# Patient Record
Sex: FEMALE | Race: WHITE | NOT HISPANIC OR LATINO | Employment: UNEMPLOYED | URBAN - METROPOLITAN AREA
[De-identification: names, ages, dates, MRNs, and addresses within clinical notes are randomized per-mention and may not be internally consistent; named-entity substitution may affect disease eponyms.]

---

## 2017-01-11 LAB — HUMAN PAPILLOMAVIRUS (HPV): NORMAL

## 2017-01-24 RX ORDER — ATORVASTATIN CALCIUM 10 MG/1
10 TABLET, FILM COATED ORAL DAILY
Qty: 90 TABLET | Refills: 3 | Status: SHIPPED | OUTPATIENT
Start: 2017-01-24 | End: 2017-01-30 | Stop reason: SDUPTHER

## 2017-01-24 RX ORDER — POTASSIUM CHLORIDE 750 MG/1
10 CAPSULE, EXTENDED RELEASE ORAL DAILY
Qty: 90 CAPSULE | Refills: 3 | Status: SHIPPED | OUTPATIENT
Start: 2017-01-24 | End: 2017-01-30 | Stop reason: SDUPTHER

## 2017-01-24 RX ORDER — FELODIPINE 5 MG/1
5 TABLET, EXTENDED RELEASE ORAL DAILY
Qty: 90 TABLET | Refills: 3 | Status: SHIPPED | OUTPATIENT
Start: 2017-01-24 | End: 2017-11-15 | Stop reason: SDUPTHER

## 2017-01-24 RX ORDER — LEVOTHYROXINE SODIUM 50 UG/1
50 TABLET ORAL DAILY
Qty: 90 TABLET | Refills: 3 | Status: SHIPPED | OUTPATIENT
Start: 2017-01-24 | End: 2017-11-15 | Stop reason: SDUPTHER

## 2017-01-24 RX ORDER — PANTOPRAZOLE SODIUM 40 MG/1
40 TABLET, DELAYED RELEASE ORAL DAILY
Qty: 90 TABLET | Refills: 3 | Status: SHIPPED | OUTPATIENT
Start: 2017-01-24 | End: 2017-09-18 | Stop reason: SDUPTHER

## 2017-01-25 ENCOUNTER — TELEPHONE (OUTPATIENT)
Dept: FAMILY MEDICINE | Facility: CLINIC | Age: 62
End: 2017-01-25

## 2017-01-26 RX ORDER — BUPROPION HYDROCHLORIDE 300 MG/1
300 TABLET ORAL DAILY
Qty: 90 TABLET | Refills: 1 | Status: SHIPPED | OUTPATIENT
Start: 2017-01-26 | End: 2017-11-15 | Stop reason: SDUPTHER

## 2017-01-30 RX ORDER — POTASSIUM CHLORIDE 750 MG/1
10 CAPSULE, EXTENDED RELEASE ORAL DAILY
Qty: 90 CAPSULE | Refills: 3 | Status: SHIPPED | OUTPATIENT
Start: 2017-01-30 | End: 2017-11-15 | Stop reason: SDUPTHER

## 2017-01-30 RX ORDER — ATORVASTATIN CALCIUM 10 MG/1
10 TABLET, FILM COATED ORAL DAILY
Qty: 90 TABLET | Refills: 3 | Status: SHIPPED | OUTPATIENT
Start: 2017-01-30 | End: 2017-03-09

## 2017-02-07 RX ORDER — ESCITALOPRAM OXALATE 20 MG/1
TABLET ORAL
Qty: 30 TABLET | Refills: 11 | Status: SHIPPED | OUTPATIENT
Start: 2017-02-07 | End: 2017-11-15 | Stop reason: SDUPTHER

## 2017-03-09 RX ORDER — HYDROCHLOROTHIAZIDE 25 MG/1
TABLET ORAL
Qty: 90 TABLET | Refills: 1 | Status: SHIPPED | OUTPATIENT
Start: 2017-03-09 | End: 2017-11-15 | Stop reason: SDUPTHER

## 2017-03-09 RX ORDER — ATORVASTATIN CALCIUM 10 MG/1
TABLET, FILM COATED ORAL
Qty: 90 TABLET | Refills: 1 | Status: SHIPPED | OUTPATIENT
Start: 2017-03-09 | End: 2017-11-15 | Stop reason: SDUPTHER

## 2017-03-14 RX ORDER — BUPROPION HYDROCHLORIDE 300 MG/1
TABLET ORAL
Qty: 90 TABLET | Refills: 2 | Status: SHIPPED | OUTPATIENT
Start: 2017-03-14 | End: 2017-11-15 | Stop reason: SDUPTHER

## 2017-03-21 RX ORDER — ESCITALOPRAM OXALATE 20 MG/1
20 TABLET ORAL DAILY
Qty: 90 TABLET | Refills: 2 | Status: SHIPPED | OUTPATIENT
Start: 2017-03-21 | End: 2017-09-04 | Stop reason: SDUPTHER

## 2017-06-07 RX ORDER — FELODIPINE 5 MG/1
5 TABLET, EXTENDED RELEASE ORAL DAILY
Qty: 90 TABLET | Refills: 3 | OUTPATIENT
Start: 2017-06-07

## 2017-06-08 RX ORDER — FELODIPINE 5 MG/1
TABLET, EXTENDED RELEASE ORAL
Qty: 90 TABLET | Refills: 1 | OUTPATIENT
Start: 2017-06-08

## 2017-09-04 RX ORDER — ESCITALOPRAM OXALATE 20 MG/1
TABLET ORAL
Qty: 90 TABLET | Refills: 0 | Status: SHIPPED | OUTPATIENT
Start: 2017-09-04 | End: 2017-11-15 | Stop reason: SDUPTHER

## 2017-09-18 RX ORDER — ESCITALOPRAM OXALATE 20 MG/1
TABLET ORAL
Qty: 90 TABLET | Refills: 2 | OUTPATIENT
Start: 2017-09-18

## 2017-09-18 RX ORDER — PANTOPRAZOLE SODIUM 40 MG/1
TABLET, DELAYED RELEASE ORAL
Qty: 90 TABLET | Refills: 3 | Status: SHIPPED | OUTPATIENT
Start: 2017-09-18 | End: 2018-08-26 | Stop reason: SDUPTHER

## 2017-11-15 ENCOUNTER — LAB VISIT (OUTPATIENT)
Dept: LAB | Facility: HOSPITAL | Age: 62
End: 2017-11-15
Attending: FAMILY MEDICINE
Payer: COMMERCIAL

## 2017-11-15 ENCOUNTER — OFFICE VISIT (OUTPATIENT)
Dept: FAMILY MEDICINE | Facility: CLINIC | Age: 62
End: 2017-11-15
Payer: COMMERCIAL

## 2017-11-15 VITALS
DIASTOLIC BLOOD PRESSURE: 68 MMHG | HEIGHT: 59 IN | WEIGHT: 141.81 LBS | TEMPERATURE: 99 F | HEART RATE: 66 BPM | SYSTOLIC BLOOD PRESSURE: 105 MMHG | BODY MASS INDEX: 28.59 KG/M2

## 2017-11-15 DIAGNOSIS — E03.9 HYPOTHYROIDISM, UNSPECIFIED TYPE: ICD-10-CM

## 2017-11-15 DIAGNOSIS — Z00.00 ROUTINE PHYSICAL EXAMINATION: ICD-10-CM

## 2017-11-15 DIAGNOSIS — E78.5 HYPERLIPIDEMIA, UNSPECIFIED HYPERLIPIDEMIA TYPE: ICD-10-CM

## 2017-11-15 DIAGNOSIS — F41.1 GENERALIZED ANXIETY DISORDER: ICD-10-CM

## 2017-11-15 DIAGNOSIS — Z00.00 ROUTINE PHYSICAL EXAMINATION: Primary | ICD-10-CM

## 2017-11-15 DIAGNOSIS — I10 ESSENTIAL HYPERTENSION: ICD-10-CM

## 2017-11-15 LAB
ALBUMIN SERPL BCP-MCNC: 3.9 G/DL
ALP SERPL-CCNC: 94 U/L
ALT SERPL W/O P-5'-P-CCNC: 23 U/L
ANION GAP SERPL CALC-SCNC: 6 MMOL/L
AST SERPL-CCNC: 21 U/L
BASOPHILS # BLD AUTO: 0 K/UL
BASOPHILS NFR BLD: 0 %
BILIRUB SERPL-MCNC: 0.4 MG/DL
BUN SERPL-MCNC: 20 MG/DL
CALCIUM SERPL-MCNC: 9.9 MG/DL
CHLORIDE SERPL-SCNC: 105 MMOL/L
CHOLEST SERPL-MCNC: 232 MG/DL
CHOLEST/HDLC SERPL: 3 {RATIO}
CO2 SERPL-SCNC: 31 MMOL/L
CREAT SERPL-MCNC: 0.9 MG/DL
DIFFERENTIAL METHOD: ABNORMAL
EOSINOPHIL # BLD AUTO: 0 K/UL
EOSINOPHIL NFR BLD: 0 %
ERYTHROCYTE [DISTWIDTH] IN BLOOD BY AUTOMATED COUNT: 13.1 %
EST. GFR  (AFRICAN AMERICAN): >60 ML/MIN/1.73 M^2
EST. GFR  (NON AFRICAN AMERICAN): >60 ML/MIN/1.73 M^2
GLUCOSE SERPL-MCNC: 95 MG/DL
HCT VFR BLD AUTO: 42.8 %
HDLC SERPL-MCNC: 77 MG/DL
HDLC SERPL: 33.2 %
HGB BLD-MCNC: 14.2 G/DL
IMM GRANULOCYTES # BLD AUTO: 0.03 K/UL
IMM GRANULOCYTES NFR BLD AUTO: 0.6 %
LDLC SERPL CALC-MCNC: 140 MG/DL
LYMPHOCYTES # BLD AUTO: 1.8 K/UL
LYMPHOCYTES NFR BLD: 36.3 %
MCH RBC QN AUTO: 29.5 PG
MCHC RBC AUTO-ENTMCNC: 33.2 G/DL
MCV RBC AUTO: 89 FL
MONOCYTES # BLD AUTO: 0.4 K/UL
MONOCYTES NFR BLD: 8.4 %
NEUTROPHILS # BLD AUTO: 2.8 K/UL
NEUTROPHILS NFR BLD: 54.7 %
NONHDLC SERPL-MCNC: 155 MG/DL
NRBC BLD-RTO: 0 /100 WBC
PLATELET # BLD AUTO: 223 K/UL
PMV BLD AUTO: 10.3 FL
POTASSIUM SERPL-SCNC: 3.9 MMOL/L
PROT SERPL-MCNC: 7.3 G/DL
RBC # BLD AUTO: 4.82 M/UL
SODIUM SERPL-SCNC: 142 MMOL/L
TRIGL SERPL-MCNC: 75 MG/DL
TSH SERPL DL<=0.005 MIU/L-ACNC: 1.31 UIU/ML
WBC # BLD AUTO: 5.02 K/UL

## 2017-11-15 PROCEDURE — 90472 IMMUNIZATION ADMIN EACH ADD: CPT | Mod: S$GLB,,, | Performed by: FAMILY MEDICINE

## 2017-11-15 PROCEDURE — 99396 PREV VISIT EST AGE 40-64: CPT | Mod: 25,S$GLB,, | Performed by: FAMILY MEDICINE

## 2017-11-15 PROCEDURE — 85025 COMPLETE CBC W/AUTO DIFF WBC: CPT

## 2017-11-15 PROCEDURE — 99999 PR PBB SHADOW E&M-EST. PATIENT-LVL IV: CPT | Mod: PBBFAC,,, | Performed by: FAMILY MEDICINE

## 2017-11-15 PROCEDURE — 90471 IMMUNIZATION ADMIN: CPT | Mod: S$GLB,,, | Performed by: FAMILY MEDICINE

## 2017-11-15 PROCEDURE — 80061 LIPID PANEL: CPT

## 2017-11-15 PROCEDURE — 80053 COMPREHEN METABOLIC PANEL: CPT

## 2017-11-15 PROCEDURE — 84443 ASSAY THYROID STIM HORMONE: CPT

## 2017-11-15 PROCEDURE — 90686 IIV4 VACC NO PRSV 0.5 ML IM: CPT | Mod: S$GLB,,, | Performed by: FAMILY MEDICINE

## 2017-11-15 PROCEDURE — 36415 COLL VENOUS BLD VENIPUNCTURE: CPT | Mod: PO

## 2017-11-15 PROCEDURE — 90736 HZV VACCINE LIVE SUBQ: CPT | Mod: S$GLB,,, | Performed by: FAMILY MEDICINE

## 2017-11-15 RX ORDER — ALPRAZOLAM 0.25 MG/1
0.25 TABLET ORAL 3 TIMES DAILY PRN
Qty: 30 TABLET | Refills: 0 | Status: SHIPPED | OUTPATIENT
Start: 2017-11-15 | End: 2019-01-23 | Stop reason: SDUPTHER

## 2017-11-15 RX ORDER — BUPROPION HYDROCHLORIDE 300 MG/1
300 TABLET ORAL DAILY
Qty: 90 TABLET | Refills: 3 | Status: SHIPPED | OUTPATIENT
Start: 2017-11-15 | End: 2018-08-26 | Stop reason: SDUPTHER

## 2017-11-15 RX ORDER — POTASSIUM CHLORIDE 750 MG/1
10 CAPSULE, EXTENDED RELEASE ORAL DAILY
Qty: 90 CAPSULE | Refills: 3 | Status: SHIPPED | OUTPATIENT
Start: 2017-11-15 | End: 2018-08-26 | Stop reason: SDUPTHER

## 2017-11-15 RX ORDER — HYDROCHLOROTHIAZIDE 25 MG/1
25 TABLET ORAL DAILY
Qty: 90 TABLET | Refills: 3 | Status: SHIPPED | OUTPATIENT
Start: 2017-11-15 | End: 2018-08-26 | Stop reason: SDUPTHER

## 2017-11-15 RX ORDER — ESCITALOPRAM OXALATE 20 MG/1
20 TABLET ORAL DAILY
Qty: 90 TABLET | Refills: 3 | Status: SHIPPED | OUTPATIENT
Start: 2017-11-15 | End: 2018-07-30 | Stop reason: SDUPTHER

## 2017-11-15 RX ORDER — ATORVASTATIN CALCIUM 10 MG/1
10 TABLET, FILM COATED ORAL DAILY
Qty: 90 TABLET | Refills: 3 | Status: SHIPPED | OUTPATIENT
Start: 2017-11-15 | End: 2018-08-26 | Stop reason: SDUPTHER

## 2017-11-15 RX ORDER — FELODIPINE 5 MG/1
5 TABLET, EXTENDED RELEASE ORAL DAILY
Qty: 90 TABLET | Refills: 3 | Status: SHIPPED | OUTPATIENT
Start: 2017-11-15 | End: 2018-01-26 | Stop reason: SDUPTHER

## 2017-11-15 RX ORDER — LEVOTHYROXINE SODIUM 50 UG/1
50 TABLET ORAL DAILY
Qty: 90 TABLET | Refills: 3 | Status: SHIPPED | OUTPATIENT
Start: 2017-11-15 | End: 2018-01-26 | Stop reason: SDUPTHER

## 2017-11-16 NOTE — PROGRESS NOTES
Patient presents routine physical exam.  Medical problems as below to include hypertension and hypothyroidism hyperlipidemia anxiety all controlled with current medication.    Past Medical History:  Past Medical History:   Diagnosis Date    Carpal tunnel syndrome of right wrist     Chronic gastritis     Dissociative fugue     Diverticulosis     Duodenitis     Dyspepsia     Generalized anxiety disorder     Hearing loss on left     Hearing loss on right     Helicobacter pylori (H. pylori)     History of blood transfusion     Hyperlipidemia     Hypertension     Hypothyroidism     IBS (irritable bowel syndrome)     Iron deficiency anemia     Mild obesity     Mild vitamin D deficiency     Paraesophageal hiatal hernia     Reflux gastritis     Sleep apnea     mild improved with wt. loss    Thyroid goiter     Urticaria, chronic      Past Surgical History:   Procedure Laterality Date    BLADDER SUSPENSION      pubovaginal sling     SECTION, CLASSIC      CHOLECYSTECTOMY      COLONOSCOPY  2011    Negative-repeat 10 years    ESOPHAGEAL DILATION      ESOPHAGOGASTRODUODENOSCOPY  2016    THYROIDECTOMY, PARTIAL       partial for benign nodule     Social History     Social History    Marital status:      Spouse name: N/A    Number of children: N/A    Years of education: N/A     Occupational History    Not on file.     Social History Main Topics    Smoking status: Never Smoker    Smokeless tobacco: Not on file    Alcohol use No    Drug use: No    Sexual activity: Not on file     Other Topics Concern    Not on file     Social History Narrative    No narrative on file     Family History   Problem Relation Age of Onset    Hypertension Mother     Heart disease Mother      Allergies   Allergen Reactions    Penicillins      Current Outpatient Prescriptions on File Prior to Visit   Medication Sig Dispense Refill    ferrous gluconate (FERGON) 324 MG tablet TAKE 1  TABLET BY MOUTH WITH BREAKFAST (Patient taking differently: TWICE WEEKLY) 30 tablet 5    pantoprazole (PROTONIX) 40 MG tablet TAKE 1 TABLET DAILY 90 tablet 3    [DISCONTINUED] atorvastatin (LIPITOR) 10 MG tablet TAKE 1 TABLET DAILY 90 tablet 1    [DISCONTINUED] buPROPion (WELLBUTRIN XL) 300 MG 24 hr tablet Take 1 tablet (300 mg total) by mouth once daily. 90 tablet 1    [DISCONTINUED] escitalopram oxalate (LEXAPRO) 20 MG tablet TAKE 1 TABLET ONCE DAILY 90 tablet 0    [DISCONTINUED] felodipine (PLENDIL) 5 MG 24 hr tablet Take 1 tablet (5 mg total) by mouth once daily. 90 tablet 3    [DISCONTINUED] hydrochlorothiazide (HYDRODIURIL) 25 MG tablet TAKE 1 TABLET DAILY 90 tablet 1    [DISCONTINUED] levothyroxine (SYNTHROID) 50 MCG tablet Take 1 tablet (50 mcg total) by mouth once daily. 90 tablet 3    [DISCONTINUED] potassium chloride (MICRO-K) 10 MEQ CpSR Take 1 capsule (10 mEq total) by mouth once daily. 90 capsule 3    senna (SENOKOT) 8.6 mg tablet Take 8.6 mg by mouth once daily.      [DISCONTINUED] buPROPion (WELLBUTRIN XL) 300 MG 24 hr tablet TAKE 1 TABLET DAILY 90 tablet 2    [DISCONTINUED] butalbital-acetaminophen-caffeine -40 mg (FIORICET, ESGIC) -40 mg per tablet Take 1 tablet by mouth daily as needed for Headaches. 30 tablet 0    [DISCONTINUED] escitalopram oxalate (LEXAPRO) 20 MG tablet TAKE 1 TABLET BY MOUTH ONCE DAILY. 30 tablet 11    [DISCONTINUED] multivitamin capsule Take 1 capsule by mouth once daily.       No current facility-administered medications on file prior to visit.            ROS:  GENERAL: No fever, chills.  She did lose some weight intentionally  HEENT: No headache .  Bilateral hearing aids.  No dysphagia  Eyes: No vision complaints  CHEST: Denies WATSON, cyanosis, wheezing, cough and sputum production.  CARDIOVASCULAR: Denies chest pain, PND, orthopnea or reduced exercise tolerance.  ABDOMEN: Appetite fine. Denies diarrhea, abdominal pain, hematemesis or blood in  "stool.  URINARY: No flank pain, dysuria or hematuria.  MUSCULOSKELETAL: No warmth swelling or tenderness of the joints  NEUROLOGIC: No focal weakness numbness or paresthesia  PSYCHIATRIC: Denies depression        OBJECTIVE:     Vitals:    11/15/17 0754   BP: 105/68   Pulse: 66   Temp: 98.6 °F (37 °C)   Weight: 64.3 kg (141 lb 12.8 oz)   Height: 4' 11" (1.499 m)     Wt Readings from Last 3 Encounters:   11/15/17 64.3 kg (141 lb 12.8 oz)   11/17/16 70 kg (154 lb 5.2 oz)   03/08/16 76.1 kg (167 lb 12.8 oz)     APPEARANCE: Well nourished, well developed, in no acute distress.    HEAD: Normocephalic.  Atraumatic.  No sinus tenderness.  EYES:   Right eye: Pupil reactive.  Conjunctiva clear.    Left eye: Pupil reactive.  Conjunctiva clear.    Both fundi:  Grossly normal to nondilated exam. EOMI.    EARS: TM's intact. Light reflex normal. No retraction or perforation.    NOSE:  clear.  MOUTH & THROAT:  No pharyngeal erythema or exudate. No lesions.  NECK: Supple. No bruits.  No JVD.  No cervical lymphadenopathy.  No thyromegaly.    CHEST: Breath sounds clear bilaterally.  Normal respiratory effort  CARDIOVASCULAR: Normal rate.  Regular rhythm.  No murmurs.  No rub.  No gallops.  ABDOMEN: Bowel sounds normal.  Soft.  No tenderness.  No organomegaly.  PERIPHERAL VASCULAR: No cyanosis.  No clubbing.  No edema.  NEUROLOGIC: No focal findings.  MENTAL STATUS: Alert.  Oriented x 3.          Cheyenne was seen today for annual exam.    Diagnoses and all orders for this visit:    Routine physical examination  -     CBC auto differential; Future  -     Comprehensive metabolic panel; Future  -     Lipid panel; Future  -     TSH; Future    Generalized anxiety disorder    Hyperlipidemia, unspecified hyperlipidemia type    Essential hypertension    Hypothyroidism, unspecified type    Other orders  -     Zoster Vaccine - Live  -     Influenza - Quadrivalent (3 years & older) (PF)  -     atorvastatin (LIPITOR) 10 MG tablet; Take 1 tablet (10 " mg total) by mouth once daily.  -     felodipine (PLENDIL) 5 MG 24 hr tablet; Take 1 tablet (5 mg total) by mouth once daily.  -     hydroCHLOROthiazide (HYDRODIURIL) 25 MG tablet; Take 1 tablet (25 mg total) by mouth once daily.  -     levothyroxine (SYNTHROID) 50 MCG tablet; Take 1 tablet (50 mcg total) by mouth once daily.  -     buPROPion (WELLBUTRIN XL) 300 MG 24 hr tablet; Take 1 tablet (300 mg total) by mouth once daily.  -     escitalopram oxalate (LEXAPRO) 20 MG tablet; Take 1 tablet (20 mg total) by mouth once daily.  -     potassium chloride (MICRO-K) 10 MEQ CpSR; Take 1 capsule (10 mEq total) by mouth once daily.  -     ALPRAZolam (XANAX) 0.25 MG tablet; Take 1 tablet (0.25 mg total) by mouth 3 (three) times daily as needed for Anxiety.      Anticipatory guidance: Don't smoke.  Healthy diet and regular exercise recommended.  Continue current medication

## 2018-01-23 RX ORDER — ESCITALOPRAM OXALATE 20 MG/1
TABLET ORAL
Qty: 90 TABLET | Refills: 3 | Status: SHIPPED | OUTPATIENT
Start: 2018-01-23 | End: 2018-06-27 | Stop reason: SDUPTHER

## 2018-01-26 RX ORDER — FELODIPINE 5 MG/1
5 TABLET, EXTENDED RELEASE ORAL DAILY
Qty: 90 TABLET | Refills: 3 | Status: SHIPPED | OUTPATIENT
Start: 2018-01-26 | End: 2018-12-10 | Stop reason: SDUPTHER

## 2018-01-26 RX ORDER — LEVOTHYROXINE SODIUM 50 UG/1
50 TABLET ORAL DAILY
Qty: 90 TABLET | Refills: 3 | Status: SHIPPED | OUTPATIENT
Start: 2018-01-26 | End: 2018-11-07 | Stop reason: SDUPTHER

## 2018-02-01 ENCOUNTER — TELEPHONE (OUTPATIENT)
Dept: FAMILY MEDICINE | Facility: CLINIC | Age: 63
End: 2018-02-01

## 2018-02-01 NOTE — TELEPHONE ENCOUNTER
----- Message from Gabriella Patel sent at 2/1/2018  3:40 PM CST -----  Contact: [facility]  Request a call concerning a medication refill, no additional info given, can be reached at [phone]///[email or web address]

## 2018-02-01 NOTE — TELEPHONE ENCOUNTER
Cancel rx at [facility] and verbally sent rx to [facility].

## 2018-04-15 RX ORDER — ESCITALOPRAM OXALATE 20 MG/1
TABLET ORAL
Qty: 90 TABLET | Refills: 1 | Status: SHIPPED | OUTPATIENT
Start: 2018-04-15 | End: 2018-12-03 | Stop reason: SDUPTHER

## 2018-06-27 ENCOUNTER — TELEPHONE (OUTPATIENT)
Dept: GASTROENTEROLOGY | Facility: CLINIC | Age: 63
End: 2018-06-27

## 2018-06-27 ENCOUNTER — OFFICE VISIT (OUTPATIENT)
Dept: FAMILY MEDICINE | Facility: CLINIC | Age: 63
End: 2018-06-27
Payer: COMMERCIAL

## 2018-06-27 ENCOUNTER — HOSPITAL ENCOUNTER (OUTPATIENT)
Dept: RADIOLOGY | Facility: HOSPITAL | Age: 63
Discharge: HOME | End: 2018-06-27
Attending: FAMILY MEDICINE
Payer: COMMERCIAL

## 2018-06-27 VITALS
OXYGEN SATURATION: 97 % | DIASTOLIC BLOOD PRESSURE: 84 MMHG | WEIGHT: 155.19 LBS | BODY MASS INDEX: 31.28 KG/M2 | HEIGHT: 59 IN | HEART RATE: 77 BPM | SYSTOLIC BLOOD PRESSURE: 126 MMHG | TEMPERATURE: 99 F

## 2018-06-27 DIAGNOSIS — E03.9 HYPOTHYROIDISM, UNSPECIFIED TYPE: ICD-10-CM

## 2018-06-27 DIAGNOSIS — K29.50 CHRONIC GASTRITIS, PRESENCE OF BLEEDING UNSPECIFIED, UNSPECIFIED GASTRITIS TYPE: ICD-10-CM

## 2018-06-27 DIAGNOSIS — K62.5 RECTAL BLEEDING: ICD-10-CM

## 2018-06-27 DIAGNOSIS — R06.09 DOE (DYSPNEA ON EXERTION): Primary | ICD-10-CM

## 2018-06-27 DIAGNOSIS — R06.09 DOE (DYSPNEA ON EXERTION): ICD-10-CM

## 2018-06-27 DIAGNOSIS — R00.2 PALPITATION: ICD-10-CM

## 2018-06-27 DIAGNOSIS — R13.10 DYSPHAGIA, UNSPECIFIED TYPE: ICD-10-CM

## 2018-06-27 DIAGNOSIS — Z86.2 HISTORY OF IRON DEFICIENCY ANEMIA: ICD-10-CM

## 2018-06-27 PROCEDURE — 99999 PR PBB SHADOW E&M-EST. PATIENT-LVL III: CPT | Mod: PBBFAC,,, | Performed by: FAMILY MEDICINE

## 2018-06-27 PROCEDURE — 71046 X-RAY EXAM CHEST 2 VIEWS: CPT | Mod: TC,PO

## 2018-06-27 PROCEDURE — 3008F BODY MASS INDEX DOCD: CPT | Mod: CPTII,S$GLB,, | Performed by: FAMILY MEDICINE

## 2018-06-27 PROCEDURE — 93005 ELECTROCARDIOGRAM TRACING: CPT | Mod: S$GLB,,, | Performed by: FAMILY MEDICINE

## 2018-06-27 PROCEDURE — 3079F DIAST BP 80-89 MM HG: CPT | Mod: CPTII,S$GLB,, | Performed by: FAMILY MEDICINE

## 2018-06-27 PROCEDURE — 93010 ELECTROCARDIOGRAM REPORT: CPT | Mod: S$GLB,,, | Performed by: INTERNAL MEDICINE

## 2018-06-27 PROCEDURE — 99214 OFFICE O/P EST MOD 30 MIN: CPT | Mod: S$GLB,,, | Performed by: FAMILY MEDICINE

## 2018-06-27 PROCEDURE — 3074F SYST BP LT 130 MM HG: CPT | Mod: CPTII,S$GLB,, | Performed by: FAMILY MEDICINE

## 2018-06-27 PROCEDURE — 71046 X-RAY EXAM CHEST 2 VIEWS: CPT | Mod: 26,,, | Performed by: RADIOLOGY

## 2018-06-27 NOTE — PROGRESS NOTES
Patient presents with a complaint of some restlessness in the legs during the past month or 2.  During the past 4 days she has noted some mild dyspnea on exertion.  Occasional headache.  She states she had similar symptoms when she had significant anemia 2 years ago which required transfusion.  At that time she had evidence of chronic gastritis on upper endoscopy, but apparently no active bleeding.  She does state that she saw small amount of blood when she had a hard bowel movement last month, but this has not recurred.  She was taking iron supplements, but only twice a week due to constipation and more recently had not been taking it at all.  She has noted some mild dysphagia which she has had in the past.  This mainly occurs with meat.  She denies any upper respiratory symptoms or cough.  No chest pain. Occasionally has a slight fluttering sensation at the chest.  No orthopnea or PND.  Not using NSAIDs    Past Medical History:  Past Medical History:   Diagnosis Date    Carpal tunnel syndrome of right wrist     Chronic gastritis     Dissociative fugue     Diverticulosis     Duodenitis     Dyspepsia     Generalized anxiety disorder     Hearing loss on left     Hearing loss on right     Helicobacter pylori (H. pylori)     History of blood transfusion     Hyperlipidemia     Hypertension     Hypothyroidism     IBS (irritable bowel syndrome)     Iron deficiency anemia     Mild obesity     Mild vitamin D deficiency     Paraesophageal hiatal hernia     Reflux gastritis     Sleep apnea     mild improved with wt. loss    Thyroid goiter     Urticaria, chronic      Past Surgical History:   Procedure Laterality Date    BLADDER SUSPENSION      pubovaginal sling     SECTION, CLASSIC      CHOLECYSTECTOMY      COLONOSCOPY  2011    Negative-repeat 10 years    ESOPHAGEAL DILATION      ESOPHAGOGASTRODUODENOSCOPY  2016    THYROIDECTOMY, PARTIAL       partial for benign nodule      Social History     Social History    Marital status:      Spouse name: N/A    Number of children: N/A    Years of education: N/A     Occupational History    Not on file.     Social History Main Topics    Smoking status: Never Smoker    Smokeless tobacco: Not on file    Alcohol use No    Drug use: No    Sexual activity: Not on file     Other Topics Concern    Not on file     Social History Narrative    No narrative on file     Family History   Problem Relation Age of Onset    Hypertension Mother     Heart disease Mother      Review of patient's allergies indicates:   Allergen Reactions    Penicillins      Current Outpatient Prescriptions on File Prior to Visit   Medication Sig Dispense Refill    ALPRAZolam (XANAX) 0.25 MG tablet Take 1 tablet (0.25 mg total) by mouth 3 (three) times daily as needed for Anxiety. 30 tablet 0    atorvastatin (LIPITOR) 10 MG tablet Take 1 tablet (10 mg total) by mouth once daily. 90 tablet 3    buPROPion (WELLBUTRIN XL) 300 MG 24 hr tablet Take 1 tablet (300 mg total) by mouth once daily. 90 tablet 3    CALCIUM CARBONATE/VITAMIN D3 (CALTRATE 600 + D ORAL) Take by mouth 2 (two) times daily.      escitalopram oxalate (LEXAPRO) 20 MG tablet Take 1 tablet (20 mg total) by mouth once daily. 90 tablet 3    escitalopram oxalate (LEXAPRO) 20 MG tablet TAKE 1 TABLET ONCE DAILY 90 tablet 1    felodipine (PLENDIL) 5 MG 24 hr tablet Take 1 tablet (5 mg total) by mouth once daily. 90 tablet 3    ferrous gluconate (FERGON) 324 MG tablet TAKE 1 TABLET BY MOUTH WITH BREAKFAST (Patient taking differently: TWICE WEEKLY) 30 tablet 5    hydroCHLOROthiazide (HYDRODIURIL) 25 MG tablet Take 1 tablet (25 mg total) by mouth once daily. 90 tablet 3    levothyroxine (SYNTHROID) 50 MCG tablet Take 1 tablet (50 mcg total) by mouth once daily. 90 tablet 3    pantoprazole (PROTONIX) 40 MG tablet TAKE 1 TABLET DAILY 90 tablet 3    potassium chloride (MICRO-K) 10 MEQ CpSR Take 1  "capsule (10 mEq total) by mouth once daily. 90 capsule 3    senna (SENOKOT) 8.6 mg tablet Take 8.6 mg by mouth once daily.      VIT A/VIT C/VIT E/ZINC/COPPER (PRESERVISION AREDS ORAL) Take by mouth once daily.      OMEGA-3 FATTY ACIDS-EPA ORAL Take by mouth once daily.      [DISCONTINUED] escitalopram oxalate (LEXAPRO) 20 MG tablet TAKE 1 TABLET ONCE DAILY 90 tablet 3     No current facility-administered medications on file prior to visit.        ROS:  GENERAL: No fever, chills,  or significant weight changes.   CARDIOVASCULAR: Denies chest pain, PND, orthopnea .  ABDOMEN: Appetite fine. Denies diarrhea, abdominal pain, hematemesis.  URINARY: No flank pain, dysuria or hematuria.      OBJECTIVE:     Vitals:    06/27/18 0822   BP: 126/84   Pulse: 77   Temp: 99 °F (37.2 °C)   SpO2: 97%   Weight: 70.4 kg (155 lb 3.2 oz)   Height: 4' 11" (1.499 m)     Wt Readings from Last 3 Encounters:   06/27/18 70.4 kg (155 lb 3.2 oz)   11/15/17 64.3 kg (141 lb 12.8 oz)   11/17/16 70 kg (154 lb 5.2 oz)     APPEARANCE: Well nourished, well developed, in no acute distress.    HEAD: Normocephalic.  Atraumatic.  No sinus tenderness.  EYES:   Right eye: Pupil reactive.  Conjunctiva clear.    Left eye: Pupil reactive.  Conjunctiva clear.    Both fundi:  Grossly normal to nondilated exam. EOMI.    EARS: TM's intact. Light reflex normal. No retraction or perforation.    NOSE:  clear.  MOUTH & THROAT:  No pharyngeal erythema or exudate. No lesions.  NECK: Supple. No bruits.  No JVD.  No cervical lymphadenopathy.  No thyromegaly.    CHEST: Breath sounds clear bilaterally.  Normal respiratory effort  CARDIOVASCULAR: Normal rate.  Regular rhythm.  No murmurs.  No rub.  No gallops.  ABDOMEN: Bowel sounds normal.  Soft.  No tenderness.  No organomegaly.  PERIPHERAL VASCULAR: No cyanosis.  No clubbing.  No edema.  NEUROLOGIC: No focal findings.  MENTAL STATUS: Alert.  Oriented x 3.    Cheyenne was seen today for shortness of breath.    Diagnoses " and all orders for this visit:    WATSON (dyspnea on exertion)  -     CBC auto differential; Future  -     Ferritin; Future  -     Iron and TIBC; Future  -     X-Ray Chest PA And Lateral; Future    Palpitation  -     EKG 12-lead  -     Basic metabolic panel; Future    History of iron deficiency anemia  -     CBC auto differential; Future  -     Ferritin; Future  -     Iron and TIBC; Future    Chronic gastritis, presence of bleeding unspecified, unspecified gastritis type  -     Ambulatory referral to Gastroenterology    Dysphagia, unspecified type  -     Ambulatory referral to Gastroenterology    Hypothyroidism, unspecified type  -     TSH; Future  -     Basic metabolic panel; Future    Rectal bleeding  -     Ambulatory referral to Gastroenterology      Further evaluation pending above.  Follow-up as needed if symptoms worsen or ER if needed.

## 2018-06-27 NOTE — TELEPHONE ENCOUNTER
----- Message from Otilia Red sent at 6/27/2018  9:17 AM CDT -----  Contact: self  cell [phone]  Not getting an available appointment with anyone in gastro.  Pt needs to be seen for:  Chronic gastritis, presence of bleeding unspecified, unspecified gastritis type [K29.50]  Dysphagia, unspecified type [R13.10]  Rectal bleeding [K62.5]    Can anyone see this patient for a consult.  Please call and advise

## 2018-06-28 ENCOUNTER — TELEPHONE (OUTPATIENT)
Dept: FAMILY MEDICINE | Facility: CLINIC | Age: 63
End: 2018-06-28

## 2018-06-28 DIAGNOSIS — D50.9 IRON DEFICIENCY ANEMIA, UNSPECIFIED IRON DEFICIENCY ANEMIA TYPE: Primary | ICD-10-CM

## 2018-06-28 DIAGNOSIS — R06.09 DOE (DYSPNEA ON EXERTION): ICD-10-CM

## 2018-06-28 NOTE — TELEPHONE ENCOUNTER
MD TAMIA Cardoso Staff             Blood test does show mild iron deficiency anemia however anemia is very mild and not enough to cause any problems breathing.  This could contribute however to restless legs.  Recommend taking iron once a day.  Follow up GI as discussed at appointment.  Recommend schedule a stress echocardiogram to rule out heart issue.  Repeat CBC, iron, TIBC, ferritin in 3 months.   My nurse will contact you to arrange.   Thanks,   Dr. Buck

## 2018-07-26 ENCOUNTER — TELEPHONE (OUTPATIENT)
Dept: CARDIOLOGY | Facility: CLINIC | Age: 63
End: 2018-07-26

## 2018-07-26 NOTE — TELEPHONE ENCOUNTER
I contacted patient and explained the [facility] was still not available to perform her stress echo today.I offered to schedule at [place] or [place].She wanted to reschedule here after next week due to vacation.Appointment rescheduled for patient and she agreed.

## 2018-07-30 ENCOUNTER — HOSPITAL ENCOUNTER (OUTPATIENT)
Dept: RADIOLOGY | Facility: HOSPITAL | Age: 63
Discharge: HOME | End: 2018-07-30
Attending: NURSE PRACTITIONER
Payer: COMMERCIAL

## 2018-07-30 ENCOUNTER — INITIAL CONSULT (OUTPATIENT)
Dept: GASTROENTEROLOGY | Facility: CLINIC | Age: 63
End: 2018-07-30
Payer: COMMERCIAL

## 2018-07-30 VITALS
SYSTOLIC BLOOD PRESSURE: 152 MMHG | HEART RATE: 69 BPM | WEIGHT: 158.94 LBS | HEIGHT: 59 IN | BODY MASS INDEX: 32.04 KG/M2 | DIASTOLIC BLOOD PRESSURE: 95 MMHG

## 2018-07-30 DIAGNOSIS — D50.9 IRON DEFICIENCY ANEMIA, UNSPECIFIED IRON DEFICIENCY ANEMIA TYPE: Primary | ICD-10-CM

## 2018-07-30 DIAGNOSIS — Z87.898 HISTORY OF SHORTNESS OF BREATH: ICD-10-CM

## 2018-07-30 DIAGNOSIS — Z86.19 HISTORY OF HELICOBACTER PYLORI INFECTION: ICD-10-CM

## 2018-07-30 DIAGNOSIS — R13.14 PHARYNGOESOPHAGEAL DYSPHAGIA: ICD-10-CM

## 2018-07-30 DIAGNOSIS — R10.13 EPIGASTRIC PAIN: ICD-10-CM

## 2018-07-30 DIAGNOSIS — K59.00 CONSTIPATION, UNSPECIFIED CONSTIPATION TYPE: ICD-10-CM

## 2018-07-30 DIAGNOSIS — R12 HEARTBURN: ICD-10-CM

## 2018-07-30 DIAGNOSIS — Z87.19 HISTORY OF RECTAL BLEEDING: ICD-10-CM

## 2018-07-30 DIAGNOSIS — K44.9 HIATAL HERNIA: ICD-10-CM

## 2018-07-30 DIAGNOSIS — Z87.19 HISTORY OF DIVERTICULOSIS: ICD-10-CM

## 2018-07-30 PROCEDURE — 74019 RADEX ABDOMEN 2 VIEWS: CPT | Mod: 26,,, | Performed by: RADIOLOGY

## 2018-07-30 PROCEDURE — 99243 OFF/OP CNSLTJ NEW/EST LOW 30: CPT | Mod: S$GLB,,, | Performed by: NURSE PRACTITIONER

## 2018-07-30 PROCEDURE — 74019 RADEX ABDOMEN 2 VIEWS: CPT | Mod: TC,FY,PO

## 2018-07-30 PROCEDURE — 99999 PR PBB SHADOW E&M-EST. PATIENT-LVL V: CPT | Mod: PBBFAC,,, | Performed by: NURSE PRACTITIONER

## 2018-07-30 RX ORDER — POLYETHYLENE GLYCOL 3350 17 G/17G
17 POWDER, FOR SOLUTION ORAL DAILY
Qty: 510 G | Refills: 2 | Status: SHIPPED | OUTPATIENT
Start: 2018-07-30 | End: 2018-08-29

## 2018-07-30 NOTE — PROGRESS NOTES
Subjective:       Patient ID: Cheyenne West is a 63 y.o. female Body mass index is 32.1 kg/m².    Chief Complaint: Anemia    This patient is new to me.  Referring Provider: Dr. Buck for rectal bleeding, chronic gastritis and dysphagia    GI Problem   The primary symptoms include abdominal pain, nausea (frequent) and melena (black stool since on iron supplement). Primary symptoms do not include fever, weight loss, fatigue, vomiting, diarrhea, hematemesis, hematochezia (history of bright red rectal bleeding on toilet paper of small amount a few months ago; denies any since then) or dysuria.   The abdominal pain is located in the epigastric region (occurs daily to once every few weeks, described as sharp). The severity of the abdominal pain is 0/10 (currently). Relieved by: ice cream.   The illness is also significant for dysphagia (recurred since 5/2018 with meat mostly; relieved in the past with EGD with dilation; denies problems with liquids or pills), odynophagia and constipation (since restarting iron supplement; bowel movements once-twice a week; dulcolax prn once a week, senna prn; PAST: colace no relief). The illness does not include chills. Associated symptoms comments: History of chronic anemia and was evaluated in the past by Dr. Whalen for it.. Significant associated medical issues include GERD (protonix 40 mg once daily (long term use); reflux only noted with episodes of dysphagia otherwise denies heartburn; controlled with PPI; PAST: zantac), gallstones, irritable bowel syndrome and hemorrhoids. Associated medical issues do not include inflammatory bowel disease, PUD or diverticulitis (positive history for diverticulosis).     Review of Systems   Constitutional: Negative for appetite change, chills, fatigue, fever, unexpected weight change and weight loss.   HENT: Positive for trouble swallowing. Negative for sore throat.    Respiratory: Positive for shortness of breath (occasional, seeing PCP  for it; scheduled for stress test, denies currently). Negative for cough and choking.    Cardiovascular: Negative for chest pain.   Gastrointestinal: Positive for abdominal pain, constipation (since restarting iron supplement; bowel movements once-twice a week; dulcolax prn once a week, senna prn; PAST: colace no relief), dysphagia (recurred since 2018 with meat mostly; relieved in the past with EGD with dilation; denies problems with liquids or pills), melena (black stool since on iron supplement) and nausea (frequent). Negative for anal bleeding, blood in stool, diarrhea, hematemesis, hematochezia (history of bright red rectal bleeding on toilet paper of small amount a few months ago; denies any since then), rectal pain and vomiting.   Genitourinary: Negative for difficulty urinating, dysuria and flank pain.   Neurological: Negative for weakness.       Past Medical History:   Diagnosis Date    Carpal tunnel syndrome of right wrist     Chronic gastritis     Dissociative fugue     Diverticulosis     Duodenitis     Dyspepsia     Generalized anxiety disorder     Hearing loss on left     Hearing loss on right     Helicobacter pylori (H. pylori)     History of blood transfusion     Hyperlipidemia     Hypertension     Hypothyroidism     IBS (irritable bowel syndrome)     Iron deficiency anemia     Mild obesity     Mild vitamin D deficiency     Paraesophageal hiatal hernia     Reflux gastritis     Sleep apnea     mild improved with wt. loss    Thyroid goiter     Urticaria, chronic      Past Surgical History:   Procedure Laterality Date    BLADDER SUSPENSION      pubovaginal sling     SECTION, CLASSIC      CHOLECYSTECTOMY      COLONOSCOPY  2011    Dr. Goode, in legacy:diverticulosis, hemorrhoids, melanosis coli-repeat 10 years    ESOPHAGEAL DILATION      ESOPHAGOGASTRODUODENOSCOPY  2016    THYROIDECTOMY, PARTIAL       partial for benign nodule    UPPER  GASTROINTESTINAL ENDOSCOPY  03/04/2016    Dr. Whalen, in care everywhere     Family History   Problem Relation Age of Onset    Hypertension Mother     Heart disease Mother     Ulcers Mother     GI problems Mother         colitis    Colon cancer Neg Hx     Crohn's disease Neg Hx     Colon polyps Neg Hx     Ulcerative colitis Neg Hx     Stomach cancer Neg Hx     Esophageal cancer Neg Hx      Wt Readings from Last 10 Encounters:   07/30/18 72.1 kg (158 lb 15.2 oz)   06/27/18 70.4 kg (155 lb 3.2 oz)   11/15/17 64.3 kg (141 lb 12.8 oz)   11/17/16 70 kg (154 lb 5.2 oz)   03/08/16 76.1 kg (167 lb 12.8 oz)   10/15/15 75.9 kg (167 lb 6.4 oz)   02/04/15 68 kg (150 lb)   09/24/14 68.5 kg (151 lb)   11/22/13 78.9 kg (174 lb)   07/23/13 79.4 kg (175 lb)     Lab Results   Component Value Date    WBC 5.44 06/27/2018    HGB 11.7 (L) 06/27/2018    HCT 38.6 06/27/2018    MCV 86 06/27/2018     06/27/2018     Lab Results   Component Value Date    IRON 44 06/27/2018    TIBC 515 (H) 06/27/2018    FERRITIN 16 (L) 06/27/2018     CMP  Sodium   Date Value Ref Range Status   06/27/2018 142 136 - 145 mmol/L Final     Potassium   Date Value Ref Range Status   06/27/2018 4.3 3.5 - 5.1 mmol/L Final     Chloride   Date Value Ref Range Status   06/27/2018 105 95 - 110 mmol/L Final     CO2   Date Value Ref Range Status   06/27/2018 29 23 - 29 mmol/L Final     Glucose   Date Value Ref Range Status   06/27/2018 96 70 - 110 mg/dL Final     BUN, Bld   Date Value Ref Range Status   06/27/2018 14 8 - 23 mg/dL Final     Creatinine   Date Value Ref Range Status   06/27/2018 0.8 0.5 - 1.4 mg/dL Final     Calcium   Date Value Ref Range Status   06/27/2018 9.9 8.7 - 10.5 mg/dL Final     Total Protein   Date Value Ref Range Status   11/15/2017 7.3 6.0 - 8.4 g/dL Final     Albumin   Date Value Ref Range Status   11/15/2017 3.9 3.5 - 5.2 g/dL Final     Total Bilirubin   Date Value Ref Range Status   11/15/2017 0.4 0.1 - 1.0 mg/dL Final     Comment:  "    For infants and newborns, interpretation of results should be based  on gestational age, weight and in agreement with clinical  observations.  Premature Infant recommended reference ranges:  Up to 24 hours.............<8.0 mg/dL  Up to 48 hours............<12.0 mg/dL  3-5 days..................<15.0 mg/dL  6-29 days.................<15.0 mg/dL       Alkaline Phosphatase   Date Value Ref Range Status   11/15/2017 94 55 - 135 U/L Final     AST   Date Value Ref Range Status   11/15/2017 21 10 - 40 U/L Final     ALT   Date Value Ref Range Status   11/15/2017 23 10 - 44 U/L Final     Anion Gap   Date Value Ref Range Status   06/27/2018 8 8 - 16 mmol/L Final     eGFR if    Date Value Ref Range Status   06/27/2018 >60.0 >60 mL/min/1.73 m^2 Final     eGFR if non    Date Value Ref Range Status   06/27/2018 >60.0 >60 mL/min/1.73 m^2 Final     Comment:     Calculation used to obtain the estimated glomerular filtration  rate (eGFR) is the CKD-EPI equation.        Lab Results   Component Value Date    TSH 1.653 06/27/2018     Reviewed prior medical records including radiology report of 5/18/11 ct abdomen pelvis & endoscopy history (see surgical history).    3/2/16 ct abdomen pelvis in care everywhere was reviewed and radiology report states:  " ABDOMEN FINDINGS: The visualized lung bases are clear.    There is a large para esophageal hiatal hernia present.  The liver, spleen, bilateral adrenal glands, and pancreas are unremarkable.  Bilateral kidneys are normal in size and configuration. There is no hydronephrosis bilaterally.  The patient is status post cholecystectomy.  The gastrointestinal tract is partially opacified with oral contrast. There is no inflammation or obstruction of the small or large bowel. There is abundant fluid within the colon. There is no free air or free fluid within the abdomen or pelvis. The   appendix is unremarkable.  The uterus and bilateral adnexa are " "unremarkable. The bladder is unremarkable as well.   IMPRESSION:   1.  Large paraesophageal hernia "    3/4/16 EGD in care everywhere was reviewed and procedure report states:   " DATE OF PROCEDURE: 3/4/2016  LOCATION: 4113.  PROCEDURE: EGD with biopsy.  ATTENDING PHYSICIAN: Dr. Mata.  PREOPERATIVE DIAGNOSIS: Anemia.  POSTOPERATIVE DIAGNOSIS: Mild antral gastritis.  MEDICATIONS: MAC.  PROCEDURE NOTE: After appropriate informed consent, the patient was placed in  the left lateral decubitus position, hooked up to the Datascope and pulse   oximetry monitor, and premedicated. The Olympus video gastroscope was   advanced through the esophagus into the second portion of the duodenum under   direct visualization and without difficulty. The patient tolerated the   procedure well.  FINDINGS:  1. Esophagus: Normal.  2. Cardia, fundus, and body: Normal.  3. Antrum: Diffuse Erythema, mild friability. Biopsies were taken.  4. Pylorus: Normal.  5. Duodenum: The bulb and the C-loop were normal.  ASSESSMENT: Gastritis.  RECOMMENDATIONS: Continue medications. Check biopsies. Monitor labs. Will   follow with you.  Álvaro Whalen MD".  Biopsy results:   "FINAL DIAGNOSIS:  Stomach, antrum, biopsies:   -Mild chronic gastritis.   -No intestinal metaplasia, dysplasia or malignancy identified.   -No H. pylori-like organisms identified on Giemsa stain; Giemsa control is appropriate."  Objective:      Physical Exam   Constitutional: She is oriented to person, place, and time. She appears well-developed and well-nourished. No distress.   HENT:   Mouth/Throat: Oropharynx is clear and moist and mucous membranes are normal. No oral lesions. No oropharyngeal exudate.   Eyes: Conjunctivae are normal. Pupils are equal, round, and reactive to light. No scleral icterus.   Cardiovascular: Normal rate.    Pulmonary/Chest: Effort normal and breath sounds normal. No respiratory distress. She has no wheezes.   Abdominal: Soft. Normal appearance " and bowel sounds are normal. She exhibits no distension, no abdominal bruit and no mass. There is no hepatosplenomegaly. There is tenderness (mild) in the epigastric area. There is no rigidity, no rebound, no guarding, no tenderness at McBurney's point and negative Contreras's sign. No hernia.   Well-healed surgical scars noted. deferred rectal exam.   Neurological: She is alert and oriented to person, place, and time.   Skin: Skin is warm and dry. No rash noted. She is not diaphoretic. No erythema. No pallor.   Non-jaundiced   Psychiatric: She has a normal mood and affect. Her behavior is normal. Judgment and thought content normal.   Nursing note and vitals reviewed.      Assessment:       1. Iron deficiency anemia, unspecified iron deficiency anemia type    2. History of rectal bleeding    3. Heartburn    4. Hiatal hernia    5. Pharyngoesophageal dysphagia    6. Epigastric pain    7. History of diverticulosis    8. History of Helicobacter pylori infection    9. Constipation, unspecified constipation type    10. History of shortness of breath        Plan:       Iron deficiency anemia, unspecified iron deficiency anemia type  - schedule EGD, discussed procedure with patient, patient verbalized understanding  - schedule Colonoscopy, discussed procedure with the patient, patient verbalized understanding  - discussed with patient the different ways that anemia occurs: blood loss (such as from the gi tract), the body is not making enough, or the body is breaking down the rbcs too quickly; recommend EGD and colonoscopy to further evaluate gi tract for possible blood loss and pending results of endoscopies, possible UGI with Small Bowel Follow Through/video capsule study  -follow-up with PCP and/or hematology for continued evaluation and management    History of rectal bleeding  - discussed about different etiologies that can cause rectal bleeding, such as diverticulosis, polyps, colon inflammation or infection, anal  fissure or hemorrhoids.   - schedule Colonoscopy, discussed procedure with the patient, verbalized understanding   - You may resume normal activity as long as you feel well.  - Avoid/minimize aspirin and anti-inflammatory drugs such as ibuprofen (Advil, Motrin) and naproxen (Aleve and Naprosyn).  - Avoid alcohol.    Heartburn  - CONTINUE PROTONIX 40 MG ONCE DAILY AS DIRECTED, take in the morning 30-60 minutes before breakfast, discussed about possible long term use of medication (prefer to use lowest effective dose or discontinuing if possible) and discussed the risks & benefits with taking a reflux medication long term, and to take OTC calcium and vitamin d supplements as directed (such as Citracal +D), pt verbalized understanding  -discussed about the different types of medications used to treat reflux and how to use them, antacids can be used PRN for breakthrough heartburn symptoms by reducing stomach acid that is already produced, H2 blockers (zantac) work by limiting the amount acid production, & PPI's work to block acid production and are taken daily, patient verbalized understanding.  -Educated patient on lifestyle modifications to help control/reduce reflux/abdominal pain including: avoid large meals, avoid eating within 2-3 hours of bedtime (avoid late night eating & lying down soon after eating), elevate head of bed if nocturnal symptoms are present, smoking cessation (if current smoker), & weight loss (if overweight).   -Educated to avoid known foods which trigger reflux symptoms & to minimize/avoid high-fat foods, chocolate, caffeine, citrus, alcohol, & tomato products.  -Advised to avoid/limit use of NSAID's, since they can cause GI upset, bleeding, and/or ulcers. If needed, take with food.   - schedule EGD, discussed procedure with patient, patient verbalized understanding    Hiatal hernia  - schedule EGD, discussed procedure with patient, patient verbalized understanding  -discussed diagnosis with  patient & that it is usually managed by controlling reflux symptoms, surgery is an option, but usually performed if reflux is uncontrolled by medication management and lifestyle/dietary modifications; if symptoms persist despite medication management and lifestyle/dietary modifications, we can refer to general surgery to consult about surgical options, patient verbalized understanding    Pharyngoesophageal dysphagia  - schedule EGD, discussed procedure with patient and possible esophageal dilation may be performed during procedure if indicated, patient verbalized understanding  - educated patient to eat smaller more frequent meals and to eat slowly and advised to eat a soft diet.  - possible UGI/esophagram/esophageal manometry if symptoms persist    Epigastric pain  -     X-Ray Abdomen Flat And Erect; Future; Expected date: 07/30/2018  -     US Abdomen Complete; Future; Expected date: 07/30/2018  -     Hepatic function panel; Future; Expected date: 07/30/2018  -     Lipase; Future; Expected date: 07/30/2018  -     Amylase; Future; Expected date: 07/30/2018    History of diverticulosis  - schedule Colonoscopy, discussed procedure with the patient, verbalized understanding  - discussed the diagnosis of diverticulosis and diverticulitis, & to prevent diverticulitis, high fiber diet is recommended.  -Recommended high fiber diet after episode has completely resolved. Recommended daily exercise, adequate water intake (six 8-oz glasses of water daily), and high fiber diet. OTC fiber supplements are recommended if diet does not reach daily fiber goal (25 grams daily), such as Metamucil, Citrucel, or FiberCon (take as directed, separate from other oral medications by >2 hours).  - Advised to avoid/minimize popcorn, corn, seeds, and nuts.    History of Helicobacter pylori infection  - schedule EGD, discussed procedure with patient, patient verbalized understanding    Constipation, unspecified constipation type  -     X-Ray  Abdomen Flat And Erect; Future; Expected date: 07/30/2018  -  START   polyethylene glycol (GLYCOLAX) 17 gram/dose powder; Take 17 g by mouth once daily.  Dispense: 510 g; Refill: 2  Recommended daily exercise as tolerated, adequate water intake (six 8-oz glasses of water daily), and high fiber diet. OTC fiber supplements are recommended if diet does not reach daily fiber goal (25 grams daily), such as Metamucil, Citrucel, or FiberCon (take as directed, separate from other oral medications by >2 hours).  -Recommend taking an OTC stool softener such as Colace as directed to avoid hard stools and straining with bowel movements PRN  - If no improvement with above recommendations, try intermittently dosed Dulcolax OTC/SENNA OTC as directed (every 3-4  days) PRN to facilitate bowel movements  -If still no improvement with these measures, call/follow-up  - schedule Colonoscopy, discussed procedure with the patient, patient verbalized understanding    History of shortness of breath  Recommend follow-up with Primary Care Provider for continued evaluation and management.     Follow-up in about 1 month (around 8/30/2018), or if symptoms worsen or fail to improve.      If no improvement in symptoms or symptoms worsen, call/follow-up at clinic or go to ER.

## 2018-07-30 NOTE — LETTER
July 30, 2018      Juan Buck MD  [address] [facility] [address]           [facility]  [address] [facility] [address] [place] [address]  Phone: [phone]          Patient: Cheyenne West   MR Number: [id number]   YOB: 1955   Date of Visit: 7/30/2018       Dear Dr. Juan Buck:    Thank you for referring Cheyenne West to me for evaluation. Attached you will find relevant portions of my assessment and plan of care.    If you have questions, please do not hesitate to call me. I look forward to following Cheyenne West along with you.    Sincerely,    Debbie Castañeda, [facility]    Enclosure  CC:  No Recipients    If you would like to receive this communication electronically, please contact [email or web address] or [phone] to request more information on [employer] access.    For providers and/or their staff who would like to refer a patient to [facility], please contact us through our one-stop-shop provider referral line, [facility], at [phone].    If you feel you have received this communication in error or would no longer like to receive these types of communications, please e-mail [email or web address]

## 2018-07-30 NOTE — PATIENT INSTRUCTIONS
Anemia  Anemia is a condition that occurs when your body does not have enough healthy red blood cells (RBCs). RBCs are the parts of your blood that carry oxygen throughout your body. A protein called hemoglobin allows your RBCs to absorb and release oxygen. Without enough RBCs or hemoglobin, your body doesn't get enough oxygen. Symptoms of anemia may then occur.    What are the symptoms of anemia?  Some people with anemia have no symptoms. But most people have symptoms that range from mild to severe. These can include:  · Tiredness (fatigue)  · Weakness  · Pale skin  · Shortness of breath  · Dizziness or fainting  · Rapid heartbeat  · Trouble doing normal amounts of activity  · Jaundice (yellowing of your eyes, skin, or mouth; dark urine)  What causes anemia?  Anemia can occur when your body:  · Loses too much blood  · Does not make enough RBCs  · Destroys your RBCs at a faster rate than it can replace them  · Does not make a normal amount of hemoglobin in your RBCs  These problems can occur for many reasons, including:  · A condition that you are born with (congenital or inherited), such as sickle cell disease or thalassemia  · Heavy bleeding for any reason, including injury, surgery, childbirth, or even heavy menstrual periods  · Being low in certain nutrients, such as iron, folate, or vitamin B12, possibly from a poor diet or a condition like celiac disease or Crohn's disease  · Certain chronic conditions like diabetes, arthritis, or kidney disease  · Certain chronic infections like tuberculosis or HIV  · Exposure to certain medicines, such as those used for chemotherapy  There are different types of anemia. Your healthcare provider can tell you more about the type of anemia you have and what may have caused it.  How is anemia diagnosed?  To diagnose anemia, your healthcare provider orders blood tests. These can include:  · Complete blood cell count (CBC). This test measures the amounts of the different types  of blood cells.  · Blood smear. This test checks the size and shape of your blood cells. To do the test, a drop of your blood is viewed under a microscope. A stain is used to make the blood cells easier to see.  · Iron studies. These tests measure the amount of iron in your blood. Your body needs iron to make hemoglobin in your RBCs.  · Vitamin B12 and folate studies. These tests check for some of the components that help give RBCs a normal size and shape.  · Reticulocyte count. This test measures the amount of new RBCs that your bone marrow makes.  · Hemoglobin electrophoresis. This test checks for problems with your hemoglobin in RBCs.  How is anemia treated?  Treatment for anemia is based on the type of anemia, its cause, and the severity of your symptoms. Treatments may include:  · Diet changes. This involves increasing the amount of certain nutrients in your diet, such as iron, vitamin B12, or folate. Your healthcare provider may also prescribe nutrient supplements.  · Medicines. Certain medicines treat the cause of your anemia. Others help build new RBCs or relieve symptoms. If a medicine is the cause of your anemia, you may need to stop or change it.  · Blood transfusions. Replacing some of your blood can increase the number of healthy RBCs in your body.  · Surgery. In some cases, your doctor may do surgery to treat the underlying cause of anemia. If you need surgery, your healthcare provider will explain the procedure and outline the risks and benefits for you.  What are the long-term concerns?  If you have a certain type of anemia, you can expect a full recovery after treatment. If you have other types of anemia (especially a type you're born with), you will need to manage it for life. Your doctor can tell you more.  Date Last Reviewed: 12/1/2016  © 8689-4382 [employer]. [address] [place] [address], [place] [address]. All rights reserved. This information is not intended as a substitute for  professional medical care. Always follow your healthcare professional's instructions.        GERD (Adult)    The esophagus is a tube that carries food from the mouth to the stomach. A valve at the lower end of the esophagus prevents stomach acid from flowing upward. When this valve doesn't work properly, stomach contents may repeatedly flow back up (reflux) into the esophagus. This is called gastroesophageal reflux disease (GERD). GERD can irritate the esophagus. It can cause problems with swallowing or breathing. In severe cases, GERD can cause recurrent pneumonia or other serious problems.  Symptoms of reflux include burning, pressure or sharp pain in the upper abdomen or mid to lower chest. The pain can spread to the neck, back, or shoulder. There may be belching, an acid taste in the back of the throat, chronic cough, or sore throat or hoarseness. GERD symptoms often occur during the day after a big meal. They can also occur at night when lying down.   Home care  Lifestyle changes can help reduce symptoms. If needed, medicines may be prescribed. Symptoms often improve with treatment, but if treatment is stopped, the symptoms often return after a few months. So most persons with GERD will need to continue treatment.  Lifestyle changes  · Limit or avoid fatty, fried, and spicy foods, as well as coffee, chocolate, mint, and foods with high acid content such as tomatoes and citrus fruit and juices (orange, grapefruit, lemon).  · Dont eat large meals, especially at night. Frequent, smaller meals are best. Do not lie down right after eating. And dont eat anything 3 hours before going to bed.  · Avoid drinking alcohol and smoking. As much as possible, stay away from second hand smoke.  · If you are overweight, losing weight will reduce symptoms.   · Avoid wearing tight clothing around your stomach area.  · If your symptoms occur during sleep, use a foam wedge to elevate your upper body (not just your head.) Or, place  "4" blocks under the head of your bed.  Medicines  If needed, medicines can help relieve the symptoms of GERD and prevent damage to the esophagus. Discuss a medicine plan with your healthcare provider. This may include one or more of the following medicines:  · Antacids to help neutralize the normal acids in your stomach.  · Acid blockers (H2 blockers) to decrease acid production.  · Acid inhibitors (PPIs) to decrease acid production in a different way than the blockers. They may work better, but can take a little longer to take effect.  Take an antacid 30-60 minutes after eating and at bedtime, but not at the same time as an acid blocker.  Try not to take medicines such as ibuprofen and aspirin. If you are taking aspirin for your heart or other medical reasons, talk to your healthcare provider about stopping it.  Follow-up care  Follow up with your healthcare provider or as advised by our staff.  When to seek medical advice  Call your healthcare provider if any of the following occur:  · Stomach pain gets worse or moves to the lower right abdomen (appendix area)  · Chest pain appears or gets worse, or spreads to the back, neck, shoulder, or arm  · Frequent vomiting (cant keep down liquids)  · Blood in the stool or vomit (red or black in color)  · Feeling weak or dizzy  · Fever of 100.4ºF (38ºC) or higher, or as directed by your healthcare provider  Date Last Reviewed: 6/23/2015  © 5397-9842 [employer]. [address] [place] [address]. All rights reserved. This information is not intended as a substitute for professional medical care. Always follow your healthcare professional's instructions.        Abdominal Pain    Abdominal pain is pain in the stomach or belly area. Everyone has this pain from time to time. In many cases it goes away on its own. But abdominal pain can sometimes be due to a serious problem, such as appendicitis. So its important to know when to seek help.  Causes of " abdominal pain  There are many possible causes of abdominal pain. Common causes in adults include:  · Constipation, diarrhea, or gas  · Stomach acid flowing back up into the esophagus (acid reflux or heartburn)  · Severe acid reflux, called GERD (gastroesophageal reflux disease)  · A sore in the lining of the stomach or small intestine (peptic ulcer)  · Inflammation of the gallbladder, liver, or pancreas  · Gallstones or kidney stones  · Appendicitis   · Intestinal blockage   · An internal organ pushing through a muscle or other tissue (hernia)  · Urinary tract infections  · In women, menstrual cramps, fibroids, or endometriosis  · Inflammation or infection of the intestines  Diagnosing the cause of abdominal pain  Your healthcare provider will do a physical exam help find the cause of your pain. If needed, tests will be ordered. Belly pain has many possible causes. So it can be hard to find the reason for your pain. Giving details about your pain can help. Tell your provider where and when you feel the pain, and what makes it better or worse. Also let your provider know if you have other symptoms such as:  · Fever  · Tiredness  · Upset stomach (nausea)  · Vomiting  · Changes in bathroom habits  Treating abdominal pain  Some causes of pain need emergency medical treatment right away. These include appendicitis or a bowel blockage. Other problems can be treated with rest, fluids, or medicines. Your healthcare provider can give you specific instructions for treatment or self-care based on what is causing your pain.  If you have vomiting or diarrhea, sip water or other clear fluids. When you are ready to eat solid foods again, start with small amounts of easy-to-digest, low-fat foods. These include apple sauce, toast, or crackers.   When to seek medical care  Call 911 or go to the hospital right away if you:  · Cant pass stool and are vomiting  · Are vomiting blood or have bloody diarrhea or black, tarry  diarrhea  · Have chest, neck, or shoulder pain  · Feel like you might pass out  · Have pain in your shoulder blades with nausea  · Have sudden, severe belly pain  · Have new, severe pain unlike any you have felt before  · Have a belly that is rigid, hard, and tender to touch  Call your healthcare provider if you have:  · Pain for more than 5 days  · Bloating for more than 2 days  · Diarrhea for more than 5 days  · A fever of 100.4°F (38.0°C) or higher, or as directed by your provider  · Pain that gets worse  · Weight loss for no reason  · Continued lack of appetite  · Blood in your stool  How to prevent abdominal pain  Here are some tips to help prevent abdominal pain:  · Eat smaller amounts of food at one time.  · Avoid greasy, fried, or other high-fat foods.  · Avoid foods that give you gas.  · Exercise regularly.  · Drink plenty of fluids.  To help prevent GERD symptoms:  · Quit smoking.  · Reduce alcohol and certain foods that increase stomach acid.  · Avoid aspirin and over-the-counter pain and fever medicines (NSAIDS or nonsteroidal anti-inflammatory drugs), if possible  · Lose extra weight.  · Finish eating at least 2 hours before you go to bed or lie down.  · Raise the head of your bed.  Date Last Reviewed: 7/1/2016  © 2251-8096 [employer]. [address] [place] [address], [place] [address]. All rights reserved. This information is not intended as a substitute for professional medical care. Always follow your healthcare professional's instructions.        Lower GI Bleeding (Stable)  You have signs of blood in your stool. This is called rectal bleeding. The bleeding may have begun in another part of your gastrointestinal (GI) tract. If the blood is bright red, it is likely coming from the lower part of the GI tract. If the blood is black or dark, it might be coming from higher up in the GI tract. Very small amounts of GI bleeding may not be visible and can only be discovered during a test on your stool.  Possible causes of lower GI bleeding include:  · Hemorrhoids  · Anal fissures  · Diverticulitis  · Inflammatory bowel disease (Crohn's disease or ulcerative colitis)  · Polyps (growths) in the intestine  Note: Iron supplements and medicines for diarrhea or upset stomach can cause black stools. Foods such as licorice and red beets can also discolor the stool and be mistaken for bleeding. These are not bleeding and are not a cause for alarm.  Home care  You have not lost a large amount of blood and your condition appears stable at this time. You may resume normal activity as long as you feel well.  Avoid NSAIDs, such as aspirin, ibuprofen, or naproxen. They can irritate the stomach and cause further bleeding. If you are taking these medicines for other medical reasons, talk to your healthcare provider before you stop them.   Follow-up care  Follow up with your healthcare provider as advised. Further tests may be needed to find the cause of your bleeding.  When to seek medical advice  Call your healthcare provider for any of the following:  · Large amount of rectal bleeding   · Increasing abdominal pain  · Weakness, dizziness  Call 911  Get emergency medical care if any of the following occur:  · Loss of consciousness  · Vomiting blood  Date Last Reviewed: 6/24/2015  © 2660-2453 [employer]. [address] [place] [address], [place] [address]. All rights reserved. This information is not intended as a substitute for professional medical care. Always follow your healthcare professional's instructions.

## 2018-07-31 ENCOUNTER — HOSPITAL ENCOUNTER (OUTPATIENT)
Dept: RADIOLOGY | Facility: HOSPITAL | Age: 63
Discharge: HOME | End: 2018-07-31
Attending: NURSE PRACTITIONER
Payer: COMMERCIAL

## 2018-07-31 DIAGNOSIS — R10.13 EPIGASTRIC PAIN: ICD-10-CM

## 2018-07-31 PROCEDURE — 76700 US EXAM ABDOM COMPLETE: CPT | Mod: TC,PO

## 2018-07-31 PROCEDURE — 76700 US EXAM ABDOM COMPLETE: CPT | Mod: 26,,, | Performed by: RADIOLOGY

## 2018-08-21 ENCOUNTER — CLINICAL SUPPORT (OUTPATIENT)
Dept: CARDIOLOGY | Facility: CLINIC | Age: 63
End: 2018-08-21
Attending: FAMILY MEDICINE
Payer: COMMERCIAL

## 2018-08-21 VITALS — HEIGHT: 59 IN | BODY MASS INDEX: 32.04 KG/M2 | HEART RATE: 66 BPM | WEIGHT: 158.94 LBS

## 2018-08-21 DIAGNOSIS — R06.09 DOE (DYSPNEA ON EXERTION): ICD-10-CM

## 2018-08-21 PROCEDURE — 99999 PR PBB SHADOW E&M-EST. PATIENT-LVL I: CPT | Mod: PBBFAC,,,

## 2018-08-21 PROCEDURE — 93351 STRESS TTE COMPLETE: CPT | Mod: S$GLB,,, | Performed by: INTERNAL MEDICINE

## 2018-08-22 LAB
ASCENDING AORTA: 3.06 CM
BSA FOR ECHO PROCEDURE: 1.73 M2
CV ECHO LV RWT: 0.48 CM
CV STRESS BASE HR: 72
CVPEAKDIABP: 96
CVPEAKSYSBP: 185
CVRESTDIABP: 87
CVRESTSYSBP: 140
DOP CALC LVOT AREA: 2.89 CM2
DOP CALC LVOT DIAMETER: 1.92 CM
E WAVE DECELERATION TIME: 160.08 MSEC
E/A RATIO: 0.77
E/E' RATIO: 11.5
ECHO LV POSTERIOR WALL: 0.87 CM (ref 0.6–1.1)
FRACTIONAL SHORTENING: 33 % (ref 28–44)
INTERVENTRICULAR SEPTUM: 0.91 CM (ref 0.6–1.1)
IVRT: 0.13 MSEC
LA MAJOR: 5 CM
LA MINOR: 4.13 CM
LA WIDTH: 3.91 CM
LEFT ATRIUM SIZE: 2.86 CM
LEFT ATRIUM VOLUME INDEX: 24.9 ML/M2
LEFT ATRIUM VOLUME: 43 CM3
LEFT INTERNAL DIMENSION IN SYSTOLE: 2.46 CM (ref 2.1–4)
LEFT VENTRICLE MASS INDEX: 54.9 G/M2
LEFT VENTRICULAR INTERNAL DIMENSION IN DIASTOLE: 3.69 CM (ref 3.5–6)
LEFT VENTRICULAR MASS: 94.97 G
LV LATERAL E/E' RATIO: 8.63
LV SEPTAL E/E' RATIO: 17.25
MV PEAK A VEL: 0.9 M/S
MV PEAK E VEL: 0.69 M/S
MV STENOSIS PRESSURE HALF TIME: 46.42 MS
MV VALVE AREA P 1/2 METHOD: 4.74 CM2
PISA TR MAX VEL: 2.22 M/S
PULM VEIN S/D RATIO: 0.84
PV PEAK D VEL: 0.49 M/S
PV PEAK S VEL: 0.41 M/S
RA MAJOR: 4.84 CM
RA WIDTH: 3.49 CM
SINUS: 2.78 CM
STJ: 2.83 CM
STRESS ECHO POST ESTIMATED WORKLOAD: 11 METS
STRESS ECHO POST EXERCISE DUR MIN: 6 MIN
STRESS ECHO POST EXERCISE DUR SEC: 19
TDI LATERAL: 0.08
TDI SEPTAL: 0.04
TDI: 0.06
TR MAX PG: 19.71 MMHG

## 2018-08-27 RX ORDER — BUPROPION HYDROCHLORIDE 300 MG/1
TABLET ORAL
Qty: 90 TABLET | Refills: 3 | Status: SHIPPED | OUTPATIENT
Start: 2018-08-27 | End: 2020-01-07 | Stop reason: SDUPTHER

## 2018-08-27 RX ORDER — POTASSIUM CHLORIDE 750 MG/1
CAPSULE, EXTENDED RELEASE ORAL
Qty: 90 CAPSULE | Refills: 3 | Status: SHIPPED | OUTPATIENT
Start: 2018-08-27 | End: 2019-10-14

## 2018-08-27 RX ORDER — HYDROCHLOROTHIAZIDE 25 MG/1
TABLET ORAL
Qty: 90 TABLET | Refills: 3 | Status: SHIPPED | OUTPATIENT
Start: 2018-08-27 | End: 2019-10-14

## 2018-08-27 RX ORDER — ATORVASTATIN CALCIUM 10 MG/1
TABLET, FILM COATED ORAL
Qty: 90 TABLET | Refills: 3 | Status: SHIPPED | OUTPATIENT
Start: 2018-08-27 | End: 2019-10-14

## 2018-08-27 RX ORDER — PANTOPRAZOLE SODIUM 40 MG/1
TABLET, DELAYED RELEASE ORAL
Qty: 90 TABLET | Refills: 3 | Status: SHIPPED | OUTPATIENT
Start: 2018-08-27 | End: 2019-09-13 | Stop reason: SDUPTHER

## 2018-09-05 ENCOUNTER — TELEPHONE (OUTPATIENT)
Dept: FAMILY MEDICINE | Facility: CLINIC | Age: 63
End: 2018-09-05

## 2018-09-05 ENCOUNTER — ANESTHESIA EVENT (OUTPATIENT)
Dept: ENDOSCOPY | Facility: HOSPITAL | Age: 63
End: 2018-09-05
Payer: COMMERCIAL

## 2018-09-05 DIAGNOSIS — R92.8 ABNORMAL SCREENING MAMMOGRAM: Primary | ICD-10-CM

## 2018-09-05 NOTE — H&P
History & Physical - Short Stay  Gastroenterology      SUBJECTIVE:     Procedure: Gastroscopy and Colonoscopy    Chief Complaint/Indication for Procedure: GERD.  Dysphagia.   Rectal bleeding.    History of Present Illness:  Initial consult     7/30/2018  [facility]      QUYEN Fierro   Gastroenterology   Iron deficiency anemia, unspecified iron deficiency anemia type +9 more   Dx   Anemia ; Referred by Juan Buck MD   Reason for Visit    Progress Notes        Subjective:       Patient ID: Cheyenne West is a 63 y.o. female Body mass index is 32.1 kg/m².     Chief Complaint: Anemia     This patient is new to me.  Referring Provider: Dr. Buck for rectal bleeding, chronic gastritis and dysphagia     GI Problem   The primary symptoms include abdominal pain, nausea (frequent) and melena (black stool since on iron supplement). Primary symptoms do not include fever, weight loss, fatigue, vomiting, diarrhea, hematemesis, hematochezia (history of bright red rectal bleeding on toilet paper of small amount a few months ago; denies any since then) or dysuria.   The abdominal pain is located in the epigastric region (occurs daily to once every few weeks, described as sharp). The severity of the abdominal pain is 0/10 (currently). Relieved by: ice cream.   The illness is also significant for dysphagia (recurred since 5/2018 with meat mostly; relieved in the past with EGD with dilation; denies problems with liquids or pills), odynophagia and constipation (since restarting iron supplement; bowel movements once-twice a week; dulcolax prn once a week, senna prn; PAST: colace no relief). The illness does not include chills. Associated symptoms comments: History of chronic anemia and was evaluated in the past by Dr. Whalen for it.. Significant associated medical issues include GERD (protonix 40 mg once daily (long term use); reflux only noted with episodes of dysphagia otherwise denies  "heartburn; controlled with PPI; PAST: zantac), gallstones, irritable bowel syndrome and hemorrhoids. Associated medical issues do not include inflammatory bowel disease, PUD or diverticulitis (positive history for diverticulosis).     Reviewed prior medical records including radiology report of 5/18/11 ct abdomen pelvis & endoscopy history (see surgical history).     3/2/16 ct abdomen pelvis in care everywhere was reviewed and radiology report states:  " ABDOMEN FINDINGS: The visualized lung bases are clear.    There is a large para esophageal hiatal hernia present.  The liver, spleen, bilateral adrenal glands, and pancreas are unremarkable.  Bilateral kidneys are normal in size and configuration. There is no hydronephrosis bilaterally.  The patient is status post cholecystectomy.  The gastrointestinal tract is partially opacified with oral contrast. There is no inflammation or obstruction of the small or large bowel. There is abundant fluid within the colon. There is no free air or free fluid within the abdomen or pelvis. The   appendix is unremarkable.  The uterus and bilateral adnexa are unremarkable. The bladder is unremarkable as well.   IMPRESSION:  1.  Large paraesophageal hernia "     3/4/16 EGD in care everywhere was reviewed and procedure report states:   " DATE OF PROCEDURE: 3/4/2016  LOCATION: 4113.  PROCEDURE: EGD with biopsy.  ATTENDING PHYSICIAN: Dr. Mata.  PREOPERATIVE DIAGNOSIS: Anemia.  POSTOPERATIVE DIAGNOSIS: Mild antral gastritis.  MEDICATIONS: MAC.  PROCEDURE NOTE: After appropriate informed consent, the patient was placed in  the left lateral decubitus position, hooked up to the Datascope and pulse   oximetry monitor, and premedicated. The Olympus video gastroscope was   advanced through the esophagus into the second portion of the duodenum under   direct visualization and without difficulty. The patient tolerated the   procedure well.  FINDINGS:  1. Esophagus: Normal.  2. Cardia, " "fundus, and body: Normal.  3. Antrum: Diffuse Erythema, mild friability. Biopsies were taken.  4. Pylorus: Normal.  5. Duodenum: The bulb and the C-loop were normal.  ASSESSMENT: Gastritis.  RECOMMENDATIONS: Continue medications. Check biopsies. Monitor labs. Will   follow with you.  Álvaro Whalen MD".  Biopsy results:   "FINAL DIAGNOSIS:  Stomach, antrum, biopsies:  -Mild chronic gastritis.  -No intestinal metaplasia, dysplasia or malignancy identified.  -No H. pylori-like organisms identified on Giemsa stain; Giemsa control is appropriate."    Assessment:       1. Iron deficiency anemia, unspecified iron deficiency anemia type    2. History of rectal bleeding    3. Heartburn    4. Hiatal hernia    5. Pharyngoesophageal dysphagia    6. Epigastric pain    7. History of diverticulosis    8. History of Helicobacter pylori infection    9. Constipation, unspecified constipation type    10. History of shortness of breath        Plan:       Iron deficiency anemia, unspecified iron deficiency anemia type  - schedule EGD, discussed procedure with patient, patient verbalized understanding  - schedule Colonoscopy, discussed procedure with the patient, patient verbalized understanding  - discussed with patient the different ways that anemia occurs: blood loss (such as from the gi tract), the body is not making enough, or the body is breaking down the rbcs too quickly; recommend EGD and colonoscopy to further evaluate gi tract for possible blood loss and pending results of endoscopies, possible UGI with Small Bowel Follow Through/video capsule study  -follow-up with PCP and/or hematology for continued evaluation and management     History of rectal bleeding  - discussed about different etiologies that can cause rectal bleeding, such as diverticulosis, polyps, colon inflammation or infection, anal fissure or hemorrhoids.   - schedule Colonoscopy, discussed procedure with the patient, verbalized understanding   - You may " resume normal activity as long as you feel well.  - Avoid/minimize aspirin and anti-inflammatory drugs such as ibuprofen (Advil, Motrin) and naproxen (Aleve and Naprosyn).  - Avoid alcohol.     Heartburn  - CONTINUE PROTONIX 40 MG ONCE DAILY AS DIRECTED, take in the morning 30-60 minutes before breakfast, discussed about possible long term use of medication (prefer to use lowest effective dose or discontinuing if possible) and discussed the risks & benefits with taking a reflux medication long term, and to take OTC calcium and vitamin d supplements as directed (such as Citracal +D), pt verbalized understanding  -discussed about the different types of medications used to treat reflux and how to use them, antacids can be used PRN for breakthrough heartburn symptoms by reducing stomach acid that is already produced, H2 blockers (zantac) work by limiting the amount acid production, & PPI's work to block acid production and are taken daily, patient verbalized understanding.  -Educated patient on lifestyle modifications to help control/reduce reflux/abdominal pain including: avoid large meals, avoid eating within 2-3 hours of bedtime (avoid late night eating & lying down soon after eating), elevate head of bed if nocturnal symptoms are present, smoking cessation (if current smoker), & weight loss (if overweight).   -Educated to avoid known foods which trigger reflux symptoms & to minimize/avoid high-fat foods, chocolate, caffeine, citrus, alcohol, & tomato products.  -Advised to avoid/limit use of NSAID's, since they can cause GI upset, bleeding, and/or ulcers. If needed, take with food.   - schedule EGD, discussed procedure with patient, patient verbalized understanding     Hiatal hernia  - schedule EGD, discussed procedure with patient, patient verbalized understanding  -discussed diagnosis with patient & that it is usually managed by controlling reflux symptoms, surgery is an option, but usually performed if reflux is  uncontrolled by medication management and lifestyle/dietary modifications; if symptoms persist despite medication management and lifestyle/dietary modifications, we can refer to general surgery to consult about surgical options, patient verbalized understanding     Pharyngoesophageal dysphagia  - schedule EGD, discussed procedure with patient and possible esophageal dilation may be performed during procedure if indicated, patient verbalized understanding  - educated patient to eat smaller more frequent meals and to eat slowly and advised to eat a soft diet.  - possible UGI/esophagram/esophageal manometry if symptoms persist     Epigastric pain  -     X-Ray Abdomen Flat And Erect; Future; Expected date: 07/30/2018  -     US Abdomen Complete; Future; Expected date: 07/30/2018  -     Hepatic function panel; Future; Expected date: 07/30/2018  -     Lipase; Future; Expected date: 07/30/2018  -     Amylase; Future; Expected date: 07/30/2018     History of diverticulosis  - schedule Colonoscopy, discussed procedure with the patient, verbalized understanding  - discussed the diagnosis of diverticulosis and diverticulitis, & to prevent diverticulitis, high fiber diet is recommended.  -Recommended high fiber diet after episode has completely resolved. Recommended daily exercise, adequate water intake (six 8-oz glasses of water daily), and high fiber diet. OTC fiber supplements are recommended if diet does not reach daily fiber goal (25 grams daily), such as Metamucil, Citrucel, or FiberCon (take as directed, separate from other oral medications by >2 hours).  - Advised to avoid/minimize popcorn, corn, seeds, and nuts.     History of Helicobacter pylori infection  - schedule EGD, discussed procedure with patient, patient verbalized understanding     Constipation, unspecified constipation type  -     X-Ray Abdomen Flat And Erect; Future; Expected date: 07/30/2018  -  START   polyethylene glycol (GLYCOLAX) 17 gram/dose powder;  Take 17 g by mouth once daily.  Dispense: 510 g; Refill: 2  Recommended daily exercise as tolerated, adequate water intake (six 8-oz glasses of water daily), and high fiber diet. OTC fiber supplements are recommended if diet does not reach daily fiber goal (25 grams daily), such as Metamucil, Citrucel, or FiberCon (take as directed, separate from other oral medications by >2 hours).  -Recommend taking an OTC stool softener such as Colace as directed to avoid hard stools and straining with bowel movements PRN  - If no improvement with above recommendations, try intermittently dosed Dulcolax OTC/SENNA OTC as directed (every 3-4  days) PRN to facilitate bowel movements  -If still no improvement with these measures, call/follow-up  - schedule Colonoscopy, discussed procedure with the patient, patient verbalized understanding     History of shortness of breath  Recommend follow-up with Primary Care Provider for continued evaluation and management.      Follow-up in about 1 month (around 8/30/2018), or if symptoms worsen or fail to improve.                Results for ZABRINA LESTER (MRN [id number]) as of 9/6/2018 11:13   Ref. Range 8/30/2013 07:40 3/1/2016 08:05 3/15/2016 08:45 5/18/2016 08:08 11/17/2016 08:40 11/15/2017 08:55 6/27/2018 09:29   Hemoglobin Latest Ref Range: 12.0 - 16.0 g/dL 13.2 7.4 (L) 11.3 (L) 11.6 (L) 13.3 14.2 11.7 (L)   Hematocrit Latest Ref Range: 37.0 - 48.5 % 41.0 25.3 (L) 36.9 (L) 36.9 (L) 41.1 42.8 38.6   MCV Latest Ref Range: 82 - 98 fL 89.3 76 (L) 81 (L) 86 89 89 86       U/S 7/23/2018  Impression       1. Status post cholecystectomy.  2. Normal abdomen ultrasound.    Electronically signed by: Jose Ambriz MD  Date: 07/31/2018         PTA Medications   Medication Sig    atorvastatin (LIPITOR) 10 MG tablet TAKE 1 TABLET ONCE DAILY    buPROPion (WELLBUTRIN XL) 300 MG 24 hr tablet TAKE 1 TABLET ONCE DAILY    CALCIUM CARBONATE/VITAMIN D3 (CALTRATE 600 + D ORAL) Take by mouth 2 (two)  times daily.    escitalopram oxalate (LEXAPRO) 20 MG tablet TAKE 1 TABLET ONCE DAILY    felodipine (PLENDIL) 5 MG 24 hr tablet Take 1 tablet (5 mg total) by mouth once daily.    ferrous gluconate (FERGON) 324 MG tablet TAKE 1 TABLET BY MOUTH WITH BREAKFAST (Patient taking differently: TWICE WEEKLY)    hydroCHLOROthiazide (HYDRODIURIL) 25 MG tablet TAKE 1 TABLET ONCE DAILY    levothyroxine (SYNTHROID) 50 MCG tablet Take 1 tablet (50 mcg total) by mouth once daily.    OMEGA-3 FATTY ACIDS-EPA ORAL Take by mouth once daily.    pantoprazole (PROTONIX) 40 MG tablet TAKE 1 TABLET DAILY    potassium chloride (MICRO-K) 10 MEQ CpSR TAKE 1 CAPSULE ONCE DAILY    VIT A/VIT C/VIT E/ZINC/COPPER (PRESERVISION AREDS ORAL) Take by mouth once daily.    ALPRAZolam (XANAX) 0.25 MG tablet Take 1 tablet (0.25 mg total) by mouth 3 (three) times daily as needed for Anxiety.    senna (SENOKOT) 8.6 mg tablet Take 8.6 mg by mouth daily as needed.        Review of patient's allergies indicates:   Allergen Reactions    Penicillins         Past Medical History:   Diagnosis Date    Carpal tunnel syndrome of right wrist     Chronic gastritis     Coronary artery disease     mitral regurgitation    Dissociative fugue     Diverticulosis     Duodenitis     Dyspepsia     Generalized anxiety disorder     Hearing loss on left     Hearing loss on right     Helicobacter pylori (H. pylori)     History of blood transfusion     Hyperlipidemia     Hypertension     Hypothyroidism     IBS (irritable bowel syndrome)     Iron deficiency anemia     Mild mitral regurgitation by prior echocardiogram     Mild obesity     Mild vitamin D deficiency     Paraesophageal hiatal hernia     PONV (postoperative nausea and vomiting)     Reflux gastritis     Sleep apnea     mild improved with wt. loss    Thyroid goiter     Urticaria, chronic      Past Surgical History:   Procedure Laterality Date    BLADDER SUSPENSION      pubovaginal sling  "    SECTION, CLASSIC      CHOLECYSTECTOMY      COLONOSCOPY  2011    Dr. Goode, in [facility]:diverticulosis, hemorrhoids, melanosis coli-repeat 10 years    ESOPHAGEAL DILATION      ESOPHAGOGASTRODUODENOSCOPY  2016    THYROIDECTOMY, PARTIAL       partial for benign nodule    UPPER GASTROINTESTINAL ENDOSCOPY  2016    Dr. Whalen, in care everywhere     Family History   Problem Relation Age of Onset    Hypertension Mother     Heart disease Mother     Ulcers Mother     GI problems Mother         colitis    Colon cancer Neg Hx     Crohn's disease Neg Hx     Colon polyps Neg Hx     Ulcerative colitis Neg Hx     Stomach cancer Neg Hx     Esophageal cancer Neg Hx      Social History     Tobacco Use    Smoking status: Never Smoker    Smokeless tobacco: Never Used   Substance Use Topics    Alcohol use: No    Drug use: No         OBJECTIVE:     Vital Signs (Most Recent)  Temp: 97.5 °F (36.4 °C) (18 1059)  Pulse: 62 (18 1059)  Resp: 18 (18 1059)  BP: 129/72 (18 1059)  SpO2: 98 % (18 1059)    Physical Exam:  :  Ht 4' 11" (1.499 m)   Wt 72.1 kg (158 lb 15.2 oz)   BMI 32.10 kg/m²                   GENERAL:  Comfortable, in no acute distress.                                 HEENT EXAM:  Nonicteric.  No adenopathy.  Oropharynx is clear.               NECK:  Supple.                                                               LUNGS:  Clear.                                                               CARDIAC:  Regular rate and rhythm.  S1, S2.  No murmur.                      ABDOMEN:  Soft, positive bowel sounds, nontender.  No hepatosplenomegaly or masses.  No rebound or guarding.                                             EXTREMITIES:  No edema.     MENTAL STATUS:  Alert and oriented.    ASSESSMENT/PLAN:     Assessment: GERD.  Dysphagia.   Rectal bleeding.    Plan: Gastroscopy and Colonoscopy    Anesthesia Plan:   MAC / General Anaesthesia    ASA Grade: " ASA 2 - Patient with mild systemic disease with no functional limitations    MALLAMPATI SCORE: II (hard and soft palate, upper portion of tonsils anduvula visible)

## 2018-09-05 NOTE — TELEPHONE ENCOUNTER
----- Message from Ellie Porter sent at 9/5/2018 10:36 AM CDT -----  Contact: Pt.   Pt is calling regarding requesting Orders to be put in to have Mammo screening.  [phone]

## 2018-09-06 ENCOUNTER — ANESTHESIA (OUTPATIENT)
Dept: ENDOSCOPY | Facility: HOSPITAL | Age: 63
End: 2018-09-06
Payer: COMMERCIAL

## 2018-09-06 ENCOUNTER — HOSPITAL ENCOUNTER (OUTPATIENT)
Facility: HOSPITAL | Age: 63
Discharge: HOME | End: 2018-09-06
Attending: INTERNAL MEDICINE | Admitting: INTERNAL MEDICINE
Payer: COMMERCIAL

## 2018-09-06 ENCOUNTER — TELEPHONE (OUTPATIENT)
Dept: GASTROENTEROLOGY | Facility: CLINIC | Age: 63
End: 2018-09-06

## 2018-09-06 VITALS
WEIGHT: 160 LBS | RESPIRATION RATE: 16 BRPM | OXYGEN SATURATION: 98 % | HEART RATE: 64 BPM | SYSTOLIC BLOOD PRESSURE: 127 MMHG | HEIGHT: 59 IN | BODY MASS INDEX: 32.25 KG/M2 | DIASTOLIC BLOOD PRESSURE: 77 MMHG | TEMPERATURE: 98 F

## 2018-09-06 DIAGNOSIS — K21.9 GERD (GASTROESOPHAGEAL REFLUX DISEASE): ICD-10-CM

## 2018-09-06 LAB — H PYLORI INDEX VALUE: NEGATIVE

## 2018-09-06 PROCEDURE — 87449 NOS EACH ORGANISM AG IA: CPT | Mod: PO | Performed by: INTERNAL MEDICINE

## 2018-09-06 PROCEDURE — D9220A PRA ANESTHESIA: Mod: ANES,,, | Performed by: ANESTHESIOLOGY

## 2018-09-06 PROCEDURE — 37000009 HC ANESTHESIA EA ADD 15 MINS: Mod: PO | Performed by: INTERNAL MEDICINE

## 2018-09-06 PROCEDURE — 88305 TISSUE EXAM BY PATHOLOGIST: CPT | Performed by: PATHOLOGY

## 2018-09-06 PROCEDURE — 37000008 HC ANESTHESIA 1ST 15 MINUTES: Mod: PO | Performed by: INTERNAL MEDICINE

## 2018-09-06 PROCEDURE — 27201012 HC FORCEPS, HOT/COLD, DISP: Mod: PO | Performed by: INTERNAL MEDICINE

## 2018-09-06 PROCEDURE — 43248 EGD GUIDE WIRE INSERTION: CPT | Mod: PO | Performed by: INTERNAL MEDICINE

## 2018-09-06 PROCEDURE — 43248 EGD GUIDE WIRE INSERTION: CPT | Mod: 51,,, | Performed by: INTERNAL MEDICINE

## 2018-09-06 PROCEDURE — 63600175 PHARM REV CODE 636 W HCPCS: Mod: PO | Performed by: NURSE ANESTHETIST, CERTIFIED REGISTERED

## 2018-09-06 PROCEDURE — 88305 TISSUE EXAM BY PATHOLOGIST: CPT | Mod: 26,,, | Performed by: PATHOLOGY

## 2018-09-06 PROCEDURE — D9220A PRA ANESTHESIA: Mod: CRNA,,, | Performed by: NURSE ANESTHETIST, CERTIFIED REGISTERED

## 2018-09-06 PROCEDURE — 45378 DIAGNOSTIC COLONOSCOPY: CPT | Mod: ,,, | Performed by: INTERNAL MEDICINE

## 2018-09-06 PROCEDURE — 25000003 PHARM REV CODE 250: Mod: PO | Performed by: INTERNAL MEDICINE

## 2018-09-06 PROCEDURE — 43239 EGD BIOPSY SINGLE/MULTIPLE: CPT | Mod: 59,,, | Performed by: INTERNAL MEDICINE

## 2018-09-06 PROCEDURE — 43239 EGD BIOPSY SINGLE/MULTIPLE: CPT | Mod: PO | Performed by: INTERNAL MEDICINE

## 2018-09-06 RX ORDER — SODIUM CHLORIDE 0.9 % (FLUSH) 0.9 %
3 SYRINGE (ML) INJECTION
Status: DISCONTINUED | OUTPATIENT
Start: 2018-09-06 | End: 2018-09-06 | Stop reason: HOSPADM

## 2018-09-06 RX ORDER — LIDOCAINE HCL/PF 100 MG/5ML
SYRINGE (ML) INTRAVENOUS
Status: DISCONTINUED | OUTPATIENT
Start: 2018-09-06 | End: 2018-09-06

## 2018-09-06 RX ORDER — SODIUM CHLORIDE, SODIUM LACTATE, POTASSIUM CHLORIDE, CALCIUM CHLORIDE 600; 310; 30; 20 MG/100ML; MG/100ML; MG/100ML; MG/100ML
INJECTION, SOLUTION INTRAVENOUS CONTINUOUS
Status: DISCONTINUED | OUTPATIENT
Start: 2018-09-06 | End: 2018-09-06 | Stop reason: HOSPADM

## 2018-09-06 RX ORDER — PROPOFOL 10 MG/ML
VIAL (ML) INTRAVENOUS
Status: DISCONTINUED | OUTPATIENT
Start: 2018-09-06 | End: 2018-09-06

## 2018-09-06 RX ADMIN — PROPOFOL 30 MG: 10 INJECTION, EMULSION INTRAVENOUS at 12:09

## 2018-09-06 RX ADMIN — PROPOFOL 30 MG: 10 INJECTION, EMULSION INTRAVENOUS at 11:09

## 2018-09-06 RX ADMIN — LIDOCAINE HYDROCHLORIDE 100 MG: 20 INJECTION, SOLUTION INTRAVENOUS at 11:09

## 2018-09-06 RX ADMIN — SODIUM CHLORIDE, SODIUM LACTATE, POTASSIUM CHLORIDE, AND CALCIUM CHLORIDE: .6; .31; .03; .02 INJECTION, SOLUTION INTRAVENOUS at 11:09

## 2018-09-06 NOTE — DISCHARGE INSTRUCTIONS
Recovery After Procedural Sedation (Adult)  You have been given medicine by vein to make you sleep during your surgery. This may have included both a pain medicine and sleeping medicine. Most of the effects have worn off. But you may still have some drowsiness for the next 6 to 8 hours.  Home care  Follow these guidelines when you get home:  · For the next 8 hours, you should be watched by a responsible adult. This person should make sure your condition is not getting worse.  · Don't drink any alcohol for the next 24 hours.  · Don't drive, operate dangerous machinery, or make important business or personal decisions during the next 24 hours.  Note: Your healthcare provider may tell you not to take any medicine by mouth for pain or sleep in the next 4 hours. These medicines may react with the medicines you were given in the hospital. This could cause a much stronger response than usual.  Follow-up care  Follow up with your healthcare provider if you are not alert and back to your usual level of activity within 12 hours.  When to seek medical advice  Call your healthcare provider right away if any of these occur:  · Drowsiness gets worse  · Weakness or dizziness gets worse  · Repeated vomiting  · You can't be awakened   Date Last Reviewed: 10/18/2016  © 3520-0610 [employer]. [address] [place] [address], [place] [address]. All rights reserved. This information is not intended as a substitute for professional medical care. Always follow your healthcare professional's instructions.      PROBIOTICS:  Now that your colon is so cleaned out, now is a good time for a round of PROBIOTICS.  Eat a container of Greek Yogurt, such as OIKOS or CHOBANI,  Or Activia or Dannon    Greek Yogurt.    Or Take a similar Probiotic product such as Align or Culturelle or Lizzie-Q, every day for a month.                  (The products listed are non-prescription, but you may need to ask the pharmacist for their location.)   Repeat  this t least 5-6 times a year.        Tips to Control Acid Reflux    To control acid reflux, youll need to make some basic diet and lifestyle changes. The simple steps outlined below may be all youll need to ease discomfort.  Watch what you eat  · Avoid fatty foods and spicy foods.  · Eat fewer acidic foods, such as citrus and tomato-based foods. These can increase symptoms.  · Limit drinking alcohol, caffeine, and fizzy beverages. All increase acid reflux.  · Try limiting chocolate, peppermint, and spearmint. These can worsen acid reflux in some people.  Watch when you eat  · Avoid lying down for 3 hours after eating.  · Do not snack before going to bed.  Raise your head  Raising your head and upper body by 4 to 6 inches helps limit reflux when youre lying down. Put blocks under the head of your bed frame to raise it.  Other changes  · Lose weight, if you need to  · Dont exercise near bedtime  · Avoid tight-fitting clothes  · Limit aspirin and ibuprofen  · Stop smoking   Date Last Reviewed: 7/1/2016  © 5906-6069 [employer]. [address] [place] [address]. All rights reserved. This information is not intended as a substitute for professional medical care. Always follow your healthcare professional's instructions.        High-Fiber Diet  Fiber is in fruits, vegetables, cereals, and grains. Fiber passes through your body undigested. A high-fiber diet helps food move through your intestinal tract. The added bulk is helpful in preventing constipation. In people with diverticulosis, fiber helps clean out the pouches along the colon wall. It also prevents new pouches from forming. A high-fiber diet reduces the risk of colon cancer. It also lowers blood cholesterol and prevents high blood sugar in people with diabetes.    The fiber-rich foods listed below should be part of your diet. If you are not used to high-fiber foods, start with 1 or 2 foods from this list. Every 3 to 4 days add a new  one to your diet. Do this until you are eating 4 high-fiber foods per day. This should give you 20 to 35 grams of fiber a day. It is also important to drink a lot of water when you are on this diet. You should have 6 to 8 glasses of water a day. Water makes the fiber swell and increases the benefit.  Foods high in dietary fiber  The following foods are high in dietary fiber:  · Breads. Breads made with 100% whole-wheat flour; tori, wheat, or rye crackers; whole-grain tortillas, bran muffins.  · Cereals. Whole-grain and bran cereals with bran (shredded wheat, wheat flakes, raisin bran, corn bran); oatmeal, rolled oats, granola, and brown rice.  · Fruits. Fresh fruits and their edible skins (pears, prunes, raisins, berries, apples, and apricots); bananas, citrus fruit, mangoes, pineapple; and prune juice.  · Nuts. Any nuts and seeds.  · Vegetables. Best served raw or lightly cooked. All types, especially: green peas, celery, eggplant, potatoes, spinach, broccoli, [place] sprouts, winter squash, carrots, cauliflower, soybeans, lentils, and fresh and dried beans of all kinds.  · Other. Popcorn, any spices.  Date Last Reviewed: 8/1/2016  © 4798-3226 [employer]. [address] [place] [address], [place] [address]. All rights reserved. This information is not intended as a substitute for professional medical care. Always follow your healthcare professional's instructions.

## 2018-09-06 NOTE — ANESTHESIA POSTPROCEDURE EVALUATION
"Anesthesia Post Evaluation    Patient: Cheyenne West    Procedure(s) Performed: Procedure(s) (LRB):  EGD (ESOPHAGOGASTRODUODENOSCOPY) (N/A)  COLONOSCOPY (N/A)    Final Anesthesia Type: general  Patient location during evaluation: PACU  Patient participation: Yes- Able to Participate  Level of consciousness: awake and alert  Post-procedure vital signs: reviewed and stable  Pain management: adequate  Airway patency: patent  PONV status at discharge: No PONV  Anesthetic complications: no      Cardiovascular status: hemodynamically stable and blood pressure returned to baseline  Respiratory status: unassisted, spontaneous ventilation and room air  Hydration status: euvolemic  Follow-up not needed.        Visit Vitals  /77 (BP Location: Left arm, Patient Position: Sitting)   Pulse 64   Temp 36.7 °C (98 °F) (Skin)   Resp 16   Ht 4' 11" (1.499 m)   Wt 72.6 kg (160 lb)   SpO2 98%   Breastfeeding? No   BMI 32.32 kg/m²       Pain/Veronica Score: Pain Assessment Performed: Yes (9/6/2018  1:03 PM)  Presence of Pain: denies (9/6/2018  1:03 PM)  Veronica Score: 10 (9/6/2018 12:45 PM)        "

## 2018-09-06 NOTE — BRIEF OP NOTE
Discharge Note  Short Stay      SUMMARY     Admit Date: 9/6/2018    Attending Physician: Rusty Vogt Jr., MD     Discharge Physician: Rusty Vogt Jr., MD    Discharge Date: 9/6/2018 1:01 PM    Final Diagnosis: Iron deficiency anemia, unspecified iron deficiency anemia type [D50.9]  Dysphagia, unspecified type [R13.10]    Impression:          - Normal oropharynx.                       - Normal upper third of esophagus and middle third                        of esophagus.                       - Minimal reflux esophagitis.                       - Z-line regular, 30 cm from the incisors.                       - No endoscopic esophageal abnormality to explain                        patient's dysphagia. Esophagus dilated, 51 Fr.                       - Large hiatal hernia.                       - Erosive gastropathy (non-bleeding Tyler                        erosion). .                       - Minimal antritis. Biopsied.                       - Normal stomach otherwise.                       - Normal examined duodenum.  Recommendation:      - Perform a colonoscopy as previously scheduled.                       - Discharge patient to home after that.                       - Await pathology and CLOtest results.                       - Follow an antireflux regimen.                       - Continue present medications.                       - Use Protonix (pantoprazole) 40 mg PO daily.                       - Observe patient's clinical course following                        today's procedure with therapeutic intervention.                       - Repeat upper endoscopy PRN for retreatment.                       - Call the [facility] clinic in 2 weeks for reports (if                        you haven't heard from us sooner) [phone].                       - To visualize the small bowel, consider video                        capsule endoscopy.    Impression:          - Non-bleeding internal hemorrhoids.                        - Redundant colon.                       - The examination was otherwise normal.                       - The examined portion of the ileum was normal.                       - No specimens collected.  Recommendation:      - Discharge patient to home.                       - High fiber diet.                       - Continue present medications.                       - Use fiber, for example Citrucel, Fibercon, Konsyl                        or Metamucil.                       - Take a PROBIOTIC, such as a carton of GREEK YOGURT                        (Chobani or Oikos, or Activia or Dannon); or tablets                        of ALIGN or CULTURELLE or LOUISA-Q (all                        non-prescription), every day for a month.                       - To visualize the small bowel, perform video                        capsule endoscopy.                       - Repeat colonoscopy in 10 years for screening                        purposes.    Rusty Vogt MD  9/6/2018  Disposition: HOME OR SELF CARE    Patient Instructions:   Current Discharge Medication List      CONTINUE these medications which have NOT CHANGED    Details   atorvastatin (LIPITOR) 10 MG tablet TAKE 1 TABLET ONCE DAILY  Qty: 90 tablet, Refills: 3      buPROPion (WELLBUTRIN XL) 300 MG 24 hr tablet TAKE 1 TABLET ONCE DAILY  Qty: 90 tablet, Refills: 3      CALCIUM CARBONATE/VITAMIN D3 (CALTRATE 600 + D ORAL) Take by mouth 2 (two) times daily.      escitalopram oxalate (LEXAPRO) 20 MG tablet TAKE 1 TABLET ONCE DAILY  Qty: 90 tablet, Refills: 1      felodipine (PLENDIL) 5 MG 24 hr tablet Take 1 tablet (5 mg total) by mouth once daily.  Qty: 90 tablet, Refills: 3      ferrous gluconate (FERGON) 324 MG tablet TAKE 1 TABLET BY MOUTH WITH BREAKFAST  Qty: 30 tablet, Refills: 5      hydroCHLOROthiazide (HYDRODIURIL) 25 MG tablet TAKE 1 TABLET ONCE DAILY  Qty: 90 tablet, Refills: 3      levothyroxine (SYNTHROID) 50 MCG tablet Take 1 tablet (50 mcg total)  by mouth once daily.  Qty: 90 tablet, Refills: 3      OMEGA-3 FATTY ACIDS-EPA ORAL Take by mouth once daily.      pantoprazole (PROTONIX) 40 MG tablet TAKE 1 TABLET DAILY  Qty: 90 tablet, Refills: 3      potassium chloride (MICRO-K) 10 MEQ CpSR TAKE 1 CAPSULE ONCE DAILY  Qty: 90 capsule, Refills: 3      VIT A/VIT C/VIT E/ZINC/COPPER (PRESERVISION AREDS ORAL) Take by mouth once daily.      ALPRAZolam (XANAX) 0.25 MG tablet Take 1 tablet (0.25 mg total) by mouth 3 (three) times daily as needed for Anxiety.  Qty: 30 tablet, Refills: 0      senna (SENOKOT) 8.6 mg tablet Take 8.6 mg by mouth daily as needed.              Discharge Procedure Orders (must include Diet, Follow-up, Activity)    Follow Up:  Follow up with PCP as per your routine.  Please follow an anti reflux diet and a high fiber diet.  Activity as tolerated.    No driving day of procedure.    PROBIOTICS:  Now that your colon is so cleaned out, now is a good time for a round of PROBIOTICS.  Eat a container of Greek Yogurt, such as OIKOS or CHOBANI,  Or Activia or Dannon    Greek Yogurt.    Or Take a similar Probiotic product such as Align or Culturelle or Lizzie-Q, every day for a month.                  (The products listed are non-prescription, but you may need to ask the pharmacist for their location.)   Repeat this t least 5-6 times a year.

## 2018-09-06 NOTE — [employer] PATIENT INSTRUCTIONS
Discharge Summary/Instructions for after Colonoscopy without   Biopsy/Polypectomy  Cheyenne West    Thursday, September 06, 2018  Rusty Vogt MD  RESTRICTIONS ON ACTIVITY:  - Do not drive a car or operate machinery until the day after the procedure.      - The following day: return to full activity including work.  - For  3 days: No heavy lifting, straining or running.  - Diet: You may eat solid foods, but no gassy foods (i.e. beans, broccoli,   cabbage, etc).  TREATMENT FOR COMMON SIDE EFFECTS:  - Mild abdominal pain and bloating or excessive gas: rest, eat lightly and   use a heating pad.  SYMPTOMS TO WATCH FOR AND REPORT TO YOUR PHYSICIAN:  1. Severe abdominal pain.  2. Fever within 24 hours after a procedure.  3. A large amount of rectal bleeding. (A small amount of blood from the   rectum is not serious, especially if hemorrhoids are present.  3.  Because air was put into your colon during the procedure, expelling   large amounts of air from your rectum is normal.  4.  You may not have a bowel movement for 1-3 days because of the   colonoscopy prep.  This is normal.  5.  Call immediately if you notice any of the following:   Chills and/or fever over 101   Persistent vomiting   Severe abdominal pain, other than gas cramps   Severe chest pain   Black, tarry stools   Any bleeding - exceeding one tablespoon  Your doctor recommends these additional instructions:  Eat a high fiber diet.   Take a fiber supplement, for example Citrucel, Fibercon, Konsyl or   Metamucil.   Take a PROBIOTIC, such as a carton of GREEK YOGURT (Chobani or Oikos,  or   Activia or Dannon);  or tablets of ALIGN or CULTURELLE or LOUISA-Q (all   non-prescription), every day for a month.   And repeat this 3-4 times a   year.  Continue your present medications.   Your physician has recommended video capsule endoscopy to visualize the   small bowel.     Your physician has recommended a repeat colonoscopy in 10 years for   screening  purposes.  None  If you have any questions or problems, please call your physician.  EMERGENCY PHONE NUMBER: [phone]  LAB RESULTS: Call in two (2) weeks for lab results, [phone]  ___________________________________________  Nurse Signature  ___________________________________________  Patient/Designated Responsible Party Signature  Rusty Vogt MD  9/6/2018 12:58:50 PM  This report has been verified and signed electronically.  [employer]

## 2018-09-06 NOTE — ANESTHESIA PREPROCEDURE EVALUATION
09/06/2018  Cehyenne West is a 63 y.o., female.    Anesthesia Evaluation      I have reviewed the Medications.     Review of Systems  Anesthesia Hx:  No problems with previous Anesthesia Hx of Anesthetic complications (PONV)   Social:  Non-Smoker, No Alcohol Use    Cardiovascular:   Hypertension CAD   hyperlipidemia    Pulmonary:   Sleep Apnea    Renal/:  Renal/ Normal     Hepatic/GI:   GERD    Neurological:  Neurology Normal    Endocrine:   Hypothyroidism    Psych:   anxiety          Physical Exam  General:  Obesity    Airway/Jaw/Neck:  Airway Findings: Mouth Opening: Normal General Airway Assessment: Adult  Mallampati: II  Jaw/Neck Findings:  Neck ROM: Extension Decreased, Mild       Chest/Lungs:  Chest/Lungs Findings: Clear to auscultation, Normal Respiratory Rate     Heart/Vascular:  Heart Findings: Rate: Normal  Rhythm: Regular Rhythm  Sounds: Normal  Heart murmur: negative Vascular Findings: Normal (No carotid bruits.)       Mental Status:  Mental Status Findings:  Cooperative, Alert and Oriented         Anesthesia Plan  Type of Anesthesia, risks & benefits discussed:  Anesthesia Type:  general  Patient's Preference:   Intra-op Monitoring Plan:   Intra-op Monitoring Plan Comments:   Post Op Pain Control Plan:   Post Op Pain Control Plan Comments:   Induction:   IV  Beta Blocker:  Patient is not currently on a Beta-Blocker (No further documentation required).       Informed Consent: Patient understands risks and agrees with Anesthesia plan.  Questions answered. Anesthesia consent signed with patient.  ASA Score: 2     Day of Surgery Review of History & Physical:            Ready For Surgery From Anesthesia Perspective.

## 2018-09-06 NOTE — [employer] PATIENT INSTRUCTIONS
Discharge Summary/Instructions after an Endoscopic Procedure  Patient Name: Cheyenne West  Patient MRN: [id number]  Patient YOB: 1955  Thursday, September 06, 2018  Rusty Vogt MD  RESTRICTIONS:  During your procedure today, you received medications for sedation.  These   medications may affect your judgment, balance and coordination.  Therefore,   for 24 hours, you have the following restrictions:   - DO NOT drive a car, operate machinery, make legal/financial decisions,   sign important papers or drink alcohol.    ACTIVITY:  Today: no heavy lifting, straining or running due to procedural   sedation/anesthesia.  The following day: return to full activity including work.  DIET:  Eat and drink normally unless instructed otherwise.     TREATMENT FOR COMMON SIDE EFFECTS:  - Mild abdominal pain, nausea, belching, bloating or excessive gas:  rest,   eat lightly and use a heating pad.  - Sore Throat: treat with throat lozenges and/or gargle with warm salt   water.  - Because air was used during the procedure, expelling large amounts of air   from your rectum or belching is normal.  - If a bowel prep was taken, you may not have a bowel movement for 1-3 days.    This is normal.  SYMPTOMS TO WATCH FOR AND REPORT TO YOUR PHYSICIAN:  1. Abdominal pain or bloating, other than gas cramps.  2. Chest pain.  3. Back pain.  4. Signs of infection such as: chills or fever occurring within 24 hours   after the procedure.  5. Rectal bleeding, which would show as bright red, maroon, or black stools.   (A tablespoon of blood from the rectum is not serious, especially if   hemorrhoids are present.)  6. Vomiting.  7. Weakness or dizziness.  GO DIRECTLY TO THE NEAREST EMERGENCY ROOM IF YOU HAVE ANY OF THE FOLLOWING:      Difficulty breathing              Chills and/or fever over 101 F   Persistent vomiting and/or vomiting blood   Severe abdominal pain   Severe chest pain   Black, tarry stools   Bleeding- more than one  tablespoon   Any other symptom or condition that you feel may need urgent attention  Your doctor recommends these additional instructions:  If any biopsies were taken, your doctors clinic will contact you in 1 to 2   weeks with any results.  Follow an antireflux regimen.  This includes:       - Do not lie down for at least 3 to 4 hours after meals.        - Raise the head of the bed 4 to 6 inches.        - Decrease excess weight.        - Avoid citrus juices and other acidic foods, alcohol, chocolate, mints,   coffee and other caffeinated beverages, carbonated beverages, fatty and   fried foods.        - Avoid tight-fitting clothing.        - Avoid cigarettes and other tobacco products.   Continue your present medications.   Take Protonix (pantoprazole) 40 mg by mouth once a day.   Your physician has recommended video capsule endoscopy to visualize the   small bowel.  For questions, problems or results please call your physician - Rusty Vogt MD at Work:  [phone].  EMERGENCY PHONE NUMBER: [phone], LAB RESULTS: [phone]  IF A COMPLICATION OR EMERGENCY SITUATION ARISES AND YOU ARE UNABLE TO REACH   YOUR PHYSICIAN - GO DIRECTLY TO THE EMERGENCY ROOM.  ___________________________________________  Nurse Signature  ___________________________________________  Patient/Designated Responsible Party Signature  Rusty Vogt MD  9/6/2018 12:43:07 PM  This report has been verified and signed electronically.  [employer]

## 2018-09-07 ENCOUNTER — PATIENT MESSAGE (OUTPATIENT)
Dept: GASTROENTEROLOGY | Facility: CLINIC | Age: 63
End: 2018-09-07

## 2018-09-07 ENCOUNTER — TELEPHONE (OUTPATIENT)
Dept: GASTROENTEROLOGY | Facility: CLINIC | Age: 63
End: 2018-09-07

## 2018-09-10 DIAGNOSIS — D64.9 ANEMIA, UNSPECIFIED TYPE: Primary | ICD-10-CM

## 2018-09-11 ENCOUNTER — HOSPITAL ENCOUNTER (OUTPATIENT)
Dept: RADIOLOGY | Facility: HOSPITAL | Age: 63
Discharge: HOME | End: 2018-09-11
Attending: FAMILY MEDICINE
Payer: COMMERCIAL

## 2018-09-11 VITALS — HEIGHT: 59 IN | WEIGHT: 160 LBS | BODY MASS INDEX: 32.25 KG/M2

## 2018-09-11 DIAGNOSIS — R92.8 ABNORMAL SCREENING MAMMOGRAM: ICD-10-CM

## 2018-09-11 PROCEDURE — 77063 BREAST TOMOSYNTHESIS BI: CPT | Mod: 26,,, | Performed by: RADIOLOGY

## 2018-09-11 PROCEDURE — 77067 SCR MAMMO BI INCL CAD: CPT | Mod: 26,,, | Performed by: RADIOLOGY

## 2018-09-11 PROCEDURE — 77063 BREAST TOMOSYNTHESIS BI: CPT | Mod: TC,PO

## 2018-09-26 ENCOUNTER — HOSPITAL ENCOUNTER (OUTPATIENT)
Facility: HOSPITAL | Age: 63
Discharge: HOME | End: 2018-09-26
Attending: INTERNAL MEDICINE | Admitting: INTERNAL MEDICINE
Payer: COMMERCIAL

## 2018-09-26 VITALS
BODY MASS INDEX: 31.25 KG/M2 | DIASTOLIC BLOOD PRESSURE: 74 MMHG | RESPIRATION RATE: 20 BRPM | HEART RATE: 64 BPM | TEMPERATURE: 97 F | WEIGHT: 155 LBS | SYSTOLIC BLOOD PRESSURE: 112 MMHG | HEIGHT: 59 IN

## 2018-09-26 PROCEDURE — 25000003 PHARM REV CODE 250: Performed by: INTERNAL MEDICINE

## 2018-09-26 PROCEDURE — 91110 GI TRC IMG INTRAL ESOPH-ILE: CPT | Mod: 26,,, | Performed by: INTERNAL MEDICINE

## 2018-09-26 PROCEDURE — 91110 GI TRC IMG INTRAL ESOPH-ILE: CPT | Performed by: INTERNAL MEDICINE

## 2018-09-26 RX ORDER — DEXTROMETHORPHAN/PSEUDOEPHED 2.5-7.5/.8
DROPS ORAL
Status: DISCONTINUED
Start: 2018-09-26 | End: 2018-09-26 | Stop reason: HOSPADM

## 2018-09-26 RX ORDER — DEXTROMETHORPHAN/PSEUDOEPHED 2.5-7.5/.8
40 DROPS ORAL 4 TIMES DAILY PRN
Status: DISCONTINUED | OUTPATIENT
Start: 2018-09-26 | End: 2018-09-26 | Stop reason: HOSPADM

## 2018-09-26 RX ADMIN — SIMETHICONE 40 MG: 20 SUSPENSION/ DROPS ORAL at 07:09

## 2018-09-26 NOTE — H&P
[facility] Note    CC: ELIN    HPI 63 y.o. female presents for further evaluation of ELIN without source found on upper or lower endoscopy    Past Medical History:   Diagnosis Date    Carpal tunnel syndrome of right wrist     Chronic gastritis     Coronary artery disease     mitral regurgitation    Dissociative fugue     Diverticulosis     Duodenitis     Dyspepsia     Generalized anxiety disorder     Hearing loss on left     Hearing loss on right     Helicobacter pylori (H. pylori)     History of blood transfusion     Hyperlipidemia     Hypertension     Hypothyroidism     IBS (irritable bowel syndrome)     Iron deficiency anemia     Mild mitral regurgitation by prior echocardiogram     Mild obesity     Mild vitamin D deficiency     Paraesophageal hiatal hernia     PONV (postoperative nausea and vomiting)     Reflux gastritis     Sleep apnea     mild improved with wt. loss    Thyroid goiter     Urticaria, chronic        Labs:  Lab Results   Component Value Date    WBC 5.44 06/27/2018    HGB 11.7 (L) 06/27/2018    HCT 38.6 06/27/2018    MCV 86 06/27/2018     06/27/2018     -Ferritin- 16      Assessment:   63 y.o. female presents for further evaluation of ELIN without source found on upper or lower endoscopy    [place]:  [place] [facility] [place]    Loraine Velazquez MD  [facility]  [facility] [facility] [address]  [place] [address]  Office: [phone]  Fax: [phone]

## 2018-09-27 NOTE — [employer] PATIENT INSTRUCTIONS
Discharge Summary/Instructions after an Endoscopic Procedure  Patient Name: Cheyenne West  Patient MRN: [id number]  Patient YOB: 1955  Wednesday, September 26, 2018  Loraine Velazquez MD  RESTRICTIONS:  During your procedure today, you received medications for sedation.  These   medications may affect your judgment, balance and coordination.  Therefore,   for 24 hours, you have the following restrictions:   - DO NOT drive a car, operate machinery, make legal/financial decisions,   sign important papers or drink alcohol.    ACTIVITY:  Today: no heavy lifting, straining or running due to procedural   sedation/anesthesia.  The following day: return to full activity including work.  DIET:  Eat and drink normally unless instructed otherwise.     TREATMENT FOR COMMON SIDE EFFECTS:  - Mild abdominal pain, nausea, belching, bloating or excessive gas:  rest,   eat lightly and use a heating pad.  - Sore Throat: treat with throat lozenges and/or gargle with warm salt   water.  - Because air was used during the procedure, expelling large amounts of air   from your rectum or belching is normal.  - If a bowel prep was taken, you may not have a bowel movement for 1-3 days.    This is normal.  SYMPTOMS TO WATCH FOR AND REPORT TO YOUR PHYSICIAN:  1. Abdominal pain or bloating, other than gas cramps.  2. Chest pain.  3. Back pain.  4. Signs of infection such as: chills or fever occurring within 24 hours   after the procedure.  5. Rectal bleeding, which would show as bright red, maroon, or black stools.   (A tablespoon of blood from the rectum is not serious, especially if   hemorrhoids are present.)  6. Vomiting.  7. Weakness or dizziness.  GO DIRECTLY TO THE NEAREST EMERGENCY ROOM IF YOU HAVE ANY OF THE FOLLOWING:      Difficulty breathing              Chills and/or fever over 101 F   Persistent vomiting and/or vomiting blood   Severe abdominal pain   Severe chest pain   Black, tarry stools   Bleeding- more  than one tablespoon   Any other symptom or condition that you feel may need urgent attention  Your doctor recommends these additional instructions:  If any biopsies were taken, your doctors clinic will contact you in 1 to 2   weeks with any results.  -Increase oral iron supplementation to BID dosing  -Repeat CBC, Iron, TIBC, Ferritin in 3-6 months. If anemia or iron counts   are worsened would refer to hematology for IV iron infusion  For questions, problems or results please call your physician - Loraine Velazquez MD at Work:  [phone].  [facility], EMERGENCY ROOM PHONE NUMBER: [phone]  IF A COMPLICATION OR EMERGENCY SITUATION ARISES AND YOU ARE UNABLE TO REACH   YOUR PHYSICIAN - GO DIRECTLY TO THE EMERGENCY ROOM.  Loraine Velazquez MD  9/27/2018 2:01:24 PM  This report has been verified and signed electronically.  [employer]

## 2018-10-01 ENCOUNTER — LAB VISIT (OUTPATIENT)
Dept: LAB | Facility: HOSPITAL | Age: 63
End: 2018-10-01
Attending: FAMILY MEDICINE
Payer: COMMERCIAL

## 2018-10-01 DIAGNOSIS — D50.9 IRON DEFICIENCY ANEMIA, UNSPECIFIED IRON DEFICIENCY ANEMIA TYPE: ICD-10-CM

## 2018-10-01 DIAGNOSIS — D50.9 IRON DEFICIENCY ANEMIA: ICD-10-CM

## 2018-10-01 LAB
BASOPHILS # BLD AUTO: 0 K/UL
BASOPHILS NFR BLD: 0 %
DIFFERENTIAL METHOD: ABNORMAL
EOSINOPHIL # BLD AUTO: 0 K/UL
EOSINOPHIL NFR BLD: 0 %
ERYTHROCYTE [DISTWIDTH] IN BLOOD BY AUTOMATED COUNT: 17.5 %
FERRITIN SERPL-MCNC: 31 NG/ML
HCT VFR BLD AUTO: 35 %
HGB BLD-MCNC: 10.8 G/DL
IMM GRANULOCYTES # BLD AUTO: 0.06 K/UL
IMM GRANULOCYTES NFR BLD AUTO: 1.2 %
IRON SERPL-MCNC: 274 UG/DL
LYMPHOCYTES # BLD AUTO: 1.3 K/UL
LYMPHOCYTES NFR BLD: 25.8 %
MCH RBC QN AUTO: 27.5 PG
MCHC RBC AUTO-ENTMCNC: 30.9 G/DL
MCV RBC AUTO: 89 FL
MONOCYTES # BLD AUTO: 0.5 K/UL
MONOCYTES NFR BLD: 8.9 %
NEUTROPHILS # BLD AUTO: 3.2 K/UL
NEUTROPHILS NFR BLD: 64.1 %
NRBC BLD-RTO: 0 /100 WBC
PLATELET # BLD AUTO: 252 K/UL
PMV BLD AUTO: 10.3 FL
RBC # BLD AUTO: 3.93 M/UL
SATURATED IRON: 58 %
TOTAL IRON BINDING CAPACITY: 471 UG/DL
TRANSFERRIN SERPL-MCNC: 318 MG/DL
WBC # BLD AUTO: 5.04 K/UL

## 2018-10-01 PROCEDURE — 83540 ASSAY OF IRON: CPT

## 2018-10-01 PROCEDURE — 82728 ASSAY OF FERRITIN: CPT

## 2018-10-01 PROCEDURE — 85025 COMPLETE CBC W/AUTO DIFF WBC: CPT

## 2018-10-01 PROCEDURE — 36415 COLL VENOUS BLD VENIPUNCTURE: CPT | Mod: PO

## 2018-10-05 ENCOUNTER — PATIENT MESSAGE (OUTPATIENT)
Dept: FAMILY MEDICINE | Facility: CLINIC | Age: 63
End: 2018-10-05

## 2018-10-05 DIAGNOSIS — D64.9 ANEMIA, UNSPECIFIED TYPE: Primary | ICD-10-CM

## 2018-10-05 NOTE — TELEPHONE ENCOUNTER
Please see results review note    Still has slight anemia.  One iron test normal.  Another iron test elevated.  Unclear what is going on with this.  Recommend hold off on taking the iron for now.  Schedule an appointment with the hematologist. My nurse will contact you to arrange.  Thanks,  Dr. Buck

## 2018-10-07 ENCOUNTER — PATIENT MESSAGE (OUTPATIENT)
Dept: GASTROENTEROLOGY | Facility: CLINIC | Age: 63
End: 2018-10-07

## 2018-10-08 ENCOUNTER — PATIENT MESSAGE (OUTPATIENT)
Dept: GASTROENTEROLOGY | Facility: CLINIC | Age: 63
End: 2018-10-08

## 2018-10-11 ENCOUNTER — PATIENT MESSAGE (OUTPATIENT)
Dept: GASTROENTEROLOGY | Facility: CLINIC | Age: 63
End: 2018-10-11

## 2018-10-12 ENCOUNTER — TELEPHONE (OUTPATIENT)
Dept: HEMATOLOGY/ONCOLOGY | Facility: CLINIC | Age: 63
End: 2018-10-12

## 2018-10-12 NOTE — TELEPHONE ENCOUNTER
Spoke with pt.  Wanted to schedule new pt appointment, low iron, anemia per Dr. Buck.  Next available new pt appointment 10/16/18, w/ Dr. Sagastume scheduled. Pt verbalized under standing.

## 2018-10-12 NOTE — TELEPHONE ENCOUNTER
----- Message from Chava Power sent at 10/12/2018  2:14 PM CDT -----  Contact: self   Patient need to speak with a nurse regarding scheduling a appointment, please call back at [phone] (home)

## 2018-10-16 ENCOUNTER — OFFICE VISIT (OUTPATIENT)
Dept: HEMATOLOGY/ONCOLOGY | Facility: CLINIC | Age: 63
End: 2018-10-16
Payer: COMMERCIAL

## 2018-10-16 VITALS — BODY MASS INDEX: 32.8 KG/M2 | TEMPERATURE: 99 F | HEIGHT: 59 IN | RESPIRATION RATE: 18 BRPM | WEIGHT: 162.69 LBS

## 2018-10-16 DIAGNOSIS — K29.60 REFLUX GASTRITIS: ICD-10-CM

## 2018-10-16 DIAGNOSIS — K29.50 OTHER CHRONIC GASTRITIS WITHOUT HEMORRHAGE: ICD-10-CM

## 2018-10-16 DIAGNOSIS — D50.9 IRON DEFICIENCY ANEMIA, UNSPECIFIED IRON DEFICIENCY ANEMIA TYPE: Primary | ICD-10-CM

## 2018-10-16 DIAGNOSIS — K59.03 DRUG INDUCED CONSTIPATION: ICD-10-CM

## 2018-10-16 DIAGNOSIS — E03.4 HYPOTHYROIDISM DUE TO ACQUIRED ATROPHY OF THYROID: ICD-10-CM

## 2018-10-16 PROCEDURE — 3008F BODY MASS INDEX DOCD: CPT | Mod: CPTII,S$GLB,, | Performed by: INTERNAL MEDICINE

## 2018-10-16 PROCEDURE — 99999 PR PBB SHADOW E&M-EST. PATIENT-LVL III: CPT | Mod: PBBFAC,,, | Performed by: INTERNAL MEDICINE

## 2018-10-16 PROCEDURE — 99204 OFFICE O/P NEW MOD 45 MIN: CPT | Mod: S$GLB,,, | Performed by: INTERNAL MEDICINE

## 2018-10-16 NOTE — PROGRESS NOTES
Initial consultation for patient name Cheyenne West      HPI    63 years old  female who was presented to [facility] for evaluation of iron deficiency anemia.  Per patient she had a long history of iron deficiency anemia intermittently. she has been taking iron as per Primary Care instructions.  She also had a prior hematologist who put her on iron replacement protocol.  Due to insurance changing status she decided to stay with [facility].  Pre patient she has history of iron deficiency and recently she has been taking iron pill once daily for the past 2 weeks, she was told to stop taking iron pill because of the recent iron study shows increase serum concentration of iron.  Prior to this she was taking iron pill once a week for 6 months.  And before that she was taking iron pill once every other day.   She recently also had upper EGD and colonoscopy study which showed nonbleeding internal hemorrhoids, with erosive gastropathy (nonbleeding).  Video capsule endoscopy was performed on September 26, 2018 which showed multiple angioectasias without bleeding in the small bowel, likely etiology for ELIN.        Past Medical History:   Diagnosis Date    Carpal tunnel syndrome of right wrist     Chronic gastritis     Coronary artery disease     mitral regurgitation    Dissociative fugue     Diverticulosis     Duodenitis     Dyspepsia     Generalized anxiety disorder     Hearing loss on left     Hearing loss on right     Helicobacter pylori (H. pylori)     History of blood transfusion     Hyperlipidemia     Hypertension     Hypothyroidism     IBS (irritable bowel syndrome)     Iron deficiency anemia     Mild mitral regurgitation by prior echocardiogram     Mild obesity     Mild vitamin D deficiency     Paraesophageal hiatal hernia     PONV (postoperative nausea and vomiting)     Reflux gastritis     Sleep apnea     mild improved with wt. loss    Thyroid goiter     Urticaria,  chronic      Social History     Socioeconomic History    Marital status:      Spouse name: None    Number of children: None    Years of education: None    Highest education level: None   Social Needs    Financial resource strain: None    Food insecurity - worry: None    Food insecurity - inability: None    Transportation needs - medical: None    Transportation needs - non-medical: None   Occupational History    None   Tobacco Use    Smoking status: Never Smoker    Smokeless tobacco: Never Used   Substance and Sexual Activity    Alcohol use: Yes    Drug use: No    Sexual activity: Yes   Other Topics Concern    None   Social History Narrative    None         Subjective      Review of Systems   Constitutional:  Negative for appetite change, mild fatigue intermittently.    HENT: Negative for mouth sores.   Eyes: Negative for visual disturbance.   Respiratory: Negative for cough and shortness of breath.   Cardiovascular: Negative for chest pain.   Gastrointestinal: Negative for diarrhea.   Genitourinary: Negative for frequency.   Musculoskeletal: Negative for back pain.   Skin: Negative for rash.   Neurological: Negative for headaches.   Hematological: Negative for adenopathy.   Psychiatric/Behavioral: The patient is not nervous/anxious.   All other systems reviewed and are negative.     Objective    Physical Exam   Vitals:    10/16/18 0805   Resp: 18   Temp: 99 °F (37.2 °C)       Constitutional: patient is oriented to person, place, and time. patient appears well-developed and well-nourished. No distress.   HENT:  Normocephalic atraumatic pupils equal round reactive to light extraocular muscle intact tongue midline trachea midline hearing grossly intact  Cardiovascular: Normal rate, regular rhythm, normal heart sounds, intact distal pulses and normal pulses.   No murmur heard.   No edema, no tenderness in the extremities.   Pulmonary/Chest: Effort normal and breath sounds normal. No accessory  muscle usage. patient has no wheezes..   Abdominal: Soft. Normal appearance and bowel sounds are normal. patient exhibits no distension and no mass. There is no hepatosplenomegaly. There is no tenderness.   Musculoskeletal: Normal range of motion.   Gait is normal   No clubbing, cyanosis     Lymphadenopathy:  Lymphadenopathy exam is grossly normal  Neurological: patient is alert and oriented to person, place, and time. patient has normal strength and normal reflexes. No sensory deficit. Gait normal.   Skin: Skin is warm, dry and intact. No bruising, no lesion and no rash noted. No cyanosis. Nails show no clubbing.   No lesions   Psychiatric: patient has a normal mood and affect. patient speech is normal and behavior is normal. Judgment normal. Cognition and memory are normal.   Vitals reviewed.     CMP  Sodium   Date Value Ref Range Status   06/27/2018 142 136 - 145 mmol/L Final     Potassium   Date Value Ref Range Status   06/27/2018 4.3 3.5 - 5.1 mmol/L Final     Chloride   Date Value Ref Range Status   06/27/2018 105 95 - 110 mmol/L Final     CO2   Date Value Ref Range Status   06/27/2018 29 23 - 29 mmol/L Final     Glucose   Date Value Ref Range Status   06/27/2018 96 70 - 110 mg/dL Final     BUN, Bld   Date Value Ref Range Status   06/27/2018 14 8 - 23 mg/dL Final     Creatinine   Date Value Ref Range Status   06/27/2018 0.8 0.5 - 1.4 mg/dL Final     Calcium   Date Value Ref Range Status   06/27/2018 9.9 8.7 - 10.5 mg/dL Final     Total Protein   Date Value Ref Range Status   07/30/2018 7.5 6.0 - 8.4 g/dL Final     Albumin   Date Value Ref Range Status   07/30/2018 4.2 3.5 - 5.2 g/dL Final     Total Bilirubin   Date Value Ref Range Status   07/30/2018 0.2 0.1 - 1.0 mg/dL Final     Comment:     For infants and newborns, interpretation of results should be based  on gestational age, weight and in agreement with clinical  observations.  Premature Infant recommended reference ranges:  Up to 24  hours.............<8.0 mg/dL  Up to 48 hours............<12.0 mg/dL  3-5 days..................<15.0 mg/dL  6-29 days.................<15.0 mg/dL       Alkaline Phosphatase   Date Value Ref Range Status   07/30/2018 117 55 - 135 U/L Final     AST   Date Value Ref Range Status   07/30/2018 27 10 - 40 U/L Final     ALT   Date Value Ref Range Status   07/30/2018 30 10 - 44 U/L Final     Anion Gap   Date Value Ref Range Status   06/27/2018 8 8 - 16 mmol/L Final     eGFR if    Date Value Ref Range Status   06/27/2018 >60.0 >60 mL/min/1.73 m^2 Final     eGFR if non    Date Value Ref Range Status   06/27/2018 >60.0 >60 mL/min/1.73 m^2 Final     Comment:     Calculation used to obtain the estimated glomerular filtration  rate (eGFR) is the CKD-EPI equation.        Lab Results   Component Value Date    WBC 5.04 10/01/2018    HGB 10.8 (L) 10/01/2018    HCT 35.0 (L) 10/01/2018    MCV 89 10/01/2018     10/01/2018     Latest iron panel shows he ferritin of 31 which has been improved compared to 1 month ago which was 16.    10/1/2018: iron 274, transferrin 318 TIBC 471, T sat 58%, ferritin 51    6/27/2018: iron 44, transferrin 348, TIIBC 515, Tsat 9%, ferritin 16    Assessment  [] Iron deficiency anemia likely secondary to multiple angioectasias discovered on small bowel series.    - recommend patient to take iron sulfate 325 mg 1 pill daily.  I am aware that iron pill gives her constipation that she is reluctant to take 2 pills daily.  At this point with her hemoglobin at 10.8g/dL, I am. comfortable with her taking iron pill once daily.  Will follow-up in 1 months to see how she responds to treatment.      [] acitive thyroid disease on thyroid medication  -continue current medical management    [] constipation secondary to iron  -recommend patient to be on sennako plus MiraLax for symptomatic relief.  At this moment I do not feel constipation alone well impinge patient from stop taking oral  iron supplement.    -I have addressed constipation with patient she agrees that she will continue taking iron pill follow-up 4 weeks with repeat iron study and address any concerns an issue regarding her symptoms    [] Reflux gastritis  -follow-up with GI    Plan    Iron deficiency anemia, unspecified iron deficiency anemia type  -     CBC w/ DIFF; Future; Expected date: 10/16/2018  -     CMP; Future; Expected date: 10/16/2018  -     FERRITIN; Future; Expected date: 10/16/2018  -     Iron and TIBC; Future; Expected date: 10/16/2018  -     STFR; Future; Expected date: 10/16/2018  -     B-12; Future; Expected date: 10/16/2018  -     FOLATE; Future; Expected date: 10/16/2018  -     Pathologist Interpretation Differential; Future; Expected date: 10/16/2018  -     TSH; Future; Expected date: 10/16/2018    Hypothyroidism due to acquired atrophy of thyroid  -     CBC w/ DIFF; Future; Expected date: 10/16/2018  -     CMP; Future; Expected date: 10/16/2018  -     FERRITIN; Future; Expected date: 10/16/2018  -     Iron and TIBC; Future; Expected date: 10/16/2018  -     STFR; Future; Expected date: 10/16/2018  -     B-12; Future; Expected date: 10/16/2018  -     FOLATE; Future; Expected date: 10/16/2018  -     Pathologist Interpretation Differential; Future; Expected date: 10/16/2018  -     TSH; Future; Expected date: 10/16/2018    Reflux gastritis  -     CBC w/ DIFF; Future; Expected date: 10/16/2018  -     CMP; Future; Expected date: 10/16/2018  -     FERRITIN; Future; Expected date: 10/16/2018  -     Iron and TIBC; Future; Expected date: 10/16/2018  -     STFR; Future; Expected date: 10/16/2018  -     B-12; Future; Expected date: 10/16/2018  -     FOLATE; Future; Expected date: 10/16/2018  -     Pathologist Interpretation Differential; Future; Expected date: 10/16/2018  -     TSH; Future; Expected date: 10/16/2018    Other chronic gastritis without hemorrhage  -     CBC w/ DIFF; Future; Expected date: 10/16/2018  -     CMP;  Future; Expected date: 10/16/2018  -     FERRITIN; Future; Expected date: 10/16/2018  -     Iron and TIBC; Future; Expected date: 10/16/2018  -     STFR; Future; Expected date: 10/16/2018  -     B-12; Future; Expected date: 10/16/2018  -     FOLATE; Future; Expected date: 10/16/2018  -     Pathologist Interpretation Differential; Future; Expected date: 10/16/2018  -     TSH; Future; Expected date: 10/16/2018    Drug induced constipation

## 2018-10-16 NOTE — LETTER
October 16, 2018      Juan Buck MD  [id number] [facility] [address]           [facility] [address] [place] [id number]  Phone: [phone]  Fax: [phone]          Patient: Cheyenne West   MR Number: [id number]   YOB: 1955   Date of Visit: 10/16/2018       Dear Dr. Juan Buck:    Thank you for referring Cheyenne West to me for evaluation. Attached you will find relevant portions of my assessment and plan of care.    If you have questions, please do not hesitate to call me. I look forward to following Cheyenne West along with you.    Sincerely,    Mono Sagastume MD    Enclosure  CC:  No Recipients    If you would like to receive this communication electronically, please contact [email or web address] or [phone] to request more information on [employer] access.    For providers and/or their staff who would like to refer a patient to [facility], please contact us through our one-stop-shop provider referral line, [facility], at [phone].    If you feel you have received this communication in error or would no longer like to receive these types of communications, please e-mail [email or web address]

## 2018-10-17 ENCOUNTER — OFFICE VISIT (OUTPATIENT)
Dept: GASTROENTEROLOGY | Facility: CLINIC | Age: 63
End: 2018-10-17
Payer: COMMERCIAL

## 2018-10-17 VITALS
SYSTOLIC BLOOD PRESSURE: 146 MMHG | DIASTOLIC BLOOD PRESSURE: 86 MMHG | RESPIRATION RATE: 20 BRPM | BODY MASS INDEX: 32.8 KG/M2 | HEART RATE: 74 BPM | HEIGHT: 59 IN | WEIGHT: 162.69 LBS

## 2018-10-17 DIAGNOSIS — R10.84 GENERALIZED ABDOMINAL PAIN: ICD-10-CM

## 2018-10-17 DIAGNOSIS — R13.14 PHARYNGOESOPHAGEAL DYSPHAGIA: ICD-10-CM

## 2018-10-17 DIAGNOSIS — K92.1 BLACK STOOL: ICD-10-CM

## 2018-10-17 DIAGNOSIS — R06.02 SHORTNESS OF BREATH: ICD-10-CM

## 2018-10-17 DIAGNOSIS — K44.9 HIATAL HERNIA: ICD-10-CM

## 2018-10-17 DIAGNOSIS — Z87.19 HISTORY OF HEMORRHOIDS: ICD-10-CM

## 2018-10-17 DIAGNOSIS — K59.09 CHRONIC CONSTIPATION: ICD-10-CM

## 2018-10-17 DIAGNOSIS — D50.9 IRON DEFICIENCY ANEMIA, UNSPECIFIED IRON DEFICIENCY ANEMIA TYPE: Primary | ICD-10-CM

## 2018-10-17 DIAGNOSIS — Z87.19 HISTORY OF RECTAL BLEEDING: ICD-10-CM

## 2018-10-17 DIAGNOSIS — K31.819: ICD-10-CM

## 2018-10-17 DIAGNOSIS — Z87.19 HISTORY OF GASTRITIS: ICD-10-CM

## 2018-10-17 DIAGNOSIS — R10.13 EPIGASTRIC PAIN: ICD-10-CM

## 2018-10-17 PROCEDURE — 3079F DIAST BP 80-89 MM HG: CPT | Mod: CPTII,S$GLB,, | Performed by: NURSE PRACTITIONER

## 2018-10-17 PROCEDURE — 99214 OFFICE O/P EST MOD 30 MIN: CPT | Mod: S$GLB,,, | Performed by: NURSE PRACTITIONER

## 2018-10-17 PROCEDURE — 3077F SYST BP >= 140 MM HG: CPT | Mod: CPTII,S$GLB,, | Performed by: NURSE PRACTITIONER

## 2018-10-17 PROCEDURE — 3008F BODY MASS INDEX DOCD: CPT | Mod: CPTII,S$GLB,, | Performed by: NURSE PRACTITIONER

## 2018-10-17 PROCEDURE — 99999 PR PBB SHADOW E&M-EST. PATIENT-LVL V: CPT | Mod: PBBFAC,,, | Performed by: NURSE PRACTITIONER

## 2018-10-17 RX ORDER — POLYETHYLENE GLYCOL 3350 17 G/17G
17 POWDER, FOR SOLUTION ORAL DAILY
COMMUNITY
End: 2018-10-17 | Stop reason: ALTCHOICE

## 2018-10-17 NOTE — PROGRESS NOTES
"Subjective:       Patient ID: Cheyenne West is a 63 y.o. female Body mass index is 32.86 kg/m².    Chief Complaint: Follow-up (follow up test results-was started back on oral iron supplement yesterday by Oncologist)    This patient is established with Dr. Vogt & myself.    Reviewed 10/16/18 visit note with Dr. Sagastume: "HPI     63 years old  female who was presented to Hematology Clinic for evaluation of iron deficiency anemia.  Per patient she had a long history of iron deficiency anemia intermittently. she has been taking iron as per Primary Care instructions.  She also had a prior hematologist who put her on iron replacement protocol.  Due to insurance changing status she decided to stay with [facility].  Pre patient she has history of iron deficiency and recently she has been taking iron pill once daily for the past 2 weeks, she was told to stop taking iron pill because of the recent iron study shows increase serum concentration of iron.  Prior to this she was taking iron pill once a week for 6 months.  And before that she was taking iron pill once every other day.   She recently also had upper EGD and colonoscopy study which showed nonbleeding internal hemorrhoids, with erosive gastropathy (nonbleeding).  Video capsule endoscopy was performed on September 26, 2018 which showed multiple angioectasias without bleeding in the small bowel, likely etiology for ELIN."  "Assessment  () Iron deficiency anemia likely secondary to multiple angioectasias discovered on small bowel series.    - recommend patient to take iron sulfate 325 mg 1 pill daily.  I am aware that iron pill gives her constipation that she is reluctant to take 2 pills daily.  At this point with her hemoglobin at 10.8g/dL, I am. comfortable with her taking iron pill once daily.  Will follow-up in 1 months to see how she responds to treatment.    () acitive thyroid disease on thyroid medication  -continue current medical " "management  () constipation secondary to iron  -recommend patient to be on sennako plus MiraLax for symptomatic relief.  At this moment I do not feel constipation alone well impinge patient from stop taking oral iron supplement.    -I have addressed constipation with patient she agrees that she will continue taking iron pill follow-up 4 weeks with repeat iron study and address any concerns an issue regarding her symptoms  () Reflux gastritis  -follow-up with GI"      GI Problem   The primary symptoms include abdominal pain and melena (black stool since on iron supplement). Primary symptoms do not include fever, weight loss, fatigue, nausea (resolved), vomiting, diarrhea, hematemesis, hematochezia (history of bright red rectal bleeding on toilet paper of small amount a few months ago; denies any since then) or dysuria.   The abdominal pain has been gradually improving since its onset. The abdominal pain is located in the epigastric region (occurs daily to once every few weeks, described as aching). The severity of the abdominal pain is 0/10 (currently). Relieved by: ice cream.   The illness is also significant for dysphagia (recurred since 5/2018 with food, meat mostly; improved with EGD with dilation but not completely resolved; denies problems with liquids or pills) and constipation (chronic and worsen with iron supplement; bowel movements once every other day to once a week; miralax daily, senna prn; PAST: colace no relief, dulcolax). The illness does not include chills or odynophagia. Associated symptoms comments: History of chronic anemia.. Significant associated medical issues include GERD (protonix 40 mg once daily (long term use); controlled with PPI; PAST: zantac), gallstones, irritable bowel syndrome and hemorrhoids. Associated medical issues do not include inflammatory bowel disease, PUD or diverticulitis (positive history for diverticulosis).     Review of Systems   Constitutional: Negative for appetite " change, chills, fatigue, fever, unexpected weight change and weight loss.   HENT: Positive for trouble swallowing (see hpi). Negative for sore throat.    Respiratory: Positive for shortness of breath (occasional, seeing PCP for it; had stress test, denies currently). Negative for cough and choking.    Cardiovascular: Negative for chest pain.   Gastrointestinal: Positive for abdominal pain, constipation (chronic and worsen with iron supplement; bowel movements once every other day to once a week; miralax daily, senna prn; PAST: colace no relief, dulcolax), dysphagia (recurred since 5/2018 with food, meat mostly; improved with EGD with dilation but not completely resolved; denies problems with liquids or pills) and melena (black stool since on iron supplement). Negative for anal bleeding, blood in stool, diarrhea, hematemesis, hematochezia (history of bright red rectal bleeding on toilet paper of small amount a few months ago; denies any since then), nausea (resolved), rectal pain and vomiting.   Genitourinary: Negative for difficulty urinating, dysuria and flank pain.   Neurological: Negative for weakness.       Past Medical History:   Diagnosis Date    Carpal tunnel syndrome of right wrist     Chronic gastritis     Coronary artery disease     mitral regurgitation    Dissociative fugue     Diverticulosis     Duodenitis     Dyspepsia     Generalized anxiety disorder     Hearing loss on left     Hearing loss on right     Helicobacter pylori (H. pylori)     History of blood transfusion     Hyperlipidemia     Hypertension     Hypothyroidism     IBS (irritable bowel syndrome)     Iron deficiency anemia     Mild mitral regurgitation by prior echocardiogram     Mild obesity     Mild vitamin D deficiency     Paraesophageal hiatal hernia     PONV (postoperative nausea and vomiting)     Reflux gastritis     Sleep apnea     mild improved with wt. loss    Thyroid goiter     Urticaria, chronic      Past  Surgical History:   Procedure Laterality Date    BLADDER SUSPENSION      pubovaginal sling     SECTION, CLASSIC      CHOLECYSTECTOMY      COLONOSCOPY  2011    Dr. Goode, in legacy:diverticulosis, hemorrhoids, melanosis coli-repeat 10 years    COLONOSCOPY N/A 2018    Procedure: COLONOSCOPY;  Surgeon: Rusty Vogt Jr., MD;  Location: [facility];  Service: Endoscopy;  Laterality: N/A; repeat in 10 years for screening    COLONOSCOPY N/A 2018    Performed by Rusty Vogt Jr., MD at [facility]    EGD (ESOPHAGOGASTRODUODENOSCOPY) N/A 2018    Performed by Rusty Vogt Jr., MD at [facility]    ESOPHAGEAL DILATION      ESOPHAGOGASTRODUODENOSCOPY  2016    ESOPHAGOGASTRODUODENOSCOPY N/A 2018    Procedure: EGD (ESOPHAGOGASTRODUODENOSCOPY);  Surgeon: Rusty Vogt Jr., MD;  Location: [facility];  Service: Endoscopy;  Laterality: N/A;    IMAGING PROCEDURE, GI TRACT, INTRALUMINAL, VIA CAPSULE N/A 2018    Performed by Loraine Velazquez MD at [facility]    INTRALUMINAL GASTROINTESTINAL TRACT IMAGING VIA CAPSULE N/A 2018    Procedure: IMAGING PROCEDURE, GI TRACT, INTRALUMINAL, VIA CAPSULE;  Surgeon: Loraien Velazquez MD;  Location: [facility];  Service: Endoscopy;  Laterality: N/A;    THYROIDECTOMY, PARTIAL       partial for benign nodule    UPPER GASTROINTESTINAL ENDOSCOPY  2016    Dr. Whalen, in care everywhere     Family History   Problem Relation Age of Onset    Hypertension Mother     Heart disease Mother     Ulcers Mother     GI problems Mother         colitis    Hypertension Father     Breast cancer Paternal Grandmother     Colon cancer Neg Hx     Crohn's disease Neg Hx     Colon polyps Neg Hx     Ulcerative colitis Neg Hx     Stomach cancer Neg Hx     Esophageal cancer Neg Hx      Wt Readings from Last 10 Encounters:   10/17/18 73.8 kg (162 lb 11.2 oz)   10/16/18 73.8 kg (162 lb 11.2 oz)   18 70.3 kg (155 lb)   18 72.6 kg (160  lb)   09/05/18 72.6 kg (160 lb)   08/21/18 72.1 kg (158 lb 15.2 oz)   07/30/18 72.1 kg (158 lb 15.2 oz)   06/27/18 70.4 kg (155 lb 3.2 oz)   11/15/17 64.3 kg (141 lb 12.8 oz)   11/17/16 70 kg (154 lb 5.2 oz)     Lab Results   Component Value Date    WBC 5.04 10/01/2018    HGB 10.8 (L) 10/01/2018    HCT 35.0 (L) 10/01/2018    MCV 89 10/01/2018     10/01/2018     Lab Results   Component Value Date    IRON 274 (H) 10/01/2018    TIBC 471 (H) 10/01/2018    FERRITIN 31 10/01/2018     CMP  Sodium   Date Value Ref Range Status   06/27/2018 142 136 - 145 mmol/L Final     Potassium   Date Value Ref Range Status   06/27/2018 4.3 3.5 - 5.1 mmol/L Final     Chloride   Date Value Ref Range Status   06/27/2018 105 95 - 110 mmol/L Final     CO2   Date Value Ref Range Status   06/27/2018 29 23 - 29 mmol/L Final     Glucose   Date Value Ref Range Status   06/27/2018 96 70 - 110 mg/dL Final     BUN, Bld   Date Value Ref Range Status   06/27/2018 14 8 - 23 mg/dL Final     Creatinine   Date Value Ref Range Status   06/27/2018 0.8 0.5 - 1.4 mg/dL Final     Calcium   Date Value Ref Range Status   06/27/2018 9.9 8.7 - 10.5 mg/dL Final     Total Protein   Date Value Ref Range Status   07/30/2018 7.5 6.0 - 8.4 g/dL Final     Albumin   Date Value Ref Range Status   07/30/2018 4.2 3.5 - 5.2 g/dL Final     Total Bilirubin   Date Value Ref Range Status   07/30/2018 0.2 0.1 - 1.0 mg/dL Final     Comment:     For infants and newborns, interpretation of results should be based  on gestational age, weight and in agreement with clinical  observations.  Premature Infant recommended reference ranges:  Up to 24 hours.............<8.0 mg/dL  Up to 48 hours............<12.0 mg/dL  3-5 days..................<15.0 mg/dL  6-29 days.................<15.0 mg/dL       Alkaline Phosphatase   Date Value Ref Range Status   07/30/2018 117 55 - 135 U/L Final     AST   Date Value Ref Range Status   07/30/2018 27 10 - 40 U/L Final     ALT   Date Value Ref Range  "Status   07/30/2018 30 10 - 44 U/L Final     Anion Gap   Date Value Ref Range Status   06/27/2018 8 8 - 16 mmol/L Final     eGFR if    Date Value Ref Range Status   06/27/2018 >60.0 >60 mL/min/1.73 m^2 Final     eGFR if non    Date Value Ref Range Status   06/27/2018 >60.0 >60 mL/min/1.73 m^2 Final     Comment:     Calculation used to obtain the estimated glomerular filtration  rate (eGFR) is the CKD-EPI equation.        Lab Results   Component Value Date    TSH 1.653 06/27/2018     Lab Results   Component Value Date    LIPASE 46 07/30/2018     Lab Results   Component Value Date    AMYLASE 89 07/30/2018     Reviewed prior medical records including radiology report of 7/31/18 abdominal ultrasound; 7/30/18 abdominal x-ray; 3/2/16 ct abdomen pelvis in care everywhere; 5/18/11 ct abdomen pelvis & endoscopy history (see surgical history).    9/6/18 EGD was reviewed and procedure report states:   "Findings:       The oropharynx was normal.       The upper third of the esophagus and middle third of the esophagus        were normal.       Minimal change of reflux esophagitis was found in the lower third of        the esophagus.       The Z-line was regular and was found 30 cm from the incisors.       A large hiatal hernia was found. The hiatal narrowing was 38 cm from        the incisors.       A single diminutive erosion was found in the cardia (non-bleeding        Tyler erosion).       Minimal inflammation characterized by erythema was found in the        prepyloric region of the stomach. Biopsies were taken with a cold        forceps for Helicobacter pylori testing using CLOtest. Biopsies were        taken with a cold forceps for histology.       The stomach was otherwise normal.       The examined duodenum was normal.       No endoscopic abnormality was evident in the esophagus to explain        the patient's complaint of dysphagia. It was decided, however, to        proceed with " "dilation of the entire esophagus. A guidewire was        placed and the scope was withdrawn. Dilation was performed with a        Savary dilator with no resistance at 51 Fr.  Impression:          - Normal oropharynx.                       - Normal upper third of esophagus and middle third                        of esophagus.                       - Minimal reflux esophagitis.                       - Z-line regular, 30 cm from the incisors.                       - No endoscopic esophageal abnormality to explain                        patient's dysphagia. Esophagus dilated, 51 Fr.                       - Large hiatal hernia.                       - Erosive gastropathy (non-bleeding Tyler                        erosion). .                       - Minimal antritis. Biopsied.                       - Normal stomach otherwise.                       - Normal examined duodenum.  Recommendation:      - Perform a colonoscopy as previously scheduled.                       - Discharge patient to home after that.                       - Await pathology and CLOtest results.                       - Follow an antireflux regimen.                       - Continue present medications.                       - Use Protonix (pantoprazole) 40 mg PO daily.                       - Observe patient's clinical course following                        today's procedure with therapeutic intervention.                       - Repeat upper endoscopy PRN for retreatment.                       - Call the [facility] clinic in 2 weeks for reports (if                        you haven't heard from us sooner) [phone].                       - To visualize the small bowel, consider video                        capsule endoscopy.".  Biopsy results:   "FINAL PATHOLOGIC DIAGNOSIS  Stomach, antrum, biopsy:  Reactive gastropathy.  Negative for dysplasia.  No Helicobacter identified on routine stain."    9/6/18 Colonoscopy was reviewed and procedure report states:   " ""Findings:       The perianal and digital rectal examinations were normal. Pertinent        negatives include normal sphincter tone and no palpable rectal        lesions.       Non-bleeding internal hemorrhoids were found during retroflexion.        The hemorrhoids were medium-sized and Grade I (internal hemorrhoids        that do not prolapse).       No additional abnormalities were found on retroflexion.       The colon was mildly redundant.       The exam was otherwise without abnormality.       .       The terminal ileum appeared normal.  Impression:          - Non-bleeding internal hemorrhoids.                       - Redundant colon.                       - The examination was otherwise normal.                       - The examined portion of the ileum was normal.                       - No specimens collected.  Recommendation:      - Discharge patient to home.                       - High fiber diet.                       - Continue present medications.                       - Use fiber, for example Citrucel, Fibercon, Konsyl                        or Metamucil.                       - Take a PROBIOTIC, such as a carton of GREEK YOGURT                        (Chobani or Oikos, or Activia or Dannon); or tablets                        of ALIGN or CULTURELLE or LOUISA-Q (all                        non-prescription), every day for a month.                       - To visualize the small bowel, perform video                        capsule endoscopy.                       - Repeat colonoscopy in 10 years for screening                        purposes.                       - Patient has a contact number available for                        emergencies. The signs and symptoms of potential                        delayed complications were discussed with the                        patient. Return to normal activities tomorrow.                        Written discharge instructions were provided to the                      " "  patient.                       - Return to normal activities tomorrow.".    9/26/18 video capsule endoscopy was reviewed and procedure report states:   "Findings:       Images of the esophagus, stomach and small bowel were obtained from        the swallowed capsule and reviewed.       The first gastric image was captured at 1-hour 1-minute.       The first duodenal image was captured at 0-hours 4-minutes.       The first cecal image was captured at 2-hours 29-minutes.       Multiple small angioectasias without bleeding were seen in the small        bowel (entire small bowel).  Impression:          - Multiple angioectasias without bleeding in the                        small bowel, likely etiology for ELIN.  Recommendation:      -Increase oral iron supplementation to BID dosing                       -Repeat CBC, Iron, TIBC, Ferritin in 3-6 months. If                        anemia or iron counts are worsened would refer to                        hematology for IV iron infusion".    Objective:      Physical Exam   Constitutional: She is oriented to person, place, and time. She appears well-developed and well-nourished. No distress.   HENT:   Mouth/Throat: Oropharynx is clear and moist and mucous membranes are normal. No oral lesions. No oropharyngeal exudate.   Eyes: Conjunctivae are normal. Pupils are equal, round, and reactive to light. No scleral icterus.   Cardiovascular: Normal rate.   Pulmonary/Chest: Effort normal and breath sounds normal. No respiratory distress. She has no wheezes.   Abdominal: Soft. Normal appearance and bowel sounds are normal. She exhibits no distension, no abdominal bruit and no mass. There is no hepatosplenomegaly. There is tenderness (mild) in the epigastric area. There is no rigidity, no rebound, no guarding, no tenderness at McBurney's point and negative Contreras's sign. No hernia.   Well-healed surgical scars noted. deferred rectal exam.   Neurological: She is alert and oriented to " person, place, and time.   Skin: Skin is warm and dry. No rash noted. She is not diaphoretic. No erythema. No pallor.   Non-jaundiced   Psychiatric: She has a normal mood and affect. Her behavior is normal. Judgment and thought content normal.   Nursing note and vitals reviewed.      Assessment:       1. Iron deficiency anemia, unspecified iron deficiency anemia type    2. Angioectasia of the intestines    3. History of gastritis    4. Hiatal hernia    5. Pharyngoesophageal dysphagia    6. Generalized abdominal pain    7. Epigastric pain    8. Shortness of breath    9. Black stool    10. Chronic constipation        Plan:       Iron deficiency anemia, unspecified iron deficiency anemia type, Angioectasia of the intestines & Black stool  -     CT Abdomen Pelvis With Contrast; Future; Expected date: 10/17/2018  - continue iron supplement as directed  - black stool possibly from iron supplement but if anemia persist will recommend referral for small bowel enteroscopy per Dr. Panda or Dr. Myers  - discussed with patient the different ways that anemia occurs: blood loss (such as from the gi tract), the body is not making enough, or the body is breaking down the rbcs too quickly; reviewed video capsule study, EGD, and colonoscopy, possible referral for small bowel enteroscopy per Dr. Panda or Dr. Myers if anemia persist  -follow-up with hematology, Dr. Sagastume, for continued evaluation and management    History of gastritis  -     FL Upper GI Without [employer] Esophagram; Future; Expected date: 10/17/2018  - CONTINUE PROTONIX 40 MG ONCE DAILY AS DIRECTED,  take in the morning 30-60 minutes before breakfast, discussed about possible long term use of medication (prefer to use lowest effective dose or discontinuing if possible) and discussed the risks & benefits with taking a reflux medication long term, and to take OTC calcium and vitamin d supplements as directed (such as Citracal +D), pt verbalized understanding  -discussed  about the different types of medications used to treat reflux and how to use them, antacids can be used PRN for breakthrough heartburn symptoms by reducing stomach acid that is already produced, H2 blockers (zantac) work by limiting the amount acid production, & PPI's work to block acid production and are taken daily, patient verbalized understanding.  -CONTINUE lifestyle modifications to help control/reduce reflux/abdominal pain including: avoid large meals, avoid eating within 2-3 hours of bedtime (avoid late night eating & lying down soon after eating), elevate head of bed if nocturnal symptoms are present, smoking cessation (if current smoker), & weight loss (if overweight).   - avoid known foods which trigger reflux symptoms & to minimize/avoid high-fat foods, chocolate, caffeine, citrus, alcohol, & tomato products.  - avoid/limit use of NSAID's, since they can cause GI upset, bleeding, and/or ulcers. If needed, take with food.     Hiatal hernia  -     FL Upper GI Without [employer] Esophagram; Future; Expected date: 10/17/2018  -     CT Abdomen Pelvis With Contrast; Future; Expected date: 10/17/2018  -     Ambulatory referral to General Surgery    Pharyngoesophageal dysphagia  -     FL Upper GI Without [employer] Esophagram; Future; Expected date: 10/17/2018  -     CT Abdomen Pelvis With Contrast; Future; Expected date: 10/17/2018  - educated patient to eat smaller more frequent meals and to eat slowly and advised to eat a soft diet.  - possible esophageal manometry if symptoms persist    Generalized abdominal pain  -     FL Upper GI Without [employer] Esophagram; Future; Expected date: 10/17/2018  -     CT Abdomen Pelvis With Contrast; Future; Expected date: 10/17/2018  -     Creatinine, serum; Future; Expected date: 10/17/2018    Epigastric pain  -     FL Upper GI Without [employer] Esophagram; Future; Expected date: 10/17/2018  -     CT Abdomen Pelvis With Contrast; Future; Expected date: 10/17/2018    Shortness of  breath  -     FL Upper GI Without [employer] Esophagram; Future; Expected date: 10/17/2018  Recommend follow-up with Primary Care Provider for continued evaluation and management.    Chronic constipation  - START    linaclotide (LINZESS) 145 mcg Cap capsule; Take 1 capsule (145 mcg total) by mouth once daily. Take >30 minutes before 1st meal of the day.  Dispense: 30 capsule; Refill: 2  - DISCONTINUE GLYCOLAX DUE TO ALTERNATE THERAPY  Recommended daily exercise as tolerated, adequate water intake (six 8-oz glasses of water daily), and high fiber diet. OTC fiber supplements are recommended if diet does not reach daily fiber goal (25 grams daily), such as Metamucil, Citrucel, or FiberCon (take as directed, separate from other oral medications by >2 hours).  -Recommend taking an OTC stool softener such as Colace as directed to avoid hard stools and straining with bowel movements PRN  - If no improvement with above recommendations, try intermittently dosed Dulcolax OTC as directed (every 3-4  days) PRN to facilitate bowel movements  -If still no improvement with these measures, call/follow-up    History of rectal bleeding & History of hemorrhoids  - discussed about different etiologies that can cause rectal bleeding, such as diverticulosis, polyps, colon inflammation or infection, anal fissure or hemorrhoids. Recommend treating hemorrhoids per general surgery and if rectal bleeding recurs, recommend to contact/follow-up for further evaluation and management  - You may resume normal activity as long as you feel well.  - Avoid/minimize aspirin and anti-inflammatory drugs such as ibuprofen (Advil, Motrin) and naproxen (Aleve and Naprosyn).  - Avoid alcohol.  -     Ambulatory referral to General Surgery  - avoid constipation and straining with bowel movements; try using an OTC stool softener as directed and increase fiber in diet (20-30 grams daily)/OTC fiber supplement such as metamucil (take as directed)  - recommend SITZ  baths    Follow-up in about 1 month (around 11/17/2018), or if symptoms worsen or fail to improve.      If no improvement in symptoms or symptoms worsen, call/follow-up at clinic or go to ER.

## 2018-10-17 NOTE — PATIENT INSTRUCTIONS

## 2018-10-18 ENCOUNTER — HOSPITAL ENCOUNTER (OUTPATIENT)
Dept: RADIOLOGY | Facility: HOSPITAL | Age: 63
Discharge: HOME | End: 2018-10-18
Attending: NURSE PRACTITIONER
Payer: COMMERCIAL

## 2018-10-18 DIAGNOSIS — K44.9 HIATAL HERNIA: ICD-10-CM

## 2018-10-18 DIAGNOSIS — R13.14 PHARYNGOESOPHAGEAL DYSPHAGIA: ICD-10-CM

## 2018-10-18 DIAGNOSIS — R10.13 EPIGASTRIC PAIN: ICD-10-CM

## 2018-10-18 DIAGNOSIS — K31.819: ICD-10-CM

## 2018-10-18 DIAGNOSIS — D50.9 IRON DEFICIENCY ANEMIA, UNSPECIFIED IRON DEFICIENCY ANEMIA TYPE: ICD-10-CM

## 2018-10-18 DIAGNOSIS — R10.84 GENERALIZED ABDOMINAL PAIN: ICD-10-CM

## 2018-10-18 PROCEDURE — 74177 CT ABD & PELVIS W/CONTRAST: CPT | Mod: TC,PO

## 2018-10-18 PROCEDURE — 74177 CT ABD & PELVIS W/CONTRAST: CPT | Mod: 26,,, | Performed by: RADIOLOGY

## 2018-10-18 PROCEDURE — 25500020 PHARM REV CODE 255: Mod: PO | Performed by: NURSE PRACTITIONER

## 2018-10-18 RX ADMIN — IOHEXOL 75 ML: 350 INJECTION, SOLUTION INTRAVENOUS at 09:10

## 2018-10-18 RX ADMIN — IOHEXOL 30 ML: 350 INJECTION, SOLUTION INTRAVENOUS at 09:10

## 2018-10-19 ENCOUNTER — TELEPHONE (OUTPATIENT)
Dept: GASTROENTEROLOGY | Facility: CLINIC | Age: 63
End: 2018-10-19

## 2018-10-19 ENCOUNTER — PATIENT MESSAGE (OUTPATIENT)
Dept: INFECTIOUS DISEASES | Facility: CLINIC | Age: 63
End: 2018-10-19

## 2018-10-19 NOTE — TELEPHONE ENCOUNTER
"Please call to inform & review the results with the patient- radiology report of the ct scan abdomen pelvis showed "Benign 3 mm right lower lobe subpleural pulmonary nodule which is unchanged."  "Large gastric hiatal hernia" (seen on recent EGD) continue GERD recommendations.  Otherwise, unremarkable findings.    Continue with previous recommendations. If no improvement in symptoms or symptoms worsen, call/follow-up at clinic or go to ER.  Please release results to patient's mychart once you have discussed results and recommendations with patient.  Thanks,  Debbie NAPIER-CICI    "

## 2018-11-06 ENCOUNTER — HOSPITAL ENCOUNTER (OUTPATIENT)
Dept: RADIOLOGY | Facility: HOSPITAL | Age: 63
Discharge: HOME | End: 2018-11-06
Attending: NURSE PRACTITIONER
Payer: COMMERCIAL

## 2018-11-06 DIAGNOSIS — R06.02 SHORTNESS OF BREATH: ICD-10-CM

## 2018-11-06 DIAGNOSIS — R10.13 EPIGASTRIC PAIN: ICD-10-CM

## 2018-11-06 DIAGNOSIS — R13.14 PHARYNGOESOPHAGEAL DYSPHAGIA: ICD-10-CM

## 2018-11-06 DIAGNOSIS — R10.84 GENERALIZED ABDOMINAL PAIN: ICD-10-CM

## 2018-11-06 DIAGNOSIS — Z87.19 HISTORY OF GASTRITIS: ICD-10-CM

## 2018-11-06 DIAGNOSIS — K44.9 HIATAL HERNIA: ICD-10-CM

## 2018-11-06 PROCEDURE — 74240 X-RAY XM UPR GI TRC 1CNTRST: CPT | Mod: 26,,, | Performed by: RADIOLOGY

## 2018-11-06 PROCEDURE — 74240 X-RAY XM UPR GI TRC 1CNTRST: CPT | Mod: TC,FY,PO

## 2018-11-07 RX ORDER — LEVOTHYROXINE SODIUM 50 UG/1
TABLET ORAL
Qty: 90 TABLET | Refills: 0 | Status: SHIPPED | OUTPATIENT
Start: 2018-11-07 | End: 2018-12-02 | Stop reason: SDUPTHER

## 2018-11-08 ENCOUNTER — PATIENT MESSAGE (OUTPATIENT)
Dept: GASTROENTEROLOGY | Facility: CLINIC | Age: 63
End: 2018-11-08

## 2018-11-09 ENCOUNTER — TELEPHONE (OUTPATIENT)
Dept: GASTROENTEROLOGY | Facility: CLINIC | Age: 63
End: 2018-11-09

## 2018-11-09 ENCOUNTER — PATIENT MESSAGE (OUTPATIENT)
Dept: GASTROENTEROLOGY | Facility: CLINIC | Age: 63
End: 2018-11-09

## 2018-11-09 DIAGNOSIS — K44.9 HIATAL HERNIA: Primary | ICD-10-CM

## 2018-11-09 NOTE — TELEPHONE ENCOUNTER
"Please call to inform & review the results with the patient- radiology report of the UGI esophagram showed "a very large hiatal hernia with most of the stomach being intrathoracic in location.  There is diffuse gastric fold thickening within this portion of the stomach which may reflect gastritis and/or crowding of normal gastric tissue at the gastroesophageal junction" & "significant gastroesophageal reflux to the lower cervical esophagus". Continue GERD recommendations and I recommend consulting general surgery for large hiatal hernia. Referral to general surgery placed.  Possible esophageal manometry if symptoms persist.    Continue with previous recommendations. If no improvement in symptoms or symptoms worsen, call/follow-up at clinic or go to ER.  Please release results to patient's mychart once you have discussed results and recommendations with patient.  Thanks,  Debbie TERRELL    "

## 2018-11-12 ENCOUNTER — PATIENT MESSAGE (OUTPATIENT)
Dept: GASTROENTEROLOGY | Facility: CLINIC | Age: 63
End: 2018-11-12

## 2018-11-13 ENCOUNTER — LAB VISIT (OUTPATIENT)
Dept: LAB | Facility: HOSPITAL | Age: 63
End: 2018-11-13
Attending: FAMILY MEDICINE
Payer: COMMERCIAL

## 2018-11-13 DIAGNOSIS — E03.4 HYPOTHYROIDISM DUE TO ACQUIRED ATROPHY OF THYROID: ICD-10-CM

## 2018-11-13 DIAGNOSIS — D50.9 IRON DEFICIENCY ANEMIA, UNSPECIFIED IRON DEFICIENCY ANEMIA TYPE: ICD-10-CM

## 2018-11-13 DIAGNOSIS — K29.50 OTHER CHRONIC GASTRITIS WITHOUT HEMORRHAGE: ICD-10-CM

## 2018-11-13 DIAGNOSIS — K29.60 REFLUX GASTRITIS: ICD-10-CM

## 2018-11-13 LAB
ALBUMIN SERPL BCP-MCNC: 3.7 G/DL
ALP SERPL-CCNC: 113 U/L
ALT SERPL W/O P-5'-P-CCNC: 21 U/L
ANION GAP SERPL CALC-SCNC: 9 MMOL/L
AST SERPL-CCNC: 27 U/L
BASOPHILS # BLD AUTO: 0 K/UL
BASOPHILS NFR BLD: 0 %
BILIRUB SERPL-MCNC: 0.3 MG/DL
BUN SERPL-MCNC: 11 MG/DL
CALCIUM SERPL-MCNC: 9.4 MG/DL
CHLORIDE SERPL-SCNC: 104 MMOL/L
CO2 SERPL-SCNC: 26 MMOL/L
CREAT SERPL-MCNC: 0.8 MG/DL
DIFFERENTIAL METHOD: ABNORMAL
EOSINOPHIL # BLD AUTO: 0 K/UL
EOSINOPHIL NFR BLD: 0 %
ERYTHROCYTE [DISTWIDTH] IN BLOOD BY AUTOMATED COUNT: 16.4 %
EST. GFR  (AFRICAN AMERICAN): >60 ML/MIN/1.73 M^2
EST. GFR  (NON AFRICAN AMERICAN): >60 ML/MIN/1.73 M^2
FERRITIN SERPL-MCNC: 23 NG/ML
FOLATE SERPL-MCNC: 14.4 NG/ML
GLUCOSE SERPL-MCNC: 130 MG/DL
HCT VFR BLD AUTO: 37.8 %
HGB BLD-MCNC: 11.4 G/DL
IRON SERPL-MCNC: 145 UG/DL
LYMPHOCYTES # BLD AUTO: 1 K/UL
LYMPHOCYTES NFR BLD: 23.6 %
MCH RBC QN AUTO: 26.7 PG
MCHC RBC AUTO-ENTMCNC: 30.2 G/DL
MCV RBC AUTO: 89 FL
MONOCYTES # BLD AUTO: 0.4 K/UL
MONOCYTES NFR BLD: 9.1 %
NEUTROPHILS # BLD AUTO: 2.9 K/UL
NEUTROPHILS NFR BLD: 67.3 %
PLATELET # BLD AUTO: 232 K/UL
PMV BLD AUTO: 9.6 FL
POTASSIUM SERPL-SCNC: 3.6 MMOL/L
PROT SERPL-MCNC: 7.1 G/DL
RBC # BLD AUTO: 4.27 M/UL
SATURATED IRON: 34 %
SODIUM SERPL-SCNC: 139 MMOL/L
TOTAL IRON BINDING CAPACITY: 425 UG/DL
TRANSFERRIN SERPL-MCNC: 287 MG/DL
TSH SERPL DL<=0.005 MIU/L-ACNC: 1.6 UIU/ML
VIT B12 SERPL-MCNC: 462 PG/ML
WBC # BLD AUTO: 4.28 K/UL

## 2018-11-13 PROCEDURE — 80053 COMPREHEN METABOLIC PANEL: CPT

## 2018-11-13 PROCEDURE — 82746 ASSAY OF FOLIC ACID SERUM: CPT

## 2018-11-13 PROCEDURE — 85060 BLOOD SMEAR INTERPRETATION: CPT | Mod: ,,, | Performed by: PATHOLOGY

## 2018-11-13 PROCEDURE — 82607 VITAMIN B-12: CPT

## 2018-11-13 PROCEDURE — 82728 ASSAY OF FERRITIN: CPT

## 2018-11-13 PROCEDURE — 83540 ASSAY OF IRON: CPT

## 2018-11-13 PROCEDURE — 84238 ASSAY NONENDOCRINE RECEPTOR: CPT

## 2018-11-13 PROCEDURE — 36415 COLL VENOUS BLD VENIPUNCTURE: CPT | Mod: PO

## 2018-11-13 PROCEDURE — 84443 ASSAY THYROID STIM HORMONE: CPT

## 2018-11-13 PROCEDURE — 85025 COMPLETE CBC W/AUTO DIFF WBC: CPT | Mod: PO

## 2018-11-14 LAB
PATH REV BLD -IMP: NORMAL
PATH REV BLD -IMP: NORMAL
STFR SERPL-MCNC: 5.3 MG/L

## 2018-11-19 ENCOUNTER — OFFICE VISIT (OUTPATIENT)
Dept: SURGERY | Facility: CLINIC | Age: 63
End: 2018-11-19
Payer: COMMERCIAL

## 2018-11-19 VITALS
HEART RATE: 82 BPM | DIASTOLIC BLOOD PRESSURE: 69 MMHG | SYSTOLIC BLOOD PRESSURE: 143 MMHG | WEIGHT: 165.81 LBS | BODY MASS INDEX: 33.48 KG/M2

## 2018-11-19 DIAGNOSIS — K44.9 HIATAL HERNIA: Primary | ICD-10-CM

## 2018-11-19 PROCEDURE — 99999 PR PBB SHADOW E&M-EST. PATIENT-LVL IV: CPT | Mod: PBBFAC,,, | Performed by: SURGERY

## 2018-11-19 PROCEDURE — 99244 OFF/OP CNSLTJ NEW/EST MOD 40: CPT | Mod: S$GLB,,, | Performed by: SURGERY

## 2018-11-19 RX ORDER — SENNOSIDES 8.6 MG/1
8.6 TABLET ORAL
COMMUNITY
End: 2018-11-28 | Stop reason: SDUPTHER

## 2018-11-19 RX ORDER — BUPROPION HYDROCHLORIDE 300 MG/1
300 TABLET ORAL
COMMUNITY
Start: 2015-07-16 | End: 2018-11-28 | Stop reason: SDUPTHER

## 2018-11-19 RX ORDER — LIDOCAINE HYDROCHLORIDE 10 MG/ML
1 INJECTION, SOLUTION EPIDURAL; INFILTRATION; INTRACAUDAL; PERINEURAL ONCE
Status: CANCELLED | OUTPATIENT
Start: 2018-11-29

## 2018-11-19 RX ORDER — ESCITALOPRAM OXALATE 10 MG/1
20 TABLET ORAL
COMMUNITY
End: 2018-11-28 | Stop reason: SDUPTHER

## 2018-11-19 RX ORDER — HYDROCHLOROTHIAZIDE 25 MG/1
25 TABLET ORAL
COMMUNITY
End: 2018-11-28 | Stop reason: SDUPTHER

## 2018-11-19 RX ORDER — FELODIPINE 5 MG/1
5 TABLET, EXTENDED RELEASE ORAL
COMMUNITY
End: 2018-12-03 | Stop reason: SDUPTHER

## 2018-11-19 RX ORDER — ESTRADIOL 10 UG/1
10 INSERT VAGINAL
COMMUNITY
Start: 2018-04-26

## 2018-11-19 RX ORDER — BUTALBITAL, ACETAMINOPHEN AND CAFFEINE 50; 325; 40 MG/1; MG/1; MG/1
1 TABLET ORAL
COMMUNITY
End: 2020-07-30

## 2018-11-19 RX ORDER — PANTOPRAZOLE SODIUM 40 MG/1
40 TABLET, DELAYED RELEASE ORAL
COMMUNITY
End: 2018-11-28 | Stop reason: SDUPTHER

## 2018-11-19 RX ORDER — SODIUM CHLORIDE 9 MG/ML
INJECTION, SOLUTION INTRAVENOUS CONTINUOUS
Status: CANCELLED | OUTPATIENT
Start: 2018-11-29

## 2018-11-19 RX ORDER — LEVOTHYROXINE SODIUM 50 UG/1
50 TABLET ORAL
COMMUNITY
End: 2018-12-03

## 2018-11-19 RX ORDER — FERROUS GLUCONATE 324(38)MG
324 TABLET ORAL
COMMUNITY
End: 2018-11-28 | Stop reason: SDUPTHER

## 2018-11-19 RX ORDER — ATORVASTATIN CALCIUM 10 MG/1
10 TABLET, FILM COATED ORAL
COMMUNITY
End: 2018-11-28 | Stop reason: SDUPTHER

## 2018-11-19 NOTE — LETTER
November 26, 2018      Debbie Castañeda, QUYEN  [address] [facility] [address] [place] [address]           [facility]  [address] [facility] [address] [place] [address]  Phone: [phone]          Patient: Cheyenne West   MR Number: [id number]   YOB: 1955   Date of Visit: 11/19/2018       Dear Debbie Castañeda:    Thank you for referring Cheyenne West to me for evaluation. Attached you will find relevant portions of my assessment and plan of care.    If you have questions, please do not hesitate to call me. I look forward to following Cheyenne West along with you.    Sincerely,    Frank Akins MD    Enclosure  CC:  No Recipients    If you would like to receive this communication electronically, please contact [email or web address] or [phone] to request more information on [employer] access.    For providers and/or their staff who would like to refer a patient to [facility], please contact us through our one-stop-shop provider referral line, [facility], at [phone].    If you feel you have received this communication in error or would no longer like to receive these types of communications, please e-mail [email or web address]

## 2018-11-19 NOTE — PATIENT INSTRUCTIONS
.PRE-OP INSTRUCTIONS    Your procedure has been scheduled at:    [facility]pre-admit nurse  [phone]      DAY thursday    DATE 11/29/2018       Please call the pre-op nurse to schedule your pre-op testing and registration  Someone from the hospital will call you the evening before surgery to let you know what time you need to be at the hospital for surgery.                                               1:  DO NOT EAT OR DRINK ANYTHING AFTER MIDNIGHT THE NIGHT BEFORE SURGERY.     2:  You will need to stop any blood thinners 1 week prior to surgery.  This includes Aspirin, fish oil, Pradaxa, Coumadin, Plavix, Pletal.  Please contact the prescribing doctor to be sure it is ok to stop these medicines.    3:  Pre-admit nurse will go over your medicines and let you know which ones not to take the morning of surgery    4:  Plan to have someone drive you home after you are released from the hospital.  You WILL NOT be able to drive yourself.    5:  If you have any questions or need to change your surgery date, please call Mae at [phone]    AFTER SURGERY:    You can shower 48 hours after surgery, REMOVE WET BANDAGES AND BANDAIDS, leave the steri- strips on.  If you have not had a bowel movement within 3 days after surgery you may take a laxative of your choice.  Do not lift anything over 5-10 pounds.    You need to have a follow up appointment 7-14 days after surgery, call the office to schedule or if you have questions or concerns.    For [facility] patients, you will receive a [facility] message with a link to schedule your post op appointment.

## 2018-11-20 ENCOUNTER — OFFICE VISIT (OUTPATIENT)
Dept: HEMATOLOGY/ONCOLOGY | Facility: CLINIC | Age: 63
End: 2018-11-20
Payer: COMMERCIAL

## 2018-11-20 VITALS
HEIGHT: 59 IN | DIASTOLIC BLOOD PRESSURE: 81 MMHG | RESPIRATION RATE: 16 BRPM | HEART RATE: 79 BPM | TEMPERATURE: 98 F | SYSTOLIC BLOOD PRESSURE: 113 MMHG | BODY MASS INDEX: 33.06 KG/M2 | WEIGHT: 164 LBS

## 2018-11-20 DIAGNOSIS — K59.03 DRUG INDUCED CONSTIPATION: ICD-10-CM

## 2018-11-20 DIAGNOSIS — K29.30 CHRONIC SUPERFICIAL GASTRITIS WITHOUT BLEEDING: ICD-10-CM

## 2018-11-20 DIAGNOSIS — K21.00 GASTROESOPHAGEAL REFLUX DISEASE WITH ESOPHAGITIS: ICD-10-CM

## 2018-11-20 DIAGNOSIS — I10 ESSENTIAL HYPERTENSION: ICD-10-CM

## 2018-11-20 DIAGNOSIS — D50.0 IRON DEFICIENCY ANEMIA DUE TO CHRONIC BLOOD LOSS: Primary | ICD-10-CM

## 2018-11-20 DIAGNOSIS — K29.60 REFLUX GASTRITIS: ICD-10-CM

## 2018-11-20 PROCEDURE — 3008F BODY MASS INDEX DOCD: CPT | Mod: CPTII,S$GLB,, | Performed by: INTERNAL MEDICINE

## 2018-11-20 PROCEDURE — 3074F SYST BP LT 130 MM HG: CPT | Mod: CPTII,S$GLB,, | Performed by: INTERNAL MEDICINE

## 2018-11-20 PROCEDURE — 3079F DIAST BP 80-89 MM HG: CPT | Mod: CPTII,S$GLB,, | Performed by: INTERNAL MEDICINE

## 2018-11-20 PROCEDURE — 99999 PR PBB SHADOW E&M-EST. PATIENT-LVL IV: CPT | Mod: PBBFAC,,, | Performed by: INTERNAL MEDICINE

## 2018-11-20 PROCEDURE — 99214 OFFICE O/P EST MOD 30 MIN: CPT | Mod: S$GLB,,, | Performed by: INTERNAL MEDICINE

## 2018-11-26 NOTE — PROGRESS NOTES
Subjective:       Patient ID: Cheyenne West is a 63 y.o. female.    Chief Complaint: Consult (hiatal hernia)      HPI this 63-year-old female referred for evaluation of a large hiatal hernia.  This has been present for years.  It has gotten worse in the last 6 months.  She has significant reflux.  Now she is beginning to feel like when she eats the food would like go down.  Large hiatal hernia has been confirmed on CT scan and upper GI.  There was minimal reflux esophagitis in the distal esophagus. Pathology was negative for dysplasia.  The patient would like to proceed with the surgical option.  She is here to discuss that.  She has a history of iron deficiency anemia with no clear site of gastrointestinal blood loss.    Past Medical History:   Diagnosis Date    Carpal tunnel syndrome of right wrist     Chronic gastritis     Coronary artery disease     mitral regurgitation    Dissociative fugue     Diverticulosis     Duodenitis     Dyspepsia     Generalized anxiety disorder     Hearing loss on left     Hearing loss on right     Helicobacter pylori (H. pylori)     History of blood transfusion     Hyperlipidemia     Hypertension     Hypothyroidism     IBS (irritable bowel syndrome)     Iron deficiency anemia     Mild mitral regurgitation by prior echocardiogram     Mild obesity     Mild vitamin D deficiency     Paraesophageal hiatal hernia     PONV (postoperative nausea and vomiting)     Reflux gastritis     Sleep apnea     mild improved with wt. loss    Thyroid goiter     Urticaria, chronic      Past Surgical History:   Procedure Laterality Date    BLADDER SUSPENSION      pubovaginal sling     SECTION, CLASSIC      CHOLECYSTECTOMY      COLONOSCOPY  2011    Dr. Goode, in legacy:diverticulosis, hemorrhoids, melanosis coli-repeat 10 years    COLONOSCOPY N/A 2018    Procedure: COLONOSCOPY;  Surgeon: Rusty Vogt Jr., MD;  Location: [facility];  Service:  Endoscopy;  Laterality: N/A; repeat in 10 years for screening    COLONOSCOPY N/A 9/6/2018    Performed [facility]., [facility] at [facility] ENDO    EGD (ESOPHAGOGASTRODUODENOSCOPY) N/A 9/6/2018    Per[facility] by [facility] Jr[facility] at [facility] ENDO    ESOPHAGEAL DILATION      ESOPHAGOGASTRODUODENOSCOPY  03/04/2016    ESOPHAGOGASTRODUODENOSCOPY N/A 9/6/2018    Procedure: EGD (ESOPHAGOGASTRODUODENOSCOPY);  Surgeon: Rusty Vogt Jr., MD;  Location: [facility] ENDO;  Service: Endoscopy;  Laterality: N/A;    IMAGING PROCEDURE, GI TRACT, INTRALUMINAL, VIA CAPSULE N/A 9/26/2018    Performed by [facility] at [facility]    INTRALUMINAL GASTROINTESTINAL TRACT IMAGING VIA CAPSULE N/A 9/26/2018    Procedure: IMAGING PROCEDURE, GI TRACT, INTRALUMINAL, VIA CAPSULE;  Surgeon: Loraine Velazquez MD;  Location: [facility];  Service: Endoscopy;  Laterality: N/A;    THYROIDECTOMY, PARTIAL       partial for benign nodule    UPPER GASTROINTESTINAL ENDOSCOPY  03/04/2016    Dr. Whalen, in care everywhere         Current Outpatient Medications:     ALPRAZolam (XANAX) 0.25 MG tablet, Take 1 tablet (0.25 mg total) by mouth 3 (three) times daily as needed for Anxiety., Disp: 30 tablet, Rfl: 0    atorvastatin (LIPITOR) 10 MG tablet, TAKE 1 TABLET ONCE DAILY, Disp: 90 tablet, Rfl: 3    buPROPion (WELLBUTRIN XL) 300 MG 24 hr tablet, TAKE 1 TABLET ONCE DAILY, Disp: 90 tablet, Rfl: 3    buPROPion (WELLBUTRIN XL) 300 MG 24 hr tablet, Take 300 mg by mouth., Disp: , Rfl:     CALCIUM CARBONATE/VITAMIN D3 (CALTRATE 600 + D ORAL), Take by mouth 2 (two) times daily., Disp: , Rfl:     escitalopram oxalate (LEXAPRO) 20 MG tablet, TAKE 1 TABLET ONCE DAILY, Disp: 90 tablet, Rfl: 1    estradiol (VAGIFEM) 10 mcg Tab, Place 10 mcg vaginally., Disp: , Rfl:     felodipine (PLENDIL) 5 MG 24 hr tablet, Take 1 tablet (5 mg total) by mouth once daily., Disp: 90 tablet, Rfl: 3    ferrous gluconate (FERGON) 324 MG tablet, TAKE 1 TABLET BY MOUTH  WITH BREAKFAST (Patient taking differently: once daily), Disp: 30 tablet, Rfl: 5    hydroCHLOROthiazide (HYDRODIURIL) 25 MG tablet, TAKE 1 TABLET ONCE DAILY, Disp: 90 tablet, Rfl: 3    linaclotide (LINZESS) 145 mcg Cap capsule, Take 1 capsule (145 mcg total) by mouth once daily. Take >30 minutes before 1st meal of the day., Disp: 30 capsule, Rfl: 2    OMEGA-3 FATTY ACIDS-EPA ORAL, Take by mouth once daily., Disp: , Rfl:     pantoprazole (PROTONIX) 40 MG tablet, TAKE 1 TABLET DAILY, Disp: 90 tablet, Rfl: 3    potassium chloride (MICRO-K) 10 MEQ CpSR, TAKE 1 CAPSULE ONCE DAILY, Disp: 90 capsule, Rfl: 3    senna (SENOKOT) 8.6 mg tablet, Take 8.6 mg by mouth daily as needed. , Disp: , Rfl:     SYNTHROID 50 mcg tablet, TAKE 1 TABLET DAILY, Disp: 90 tablet, Rfl: 0    VIT A/VIT C/VIT E/ZINC/COPPER (PRESERVISION AREDS ORAL), Take by mouth once daily., Disp: , Rfl:     atorvastatin (LIPITOR) 10 MG tablet, Take 10 mg by mouth., Disp: , Rfl:     butalbital-acetaminophen-caffeine -40 mg (FIORICET, ESGIC) -40 mg per tablet, Take 1 tablet by mouth., Disp: , Rfl:     escitalopram oxalate (LEXAPRO) 10 MG tablet, Take 20 mg by mouth., Disp: , Rfl:     felodipine (PLENDIL) 5 MG 24 hr tablet, Take 5 mg by mouth., Disp: , Rfl:     ferrous gluconate (FERGON) 324 MG tablet, Take 324 mg by mouth., Disp: , Rfl:     hydroCHLOROthiazide (HYDRODIURIL) 25 MG tablet, Take 25 mg by mouth., Disp: , Rfl:     levothyroxine (SYNTHROID) 50 MCG tablet, Take 50 mcg by mouth., Disp: , Rfl:     multivitamin with minerals tablet, Take 1 tablet by mouth., Disp: , Rfl:     pantoprazole (PROTONIX) 40 MG tablet, Take 40 mg by mouth., Disp: , Rfl:     senna (SENOKOT) 8.6 mg tablet, Take 8.6 mg by mouth., Disp: , Rfl:     Review of patient's allergies indicates:   Allergen Reactions    Penicillins        Family History   Problem Relation Age of Onset    Hypertension Mother     Heart disease Mother     Ulcers Mother     GI  problems Mother         colitis    Hypertension Father     Breast cancer Paternal Grandmother     Colon cancer Neg Hx     Crohn's disease Neg Hx     Colon polyps Neg Hx     Ulcerative colitis Neg Hx     Stomach cancer Neg Hx     Esophageal cancer Neg Hx      Social History     Socioeconomic History    Marital status:      Spouse name: Not on file    Number of children: Not on file    Years of education: Not on file    Highest education level: Not on file   Social Needs    Financial resource strain: Not on file    Food insecurity - worry: Not on file    Food insecurity - inability: Not on file    Transportation needs - medical: Not on file    Transportation needs - non-medical: Not on file   Occupational History    Not on file   Tobacco Use    Smoking status: Never Smoker    Smokeless tobacco: Never Used   Substance and Sexual Activity    Alcohol use: Yes    Drug use: No    Sexual activity: Yes   Other Topics Concern    Not on file   Social History Narrative    Not on file       Review of Systems   Constitutional: Negative for activity change, chills, fever and unexpected weight change.   HENT: Negative for congestion, sore throat, trouble swallowing and voice change.    Eyes: Negative for redness and visual disturbance.   Respiratory: Negative for cough, shortness of breath and wheezing.    Cardiovascular: Negative for chest pain and palpitations.   Gastrointestinal: Positive for abdominal pain. Negative for blood in stool, nausea and vomiting.   Endocrine: Negative.    Genitourinary: Negative for dysuria, frequency and hematuria.   Musculoskeletal: Negative for arthralgias, back pain and neck pain.   Skin: Negative for rash and wound.   Allergic/Immunologic: Negative.    Neurological: Negative for dizziness, weakness and headaches.   Hematological: Negative for adenopathy.   Psychiatric/Behavioral: Negative for agitation and dysphoric mood. The patient is not nervous/anxious.       Objective:     Physical Exam   Constitutional: She is oriented to person, place, and time. She appears well-developed and well-nourished. No distress.   HENT:   Head: Normocephalic and atraumatic.   Mouth/Throat: Oropharynx is clear and moist. No oropharyngeal exudate.   Eyes: Conjunctivae and EOM are normal. Pupils are equal, round, and reactive to light. No scleral icterus.   Neck: Normal range of motion. No thyromegaly present.   Cardiovascular: Normal rate and regular rhythm.   No murmur heard.  Pulmonary/Chest: Effort normal and breath sounds normal. She has no wheezes. She has no rales.   Abdominal: Soft. Bowel sounds are normal. She exhibits no distension and no mass. There is no tenderness. No hernia.   Musculoskeletal: Normal range of motion. She exhibits no edema.   Lymphadenopathy:     She has no cervical adenopathy.   Neurological: She is alert and oriented to person, place, and time. No cranial nerve deficit.   Skin: Skin is warm and dry. No rash noted. No erythema.   Psychiatric: She has a normal mood and affect. Her behavior is normal.     EGD result in images reviewed.  CT scan and upper GI results and images reviewed.    Assessment:     Encounter Diagnosis   Name Primary?    Hiatal hernia Yes       Plan:      1.  Significant hiatal hernia with gastroesophageal reflux disease.  2.  Had a long discussion with the patient about the surgical option.  3. Risks and benefits of the planned procedure were discussed at length with the patient.  Risks and benefits of not proceeding with the procedure were discussed as well. All questions were answered. The patient expressed clear understanding and would like to proceed with the procedure as discussed.

## 2018-11-26 NOTE — H&P (VIEW-ONLY)
Subjective:       Patient ID: Cheyenne West is a 63 y.o. female.    Chief Complaint: Consult (hiatal hernia)      HPI this 63-year-old female referred for evaluation of a large hiatal hernia.  This has been present for years.  It has gotten worse in the last 6 months.  She has significant reflux.  Now she is beginning to feel like when she eats the food would like go down.  Large hiatal hernia has been confirmed on CT scan and upper GI.  There was minimal reflux esophagitis in the distal esophagus. Pathology was negative for dysplasia.  The patient would like to proceed with the surgical option.  She is here to discuss that.  She has a history of iron deficiency anemia with no clear site of gastrointestinal blood loss.    Past Medical History:   Diagnosis Date    Carpal tunnel syndrome of right wrist     Chronic gastritis     Coronary artery disease     mitral regurgitation    Dissociative fugue     Diverticulosis     Duodenitis     Dyspepsia     Generalized anxiety disorder     Hearing loss on left     Hearing loss on right     Helicobacter pylori (H. pylori)     History of blood transfusion     Hyperlipidemia     Hypertension     Hypothyroidism     IBS (irritable bowel syndrome)     Iron deficiency anemia     Mild mitral regurgitation by prior echocardiogram     Mild obesity     Mild vitamin D deficiency     Paraesophageal hiatal hernia     PONV (postoperative nausea and vomiting)     Reflux gastritis     Sleep apnea     mild improved with wt. loss    Thyroid goiter     Urticaria, chronic      Past Surgical History:   Procedure Laterality Date    BLADDER SUSPENSION      pubovaginal sling     SECTION, CLASSIC      CHOLECYSTECTOMY      COLONOSCOPY  2011    Dr. Goode, in legacy:diverticulosis, hemorrhoids, melanosis coli-repeat 10 years    COLONOSCOPY N/A 2018    Procedure: COLONOSCOPY;  Surgeon: Rusty Vogt Jr., MD;  Location: [facility];  Service:  Endoscopy;  Laterality: N/A; repeat in 10 years for screening    COLONOSCOPY N/A 9/6/2018    [facility] ENDO    EGD (ESOPHAGOGASTRODUODENOSCOPY) N/A 9/6/2018    [facility] at [facility] ENDO    ESOPHAGEAL DILATION      ESOPHAGOGASTRODUODENOSCOPY  03/04/2016    ESOPHAGOGASTRODUODENOSCOPY N/A 9/6/2018    Procedure: EGD (ESOPHAGOGASTRODUODENOSCOPY);  Surgeon: Rusty Vogt Jr., MD;  Location: [facility] ENDO;  Service: Endoscopy;  Laterality: N/A;    IMAGING PROCEDURE, GI TRACT, INTRALUMINAL, VIA CAPSULE N/A 9/26/2018    [facility]    INTRALUMINAL GASTROINTESTINAL TRACT IMAGING VIA CAPSULE N/A 9/26/2018    Procedure: IMAGING PROCEDURE, GI TRACT, INTRALUMINAL, VIA CAPSULE;  Surgeon: Loraine Velazqeuz MD;  Location: [facility];  Service: Endoscopy;  Laterality: N/A;    THYROIDECTOMY, PARTIAL       partial for benign nodule    UPPER GASTROINTESTINAL ENDOSCOPY  03/04/2016    Dr. Whalen, in care everywhere         Current Outpatient Medications:     ALPRAZolam (XANAX) 0.25 MG tablet, Take 1 tablet (0.25 mg total) by mouth 3 (three) times daily as needed for Anxiety., Disp: 30 tablet, Rfl: 0    atorvastatin (LIPITOR) 10 MG tablet, TAKE 1 TABLET ONCE DAILY, Disp: 90 tablet, Rfl: 3    buPROPion (WELLBUTRIN XL) 300 MG 24 hr tablet, TAKE 1 TABLET ONCE DAILY, Disp: 90 tablet, Rfl: 3    buPROPion (WELLBUTRIN XL) 300 MG 24 hr tablet, Take 300 mg by mouth., Disp: , Rfl:     CALCIUM CARBONATE/VITAMIN D3 (CALTRATE 600 + D ORAL), Take by mouth 2 (two) times daily., Disp: , Rfl:     escitalopram oxalate (LEXAPRO) 20 MG tablet, TAKE 1 TABLET ONCE DAILY, Disp: 90 tablet, Rfl: 1    estradiol (VAGIFEM) 10 mcg Tab, Place 10 mcg vaginally., Disp: , Rfl:     felodipine (PLENDIL) 5 MG 24 hr tablet, Take 1 tablet (5 mg total) by mouth once daily., Disp: 90 tablet, Rfl: 3    ferrous gluconate (FERGON) 324 MG tablet, TAKE 1 TABLET BY MOUTH  WITH BREAKFAST (Patient taking differently: once daily), Disp: 30 tablet, Rfl: 5    hydroCHLOROthiazide (HYDRODIURIL) 25 MG tablet, TAKE 1 TABLET ONCE DAILY, Disp: 90 tablet, Rfl: 3    linaclotide (LINZESS) 145 mcg Cap capsule, Take 1 capsule (145 mcg total) by mouth once daily. Take >30 minutes before 1st meal of the day., Disp: 30 capsule, Rfl: 2    OMEGA-3 FATTY ACIDS-EPA ORAL, Take by mouth once daily., Disp: , Rfl:     pantoprazole (PROTONIX) 40 MG tablet, TAKE 1 TABLET DAILY, Disp: 90 tablet, Rfl: 3    potassium chloride (MICRO-K) 10 MEQ CpSR, TAKE 1 CAPSULE ONCE DAILY, Disp: 90 capsule, Rfl: 3    senna (SENOKOT) 8.6 mg tablet, Take 8.6 mg by mouth daily as needed. , Disp: , Rfl:     SYNTHROID 50 mcg tablet, TAKE 1 TABLET DAILY, Disp: 90 tablet, Rfl: 0    VIT A/VIT C/VIT E/ZINC/COPPER (PRESERVISION AREDS ORAL), Take by mouth once daily., Disp: , Rfl:     atorvastatin (LIPITOR) 10 MG tablet, Take 10 mg by mouth., Disp: , Rfl:     butalbital-acetaminophen-caffeine -40 mg (FIORICET, ESGIC) -40 mg per tablet, Take 1 tablet by mouth., Disp: , Rfl:     escitalopram oxalate (LEXAPRO) 10 MG tablet, Take 20 mg by mouth., Disp: , Rfl:     felodipine (PLENDIL) 5 MG 24 hr tablet, Take 5 mg by mouth., Disp: , Rfl:     ferrous gluconate (FERGON) 324 MG tablet, Take 324 mg by mouth., Disp: , Rfl:     hydroCHLOROthiazide (HYDRODIURIL) 25 MG tablet, Take 25 mg by mouth., Disp: , Rfl:     levothyroxine (SYNTHROID) 50 MCG tablet, Take 50 mcg by mouth., Disp: , Rfl:     multivitamin with minerals tablet, Take 1 tablet by mouth., Disp: , Rfl:     pantoprazole (PROTONIX) 40 MG tablet, Take 40 mg by mouth., Disp: , Rfl:     senna (SENOKOT) 8.6 mg tablet, Take 8.6 mg by mouth., Disp: , Rfl:     Review of patient's allergies indicates:   Allergen Reactions    Penicillins        Family History   Problem Relation Age of Onset    Hypertension Mother     Heart disease Mother     Ulcers Mother     GI  problems Mother         colitis    Hypertension Father     Breast cancer Paternal Grandmother     Colon cancer Neg Hx     Crohn's disease Neg Hx     Colon polyps Neg Hx     Ulcerative colitis Neg Hx     Stomach cancer Neg Hx     Esophageal cancer Neg Hx      Social History     Socioeconomic History    Marital status:      Spouse name: Not on file    Number of children: Not on file    Years of education: Not on file    Highest education level: Not on file   Social Needs    Financial resource strain: Not on file    Food insecurity - worry: Not on file    Food insecurity - inability: Not on file    Transportation needs - medical: Not on file    Transportation needs - non-medical: Not on file   Occupational History    Not on file   Tobacco Use    Smoking status: Never Smoker    Smokeless tobacco: Never Used   Substance and Sexual Activity    Alcohol use: Yes    Drug use: No    Sexual activity: Yes   Other Topics Concern    Not on file   Social History Narrative    Not on file       Review of Systems   Constitutional: Negative for activity change, chills, fever and unexpected weight change.   HENT: Negative for congestion, sore throat, trouble swallowing and voice change.    Eyes: Negative for redness and visual disturbance.   Respiratory: Negative for cough, shortness of breath and wheezing.    Cardiovascular: Negative for chest pain and palpitations.   Gastrointestinal: Positive for abdominal pain. Negative for blood in stool, nausea and vomiting.   Endocrine: Negative.    Genitourinary: Negative for dysuria, frequency and hematuria.   Musculoskeletal: Negative for arthralgias, back pain and neck pain.   Skin: Negative for rash and wound.   Allergic/Immunologic: Negative.    Neurological: Negative for dizziness, weakness and headaches.   Hematological: Negative for adenopathy.   Psychiatric/Behavioral: Negative for agitation and dysphoric mood. The patient is not nervous/anxious.       Objective:     Physical Exam   Constitutional: She is oriented to person, place, and time. She appears well-developed and well-nourished. No distress.   HENT:   Head: Normocephalic and atraumatic.   Mouth/Throat: Oropharynx is clear and moist. No oropharyngeal exudate.   Eyes: Conjunctivae and EOM are normal. Pupils are equal, round, and reactive to light. No scleral icterus.   Neck: Normal range of motion. No thyromegaly present.   Cardiovascular: Normal rate and regular rhythm.   No murmur heard.  Pulmonary/Chest: Effort normal and breath sounds normal. She has no wheezes. She has no rales.   Abdominal: Soft. Bowel sounds are normal. She exhibits no distension and no mass. There is no tenderness. No hernia.   Musculoskeletal: Normal range of motion. She exhibits no edema.   Lymphadenopathy:     She has no cervical adenopathy.   Neurological: She is alert and oriented to person, place, and time. No cranial nerve deficit.   Skin: Skin is warm and dry. No rash noted. No erythema.   Psychiatric: She has a normal mood and affect. Her behavior is normal.     EGD result in images reviewed.  CT scan and upper GI results and images reviewed.    Assessment:     Encounter Diagnosis   Name Primary?    Hiatal hernia Yes       Plan:      1.  Significant hiatal hernia with gastroesophageal reflux disease.  2.  Had a long discussion with the patient about the surgical option.  3. Risks and benefits of the planned procedure were discussed at length with the patient.  Risks and benefits of not proceeding with the procedure were discussed as well. All questions were answered. The patient expressed clear understanding and would like to proceed with the procedure as discussed.

## 2018-11-28 ENCOUNTER — ANESTHESIA EVENT (OUTPATIENT)
Dept: SURGERY | Facility: HOSPITAL | Age: 63
End: 2018-11-28
Payer: COMMERCIAL

## 2018-11-29 ENCOUNTER — ANESTHESIA (OUTPATIENT)
Dept: SURGERY | Facility: HOSPITAL | Age: 63
End: 2018-11-29
Payer: COMMERCIAL

## 2018-11-29 ENCOUNTER — HOSPITAL ENCOUNTER (OUTPATIENT)
Facility: HOSPITAL | Age: 63
Discharge: HOME | End: 2018-11-30
Attending: SURGERY | Admitting: SURGERY
Payer: COMMERCIAL

## 2018-11-29 DIAGNOSIS — K44.9 HIATAL HERNIA: ICD-10-CM

## 2018-11-29 PROCEDURE — 63600175 PHARM REV CODE 636 W HCPCS: Performed by: NURSE ANESTHETIST, CERTIFIED REGISTERED

## 2018-11-29 PROCEDURE — 99900103 DSU ONLY-NO CHARGE-INITIAL HR (STAT): Performed by: SURGERY

## 2018-11-29 PROCEDURE — 43281 LAP PARAESOPHAG HERN REPAIR: CPT | Mod: ,,, | Performed by: SURGERY

## 2018-11-29 PROCEDURE — 25000003 PHARM REV CODE 250: Performed by: NURSE ANESTHETIST, CERTIFIED REGISTERED

## 2018-11-29 PROCEDURE — 63600175 PHARM REV CODE 636 W HCPCS: Performed by: SURGERY

## 2018-11-29 PROCEDURE — 63600175 PHARM REV CODE 636 W HCPCS: Performed by: ANESTHESIOLOGY

## 2018-11-29 PROCEDURE — 36000710: Performed by: SURGERY

## 2018-11-29 PROCEDURE — 27201423 OPTIME MED/SURG SUP & DEVICES STERILE SUPPLY: Performed by: SURGERY

## 2018-11-29 PROCEDURE — 27000221 HC OXYGEN, UP TO 24 HOURS

## 2018-11-29 PROCEDURE — D9220A PRA ANESTHESIA: Mod: CRNA,,, | Performed by: NURSE ANESTHETIST, CERTIFIED REGISTERED

## 2018-11-29 PROCEDURE — 37000009 HC ANESTHESIA EA ADD 15 MINS: Performed by: SURGERY

## 2018-11-29 PROCEDURE — D9220A PRA ANESTHESIA: Mod: ANES,,, | Performed by: ANESTHESIOLOGY

## 2018-11-29 PROCEDURE — 25000003 PHARM REV CODE 250: Performed by: SURGERY

## 2018-11-29 PROCEDURE — 71000033 HC RECOVERY, INTIAL HOUR: Performed by: SURGERY

## 2018-11-29 PROCEDURE — 37000008 HC ANESTHESIA 1ST 15 MINUTES: Performed by: SURGERY

## 2018-11-29 PROCEDURE — 94761 N-INVAS EAR/PLS OXIMETRY MLT: CPT

## 2018-11-29 PROCEDURE — S0077 INJECTION, CLINDAMYCIN PHOSP: HCPCS | Performed by: SURGERY

## 2018-11-29 PROCEDURE — 25000003 PHARM REV CODE 250: Performed by: ANESTHESIOLOGY

## 2018-11-29 PROCEDURE — 94799 UNLISTED PULMONARY SVC/PX: CPT

## 2018-11-29 PROCEDURE — 71000039 HC RECOVERY, EACH ADD'L HOUR: Performed by: SURGERY

## 2018-11-29 PROCEDURE — 99900104 DSU ONLY-NO CHARGE-EA ADD'L HR (STAT): Performed by: SURGERY

## 2018-11-29 PROCEDURE — 36000711: Performed by: SURGERY

## 2018-11-29 RX ORDER — PROPOFOL 10 MG/ML
VIAL (ML) INTRAVENOUS
Status: DISCONTINUED | OUTPATIENT
Start: 2018-11-29 | End: 2018-11-29

## 2018-11-29 RX ORDER — ONDANSETRON 2 MG/ML
4 INJECTION INTRAMUSCULAR; INTRAVENOUS EVERY 6 HOURS
Status: DISCONTINUED | OUTPATIENT
Start: 2018-11-29 | End: 2018-11-30 | Stop reason: HOSPADM

## 2018-11-29 RX ORDER — CLINDAMYCIN PHOSPHATE 900 MG/50ML
900 INJECTION, SOLUTION INTRAVENOUS ONCE
Status: COMPLETED | OUTPATIENT
Start: 2018-11-29 | End: 2018-11-29

## 2018-11-29 RX ORDER — DEXAMETHASONE SODIUM PHOSPHATE 4 MG/ML
INJECTION, SOLUTION INTRA-ARTICULAR; INTRALESIONAL; INTRAMUSCULAR; INTRAVENOUS; SOFT TISSUE
Status: DISCONTINUED | OUTPATIENT
Start: 2018-11-29 | End: 2018-11-29

## 2018-11-29 RX ORDER — LORAZEPAM 2 MG/ML
0.25 INJECTION INTRAMUSCULAR
Status: DISCONTINUED | OUTPATIENT
Start: 2018-11-29 | End: 2018-11-29 | Stop reason: HOSPADM

## 2018-11-29 RX ORDER — PANTOPRAZOLE SODIUM 40 MG/10ML
40 INJECTION, POWDER, LYOPHILIZED, FOR SOLUTION INTRAVENOUS
Status: DISCONTINUED | OUTPATIENT
Start: 2018-11-30 | End: 2018-11-30 | Stop reason: HOSPADM

## 2018-11-29 RX ORDER — ALPRAZOLAM 0.25 MG/1
0.25 TABLET ORAL 3 TIMES DAILY PRN
Status: DISCONTINUED | OUTPATIENT
Start: 2018-11-29 | End: 2018-11-30 | Stop reason: HOSPADM

## 2018-11-29 RX ORDER — MEPERIDINE HYDROCHLORIDE 50 MG/ML
12.5 INJECTION INTRAMUSCULAR; INTRAVENOUS; SUBCUTANEOUS ONCE AS NEEDED
Status: DISCONTINUED | OUTPATIENT
Start: 2018-11-29 | End: 2018-11-29 | Stop reason: HOSPADM

## 2018-11-29 RX ORDER — ESCITALOPRAM OXALATE 10 MG/1
20 TABLET ORAL DAILY
Status: DISCONTINUED | OUTPATIENT
Start: 2018-11-30 | End: 2018-11-30 | Stop reason: HOSPADM

## 2018-11-29 RX ORDER — SUCCINYLCHOLINE CHLORIDE 20 MG/ML
INJECTION INTRAMUSCULAR; INTRAVENOUS
Status: DISCONTINUED | OUTPATIENT
Start: 2018-11-29 | End: 2018-11-29

## 2018-11-29 RX ORDER — ONDANSETRON HYDROCHLORIDE 2 MG/ML
INJECTION, SOLUTION INTRAMUSCULAR; INTRAVENOUS
Status: DISCONTINUED | OUTPATIENT
Start: 2018-11-29 | End: 2018-11-29

## 2018-11-29 RX ORDER — LIDOCAINE HYDROCHLORIDE 10 MG/ML
1 INJECTION, SOLUTION EPIDURAL; INFILTRATION; INTRACAUDAL; PERINEURAL ONCE
Status: COMPLETED | OUTPATIENT
Start: 2018-11-29 | End: 2018-11-29

## 2018-11-29 RX ORDER — FENTANYL CITRATE 50 UG/ML
INJECTION, SOLUTION INTRAMUSCULAR; INTRAVENOUS
Status: DISCONTINUED | OUTPATIENT
Start: 2018-11-29 | End: 2018-11-29

## 2018-11-29 RX ORDER — FELODIPINE 5 MG/1
5 TABLET, EXTENDED RELEASE ORAL DAILY
Status: DISCONTINUED | OUTPATIENT
Start: 2018-11-30 | End: 2018-11-30 | Stop reason: HOSPADM

## 2018-11-29 RX ORDER — ONDANSETRON 2 MG/ML
4 INJECTION INTRAMUSCULAR; INTRAVENOUS DAILY PRN
Status: DISCONTINUED | OUTPATIENT
Start: 2018-11-29 | End: 2018-11-29 | Stop reason: HOSPADM

## 2018-11-29 RX ORDER — LIDOCAINE HCL/PF 100 MG/5ML
SYRINGE (ML) INTRAVENOUS
Status: DISCONTINUED | OUTPATIENT
Start: 2018-11-29 | End: 2018-11-29

## 2018-11-29 RX ORDER — SODIUM CHLORIDE 9 MG/ML
INJECTION, SOLUTION INTRAVENOUS CONTINUOUS
Status: DISCONTINUED | OUTPATIENT
Start: 2018-11-29 | End: 2018-11-30 | Stop reason: HOSPADM

## 2018-11-29 RX ORDER — HYDROCODONE BITARTRATE AND ACETAMINOPHEN 5; 325 MG/1; MG/1
1 TABLET ORAL EVERY 4 HOURS PRN
Status: DISCONTINUED | OUTPATIENT
Start: 2018-11-29 | End: 2018-11-30 | Stop reason: HOSPADM

## 2018-11-29 RX ORDER — SCOLOPAMINE TRANSDERMAL SYSTEM 1 MG/1
1 PATCH, EXTENDED RELEASE TRANSDERMAL ONCE
Status: COMPLETED | OUTPATIENT
Start: 2018-11-29 | End: 2018-12-02

## 2018-11-29 RX ORDER — BUPIVACAINE HYDROCHLORIDE AND EPINEPHRINE 5; 5 MG/ML; UG/ML
INJECTION, SOLUTION EPIDURAL; INTRACAUDAL; PERINEURAL
Status: DISCONTINUED | OUTPATIENT
Start: 2018-11-29 | End: 2018-11-29 | Stop reason: HOSPADM

## 2018-11-29 RX ORDER — HYDROMORPHONE HYDROCHLORIDE 2 MG/ML
0.2 INJECTION, SOLUTION INTRAMUSCULAR; INTRAVENOUS; SUBCUTANEOUS EVERY 5 MIN PRN
Status: DISCONTINUED | OUTPATIENT
Start: 2018-11-29 | End: 2018-11-29 | Stop reason: HOSPADM

## 2018-11-29 RX ORDER — HYDROCHLOROTHIAZIDE 25 MG/1
25 TABLET ORAL DAILY
Status: DISCONTINUED | OUTPATIENT
Start: 2018-11-30 | End: 2018-11-30 | Stop reason: HOSPADM

## 2018-11-29 RX ORDER — SODIUM CHLORIDE, SODIUM LACTATE, POTASSIUM CHLORIDE, CALCIUM CHLORIDE 600; 310; 30; 20 MG/100ML; MG/100ML; MG/100ML; MG/100ML
INJECTION, SOLUTION INTRAVENOUS CONTINUOUS
Status: DISCONTINUED | OUTPATIENT
Start: 2018-11-29 | End: 2018-11-29

## 2018-11-29 RX ORDER — ROCURONIUM BROMIDE 10 MG/ML
INJECTION, SOLUTION INTRAVENOUS
Status: DISCONTINUED | OUTPATIENT
Start: 2018-11-29 | End: 2018-11-29

## 2018-11-29 RX ORDER — MIDAZOLAM HYDROCHLORIDE 1 MG/ML
INJECTION INTRAMUSCULAR; INTRAVENOUS
Status: DISCONTINUED | OUTPATIENT
Start: 2018-11-29 | End: 2018-11-29

## 2018-11-29 RX ORDER — LIDOCAINE HYDROCHLORIDE 10 MG/ML
1 INJECTION, SOLUTION EPIDURAL; INFILTRATION; INTRACAUDAL; PERINEURAL ONCE
Status: DISCONTINUED | OUTPATIENT
Start: 2018-11-29 | End: 2018-11-29

## 2018-11-29 RX ORDER — SODIUM CHLORIDE 0.9 % (FLUSH) 0.9 %
3 SYRINGE (ML) INJECTION
Status: DISCONTINUED | OUTPATIENT
Start: 2018-11-29 | End: 2018-11-30 | Stop reason: HOSPADM

## 2018-11-29 RX ADMIN — PROPOFOL 150 MG: 10 INJECTION, EMULSION INTRAVENOUS at 11:11

## 2018-11-29 RX ADMIN — SUCCINYLCHOLINE CHLORIDE 120 MG: 20 INJECTION, SOLUTION INTRAMUSCULAR; INTRAVENOUS at 11:11

## 2018-11-29 RX ADMIN — SODIUM CHLORIDE: 0.9 INJECTION, SOLUTION INTRAVENOUS at 11:11

## 2018-11-29 RX ADMIN — MIDAZOLAM HYDROCHLORIDE 2 MG: 1 INJECTION, SOLUTION INTRAMUSCULAR; INTRAVENOUS at 11:11

## 2018-11-29 RX ADMIN — ROCURONIUM BROMIDE 5 MG: 10 INJECTION, SOLUTION INTRAVENOUS at 11:11

## 2018-11-29 RX ADMIN — ONDANSETRON 4 MG: 2 INJECTION, SOLUTION INTRAMUSCULAR; INTRAVENOUS at 11:11

## 2018-11-29 RX ADMIN — SODIUM CHLORIDE, SODIUM LACTATE, POTASSIUM CHLORIDE, AND CALCIUM CHLORIDE: .6; .31; .03; .02 INJECTION, SOLUTION INTRAVENOUS at 10:11

## 2018-11-29 RX ADMIN — HYDROCODONE BITARTRATE AND ACETAMINOPHEN 1 TABLET: 5; 325 TABLET ORAL at 07:11

## 2018-11-29 RX ADMIN — ROCURONIUM BROMIDE 10 MG: 10 INJECTION, SOLUTION INTRAVENOUS at 12:11

## 2018-11-29 RX ADMIN — CLINDAMYCIN PHOSPHATE 900 MG: 18 INJECTION, SOLUTION INTRAVENOUS at 11:11

## 2018-11-29 RX ADMIN — ROCURONIUM BROMIDE 35 MG: 10 INJECTION, SOLUTION INTRAVENOUS at 11:11

## 2018-11-29 RX ADMIN — ONDANSETRON 4 MG: 2 INJECTION INTRAMUSCULAR; INTRAVENOUS at 07:11

## 2018-11-29 RX ADMIN — SUGAMMADEX 250 MG: 100 INJECTION, SOLUTION INTRAVENOUS at 01:11

## 2018-11-29 RX ADMIN — LORAZEPAM 0.25 MG: 2 INJECTION, SOLUTION INTRAMUSCULAR; INTRAVENOUS at 03:11

## 2018-11-29 RX ADMIN — FENTANYL CITRATE 100 MCG: 50 INJECTION, SOLUTION INTRAMUSCULAR; INTRAVENOUS at 11:11

## 2018-11-29 RX ADMIN — ONDANSETRON 4 MG: 2 INJECTION INTRAMUSCULAR; INTRAVENOUS at 11:11

## 2018-11-29 RX ADMIN — SCOPALAMINE 1 PATCH: 1 PATCH, EXTENDED RELEASE TRANSDERMAL at 10:11

## 2018-11-29 RX ADMIN — SODIUM CHLORIDE: 0.9 INJECTION, SOLUTION INTRAVENOUS at 04:11

## 2018-11-29 RX ADMIN — LIDOCAINE HYDROCHLORIDE 100 MG: 20 INJECTION, SOLUTION INTRAVENOUS at 11:11

## 2018-11-29 RX ADMIN — ONDANSETRON 4 MG: 2 INJECTION, SOLUTION INTRAMUSCULAR; INTRAVENOUS at 01:11

## 2018-11-29 RX ADMIN — HYDROCODONE BITARTRATE AND ACETAMINOPHEN 1 TABLET: 5; 325 TABLET ORAL at 11:11

## 2018-11-29 RX ADMIN — ROCURONIUM BROMIDE 20 MG: 10 INJECTION, SOLUTION INTRAVENOUS at 01:11

## 2018-11-29 RX ADMIN — DEXAMETHASONE SODIUM PHOSPHATE 4 MG: 4 INJECTION, SOLUTION INTRAMUSCULAR; INTRAVENOUS at 12:11

## 2018-11-29 RX ADMIN — FENTANYL CITRATE 50 MCG: 50 INJECTION, SOLUTION INTRAMUSCULAR; INTRAVENOUS at 01:11

## 2018-11-29 RX ADMIN — HYDROMORPHONE HYDROCHLORIDE 0.2 MG: 2 INJECTION, SOLUTION INTRAMUSCULAR; INTRAVENOUS; SUBCUTANEOUS at 03:11

## 2018-11-29 RX ADMIN — LIDOCAINE HYDROCHLORIDE 10 MG: 10 INJECTION, SOLUTION EPIDURAL; INFILTRATION; INTRACAUDAL; PERINEURAL at 10:11

## 2018-11-29 NOTE — ANESTHESIA POSTPROCEDURE EVALUATION
"Anesthesia Post Evaluation    Patient: Cheyenne West    Procedure(s) Performed: Procedure(s) (LRB):  FUNDOPLICATION, NISSEN, LAPAROSCOPIC (N/A)    Final Anesthesia Type: general  Patient location during evaluation: PACU  Patient participation: Yes- Able to Participate  Level of consciousness: awake and alert  Post-procedure vital signs: reviewed and stable  Pain management: adequate  Airway patency: patent  PONV status at discharge: No PONV  Anesthetic complications: no      Cardiovascular status: blood pressure returned to baseline and hemodynamically stable  Respiratory status: unassisted  Hydration status: euvolemic  Follow-up not needed.        Visit Vitals  BP (!) 148/72 (BP Location: Left arm, Patient Position: Sitting)   Pulse 96   Temp 36.7 °C (98.1 °F) (Temporal)   Resp 15   Ht 4' 11" (1.499 m)   Wt 74.8 kg (165 lb)   SpO2 95%   Breastfeeding? No   BMI 33.33 kg/m²       Pain/Veronica Score: Pain Assessment Performed: Yes (11/29/2018  9:56 AM)  Presence of Pain: denies (11/29/2018  9:56 AM)        "

## 2018-11-29 NOTE — PLAN OF CARE
"DR Crowder at bs released from pacu to room for extended recovery vs wnl of bl dr crowder aware of pt on b/p meds and b/p no new orders pt will get home meds tonight scop patch on No nausea No emesis familia sips and chips well co of "gas in upper stomach felt better when I move" Report to Denys prasad pt has all belongs at bs including hearing aid  "

## 2018-11-29 NOTE — INTERVAL H&P NOTE
The patient has been examined and the H&P has been reviewed:    I concur with the findings and no changes have occurred since H&P was written.    Anesthesia/Surgery risks, benefits and alternative options discussed and understood by patient/family.          Active Hospital Problems    Diagnosis  POA    Hiatal hernia [K44.9]  Yes      Resolved Hospital Problems   No resolved problems to display.

## 2018-11-29 NOTE — OP NOTE
DATE OF PROCEDURE:  11/29/2018.    PROCEDURE:  Laparoscopic Nissen fundoplication with repair of large hiatal   hernia.    POSTOPERATIVE DIAGNOSIS:  Severe gastroesophageal reflux disease with large   hiatal hernia.    POSTOPERATIVE DIAGNOSIS:  Severe gastroesophageal reflux disease with large   hiatal hernia.    SURGEON:  Frank Akins Jr., M.D.    ASSISTANT:  Karine Cole.    PROCEDURE IN DETAIL:  After appropriate consent was obtained, consent forms   signed and questions answered, the patient was taken to the Operating Room,   placed on the operating room table where general endotracheal anesthesia was   induced without difficulty.  She was on a split leg table.  The abdomen was   prepped and draped in the usual sterile fashion.  A timeout procedure was   performed in the usual fashion.  A midline incision was made above the   umbilicus.  Dissection was performed down to the fascia, which was incised.    Stay sutures were placed and Dell trocar was inserted and the abdomen was   insufflated.  The patient was then placed in reverse Trendelenburg.  After   injection of local, a 5 mm trocar was placed in the right abdomen.  The liver   retractor was then inserted and secured elevating the left lobe of the liver.    This was secured in place with the Iron Intern.  This exposed the hiatus.  There   was a large hiatal hernia with the majority of the stomach up in the   mediastinum.  Three additional 5 mm trocars were placed in the subcostal   position to allow for the procedure.  The stomach was retracted.  The lesser   omentum was incised with the Harmonic scalpel and then the hernia sac was   dissected from its attachments at the hiatus and up into the mediastinum   allowing the stomach to be pulled down into the abdominal cavity.  This was a   fairly extensive dissection, freeing up the attachments to the fundus of the   stomach and then identifying the esophagus.  The esophagus was mobilized for a    distance into the mediastinum.  This allowed for the stomach to rest comfortably   in the abdomen with a segment of the esophagus within the abdomen as well.  The   greater curve was then taken down with Harmonic scalpel and this took down the   short gastrics freeing up the attachments to the spleen into posteriorly.  Then,   we dissected along the left bonnie and then identified the junction of the left   and right bonnie just anterior to the aorta.  I created a window posterior to the   GE junction and freed up the entire fundus of the stomach.  Once that was   completed, a Penrose drain was brought around the GE junction and this was used   to elevate the GE junction and the hiatus was reapproximated with interrupted 0   Ethibond sutures with a 52 bougie in place.  The defect came together well with   minimal tension.  The fundus was then wrapped posteriorly and then a shoeshine   maneuver was performed to assure no tension and then the wrap was created with   4-0 Ethibond sutures, one of the stitches incorporating the anterior wall of the   esophagus to secure that in position.  The wrap was good and loose.  I was   easily able to pass a grasper between the stomach and the esophagus confirming   the floppy Nissen.  The area was irrigated and suctioned dry.  There had been   minimal blood loss, a total of 15 mL.  Penrose drain was then removed.  The   liver retractor was then removed and the liver was examined to assure there was   no injury.  Trocars were then removed and the abdomen was insufflated.  The   fascia was closed with interrupted 0 Vicryl suture and additional local was   injected.  Skin was then closed with 4-0 Monocryl subcuticular stitches.    Steri-Strips were applied.  The patient was awakened, extubated and transferred   to the Recovery Room in stable condition.  She tolerated the procedure without   complication.  Blood loss was approximately 15 mL. Counts were correct at the   end of the  procedure.      RTL/IN  dd: 11/29/2018 14:30:33 (CST)  td: 11/29/2018 17:52:04 (CST)  Doc ID   #[id number]  Job ID #[id number]    CC:

## 2018-11-29 NOTE — ANESTHESIA PREPROCEDURE EVALUATION
11/29/2018  Cheyenne West is a 63 y.o., female.    Anesthesia Evaluation    I have reviewed the Patient Summary Reports.    I have reviewed the Nursing Notes.   I have reviewed the Medications.     Review of Systems  Anesthesia Hx:  Hx of Anesthetic complications (PONV)    Social:  Non-Smoker    Cardiovascular:   Hypertension, well controlled Valvular problems/Murmurs (mild), MR CAD asymptomatic  hyperlipidemia    Pulmonary:   Sleep Apnea    Renal/:  Renal/ Normal     Hepatic/GI:   Hiatal Hernia, GERD IBS   Neurological:   Neuromuscular Disease,    Endocrine:   Hypothyroidism    Psych:   Psychiatric History anxiety          Physical Exam  General:  Well nourished    Airway/Jaw/Neck:  Airway Findings: Mouth Opening: Normal Tongue: Normal  General Airway Assessment: Adult  Oropharynx Findings:  Mallampati: II  Improves to II with phonation.  TM Distance: Normal, at least 6 cm  Jaw/Neck Findings:  Neck ROM: Normal ROM     Eyes/Ears/Nose:  Eyes/Ears/Nose Findings:    Dental:  Dental Findings: In tact   Chest/Lungs:  Chest/Lungs Findings: Normal Respiratory Rate     Heart/Vascular:  Heart Findings: Rate: Normal  Rhythm: Regular Rhythm        Mental Status:  Mental Status Findings:  Cooperative, Alert and Oriented         Anesthesia Plan  Type of Anesthesia, risks & benefits discussed:  Anesthesia Type:  general  Patient's Preference:   Intra-op Monitoring Plan: standard ASA monitors  Intra-op Monitoring Plan Comments:   Post Op Pain Control Plan: multimodal analgesia  Post Op Pain Control Plan Comments:   Induction:   IV  Beta Blocker:  Patient is not currently on a Beta-Blocker (No further documentation required).       Informed Consent: Patient understands risks and agrees with Anesthesia plan.  Questions answered. Anesthesia consent signed with patient.  ASA Score: 2     Day of Surgery Review of  History & Physical:  There are no significant changes.   H&P completed by Anesthesiologist.       Ready For Surgery From Anesthesia Perspective.

## 2018-11-29 NOTE — TRANSFER OF CARE
"Anesthesia Transfer of Care Note    Patient: Cheyenne West    Procedure(s) Performed: Procedure(s) (LRB):  FUNDOPLICATION, NISSEN, LAPAROSCOPIC (N/A)    Patient location: PACU    Anesthesia Type: general    Transport from OR: Transported from OR on 2-3 L/min O2 by NC with adequate spontaneous ventilation    Post pain: adequate analgesia    Post assessment: no apparent anesthetic complications and tolerated procedure well    Post vital signs: stable    Level of consciousness: sedated    Nausea/Vomiting: no nausea/vomiting    Complications: none    Transfer of care protocol was followed      Last vitals:   Visit Vitals  /79 (BP Location: Left arm, Patient Position: Lying)   Pulse 64   Temp 36.8 °C (98.2 °F)   Resp 16   Ht 4' 11" (1.499 m)   Wt 74.8 kg (165 lb)   SpO2 96%   Breastfeeding? No   BMI 33.33 kg/m²     "

## 2018-11-29 NOTE — PLAN OF CARE
11/29/18 1627   Patient Assessment/Suction   Level of Consciousness (AVPU) alert   Respiratory Effort Normal;Unlabored   PRE-TX-O2-ETCO2   O2 Device (Oxygen Therapy) nasal cannula   $ Is the patient on Low Flow Oxygen? Yes   SpO2 95 %   Pulse Oximetry Type Intermittent   $ Pulse Oximetry - Multiple Charge Pulse Oximetry - Multiple   Incentive Spirometer   $ Incentive Spirometer Charges postop instruction   Predicted Level (mL) (Incentive Spirometer) 1500   Administration (Incentive Spirometer) postop instruction   Number of Repetitions (Incentive Spirometer) 12   Level (mL) (Incentive Spirometer) 750   Patient Tolerance good   Pt room air sats 87% , put Pt on 2lpm NC with sats increasing to 95%. ISU and instruct with IS. Instructed Pt to use Q1 w/a.

## 2018-11-29 NOTE — BRIEF OP NOTE
Patient: Cheyenne West     Date of Procedure: 11/29/2018    Procedure: Laparoscopic Nissen Fundoplication  Repair of large hiatal hernia    Surgeon: Frank Veliz MD    Assistant: Karine Cole    Pre-op Diagnosis: Hiatal hernia [K44.9]     Post-op Diagnosis: Hiatal hernia [K44.9]    Findings: Large hiatal hernia    Specimen: none    EBL: 15 cc    Complications: None

## 2018-11-30 VITALS
TEMPERATURE: 98 F | RESPIRATION RATE: 18 BRPM | BODY MASS INDEX: 33.26 KG/M2 | WEIGHT: 165 LBS | HEART RATE: 64 BPM | DIASTOLIC BLOOD PRESSURE: 70 MMHG | HEIGHT: 59 IN | SYSTOLIC BLOOD PRESSURE: 131 MMHG | OXYGEN SATURATION: 92 %

## 2018-11-30 PROCEDURE — 25000003 PHARM REV CODE 250

## 2018-11-30 PROCEDURE — C9113 INJ PANTOPRAZOLE SODIUM, VIA: HCPCS | Performed by: SURGERY

## 2018-11-30 PROCEDURE — 63600175 PHARM REV CODE 636 W HCPCS: Performed by: SURGERY

## 2018-11-30 PROCEDURE — 94799 UNLISTED PULMONARY SVC/PX: CPT

## 2018-11-30 PROCEDURE — 25000003 PHARM REV CODE 250: Performed by: SURGERY

## 2018-11-30 PROCEDURE — 94761 N-INVAS EAR/PLS OXIMETRY MLT: CPT

## 2018-11-30 RX ORDER — HYDROCODONE BITARTRATE AND ACETAMINOPHEN 5; 325 MG/1; MG/1
1 TABLET ORAL EVERY 6 HOURS PRN
Qty: 20 TABLET | Refills: 0 | Status: SHIPPED | OUTPATIENT
Start: 2018-11-30 | End: 2019-01-23

## 2018-11-30 RX ADMIN — FELODIPINE 5 MG: 5 TABLET, EXTENDED RELEASE ORAL at 08:11

## 2018-11-30 RX ADMIN — HYDROCODONE BITARTRATE AND ACETAMINOPHEN 1 TABLET: 5; 325 TABLET ORAL at 07:11

## 2018-11-30 RX ADMIN — ONDANSETRON 4 MG: 2 INJECTION INTRAMUSCULAR; INTRAVENOUS at 07:11

## 2018-11-30 RX ADMIN — HYDROCODONE BITARTRATE AND ACETAMINOPHEN 1 TABLET: 5; 325 TABLET ORAL at 11:11

## 2018-11-30 RX ADMIN — ONDANSETRON 4 MG: 2 INJECTION INTRAMUSCULAR; INTRAVENOUS at 11:11

## 2018-11-30 RX ADMIN — PANTOPRAZOLE SODIUM 40 MG: 40 INJECTION, POWDER, FOR SOLUTION INTRAVENOUS at 07:11

## 2018-11-30 RX ADMIN — HYDROCHLOROTHIAZIDE 25 MG: 25 TABLET ORAL at 08:11

## 2018-11-30 RX ADMIN — ESCITALOPRAM OXALATE 20 MG: 10 TABLET ORAL at 08:11

## 2018-11-30 NOTE — PLAN OF CARE
Problem: Patient Care Overview  Goal: Plan of Care Review  Outcome: Ongoing (interventions implemented as appropriate)  IS to use Q1 hrs

## 2018-11-30 NOTE — PLAN OF CARE
Problem: Patient Care Overview  Goal: Plan of Care Review  Outcome: Ongoing (interventions implemented as appropriate)  Plan of care reviewed with pt at beginning of shift.  Pt/family verbalized understanding. Hourly/ Q2 hourly rounds completed on this pt throughout shift.  Pain monitored and covered as needed,  Pt up to restroom frequently throughout the night- walks with sba, repositioned self throughout night, safety maintained.  Patient has remained free from fall/injury, no skin breakdown noted.  Side rails up x2, bed in locked and lowest position, call light kept within reach.  Needs attended to, will continue to monitor/ update as indicated

## 2018-11-30 NOTE — PLAN OF CARE
11/30/18 1537   Final Note   Assessment Type Final Discharge Note   Anticipated Discharge Disposition Home   What phone number can be called within the next 1-3 days to see how you are doing after discharge? ([phone])   Hospital Follow Up  Appt(s) scheduled? Yes

## 2018-11-30 NOTE — NURSING
Discharge instructions given to patient she verbalized understanding all questions and concerns answered. Discontinued piv in left wrist. Prescription faxed to pharmacy. Follow up appts scheduled. Pt left via wheelchair at 1420. Relinquished care.

## 2018-11-30 NOTE — DISCHARGE SUMMARY
Discharge Summary  General Surgery      Admit Date: 11/29/2018    Discharge Date :11/29/2018    Attending Physician: Frank Akins     Discharge Physician: Frank Akins    Discharged Condition: good    Discharge Diagnosis: Hiatal hernia [K44.9]    Treatments/Procedures: Procedure(s) (LRB):  FUNDOPLICATION, NISSEN, LAPAROSCOPIC (N/A)    Hospital Course: Uneventful; Discharged home after extended recovery.    Significant Diagnostic Studies: none    Disposition: Home or Self Care    Diet: Regular    Follow up: Office 10-14 days    Activity: No heavy lifting till seen in office.    Patient Instructions:   Current Discharge Medication List      CONTINUE these medications which have NOT CHANGED    Details   ALPRAZolam (XANAX) 0.25 MG tablet Take 1 tablet (0.25 mg total) by mouth 3 (three) times daily as needed for Anxiety.  Qty: 30 tablet, Refills: 0      atorvastatin (LIPITOR) 10 MG tablet TAKE 1 TABLET ONCE DAILY  Qty: 90 tablet, Refills: 3      buPROPion (WELLBUTRIN XL) 300 MG 24 hr tablet TAKE 1 TABLET ONCE DAILY  Qty: 90 tablet, Refills: 3      butalbital-acetaminophen-caffeine -40 mg (FIORICET, ESGIC) -40 mg per tablet Take 1 tablet by mouth.      escitalopram oxalate (LEXAPRO) 20 MG tablet TAKE 1 TABLET ONCE DAILY  Qty: 90 tablet, Refills: 1      estradiol (VAGIFEM) 10 mcg Tab Place 10 mcg vaginally.      !! felodipine (PLENDIL) 5 MG 24 hr tablet Take 1 tablet (5 mg total) by mouth once daily.  Qty: 90 tablet, Refills: 3      ferrous gluconate (FERGON) 324 MG tablet TAKE 1 TABLET BY MOUTH WITH BREAKFAST  Qty: 30 tablet, Refills: 5      hydroCHLOROthiazide (HYDRODIURIL) 25 MG tablet TAKE 1 TABLET ONCE DAILY  Qty: 90 tablet, Refills: 3      !! levothyroxine (SYNTHROID) 50 MCG tablet Take 50 mcg by mouth.      linaclotide (LINZESS) 145 mcg Cap capsule Take 1 capsule (145 mcg total) by mouth once daily. Take >30 minutes before 1st meal of the day.  Qty: 30 capsule, Refills: 2    Associated Diagnoses:  Chronic constipation      multivitamin with minerals tablet Take 1 tablet by mouth.      OMEGA-3 FATTY ACIDS-EPA ORAL Take by mouth once daily.      pantoprazole (PROTONIX) 40 MG tablet TAKE 1 TABLET DAILY  Qty: 90 tablet, Refills: 3      potassium chloride (MICRO-K) 10 MEQ CpSR TAKE 1 CAPSULE ONCE DAILY  Qty: 90 capsule, Refills: 3      senna (SENOKOT) 8.6 mg tablet Take 8.6 mg by mouth daily as needed.       VIT A/VIT C/VIT E/ZINC/COPPER (PRESERVISION AREDS ORAL) Take by mouth once daily.      CALCIUM CARBONATE/VITAMIN D3 (CALTRATE 600 + D ORAL) Take by mouth 2 (two) times daily.      !! felodipine (PLENDIL) 5 MG 24 hr tablet Take 5 mg by mouth.      !! SYNTHROID 50 mcg tablet TAKE 1 TABLET DAILY  Qty: 90 tablet, Refills: 0       !! - Potential duplicate medications found. Please discuss with provider.          No discharge procedures on file.

## 2018-11-30 NOTE — PLAN OF CARE
11/29/18 2015   Patient Assessment/Suction   Level of Consciousness (AVPU) alert   Respiratory Effort Unlabored   PRE-TX-O2-ETCO2   O2 Device (Oxygen Therapy) nasal cannula   SpO2 97 %   Pulse Oximetry Type Intermittent   $ Pulse Oximetry - Multiple Charge Pulse Oximetry - Multiple   Pulse 69   Incentive Spirometer   $ Incentive Spirometer Charges done with encouragement   Administration (Incentive Spirometer) done with encouragement   Number of Repetitions (Incentive Spirometer) 10   Level (mL) (Incentive Spirometer) 1000   Patient Tolerance good

## 2018-12-02 RX ORDER — ESCITALOPRAM OXALATE 20 MG/1
TABLET ORAL
Qty: 90 TABLET | Refills: 1 | Status: SHIPPED | OUTPATIENT
Start: 2018-12-02 | End: 2019-10-14

## 2018-12-03 RX ORDER — LEVOTHYROXINE SODIUM 50 UG/1
TABLET ORAL
Qty: 90 TABLET | Refills: 0 | Status: SHIPPED | OUTPATIENT
Start: 2018-12-03 | End: 2019-03-26 | Stop reason: SDUPTHER

## 2018-12-06 ENCOUNTER — TELEPHONE (OUTPATIENT)
Dept: HEMATOLOGY/ONCOLOGY | Facility: CLINIC | Age: 63
End: 2018-12-06

## 2018-12-06 ENCOUNTER — PATIENT MESSAGE (OUTPATIENT)
Dept: SURGERY | Facility: CLINIC | Age: 63
End: 2018-12-06

## 2018-12-10 RX ORDER — FELODIPINE 5 MG/1
TABLET, EXTENDED RELEASE ORAL
Qty: 90 TABLET | Refills: 1 | Status: SHIPPED | OUTPATIENT
Start: 2018-12-10 | End: 2019-09-13 | Stop reason: SDUPTHER

## 2018-12-12 ENCOUNTER — OFFICE VISIT (OUTPATIENT)
Dept: SURGERY | Facility: CLINIC | Age: 63
End: 2018-12-12
Payer: COMMERCIAL

## 2018-12-12 VITALS
BODY MASS INDEX: 31.84 KG/M2 | DIASTOLIC BLOOD PRESSURE: 80 MMHG | HEART RATE: 73 BPM | SYSTOLIC BLOOD PRESSURE: 129 MMHG | WEIGHT: 157.63 LBS

## 2018-12-12 DIAGNOSIS — Z98.890 POST-OPERATIVE STATE: Primary | ICD-10-CM

## 2018-12-12 PROCEDURE — 99999 PR PBB SHADOW E&M-EST. PATIENT-LVL III: CPT | Mod: PBBFAC,,, | Performed by: SURGERY

## 2018-12-12 PROCEDURE — 99024 POSTOP FOLLOW-UP VISIT: CPT | Mod: S$GLB,,, | Performed by: SURGERY

## 2018-12-13 NOTE — PROGRESS NOTES
POST-OP NOTE    PROCEDURE: S/P lap Nissen    COMPLAINTS: None.  Swallowing without difficulty. No reflux.    EXAM: Incision well approximated. No drainage. No infection.      IMPRESSION:  Doing well    PLAN: FU as needed.

## 2018-12-27 ENCOUNTER — LAB VISIT (OUTPATIENT)
Dept: LAB | Facility: HOSPITAL | Age: 63
End: 2018-12-27
Attending: INTERNAL MEDICINE
Payer: COMMERCIAL

## 2018-12-27 DIAGNOSIS — K29.30 CHRONIC SUPERFICIAL GASTRITIS WITHOUT BLEEDING: ICD-10-CM

## 2018-12-27 DIAGNOSIS — K29.60 REFLUX GASTRITIS: ICD-10-CM

## 2018-12-27 DIAGNOSIS — D50.0 IRON DEFICIENCY ANEMIA DUE TO CHRONIC BLOOD LOSS: ICD-10-CM

## 2018-12-27 LAB
ALBUMIN SERPL BCP-MCNC: 3.6 G/DL
ALP SERPL-CCNC: 117 U/L
ALT SERPL W/O P-5'-P-CCNC: 29 U/L
ANION GAP SERPL CALC-SCNC: 9 MMOL/L
AST SERPL-CCNC: 32 U/L
BASOPHILS # BLD AUTO: 0 K/UL
BASOPHILS NFR BLD: 0 %
BILIRUB SERPL-MCNC: 0.3 MG/DL
BUN SERPL-MCNC: 11 MG/DL
CALCIUM SERPL-MCNC: 9.2 MG/DL
CHLORIDE SERPL-SCNC: 103 MMOL/L
CO2 SERPL-SCNC: 28 MMOL/L
CREAT SERPL-MCNC: 0.8 MG/DL
DIFFERENTIAL METHOD: ABNORMAL
EOSINOPHIL # BLD AUTO: 0 K/UL
EOSINOPHIL NFR BLD: 0.2 %
ERYTHROCYTE [DISTWIDTH] IN BLOOD BY AUTOMATED COUNT: 16.3 %
EST. GFR  (AFRICAN AMERICAN): >60 ML/MIN/1.73 M^2
EST. GFR  (NON AFRICAN AMERICAN): >60 ML/MIN/1.73 M^2
FERRITIN SERPL-MCNC: 30 NG/ML
GLUCOSE SERPL-MCNC: 177 MG/DL
HCT VFR BLD AUTO: 39.8 %
HGB BLD-MCNC: 12.6 G/DL
IRON SERPL-MCNC: 77 UG/DL
LYMPHOCYTES # BLD AUTO: 1.3 K/UL
LYMPHOCYTES NFR BLD: 24.8 %
MCH RBC QN AUTO: 27 PG
MCHC RBC AUTO-ENTMCNC: 31.7 G/DL
MCV RBC AUTO: 85 FL
MONOCYTES # BLD AUTO: 0.4 K/UL
MONOCYTES NFR BLD: 7.5 %
NEUTROPHILS # BLD AUTO: 3.4 K/UL
NEUTROPHILS NFR BLD: 67.5 %
PLATELET # BLD AUTO: 179 K/UL
PMV BLD AUTO: 9.2 FL
POTASSIUM SERPL-SCNC: 3.5 MMOL/L
PROT SERPL-MCNC: 6.8 G/DL
RBC # BLD AUTO: 4.66 M/UL
SATURATED IRON: 20 %
SODIUM SERPL-SCNC: 140 MMOL/L
TOTAL IRON BINDING CAPACITY: 379 UG/DL
TRANSFERRIN SERPL-MCNC: 256 MG/DL
WBC # BLD AUTO: 5.05 K/UL

## 2018-12-27 PROCEDURE — 82728 ASSAY OF FERRITIN: CPT

## 2018-12-27 PROCEDURE — 80053 COMPREHEN METABOLIC PANEL: CPT

## 2018-12-27 PROCEDURE — 83540 ASSAY OF IRON: CPT

## 2018-12-27 PROCEDURE — 85025 COMPLETE CBC W/AUTO DIFF WBC: CPT | Mod: PO

## 2018-12-27 PROCEDURE — 36415 COLL VENOUS BLD VENIPUNCTURE: CPT | Mod: PO

## 2019-01-03 ENCOUNTER — OFFICE VISIT (OUTPATIENT)
Dept: HEMATOLOGY/ONCOLOGY | Facility: CLINIC | Age: 64
End: 2019-01-03
Payer: COMMERCIAL

## 2019-01-03 VITALS
DIASTOLIC BLOOD PRESSURE: 87 MMHG | SYSTOLIC BLOOD PRESSURE: 137 MMHG | WEIGHT: 160.06 LBS | TEMPERATURE: 99 F | HEIGHT: 59 IN | HEART RATE: 72 BPM | RESPIRATION RATE: 18 BRPM | BODY MASS INDEX: 32.27 KG/M2

## 2019-01-03 DIAGNOSIS — E03.4 HYPOTHYROIDISM DUE TO ACQUIRED ATROPHY OF THYROID: ICD-10-CM

## 2019-01-03 DIAGNOSIS — Z98.890 STATUS POST LAPAROSCOPIC NISSEN FUNDOPLICATION: ICD-10-CM

## 2019-01-03 DIAGNOSIS — D50.0 IRON DEFICIENCY ANEMIA DUE TO CHRONIC BLOOD LOSS: Primary | ICD-10-CM

## 2019-01-03 DIAGNOSIS — K59.03 DRUG INDUCED CONSTIPATION: ICD-10-CM

## 2019-01-03 PROCEDURE — 3008F BODY MASS INDEX DOCD: CPT | Mod: CPTII,S$GLB,, | Performed by: INTERNAL MEDICINE

## 2019-01-03 PROCEDURE — 99214 OFFICE O/P EST MOD 30 MIN: CPT | Mod: S$GLB,,, | Performed by: INTERNAL MEDICINE

## 2019-01-03 PROCEDURE — 3079F PR MOST RECENT DIASTOLIC BLOOD PRESSURE 80-89 MM HG: ICD-10-PCS | Mod: CPTII,S$GLB,, | Performed by: INTERNAL MEDICINE

## 2019-01-03 PROCEDURE — 3075F PR MOST RECENT SYSTOLIC BLOOD PRESS GE 130-139MM HG: ICD-10-PCS | Mod: CPTII,S$GLB,, | Performed by: INTERNAL MEDICINE

## 2019-01-03 PROCEDURE — 99214 PR OFFICE/OUTPT VISIT, EST, LEVL IV, 30-39 MIN: ICD-10-PCS | Mod: S$GLB,,, | Performed by: INTERNAL MEDICINE

## 2019-01-03 PROCEDURE — 3075F SYST BP GE 130 - 139MM HG: CPT | Mod: CPTII,S$GLB,, | Performed by: INTERNAL MEDICINE

## 2019-01-03 PROCEDURE — 3079F DIAST BP 80-89 MM HG: CPT | Mod: CPTII,S$GLB,, | Performed by: INTERNAL MEDICINE

## 2019-01-03 PROCEDURE — 3008F PR BODY MASS INDEX (BMI) DOCUMENTED: ICD-10-PCS | Mod: CPTII,S$GLB,, | Performed by: INTERNAL MEDICINE

## 2019-01-03 PROCEDURE — 99999 PR PBB SHADOW E&M-EST. PATIENT-LVL III: CPT | Mod: PBBFAC,,, | Performed by: INTERNAL MEDICINE

## 2019-01-03 PROCEDURE — 99999 PR PBB SHADOW E&M-EST. PATIENT-LVL III: ICD-10-PCS | Mod: PBBFAC,,, | Performed by: INTERNAL MEDICINE

## 2019-01-03 NOTE — PROGRESS NOTES
HPI    63 years old  female who was presented to Hematology Clinic for evaluation of iron deficiency anemia.      Patient has long history of iron deficiency anemia. she has been taking iron as per Primary Care instructions.  She also had a prior hematologist who put her on iron replacement protocol.  Due to insurance changing, she decided [facility].  Patient had known history of multiple angioectasias on video capsule endoscopy which was  performed on September 26, 2018.      Recently patient had a lap Nissen on November 30, 2018 by Dr. Frank Akins.      Patient here for follow-up exam and blood work.  Patient has no complaint at this moment.  Patient denies any chest pain shortness of breath.  Patient denies any abdominal pain nausea vomiting or diarrhea.  Patient denies any fever or chills.  Patient denies any active GI bleeding.    The patient reports continue taking iron supplement at this point tolerated medication well.      Past Medical History:   Diagnosis Date    Carpal tunnel syndrome of right wrist     Chronic gastritis     Coronary artery disease     mitral regurgitation    Dissociative fugue     Diverticulosis     Duodenitis     Dyspepsia     Generalized anxiety disorder     Hearing loss on left     Hearing loss on right     Helicobacter pylori (H. pylori)     History of blood transfusion     Hyperlipidemia     Hypertension     Hypothyroidism     IBS (irritable bowel syndrome)     Iron deficiency anemia     Mild mitral regurgitation by prior echocardiogram     Mild obesity     Mild vitamin D deficiency     Paraesophageal hiatal hernia     PONV (postoperative nausea and vomiting)     Reflux gastritis     Sleep apnea     mild improved with wt. loss    Thyroid goiter     Urticaria, chronic      Social History     Socioeconomic History    Marital status:      Spouse name: Not on file    Number of children: Not on file    Years of  education: Not on file    Highest education level: Not on file   Social Needs    Financial resource strain: Not on file    Food insecurity - worry: Not on file    Food insecurity - inability: Not on file    Transportation needs - medical: Not on file    Transportation needs - non-medical: Not on file   Occupational History    Not on file   Tobacco Use    Smoking status: Never Smoker    Smokeless tobacco: Never Used   Substance and Sexual Activity    Alcohol use: Yes     Comment: seldom    Drug use: No    Sexual activity: Yes   Other Topics Concern    Not on file   Social History Narrative    Not on file         Subjective      Review of Systems   Constitutional:  Negative for appetite change, mild fatigue intermittently.    HENT: Negative for mouth sores.   Eyes: Negative for visual disturbance.   Respiratory: Negative for cough and shortness of breath.   Cardiovascular: Negative for chest pain.   Gastrointestinal: Negative for diarrhea.   Genitourinary: Negative for frequency.   Musculoskeletal: Negative for back pain.   Skin: Negative for rash.   Neurological: Negative for headaches.   Hematological: Negative for adenopathy.   Psychiatric/Behavioral: The patient is not nervous/anxious.   All other systems reviewed and are negative.     Objective    Physical Exam   There were no vitals filed for this visit.    Constitutional: patient is oriented to person, place, and time. patient appears well-developed and well-nourished. No distress.   HENT:  Normocephalic atraumatic pupils equal round reactive to light extraocular muscle intact tongue midline trachea midline hearing grossly intact  Cardiovascular: Normal rate, regular rhythm, normal heart sounds, intact distal pulses and normal pulses.   No murmur heard.   No edema, no tenderness in the extremities.   Pulmonary/Chest: Effort normal and breath sounds normal. No accessory muscle usage. patient has no wheezes..   Abdominal: Soft. Normal appearance and  bowel sounds are normal. patient exhibits no distension and no mass. There is no hepatosplenomegaly. There is no tenderness.   Musculoskeletal: Normal range of motion.   Gait is normal   No clubbing, cyanosis     Lymphadenopathy:  Lymphadenopathy exam is grossly normal  Neurological: patient is alert and oriented to person, place, and time. patient has normal strength and normal reflexes. No sensory deficit. Gait normal.   Skin: Skin is warm, dry and intact. No bruising, no lesion and no rash noted. No cyanosis. Nails show no clubbing.   No lesions   Psychiatric: patient has a normal mood and affect. patient speech is normal and behavior is normal. Judgment normal. Cognition and memory are normal.   Vitals reviewed.     CMP  Sodium   Date Value Ref Range Status   12/27/2018 140 136 - 145 mmol/L Final     Potassium   Date Value Ref Range Status   12/27/2018 3.5 3.5 - 5.1 mmol/L Final     Chloride   Date Value Ref Range Status   12/27/2018 103 95 - 110 mmol/L Final     CO2   Date Value Ref Range Status   12/27/2018 28 23 - 29 mmol/L Final     Glucose   Date Value Ref Range Status   12/27/2018 177 (H) 70 - 110 mg/dL Final     BUN, Bld   Date Value Ref Range Status   12/27/2018 11 8 - 23 mg/dL Final     Creatinine   Date Value Ref Range Status   12/27/2018 0.8 0.5 - 1.4 mg/dL Final     Calcium   Date Value Ref Range Status   12/27/2018 9.2 8.7 - 10.5 mg/dL Final     Total Protein   Date Value Ref Range Status   12/27/2018 6.8 6.0 - 8.4 g/dL Final     Albumin   Date Value Ref Range Status   12/27/2018 3.6 3.5 - 5.2 g/dL Final     Total Bilirubin   Date Value Ref Range Status   12/27/2018 0.3 0.1 - 1.0 mg/dL Final     Comment:     For infants and newborns, interpretation of results should be based  on gestational age, weight and in agreement with clinical  observations.  Premature Infant recommended reference ranges:  Up to 24 hours.............<8.0 mg/dL  Up to 48 hours............<12.0 mg/dL  3-5  days..................<15.0 mg/dL  6-29 days.................<15.0 mg/dL       Alkaline Phosphatase   Date Value Ref Range Status   12/27/2018 117 55 - 135 U/L Final     AST   Date Value Ref Range Status   12/27/2018 32 10 - 40 U/L Final     ALT   Date Value Ref Range Status   12/27/2018 29 10 - 44 U/L Final     Anion Gap   Date Value Ref Range Status   12/27/2018 9 8 - 16 mmol/L Final     eGFR if    Date Value Ref Range Status   12/27/2018 >60.0 >60 mL/min/1.73 m^2 Final     eGFR if non    Date Value Ref Range Status   12/27/2018 >60.0 >60 mL/min/1.73 m^2 Final     Comment:     Calculation used to obtain the estimated glomerular filtration  rate (eGFR) is the CKD-EPI equation.        Lab Results   Component Value Date    WBC 5.05 12/27/2018    HGB 12.6 12/27/2018    HCT 39.8 12/27/2018    MCV 85 12/27/2018     12/27/2018     Latest iron panel shows he ferritin of 31 which has been improved compared to 1 month ago which was 16.    10/1/2018: iron 274, transferrin 318 TIBC 471, T sat 58%, ferritin 51    6/27/2018: iron 44, transferrin 348, TIIBC 515, Tsat 9%, ferritin 16    Assessment/Plan    [] Iron deficiency anemia likely secondary to multiple angioectasias discovered on small bowel series.    -recent CBC study on December 27, 2018:  WBC 5.05, hemoglobin 12.6 grams/deciliter, platelets a 179 K.  serum iron 77, TIBC 379, iron saturation 20%, transferrin 256, ferritin 30.  -advised continue iron supplements:  Iron sulfate 325 mg 1 tablet daily on empty stomach.  -RTC 2 months with repeat CBC CMP iron study ferritin - February    [] Status post lap Nissen by Dr. Frank Akins November 30, 2018  -follow-up with Dr. Akins as needed     [] thyroid disease on thyroid medication  -continue current medical management     [] constipation secondary to iron  -continue sennako plus MiraLax for symptomatic relief     [] Reflux gastritis  -follow-up with GI

## 2019-01-09 ENCOUNTER — PATIENT MESSAGE (OUTPATIENT)
Dept: FAMILY MEDICINE | Facility: CLINIC | Age: 64
End: 2019-01-09

## 2019-01-09 NOTE — TELEPHONE ENCOUNTER
Called pt and informed her that she will have to call and schedule the appointment on the scheduled line so they can create a chart for her son. Pt verbalized understanding.

## 2019-01-14 DIAGNOSIS — K59.09 CHRONIC CONSTIPATION: ICD-10-CM

## 2019-01-14 RX ORDER — LINACLOTIDE 145 UG/1
CAPSULE, GELATIN COATED ORAL
Qty: 30 CAPSULE | Refills: 2 | Status: SHIPPED | OUTPATIENT
Start: 2019-01-14 | End: 2019-03-08 | Stop reason: SDUPTHER

## 2019-01-23 ENCOUNTER — OFFICE VISIT (OUTPATIENT)
Dept: FAMILY MEDICINE | Facility: CLINIC | Age: 64
End: 2019-01-23
Payer: COMMERCIAL

## 2019-01-23 VITALS
BODY MASS INDEX: 32.25 KG/M2 | WEIGHT: 160 LBS | TEMPERATURE: 99 F | DIASTOLIC BLOOD PRESSURE: 82 MMHG | SYSTOLIC BLOOD PRESSURE: 138 MMHG | HEIGHT: 59 IN | HEART RATE: 82 BPM

## 2019-01-23 DIAGNOSIS — J06.9 UPPER RESPIRATORY TRACT INFECTION, UNSPECIFIED TYPE: Primary | ICD-10-CM

## 2019-01-23 DIAGNOSIS — Z63.79 STRESS DUE TO ILLNESS OF FAMILY MEMBER: ICD-10-CM

## 2019-01-23 DIAGNOSIS — F41.1 GENERALIZED ANXIETY DISORDER: ICD-10-CM

## 2019-01-23 PROCEDURE — 3008F PR BODY MASS INDEX (BMI) DOCUMENTED: ICD-10-PCS | Mod: CPTII,S$GLB,, | Performed by: FAMILY MEDICINE

## 2019-01-23 PROCEDURE — 99213 OFFICE O/P EST LOW 20 MIN: CPT | Mod: S$GLB,,, | Performed by: FAMILY MEDICINE

## 2019-01-23 PROCEDURE — 99999 PR PBB SHADOW E&M-EST. PATIENT-LVL IV: CPT | Mod: PBBFAC,,, | Performed by: FAMILY MEDICINE

## 2019-01-23 PROCEDURE — 3075F PR MOST RECENT SYSTOLIC BLOOD PRESS GE 130-139MM HG: ICD-10-PCS | Mod: CPTII,S$GLB,, | Performed by: FAMILY MEDICINE

## 2019-01-23 PROCEDURE — 3008F BODY MASS INDEX DOCD: CPT | Mod: CPTII,S$GLB,, | Performed by: FAMILY MEDICINE

## 2019-01-23 PROCEDURE — 3079F DIAST BP 80-89 MM HG: CPT | Mod: CPTII,S$GLB,, | Performed by: FAMILY MEDICINE

## 2019-01-23 PROCEDURE — 99213 PR OFFICE/OUTPT VISIT, EST, LEVL III, 20-29 MIN: ICD-10-PCS | Mod: S$GLB,,, | Performed by: FAMILY MEDICINE

## 2019-01-23 PROCEDURE — 99999 PR PBB SHADOW E&M-EST. PATIENT-LVL IV: ICD-10-PCS | Mod: PBBFAC,,, | Performed by: FAMILY MEDICINE

## 2019-01-23 PROCEDURE — 3079F PR MOST RECENT DIASTOLIC BLOOD PRESSURE 80-89 MM HG: ICD-10-PCS | Mod: CPTII,S$GLB,, | Performed by: FAMILY MEDICINE

## 2019-01-23 PROCEDURE — 3075F SYST BP GE 130 - 139MM HG: CPT | Mod: CPTII,S$GLB,, | Performed by: FAMILY MEDICINE

## 2019-01-23 RX ORDER — ALPRAZOLAM 0.25 MG/1
0.25 TABLET ORAL 3 TIMES DAILY PRN
Qty: 21 TABLET | Refills: 0 | Status: SHIPPED | OUTPATIENT
Start: 2019-01-23 | End: 2020-04-03 | Stop reason: SDUPTHER

## 2019-01-23 RX ORDER — BENZONATATE 200 MG/1
200 CAPSULE ORAL 3 TIMES DAILY PRN
Qty: 30 CAPSULE | Refills: 0 | Status: SHIPPED | OUTPATIENT
Start: 2019-01-23 | End: 2019-02-02

## 2019-01-23 NOTE — PROGRESS NOTES
Patient complains of sore throat, congestion, postnasal drainage, slight cough, 99 fever.  Symptoms started over the past 5-6 days.  Current treatment over-the-counter medication.  She also requested refill on Xanax which she uses infrequently.  Father with significant illness and has increased symptoms.  Past Medical History:  Past Medical History:   Diagnosis Date    Carpal tunnel syndrome of right wrist     Chronic gastritis     Dissociative fugue     Diverticulosis     Duodenitis     Dyspepsia     Generalized anxiety disorder     Hearing loss on left     Hearing loss on right     Helicobacter pylori (H. pylori)     History of blood transfusion     Hyperlipidemia     Hypertension     Hypothyroidism     IBS (irritable bowel syndrome)     Iron deficiency anemia     Mild mitral regurgitation by prior echocardiogram     Mild obesity     Mild vitamin D deficiency     Paraesophageal hiatal hernia     PONV (postoperative nausea and vomiting)     Reflux gastritis     Sleep apnea     mild improved with wt. loss    Thyroid goiter     Urticaria, chronic      Past Surgical History:   Procedure Laterality Date    BLADDER SUSPENSION      pubovaginal sling     SECTION, CLASSIC      CHOLECYSTECTOMY      COLONOSCOPY  2011    Dr. Goode, in legacy:diverticulosis, hemorrhoids, melanosis coli-repeat 10 years    COLONOSCOPY N/A 2018    Performed by Rusty Vogt Jr., [facility] at [facility]    EGD (ESOPHAGOGASTRODUODENOSCOPY) N/A 2018    Performed by Rusty Vogt Jr[facility] at [facility]    ESOPHAGEAL DILATION      ESOPHAGOGASTRODUODENOSCOPY  2016    FUNDOPLICATION, NISSEN, LAPAROSCOPIC N/A 2018    Performed by Frank [facility]kins [facility] at [facility] OR    HERNIA REPAIR      IMAGING PROCEDURE, GI TRACT, INTRALUMINAL, VIA CAPSULE N/A 2018    Performed by Loraine Velazquez MD at [facility] ENDO    THYROIDECTOMY, PARTIAL       partial for benign nodule    UPPER GASTROINTESTINAL  ENDOSCOPY  03/04/2016    Dr. Whalen, in care everywhere     Review of patient's allergies indicates:   Allergen Reactions    Penicillins      Current Outpatient Medications on File Prior to Visit   Medication Sig Dispense Refill    atorvastatin (LIPITOR) 10 MG tablet TAKE 1 TABLET ONCE DAILY 90 tablet 3    buPROPion (WELLBUTRIN XL) 300 MG 24 hr tablet TAKE 1 TABLET ONCE DAILY 90 tablet 3    butalbital-acetaminophen-caffeine -40 mg (FIORICET, ESGIC) -40 mg per tablet Take 1 tablet by mouth.      CALCIUM CARBONATE/VITAMIN D3 (CALTRATE 600 + D ORAL) Take by mouth 2 (two) times daily.      escitalopram oxalate (LEXAPRO) 20 MG tablet TAKE 1 TABLET ONCE DAILY 90 tablet 1    estradiol (VAGIFEM) 10 mcg Tab Place 10 mcg vaginally.      felodipine (PLENDIL) 5 MG 24 hr tablet TAKE 1 TABLET ONCE DAILY 90 tablet 1    ferrous gluconate (FERGON) 324 MG tablet TAKE 1 TABLET BY MOUTH WITH BREAKFAST (Patient taking differently: once daily) 30 tablet 5    hydroCHLOROthiazide (HYDRODIURIL) 25 MG tablet TAKE 1 TABLET ONCE DAILY 90 tablet 3    LINZESS 145 mcg Cap capsule TAKE ONE CAPSULE BY MOUTH EVERY DAY 30 MINUTES BEFORE 1ST MEAL OF THE DAY 30 capsule 2    multivitamin with minerals tablet Take 1 tablet by mouth.      pantoprazole (PROTONIX) 40 MG tablet TAKE 1 TABLET DAILY 90 tablet 3    potassium chloride (MICRO-K) 10 MEQ CpSR TAKE 1 CAPSULE ONCE DAILY 90 capsule 3    SYNTHROID 50 mcg tablet TAKE 1 TABLET DAILY 90 tablet 0    VIT A/VIT C/VIT E/ZINC/COPPER (PRESERVISION AREDS ORAL) Take by mouth once daily.      [DISCONTINUED] ALPRAZolam (XANAX) 0.25 MG tablet Take 1 tablet (0.25 mg total) by mouth 3 (three) times daily as needed for Anxiety. 30 tablet 0    OMEGA-3 FATTY ACIDS-EPA ORAL Take by mouth once daily.      [DISCONTINUED] HYDROcodone-acetaminophen (NORCO) 5-325 mg per tablet Take 1 tablet by mouth every 6 (six) hours as needed for Pain. 20 tablet 0    [DISCONTINUED] senna (SENOKOT) 8.6 mg tablet  "Take 8.6 mg by mouth daily as needed.        No current facility-administered medications on file prior to visit.      Social History     Socioeconomic History    Marital status:      Spouse name: Not on file    Number of children: Not on file    Years of education: Not on file    Highest education level: Not on file   Social Needs    Financial resource strain: Not on file    Food insecurity - worry: Not on file    Food insecurity - inability: Not on file    Transportation needs - medical: Not on file    Transportation needs - non-medical: Not on file   Occupational History    Not on file   Tobacco Use    Smoking status: Never Smoker    Smokeless tobacco: Never Used   Substance and Sexual Activity    Alcohol use: Yes     Comment: seldom    Drug use: No    Sexual activity: Yes   Other Topics Concern    Not on file   Social History Narrative    Not on file     Family History   Problem Relation Age of Onset    Hypertension Mother     Heart disease Mother     Ulcers Mother     GI problems Mother         colitis    Hypertension Father     Breast cancer Paternal Grandmother     Colon cancer Neg Hx     Crohn's disease Neg Hx     Colon polyps Neg Hx     Ulcerative colitis Neg Hx     Stomach cancer Neg Hx     Esophageal cancer Neg Hx              Physical Exam:  Vitals:    01/23/19 0940   BP: 138/82   Pulse: 82   Temp: 99.3 °F (37.4 °C)   Weight: 72.6 kg (160 lb)   Height: 4' 11" (1.499 m)       Gen.: No acute distress  HEENT: sinuses nontender. Ears: Tympanic membranes intact.  No erythema or effusion.  Nose mild congestion.  Throat mild erythema no exudate.  Mild postnasal drainage.  Neck supple no adenopathy  Chest: Clear to auscultation.  Normal respiratory effort.    Cheyenne was seen today for cough, sore throat and headache.    Diagnoses and all orders for this visit:    Upper respiratory tract infection, unspecified type    Generalized anxiety disorder    Stress due to illness of " family member    Other orders  -     benzonatate (TESSALON) 200 MG capsule; Take 1 capsule (200 mg total) by mouth 3 (three) times daily as needed for Cough.  -     ALPRAZolam (XANAX) 0.25 MG tablet; Take 1 tablet (0.25 mg total) by mouth 3 (three) times daily as needed for Anxiety.          Follow-up as needed if symptoms not improving with Recommended treatment next few days

## 2019-02-15 ENCOUNTER — LAB VISIT (OUTPATIENT)
Dept: LAB | Facility: HOSPITAL | Age: 64
End: 2019-02-15
Attending: FAMILY MEDICINE
Payer: COMMERCIAL

## 2019-02-15 DIAGNOSIS — D50.0 IRON DEFICIENCY ANEMIA DUE TO CHRONIC BLOOD LOSS: ICD-10-CM

## 2019-02-15 LAB
ALBUMIN SERPL BCP-MCNC: 3.8 G/DL
ALP SERPL-CCNC: 127 U/L
ALT SERPL W/O P-5'-P-CCNC: 43 U/L
ANION GAP SERPL CALC-SCNC: 9 MMOL/L
AST SERPL-CCNC: 34 U/L
BASOPHILS # BLD AUTO: 0 K/UL
BASOPHILS NFR BLD: 0 %
BILIRUB SERPL-MCNC: 0.3 MG/DL
BUN SERPL-MCNC: 13 MG/DL
CALCIUM SERPL-MCNC: 9.5 MG/DL
CHLORIDE SERPL-SCNC: 100 MMOL/L
CO2 SERPL-SCNC: 30 MMOL/L
CREAT SERPL-MCNC: 0.7 MG/DL
DIFFERENTIAL METHOD: ABNORMAL
EOSINOPHIL # BLD AUTO: 0 K/UL
EOSINOPHIL NFR BLD: 0 %
ERYTHROCYTE [DISTWIDTH] IN BLOOD BY AUTOMATED COUNT: 16 %
EST. GFR  (AFRICAN AMERICAN): >60 ML/MIN/1.73 M^2
EST. GFR  (NON AFRICAN AMERICAN): >60 ML/MIN/1.73 M^2
FERRITIN SERPL-MCNC: 34 NG/ML
GLUCOSE SERPL-MCNC: 92 MG/DL
HCT VFR BLD AUTO: 40.3 %
HGB BLD-MCNC: 13.3 G/DL
IRON SERPL-MCNC: 92 UG/DL
LYMPHOCYTES # BLD AUTO: 1.9 K/UL
LYMPHOCYTES NFR BLD: 33 %
MCH RBC QN AUTO: 28.3 PG
MCHC RBC AUTO-ENTMCNC: 33 G/DL
MCV RBC AUTO: 86 FL
MONOCYTES # BLD AUTO: 0.4 K/UL
MONOCYTES NFR BLD: 6.8 %
NEUTROPHILS # BLD AUTO: 3.4 K/UL
NEUTROPHILS NFR BLD: 60.2 %
PLATELET # BLD AUTO: 207 K/UL
PMV BLD AUTO: 9.2 FL
POTASSIUM SERPL-SCNC: 3.7 MMOL/L
PROT SERPL-MCNC: 7 G/DL
RBC # BLD AUTO: 4.7 M/UL
SATURATED IRON: 21 %
SODIUM SERPL-SCNC: 139 MMOL/L
TOTAL IRON BINDING CAPACITY: 428 UG/DL
TRANSFERRIN SERPL-MCNC: 289 MG/DL
WBC # BLD AUTO: 5.6 K/UL

## 2019-02-15 PROCEDURE — 83540 ASSAY OF IRON: CPT

## 2019-02-15 PROCEDURE — 80053 COMPREHEN METABOLIC PANEL: CPT

## 2019-02-15 PROCEDURE — 82728 ASSAY OF FERRITIN: CPT

## 2019-02-15 PROCEDURE — 36415 COLL VENOUS BLD VENIPUNCTURE: CPT | Mod: PO

## 2019-02-15 PROCEDURE — 85025 COMPLETE CBC W/AUTO DIFF WBC: CPT | Mod: PO

## 2019-02-20 ENCOUNTER — OFFICE VISIT (OUTPATIENT)
Dept: HEMATOLOGY/ONCOLOGY | Facility: CLINIC | Age: 64
End: 2019-02-20
Payer: COMMERCIAL

## 2019-02-20 VITALS
WEIGHT: 164.69 LBS | RESPIRATION RATE: 18 BRPM | HEART RATE: 67 BPM | SYSTOLIC BLOOD PRESSURE: 143 MMHG | DIASTOLIC BLOOD PRESSURE: 91 MMHG | HEIGHT: 59 IN | TEMPERATURE: 99 F | BODY MASS INDEX: 33.2 KG/M2

## 2019-02-20 DIAGNOSIS — D50.0 IRON DEFICIENCY ANEMIA DUE TO CHRONIC BLOOD LOSS: Primary | ICD-10-CM

## 2019-02-20 DIAGNOSIS — Z98.890 STATUS POST LAPAROSCOPIC NISSEN FUNDOPLICATION: ICD-10-CM

## 2019-02-20 DIAGNOSIS — E03.4 HYPOTHYROIDISM DUE TO ACQUIRED ATROPHY OF THYROID: ICD-10-CM

## 2019-02-20 PROCEDURE — 3080F PR MOST RECENT DIASTOLIC BLOOD PRESSURE >= 90 MM HG: ICD-10-PCS | Mod: CPTII,S$GLB,, | Performed by: INTERNAL MEDICINE

## 2019-02-20 PROCEDURE — 3077F PR MOST RECENT SYSTOLIC BLOOD PRESSURE >= 140 MM HG: ICD-10-PCS | Mod: CPTII,S$GLB,, | Performed by: INTERNAL MEDICINE

## 2019-02-20 PROCEDURE — 99214 PR OFFICE/OUTPT VISIT, EST, LEVL IV, 30-39 MIN: ICD-10-PCS | Mod: S$GLB,,, | Performed by: INTERNAL MEDICINE

## 2019-02-20 PROCEDURE — 3008F PR BODY MASS INDEX (BMI) DOCUMENTED: ICD-10-PCS | Mod: CPTII,S$GLB,, | Performed by: INTERNAL MEDICINE

## 2019-02-20 PROCEDURE — 99999 PR PBB SHADOW E&M-EST. PATIENT-LVL III: CPT | Mod: PBBFAC,,, | Performed by: INTERNAL MEDICINE

## 2019-02-20 PROCEDURE — 3008F BODY MASS INDEX DOCD: CPT | Mod: CPTII,S$GLB,, | Performed by: INTERNAL MEDICINE

## 2019-02-20 PROCEDURE — 3080F DIAST BP >= 90 MM HG: CPT | Mod: CPTII,S$GLB,, | Performed by: INTERNAL MEDICINE

## 2019-02-20 PROCEDURE — 99214 OFFICE O/P EST MOD 30 MIN: CPT | Mod: S$GLB,,, | Performed by: INTERNAL MEDICINE

## 2019-02-20 PROCEDURE — 99999 PR PBB SHADOW E&M-EST. PATIENT-LVL III: ICD-10-PCS | Mod: PBBFAC,,, | Performed by: INTERNAL MEDICINE

## 2019-02-20 PROCEDURE — 3077F SYST BP >= 140 MM HG: CPT | Mod: CPTII,S$GLB,, | Performed by: INTERNAL MEDICINE

## 2019-02-20 NOTE — PROGRESS NOTES
HPI    63 years old  female who was presented to Hematology Clinic for evaluation of iron deficiency anemia.      Patient has long history of iron deficiency anemia. she has been taking iron as per Primary Care instructions.  She also had a prior hematologist who put her on iron replacement protocol.  Due to insurance changing, she decided to become part of [facility] patient.  Patient had known history of multiple angioectasias on video capsule endoscopy which was  performed on September 26, 2018.      Recently patient had a lap Nissen on November 30, 2018 by Dr. Frank Akins.      Patient here for follow-up exam and blood work.  Patient has no complaint at this moment.  Patient denies any chest pain shortness of breath.  Patient denies any abdominal pain nausea vomiting or diarrhea.  Patient denies any fever or chills.  Patient denies any active GI bleeding.    The patient reports continue taking iron supplement at this point tolerated medication well.     Review of system:  14 point review of system negative except for above      Past Medical History:   Diagnosis Date    Carpal tunnel syndrome of right wrist     Chronic gastritis     Dissociative fugue     Diverticulosis     Duodenitis     Dyspepsia     Generalized anxiety disorder     Hearing loss on left     Hearing loss on right     Helicobacter pylori (H. pylori)     History of blood transfusion     Hyperlipidemia     Hypertension     Hypothyroidism     IBS (irritable bowel syndrome)     Iron deficiency anemia     Mild mitral regurgitation by prior echocardiogram     Mild obesity     Mild vitamin D deficiency     Paraesophageal hiatal hernia     PONV (postoperative nausea and vomiting)     Reflux gastritis     Sleep apnea     mild improved with wt. loss    Thyroid goiter     Urticaria, chronic      Social History     Socioeconomic History    Marital status:      Spouse name: Not on file    Number of children: Not  on file    Years of education: Not on file    Highest education level: Not on file   Social Needs    Financial resource strain: Not on file    Food insecurity - worry: Not on file    Food insecurity - inability: Not on file    Transportation needs - medical: Not on file    Transportation needs - non-medical: Not on file   Occupational History    Not on file   Tobacco Use    Smoking status: Never Smoker    Smokeless tobacco: Never Used   Substance and Sexual Activity    Alcohol use: Yes     Comment: seldom    Drug use: No    Sexual activity: Yes   Other Topics Concern    Not on file   Social History Narrative    Not on file           Objective    Physical Exam   Vitals:    02/20/19 0845   BP: (!) 143/91   Pulse: 67   Resp: 18   Temp: 99.2 °F (37.3 °C)       Constitutional:  Alert oriented x3 no acute distress   HENT:  Normocephalic atraumatic pupils equal round reactive to light extraocular muscle intact tongue midline trachea midline hearing grossly intact  Cardiovascular:  Regular rate and rhythm no murmur noted   Pulmonary/Chest:  Clear to auscultate bilaterally no wheezing or rhonchi nor rales good air movement   Abdominal:  Soft nontender nondistended bowel sounds x4 no organomegaly Musculoskeletal: Normal range of motion.   Lymphadenopathy:  Lymphadenopathy exam is grossly normal  Neurological:  No focal deficit.  Cranial nerves 2-12 grossly intact.  Sensorimotor grossly intact.  Coordination intact.    Skin:  Skin warm and dry no lesion or rash   Psychiatric:  Normal affect     CMP  Sodium   Date Value Ref Range Status   02/15/2019 139 136 - 145 mmol/L Final     Potassium   Date Value Ref Range Status   02/15/2019 3.7 3.5 - 5.1 mmol/L Final     Chloride   Date Value Ref Range Status   02/15/2019 100 95 - 110 mmol/L Final     CO2   Date Value Ref Range Status   02/15/2019 30 (H) 23 - 29 mmol/L Final     Glucose   Date Value Ref Range Status   02/15/2019 92 70 - 110 mg/dL Final     BUN, Bld   Date  Value Ref Range Status   02/15/2019 13 8 - 23 mg/dL Final     Creatinine   Date Value Ref Range Status   02/15/2019 0.7 0.5 - 1.4 mg/dL Final     Calcium   Date Value Ref Range Status   02/15/2019 9.5 8.7 - 10.5 mg/dL Final     Total Protein   Date Value Ref Range Status   02/15/2019 7.0 6.0 - 8.4 g/dL Final     Albumin   Date Value Ref Range Status   02/15/2019 3.8 3.5 - 5.2 g/dL Final     Total Bilirubin   Date Value Ref Range Status   02/15/2019 0.3 0.1 - 1.0 mg/dL Final     Comment:     For infants and newborns, interpretation of results should be based  on gestational age, weight and in agreement with clinical  observations.  Premature Infant recommended reference ranges:  Up to 24 hours.............<8.0 mg/dL  Up to 48 hours............<12.0 mg/dL  3-5 days..................<15.0 mg/dL  6-29 days.................<15.0 mg/dL       Alkaline Phosphatase   Date Value Ref Range Status   02/15/2019 127 55 - 135 U/L Final     AST   Date Value Ref Range Status   02/15/2019 34 10 - 40 U/L Final     ALT   Date Value Ref Range Status   02/15/2019 43 10 - 44 U/L Final     Anion Gap   Date Value Ref Range Status   02/15/2019 9 8 - 16 mmol/L Final     eGFR if    Date Value Ref Range Status   02/15/2019 >60.0 >60 mL/min/1.73 m^2 Final     eGFR if non    Date Value Ref Range Status   02/15/2019 >60.0 >60 mL/min/1.73 m^2 Final     Comment:     Calculation used to obtain the estimated glomerular filtration  rate (eGFR) is the CKD-EPI equation.        Lab Results   Component Value Date    WBC 5.60 02/15/2019    HGB 13.3 02/15/2019    HCT 40.3 02/15/2019    MCV 86 02/15/2019     02/15/2019     Latest iron panel shows he ferritin of 31 which has been improved compared to 1 month ago which was 16.    10/1/2018: iron 274, transferrin 318 TIBC 471, T sat 58%, ferritin 51    6/27/2018: iron 44, transferrin 348, TIIBC 515, Tsat 9%, ferritin 16    February 15, 2019:  Iron 92, transferrin 289, TIBC  428, iron saturation 21%.  Ferritin 34    Assessment/Plan    [] Iron deficiency anemia likely secondary to multiple angioectasias discovered on small bowel series.    -discuss with patient regarding IV iron replacement short term  -for meantime continue oral iron replacement for additional 1 month return to clinic after 1 month with repeat studies.  Decision then to start IV iron or versus continue oral iron replacement    [] Status post lap Nissen by Dr. Frank Akins November 30, 2018  -follow-up with Dr. Akins as needed     [] thyroid disease on thyroid medication  -continue current medical management     [] constipation secondary to iron  -continue sennako plus MiraLax for symptomatic relief     [] Reflux gastritis  -follow-up with GI

## 2019-03-08 ENCOUNTER — PATIENT MESSAGE (OUTPATIENT)
Dept: GASTROENTEROLOGY | Facility: CLINIC | Age: 64
End: 2019-03-08

## 2019-03-08 DIAGNOSIS — K59.09 CHRONIC CONSTIPATION: ICD-10-CM

## 2019-03-11 ENCOUNTER — PATIENT MESSAGE (OUTPATIENT)
Dept: GASTROENTEROLOGY | Facility: CLINIC | Age: 64
End: 2019-03-11

## 2019-03-15 RX ORDER — LACTULOSE 10 G/15ML
10 SOLUTION ORAL 2 TIMES DAILY
Qty: 900 ML | Refills: 0 | Status: SHIPPED | OUTPATIENT
Start: 2019-03-15 | End: 2019-03-26 | Stop reason: SDUPTHER

## 2019-03-18 ENCOUNTER — LAB VISIT (OUTPATIENT)
Dept: LAB | Facility: HOSPITAL | Age: 64
End: 2019-03-18
Attending: INTERNAL MEDICINE
Payer: COMMERCIAL

## 2019-03-18 DIAGNOSIS — D50.0 IRON DEFICIENCY ANEMIA DUE TO CHRONIC BLOOD LOSS: ICD-10-CM

## 2019-03-18 LAB
BASOPHILS # BLD AUTO: 0 K/UL
BASOPHILS NFR BLD: 0 %
DIFFERENTIAL METHOD: ABNORMAL
EOSINOPHIL # BLD AUTO: 0 K/UL
EOSINOPHIL NFR BLD: 0.2 %
ERYTHROCYTE [DISTWIDTH] IN BLOOD BY AUTOMATED COUNT: 15.4 %
FERRITIN SERPL-MCNC: 49 NG/ML
HCT VFR BLD AUTO: 40.1 %
HGB BLD-MCNC: 13.1 G/DL
IRON SERPL-MCNC: 102 UG/DL
LDH SERPL L TO P-CCNC: 196 U/L
LYMPHOCYTES # BLD AUTO: 1.4 K/UL
LYMPHOCYTES NFR BLD: 28 %
MCH RBC QN AUTO: 29 PG
MCHC RBC AUTO-ENTMCNC: 32.7 G/DL
MCV RBC AUTO: 89 FL
MONOCYTES # BLD AUTO: 0.4 K/UL
MONOCYTES NFR BLD: 8.2 %
NEUTROPHILS # BLD AUTO: 3.3 K/UL
NEUTROPHILS NFR BLD: 63.6 %
PLATELET # BLD AUTO: 214 K/UL
PMV BLD AUTO: 9.6 FL
RBC # BLD AUTO: 4.52 M/UL
SATURATED IRON: 25 %
TOTAL IRON BINDING CAPACITY: 403 UG/DL
TRANSFERRIN SERPL-MCNC: 272 MG/DL
WBC # BLD AUTO: 5.14 K/UL

## 2019-03-18 PROCEDURE — 85025 COMPLETE CBC W/AUTO DIFF WBC: CPT | Mod: PO

## 2019-03-18 PROCEDURE — 83540 ASSAY OF IRON: CPT

## 2019-03-18 PROCEDURE — 82728 ASSAY OF FERRITIN: CPT

## 2019-03-18 PROCEDURE — 36415 COLL VENOUS BLD VENIPUNCTURE: CPT | Mod: PO

## 2019-03-18 PROCEDURE — 83615 LACTATE (LD) (LDH) ENZYME: CPT

## 2019-03-21 ENCOUNTER — OFFICE VISIT (OUTPATIENT)
Dept: HEMATOLOGY/ONCOLOGY | Facility: CLINIC | Age: 64
End: 2019-03-21
Payer: COMMERCIAL

## 2019-03-21 VITALS
TEMPERATURE: 99 F | HEART RATE: 65 BPM | BODY MASS INDEX: 31.83 KG/M2 | HEIGHT: 59 IN | SYSTOLIC BLOOD PRESSURE: 126 MMHG | DIASTOLIC BLOOD PRESSURE: 79 MMHG | RESPIRATION RATE: 18 BRPM | WEIGHT: 157.88 LBS

## 2019-03-21 DIAGNOSIS — Z98.890 STATUS POST LAPAROSCOPIC NISSEN FUNDOPLICATION: Primary | ICD-10-CM

## 2019-03-21 DIAGNOSIS — E61.1 IRON DEFICIENCY: ICD-10-CM

## 2019-03-21 PROCEDURE — 99213 OFFICE O/P EST LOW 20 MIN: CPT | Mod: S$GLB,,, | Performed by: INTERNAL MEDICINE

## 2019-03-21 PROCEDURE — 3074F SYST BP LT 130 MM HG: CPT | Mod: CPTII,S$GLB,, | Performed by: INTERNAL MEDICINE

## 2019-03-21 PROCEDURE — 99999 PR PBB SHADOW E&M-EST. PATIENT-LVL III: CPT | Mod: PBBFAC,,, | Performed by: INTERNAL MEDICINE

## 2019-03-21 PROCEDURE — 3074F PR MOST RECENT SYSTOLIC BLOOD PRESSURE < 130 MM HG: ICD-10-PCS | Mod: CPTII,S$GLB,, | Performed by: INTERNAL MEDICINE

## 2019-03-21 PROCEDURE — 3078F PR MOST RECENT DIASTOLIC BLOOD PRESSURE < 80 MM HG: ICD-10-PCS | Mod: CPTII,S$GLB,, | Performed by: INTERNAL MEDICINE

## 2019-03-21 PROCEDURE — 99999 PR PBB SHADOW E&M-EST. PATIENT-LVL III: ICD-10-PCS | Mod: PBBFAC,,, | Performed by: INTERNAL MEDICINE

## 2019-03-21 PROCEDURE — 99213 PR OFFICE/OUTPT VISIT, EST, LEVL III, 20-29 MIN: ICD-10-PCS | Mod: S$GLB,,, | Performed by: INTERNAL MEDICINE

## 2019-03-21 PROCEDURE — 3008F BODY MASS INDEX DOCD: CPT | Mod: CPTII,S$GLB,, | Performed by: INTERNAL MEDICINE

## 2019-03-21 PROCEDURE — 3008F PR BODY MASS INDEX (BMI) DOCUMENTED: ICD-10-PCS | Mod: CPTII,S$GLB,, | Performed by: INTERNAL MEDICINE

## 2019-03-21 PROCEDURE — 3078F DIAST BP <80 MM HG: CPT | Mod: CPTII,S$GLB,, | Performed by: INTERNAL MEDICINE

## 2019-03-21 NOTE — PROGRESS NOTES
HPI    63 years old  female who was presented to Hematology Clinic for evaluation of iron deficiency anemia.      Patient has long history of iron deficiency anemia. she has been taking iron as per Primary Care instructions.  She also had a prior hematologist who put her on iron replacement protocol.  Due to insurance changing, she decided to become part of [facility] patient.  Patient had known history of multiple angioectasias on video capsule endoscopy which was  performed on September 26, 2018.      Recently patient had a lap Nissen on November 30, 2018 by Dr. Frank Akins.      Patient here for follow-up exam and blood work.  Patient has no complaint at this moment.  Patient denies any chest pain shortness of breath.  Patient denies any abdominal pain nausea vomiting or diarrhea.  Patient denies any fever or chills.  Patient denies any active GI bleeding.    The patient reports continue taking iron supplement at this point tolerated medication well.     Review of system:  14 point review of system negative except for above      Past Medical History:   Diagnosis Date    Carpal tunnel syndrome of right wrist     Chronic gastritis     Dissociative fugue     Diverticulosis     Duodenitis     Dyspepsia     Generalized anxiety disorder     Hearing loss on left     Hearing loss on right     Helicobacter pylori (H. pylori)     History of blood transfusion     Hyperlipidemia     Hypertension     Hypothyroidism     IBS (irritable bowel syndrome)     Iron deficiency anemia     Mild mitral regurgitation by prior echocardiogram     Mild obesity     Mild vitamin D deficiency     Paraesophageal hiatal hernia     PONV (postoperative nausea and vomiting)     Reflux gastritis     Sleep apnea     mild improved with wt. loss    Thyroid goiter     Urticaria, chronic      Social History     Socioeconomic History    Marital status:      Spouse name: None    Number of children: None     Years of education: None    Highest education level: None   Social Needs    Financial resource strain: None    Food insecurity - worry: None    Food insecurity - inability: None    Transportation needs - medical: None    Transportation needs - non-medical: None   Occupational History    None   Tobacco Use    Smoking status: Never Smoker    Smokeless tobacco: Never Used   Substance and Sexual Activity    Alcohol use: Yes     Comment: seldom    Drug use: No    Sexual activity: Yes   Other Topics Concern    None   Social History Narrative    None           Objective    Physical Exam   Vitals:    03/21/19 0855   BP: 126/79   Pulse: 65   Resp: 18   Temp: 99 °F (37.2 °C)       Constitutional:  Alert oriented x3 no acute distress   HENT:  Normocephalic atraumatic pupils equal round reactive to light extraocular muscle intact tongue midline trachea midline hearing grossly intact  Cardiovascular:  Regular rate and rhythm no murmur noted   Pulmonary/Chest:  Clear to auscultate bilaterally no wheezing or rhonchi nor rales good air movement   Abdominal:  Soft nontender nondistended bowel sounds x4 no organomegaly Musculoskeletal: Normal range of motion.   Lymphadenopathy:  Lymphadenopathy exam is grossly normal  Neurological:  No focal deficit.  Cranial nerves 2-12 grossly intact.  Sensorimotor grossly intact.  Coordination intact.    Skin:  Skin warm and dry no lesion or rash   Psychiatric:  Normal affect     CMP  Sodium   Date Value Ref Range Status   02/15/2019 139 136 - 145 mmol/L Final     Potassium   Date Value Ref Range Status   02/15/2019 3.7 3.5 - 5.1 mmol/L Final     Chloride   Date Value Ref Range Status   02/15/2019 100 95 - 110 mmol/L Final     CO2   Date Value Ref Range Status   02/15/2019 30 (H) 23 - 29 mmol/L Final     Glucose   Date Value Ref Range Status   02/15/2019 92 70 - 110 mg/dL Final     BUN, Bld   Date Value Ref Range Status   02/15/2019 13 8 - 23 mg/dL Final     Creatinine   Date Value  Ref Range Status   02/15/2019 0.7 0.5 - 1.4 mg/dL Final     Calcium   Date Value Ref Range Status   02/15/2019 9.5 8.7 - 10.5 mg/dL Final     Total Protein   Date Value Ref Range Status   02/15/2019 7.0 6.0 - 8.4 g/dL Final     Albumin   Date Value Ref Range Status   02/15/2019 3.8 3.5 - 5.2 g/dL Final     Total Bilirubin   Date Value Ref Range Status   02/15/2019 0.3 0.1 - 1.0 mg/dL Final     Comment:     For infants and newborns, interpretation of results should be based  on gestational age, weight and in agreement with clinical  observations.  Premature Infant recommended reference ranges:  Up to 24 hours.............<8.0 mg/dL  Up to 48 hours............<12.0 mg/dL  3-5 days..................<15.0 mg/dL  6-29 days.................<15.0 mg/dL       Alkaline Phosphatase   Date Value Ref Range Status   02/15/2019 127 55 - 135 U/L Final     AST   Date Value Ref Range Status   02/15/2019 34 10 - 40 U/L Final     ALT   Date Value Ref Range Status   02/15/2019 43 10 - 44 U/L Final     Anion Gap   Date Value Ref Range Status   02/15/2019 9 8 - 16 mmol/L Final     eGFR if    Date Value Ref Range Status   02/15/2019 >60.0 >60 mL/min/1.73 m^2 Final     eGFR if non    Date Value Ref Range Status   02/15/2019 >60.0 >60 mL/min/1.73 m^2 Final     Comment:     Calculation used to obtain the estimated glomerular filtration  rate (eGFR) is the CKD-EPI equation.        Lab Results   Component Value Date    WBC 5.14 03/18/2019    HGB 13.1 03/18/2019    HCT 40.1 03/18/2019    MCV 89 03/18/2019     03/18/2019     Latest iron panel shows he ferritin of 31 which has been improved compared to 1 month ago which was 16.    10/1/2018: iron 274, transferrin 318 TIBC 471, T sat 58%, ferritin 51    6/27/2018: iron 44, transferrin 348, TIIBC 515, Tsat 9%, ferritin 16    February 15, 2019:  Iron 92, transferrin 289, TIBC 428, iron saturation 21%.  Ferritin 34    Assessment/Plan    [] Iron deficiency anemia  likely secondary to multiple angioectasias discovered on small bowel series.    -repeat iron study shows normal H/H and stable iron   -follow up with PCP, now stable hemoglobin and stable iron panel this can be monitor Q 3 months.  She is currently on oral iron tablet I have told her that she may hold oral iron tablet for now, repeat iron panel with primary care.  Intermittently she may need iron supplement.  This can be followed by primary care.  -follow hematology clinic p.r.n.    [] Status post lap Nissen by Dr. Frank Akins November 30, 2018  -follow-up with Dr. Akins as needed     [] thyroid disease on thyroid medication  -continue current medical management     [] Reflux gastritis  -follow-up with GI

## 2019-03-26 RX ORDER — LEVOTHYROXINE SODIUM 50 UG/1
TABLET ORAL
Qty: 90 TABLET | Refills: 3 | Status: SHIPPED | OUTPATIENT
Start: 2019-03-26 | End: 2020-01-29

## 2019-03-26 RX ORDER — ESCITALOPRAM OXALATE 20 MG/1
TABLET ORAL
Qty: 90 TABLET | Refills: 3 | Status: SHIPPED | OUTPATIENT
Start: 2019-03-26 | End: 2020-01-07 | Stop reason: SDUPTHER

## 2019-03-28 RX ORDER — LACTULOSE 10 G/15ML
SOLUTION ORAL; RECTAL
Qty: 946 ML | Refills: 3 | Status: SHIPPED | OUTPATIENT
Start: 2019-03-28 | End: 2020-07-30

## 2019-05-09 RX ORDER — ESCITALOPRAM OXALATE 20 MG/1
TABLET ORAL
Qty: 90 TABLET | Refills: 2 | Status: SHIPPED | OUTPATIENT
Start: 2019-05-09 | End: 2020-01-29

## 2019-06-05 ENCOUNTER — PATIENT MESSAGE (OUTPATIENT)
Dept: SURGERY | Facility: CLINIC | Age: 64
End: 2019-06-05

## 2019-06-19 RX ORDER — ATORVASTATIN CALCIUM 10 MG/1
TABLET, FILM COATED ORAL
Qty: 90 TABLET | Refills: 3 | Status: SHIPPED | OUTPATIENT
Start: 2019-06-19 | End: 2020-06-11

## 2019-06-19 RX ORDER — POTASSIUM CHLORIDE 750 MG/1
CAPSULE, EXTENDED RELEASE ORAL
Qty: 90 CAPSULE | Refills: 3 | Status: SHIPPED | OUTPATIENT
Start: 2019-06-19 | End: 2020-06-11

## 2019-06-19 RX ORDER — HYDROCHLOROTHIAZIDE 25 MG/1
TABLET ORAL
Qty: 90 TABLET | Refills: 3 | Status: SHIPPED | OUTPATIENT
Start: 2019-06-19 | End: 2020-06-11

## 2019-06-19 RX ORDER — FELODIPINE 5 MG/1
TABLET, EXTENDED RELEASE ORAL
Qty: 90 TABLET | Refills: 1 | OUTPATIENT
Start: 2019-06-19

## 2019-06-19 RX ORDER — PANTOPRAZOLE SODIUM 40 MG/1
TABLET, DELAYED RELEASE ORAL
Qty: 90 TABLET | Refills: 3 | OUTPATIENT
Start: 2019-06-19

## 2019-06-19 RX ORDER — BUPROPION HYDROCHLORIDE 300 MG/1
TABLET ORAL
Qty: 90 TABLET | Refills: 3 | Status: SHIPPED | OUTPATIENT
Start: 2019-06-19 | End: 2020-06-11

## 2019-08-12 ENCOUNTER — PATIENT MESSAGE (OUTPATIENT)
Dept: FAMILY MEDICINE | Facility: CLINIC | Age: 64
End: 2019-08-12

## 2019-08-12 DIAGNOSIS — D50.0 IRON DEFICIENCY ANEMIA DUE TO CHRONIC BLOOD LOSS: Primary | ICD-10-CM

## 2019-08-13 ENCOUNTER — LAB VISIT (OUTPATIENT)
Dept: LAB | Facility: HOSPITAL | Age: 64
End: 2019-08-13
Attending: FAMILY MEDICINE
Payer: COMMERCIAL

## 2019-08-13 DIAGNOSIS — D50.0 IRON DEFICIENCY ANEMIA DUE TO CHRONIC BLOOD LOSS: ICD-10-CM

## 2019-08-13 LAB
BASOPHILS # BLD AUTO: 0.01 K/UL (ref 0–0.2)
BASOPHILS NFR BLD: 0.2 % (ref 0–1.9)
DIFFERENTIAL METHOD: ABNORMAL
EOSINOPHIL # BLD AUTO: 0 K/UL (ref 0–0.5)
EOSINOPHIL NFR BLD: 0 % (ref 0–8)
ERYTHROCYTE [DISTWIDTH] IN BLOOD BY AUTOMATED COUNT: 13.9 % (ref 11.5–14.5)
FERRITIN SERPL-MCNC: 71 NG/ML (ref 20–300)
HCT VFR BLD AUTO: 42.9 % (ref 37–48.5)
HGB BLD-MCNC: 13.6 G/DL (ref 12–16)
IMM GRANULOCYTES # BLD AUTO: 0.04 K/UL (ref 0–0.04)
IMM GRANULOCYTES NFR BLD AUTO: 0.8 % (ref 0–0.5)
IRON SERPL-MCNC: 82 UG/DL (ref 30–160)
LYMPHOCYTES # BLD AUTO: 1.5 K/UL (ref 1–4.8)
LYMPHOCYTES NFR BLD: 30.9 % (ref 18–48)
MCH RBC QN AUTO: 29.5 PG (ref 27–31)
MCHC RBC AUTO-ENTMCNC: 31.7 G/DL (ref 32–36)
MCV RBC AUTO: 93 FL (ref 82–98)
MONOCYTES # BLD AUTO: 0.4 K/UL (ref 0.3–1)
MONOCYTES NFR BLD: 8.3 % (ref 4–15)
NEUTROPHILS # BLD AUTO: 2.9 K/UL (ref 1.8–7.7)
NEUTROPHILS NFR BLD: 59.8 % (ref 38–73)
NRBC BLD-RTO: 0 /100 WBC
PLATELET # BLD AUTO: 221 K/UL (ref 150–350)
PMV BLD AUTO: 10.6 FL (ref 9.2–12.9)
RBC # BLD AUTO: 4.61 M/UL (ref 4–5.4)
SATURATED IRON: 19 % (ref 20–50)
TOTAL IRON BINDING CAPACITY: 432 UG/DL (ref 250–450)
TRANSFERRIN SERPL-MCNC: 292 MG/DL (ref 200–375)
WBC # BLD AUTO: 4.92 K/UL (ref 3.9–12.7)

## 2019-08-13 PROCEDURE — 83540 ASSAY OF IRON: CPT

## 2019-08-13 PROCEDURE — 36415 COLL VENOUS BLD VENIPUNCTURE: CPT | Mod: PO

## 2019-08-13 PROCEDURE — 85025 COMPLETE CBC W/AUTO DIFF WBC: CPT

## 2019-08-13 PROCEDURE — 82728 ASSAY OF FERRITIN: CPT

## 2019-08-14 ENCOUNTER — PATIENT MESSAGE (OUTPATIENT)
Dept: FAMILY MEDICINE | Facility: CLINIC | Age: 64
End: 2019-08-14

## 2019-08-16 ENCOUNTER — TELEPHONE (OUTPATIENT)
Dept: FAMILY MEDICINE | Facility: CLINIC | Age: 64
End: 2019-08-16

## 2019-08-16 DIAGNOSIS — D50.0 IRON DEFICIENCY ANEMIA DUE TO CHRONIC BLOOD LOSS: Primary | ICD-10-CM

## 2019-08-16 NOTE — TELEPHONE ENCOUNTER
----- Message from Linda Gallagher NP sent at 8/14/2019  1:14 PM CDT -----  Saturated iron slightly decreased; has she still been taking the iron?. Not extremely low, so will continue current dose for now and recheck in 3 m.

## 2019-09-06 ENCOUNTER — TELEPHONE (OUTPATIENT)
Dept: FAMILY MEDICINE | Facility: CLINIC | Age: 64
End: 2019-09-06

## 2019-09-06 DIAGNOSIS — L50.9 HIVES: Primary | ICD-10-CM

## 2019-09-06 DIAGNOSIS — L50.8 CHRONIC URTICARIA: ICD-10-CM

## 2019-09-06 NOTE — TELEPHONE ENCOUNTER
Typically this would be evaluated with an allergy/immunology physician rather than a rheumatologist.  Usually the allergist will check laboratory and if there is any finding which would be concerning for rheumatologic disease would have them see the rheumatologist at that point.  I do believe we had her see the allergist in the past, but it has been many years ago.  At this point if she wants to get evaluation again I would probably start back with the allergist again rather than the rheumatologist and then go from there.   We'll discuss at her next appointment. Needs to come in. Thank you.

## 2019-09-06 NOTE — TELEPHONE ENCOUNTER
----- Message from Ruben Smith sent at 9/6/2019  4:23 PM CDT -----  Contact: pt   .Type:  Patient Requesting Referral    Who Called: pt   Does the patient already have the specialty appointment scheduled?: no   If yes, what is the date of that appointment?: n/a   Referral to What Specialty: rheumatology   Reason for Referral: chronic hives   Does the patient want the referral with a specific physician?: anna sanchez   Is the specialist an [facility] or [facility]   Patient Requesting a Response?: yes   Would the patient rather a call back or a response via [facility]? Callback   Best Call Back Number: ..[phone]   Additional Information:

## 2019-09-06 NOTE — TELEPHONE ENCOUNTER
Per patient, she has had issued with hives off and on for > 30 years.  Has had for past week and her pharmacist told her to see a rheumatologist to rule out an autoimmune disease, please sign referral if in agreement, patient will call Monday to schedule.

## 2019-09-09 ENCOUNTER — TELEPHONE (OUTPATIENT)
Dept: RHEUMATOLOGY | Facility: CLINIC | Age: 64
End: 2019-09-09

## 2019-09-13 ENCOUNTER — PATIENT MESSAGE (OUTPATIENT)
Dept: FAMILY MEDICINE | Facility: CLINIC | Age: 64
End: 2019-09-13

## 2019-09-13 RX ORDER — PANTOPRAZOLE SODIUM 40 MG/1
40 TABLET, DELAYED RELEASE ORAL DAILY
Qty: 90 TABLET | Refills: 0 | Status: SHIPPED | OUTPATIENT
Start: 2019-09-13 | End: 2019-12-17 | Stop reason: SDUPTHER

## 2019-09-13 RX ORDER — FELODIPINE 5 MG/1
5 TABLET, EXTENDED RELEASE ORAL DAILY
Qty: 90 TABLET | Refills: 0 | Status: SHIPPED | OUTPATIENT
Start: 2019-09-13 | End: 2019-12-23

## 2019-09-17 ENCOUNTER — TELEPHONE (OUTPATIENT)
Dept: RHEUMATOLOGY | Facility: CLINIC | Age: 64
End: 2019-09-17

## 2019-09-17 NOTE — TELEPHONE ENCOUNTER
----- Message from Zenaida Werner sent at 2019 11:14 AM CDT -----  Contact: my chart  Cheyenne Wset, 64 yrs, None  MRN:   [id number]  ::   1955  Lang:   English  Last BMI:   None  Pt Zelda Status:   Elapsed  Prim Cvg::   BLUE CROSS BLUE SHIELD/BCBS ALL OUT OF STATE  Cvg Last Verified Date:   2019  Patient Types:   None  PCP:   Juan Buck MD  Care Team:     Allergies:   Penicillins (Reaction: Not Documented)  Patient Portal:   Active, 2019 11:05 AM  FYI  None   More Detail >>  Appointment Request  Cheyenne West  Sent:  10:24 AM  To: [facility]     Cheyenne West  MRN: [id number] : 1955  Pt Home: [phone]    Entered: [phone]      Message     Appointment Request From: Cheyenne West    With Provider: Tabatha Anderson    Preferred Date Range: Any    Preferred Times: Any time    Reason for visit: chronic hives that have been going on for years.  No answers. want to be check ed out by a rheumatologist as I have been considered to have lupus in the past    Comments:  chronic hives. could it be an immune disease?

## 2019-09-26 ENCOUNTER — TELEPHONE (OUTPATIENT)
Dept: RHEUMATOLOGY | Facility: CLINIC | Age: 64
End: 2019-09-26

## 2019-09-26 NOTE — TELEPHONE ENCOUNTER
Patient will book with [facility] rheum. Referral from Dr. Buck is in. Will see allergy/immunology 10-9-19 as well. CG

## 2019-10-04 ENCOUNTER — TELEPHONE (OUTPATIENT)
Dept: ALLERGY | Facility: CLINIC | Age: 64
End: 2019-10-04

## 2019-10-04 NOTE — TELEPHONE ENCOUNTER
----- Message from Sydney Romero MD sent at 10/4/2019  3:36 PM CDT -----  Pt is schedule next week and comment says chronic hives wants eval for autoimmune. Please let her know I do see hives, I do evaluate common causes of hives but I do not do autoimmune evaluation like lupus. That is not common cause of hives and would need to see rheumatology for that

## 2019-10-14 ENCOUNTER — INITIAL CONSULT (OUTPATIENT)
Dept: RHEUMATOLOGY | Facility: CLINIC | Age: 64
End: 2019-10-14
Payer: COMMERCIAL

## 2019-10-14 ENCOUNTER — LAB VISIT (OUTPATIENT)
Dept: LAB | Facility: HOSPITAL | Age: 64
End: 2019-10-14
Attending: STUDENT IN AN ORGANIZED HEALTH CARE EDUCATION/TRAINING PROGRAM
Payer: COMMERCIAL

## 2019-10-14 VITALS
HEART RATE: 68 BPM | DIASTOLIC BLOOD PRESSURE: 76 MMHG | WEIGHT: 156.75 LBS | SYSTOLIC BLOOD PRESSURE: 120 MMHG | HEIGHT: 59 IN | BODY MASS INDEX: 31.6 KG/M2

## 2019-10-14 DIAGNOSIS — L50.9 URTICARIA: Primary | ICD-10-CM

## 2019-10-14 DIAGNOSIS — L50.9 URTICARIA: ICD-10-CM

## 2019-10-14 LAB
ALBUMIN SERPL BCP-MCNC: 4.1 G/DL (ref 3.5–5.2)
ALP SERPL-CCNC: 111 U/L (ref 55–135)
ALT SERPL W/O P-5'-P-CCNC: 24 U/L (ref 10–44)
ANION GAP SERPL CALC-SCNC: 11 MMOL/L (ref 8–16)
AST SERPL-CCNC: 23 U/L (ref 10–40)
BASOPHILS # BLD AUTO: 0.01 K/UL (ref 0–0.2)
BASOPHILS NFR BLD: 0.2 % (ref 0–1.9)
BILIRUB SERPL-MCNC: 0.4 MG/DL (ref 0.1–1)
BUN SERPL-MCNC: 11 MG/DL (ref 8–23)
CALCIUM SERPL-MCNC: 9.9 MG/DL (ref 8.7–10.5)
CHLORIDE SERPL-SCNC: 102 MMOL/L (ref 95–110)
CO2 SERPL-SCNC: 26 MMOL/L (ref 23–29)
CREAT SERPL-MCNC: 0.8 MG/DL (ref 0.5–1.4)
CRP SERPL-MCNC: 1.4 MG/L (ref 0–8.2)
DIFFERENTIAL METHOD: ABNORMAL
EOSINOPHIL # BLD AUTO: 0 K/UL (ref 0–0.5)
EOSINOPHIL NFR BLD: 0 % (ref 0–8)
ERYTHROCYTE [DISTWIDTH] IN BLOOD BY AUTOMATED COUNT: 13 % (ref 11.5–14.5)
ERYTHROCYTE [SEDIMENTATION RATE] IN BLOOD BY WESTERGREN METHOD: 17 MM/HR (ref 0–20)
EST. GFR  (AFRICAN AMERICAN): >60 ML/MIN/1.73 M^2
EST. GFR  (NON AFRICAN AMERICAN): >60 ML/MIN/1.73 M^2
GLUCOSE SERPL-MCNC: 98 MG/DL (ref 70–110)
HCT VFR BLD AUTO: 43 % (ref 37–48.5)
HGB BLD-MCNC: 13.9 G/DL (ref 12–16)
IMM GRANULOCYTES # BLD AUTO: 0.03 K/UL (ref 0–0.04)
IMM GRANULOCYTES NFR BLD AUTO: 0.6 % (ref 0–0.5)
LYMPHOCYTES # BLD AUTO: 1.4 K/UL (ref 1–4.8)
LYMPHOCYTES NFR BLD: 28.5 % (ref 18–48)
MCH RBC QN AUTO: 29.2 PG (ref 27–31)
MCHC RBC AUTO-ENTMCNC: 32.3 G/DL (ref 32–36)
MCV RBC AUTO: 90 FL (ref 82–98)
MONOCYTES # BLD AUTO: 0.4 K/UL (ref 0.3–1)
MONOCYTES NFR BLD: 7.3 % (ref 4–15)
NEUTROPHILS # BLD AUTO: 3.1 K/UL (ref 1.8–7.7)
NEUTROPHILS NFR BLD: 63.4 % (ref 38–73)
NRBC BLD-RTO: 0 /100 WBC
PLATELET # BLD AUTO: 223 K/UL (ref 150–350)
PMV BLD AUTO: 9.6 FL (ref 9.2–12.9)
POTASSIUM SERPL-SCNC: 3.4 MMOL/L (ref 3.5–5.1)
PROT SERPL-MCNC: 7.4 G/DL (ref 6–8.4)
RBC # BLD AUTO: 4.76 M/UL (ref 4–5.4)
SODIUM SERPL-SCNC: 139 MMOL/L (ref 136–145)
WBC # BLD AUTO: 4.94 K/UL (ref 3.9–12.7)

## 2019-10-14 PROCEDURE — 86160 COMPLEMENT ANTIGEN: CPT | Mod: 59

## 2019-10-14 PROCEDURE — 99999 PR PBB SHADOW E&M-EST. PATIENT-LVL IV: CPT | Mod: PBBFAC,,, | Performed by: STUDENT IN AN ORGANIZED HEALTH CARE EDUCATION/TRAINING PROGRAM

## 2019-10-14 PROCEDURE — 86160 COMPLEMENT ANTIGEN: CPT

## 2019-10-14 PROCEDURE — 3078F PR MOST RECENT DIASTOLIC BLOOD PRESSURE < 80 MM HG: ICD-10-PCS | Mod: CPTII,S$GLB,, | Performed by: STUDENT IN AN ORGANIZED HEALTH CARE EDUCATION/TRAINING PROGRAM

## 2019-10-14 PROCEDURE — 86140 C-REACTIVE PROTEIN: CPT

## 2019-10-14 PROCEDURE — 86431 RHEUMATOID FACTOR QUANT: CPT

## 2019-10-14 PROCEDURE — 3008F PR BODY MASS INDEX (BMI) DOCUMENTED: ICD-10-PCS | Mod: CPTII,S$GLB,, | Performed by: STUDENT IN AN ORGANIZED HEALTH CARE EDUCATION/TRAINING PROGRAM

## 2019-10-14 PROCEDURE — 99999 PR PBB SHADOW E&M-EST. PATIENT-LVL IV: ICD-10-PCS | Mod: PBBFAC,,, | Performed by: STUDENT IN AN ORGANIZED HEALTH CARE EDUCATION/TRAINING PROGRAM

## 2019-10-14 PROCEDURE — 3008F BODY MASS INDEX DOCD: CPT | Mod: CPTII,S$GLB,, | Performed by: STUDENT IN AN ORGANIZED HEALTH CARE EDUCATION/TRAINING PROGRAM

## 2019-10-14 PROCEDURE — 3078F DIAST BP <80 MM HG: CPT | Mod: CPTII,S$GLB,, | Performed by: STUDENT IN AN ORGANIZED HEALTH CARE EDUCATION/TRAINING PROGRAM

## 2019-10-14 PROCEDURE — 86200 CCP ANTIBODY: CPT

## 2019-10-14 PROCEDURE — 90471 IMMUNIZATION ADMIN: CPT | Mod: S$GLB,,, | Performed by: STUDENT IN AN ORGANIZED HEALTH CARE EDUCATION/TRAINING PROGRAM

## 2019-10-14 PROCEDURE — 3074F PR MOST RECENT SYSTOLIC BLOOD PRESSURE < 130 MM HG: ICD-10-PCS | Mod: CPTII,S$GLB,, | Performed by: STUDENT IN AN ORGANIZED HEALTH CARE EDUCATION/TRAINING PROGRAM

## 2019-10-14 PROCEDURE — 85025 COMPLETE CBC W/AUTO DIFF WBC: CPT

## 2019-10-14 PROCEDURE — 99204 PR OFFICE/OUTPT VISIT, NEW, LEVL IV, 45-59 MIN: ICD-10-PCS | Mod: 25,S$GLB,, | Performed by: STUDENT IN AN ORGANIZED HEALTH CARE EDUCATION/TRAINING PROGRAM

## 2019-10-14 PROCEDURE — 85651 RBC SED RATE NONAUTOMATED: CPT

## 2019-10-14 PROCEDURE — 90662 IIV NO PRSV INCREASED AG IM: CPT | Mod: S$GLB,,, | Performed by: STUDENT IN AN ORGANIZED HEALTH CARE EDUCATION/TRAINING PROGRAM

## 2019-10-14 PROCEDURE — 90662 FLU VACCINE - HIGH DOSE (65+) PRESERVATIVE FREE IM: ICD-10-PCS | Mod: S$GLB,,, | Performed by: STUDENT IN AN ORGANIZED HEALTH CARE EDUCATION/TRAINING PROGRAM

## 2019-10-14 PROCEDURE — 99204 OFFICE O/P NEW MOD 45 MIN: CPT | Mod: 25,S$GLB,, | Performed by: STUDENT IN AN ORGANIZED HEALTH CARE EDUCATION/TRAINING PROGRAM

## 2019-10-14 PROCEDURE — 80053 COMPREHEN METABOLIC PANEL: CPT

## 2019-10-14 PROCEDURE — 90471 FLU VACCINE - HIGH DOSE (65+) PRESERVATIVE FREE IM: ICD-10-PCS | Mod: S$GLB,,, | Performed by: STUDENT IN AN ORGANIZED HEALTH CARE EDUCATION/TRAINING PROGRAM

## 2019-10-14 PROCEDURE — 3074F SYST BP LT 130 MM HG: CPT | Mod: CPTII,S$GLB,, | Performed by: STUDENT IN AN ORGANIZED HEALTH CARE EDUCATION/TRAINING PROGRAM

## 2019-10-14 PROCEDURE — 36415 COLL VENOUS BLD VENIPUNCTURE: CPT

## 2019-10-14 PROCEDURE — 86038 ANTINUCLEAR ANTIBODIES: CPT

## 2019-10-14 NOTE — PROGRESS NOTES
Administered 0.5 cc High dose Influenza vaccination to Right Deltoid. Pt tolerated well. No acute reaction noted to site. Pt instructed on S/S to report. Pt verbalized understanding. Patient waited 15 minutes post injection    Lot:[id number]  Exp:4/14/2020  Manu:[employer]

## 2019-10-14 NOTE — LETTER
October 17, 2019      Juan Buck MD  [address] [facility] [place] [address]           [facility] Rheumatology  [address] [place] [address] DR BREONNA DEWITT [address]  Phone: [phone]  Fax: [phone]          Patient: Cheyenne West   MR Number: [id number]   YOB: 1955   Date of Visit: 10/14/2019       Dear Dr. Juan Buck:    Thank you for referring Cheyenne West to me for evaluation. Attached you will find relevant portions of my assessment and plan of care.    If you have questions, please do not hesitate to call me. I look forward to following Cheyenne West along with you.    Sincerely,    Livier King MD    Enclosure  CC:  No Recipients    If you would like to receive this communication electronically, please contact [email or web address] or [phone] to request more information on [employer] access.    For providers and/or their staff who would like to refer a patient to [facility], please contact us through our one-stop-shop provider referral line, [facility], at [phone].    If you feel you have received this communication in error or would no longer like to receive these types of communications, please e-mail [email or web address]

## 2019-10-15 LAB
ANA SER QL IF: NORMAL
C3 SERPL-MCNC: 147 MG/DL (ref 50–180)
C4 SERPL-MCNC: 33 MG/DL (ref 11–44)
CCP AB SER IA-ACNC: 0.5 U/ML
RHEUMATOID FACT SERPL-ACNC: <10 IU/ML (ref 0–15)

## 2019-10-21 NOTE — PROGRESS NOTES
RHEUMATOLOGY OUTPATIENT CLINIC NOTE        Attending Rheumatologist: Livier King  Primary Care Provider: Juan Buck MD   Physician Requesting Consultation: Juan Buck MD  [address] [facility] [place] [address]  Chief Complaint/Reason For Consultation:  urticaria    Subjective:       HPI  Cheyenne West is a 64 y.o. White female presents with 40+ year of urticaria. States she has been on countless medicines in the past, does not remember all names. Denies any significant relief w previous regimens. Also w hx chronic steroid use, now off. Last flare was several weeks ago. Resolved on its own, reports feeling well now.     Pt denied any hairloss, vision changes, dysphagia, dry mouth, dry eyes, raynauds,  rash, muscle weakness, muscle pain, hoarseness, sob, weightloss, night sweats, chills, fevers, N/V/D, chest pain, frothy urine, swollen joints, or prolonged AM stiffness.    14pt ROS negative xcept as otherwise stated above    Review of Systems   Constitutional: Negative for chills, fever and weight loss.   HENT: Negative for congestion, sinus pain and sore throat.    Eyes: Negative for blurred vision, double vision and pain.   Respiratory: Negative for cough and shortness of breath.    Cardiovascular: Negative for chest pain, palpitations and claudication.   Gastrointestinal: Negative for abdominal pain, nausea and vomiting.   Genitourinary: Negative for dysuria and frequency.   Musculoskeletal: Positive for joint pain. Negative for falls.   Skin: Negative for itching and rash.   Neurological: Negative for dizziness and weakness.   Endo/Heme/Allergies: Negative for polydipsia. Does not bruise/bleed easily.       Chronic comorbid conditions affecting medical decision making today:  Past Medical History:   Diagnosis Date    Carpal tunnel syndrome of right wrist     Chronic gastritis     Dissociative fugue     Diverticulosis     Duodenitis     Dyspepsia     Generalized anxiety  disorder     Hearing loss on left     Hearing loss on right     Helicobacter pylori (H. pylori)     History of blood transfusion     Hyperlipidemia     Hypertension     Hypothyroidism     IBS (irritable bowel syndrome)     Iron deficiency anemia     Mild mitral regurgitation by prior echocardiogram     Mild obesity     Mild vitamin D deficiency     Paraesophageal hiatal hernia     PONV (postoperative nausea and vomiting)     Reflux gastritis     Sleep apnea     mild improved with wt. loss    Thyroid goiter     Urticaria, chronic      Past Surgical History:   Procedure Laterality Date    BLADDER SUSPENSION      pubovaginal sling     SECTION, CLASSIC      CHOLECYSTECTOMY      COLONOSCOPY  2011    Dr. Goode, in legacy:diverticulosis, hemorrhoids, melanosis coli-repeat 10 years    COLONOSCOPY N/A 2018    Procedure: COLONOSCOPY;  Surgeon: Rusty Vogt Jr., MD;  Location: [facility];  Service: Endoscopy;  Laterality: N/A; repeat in 10 years for screening    ESOPHAGEAL DILATION      ESOPHAGOGASTRODUODENOSCOPY  2016    ESOPHAGOGASTRODUODENOSCOPY N/A 2018    Procedure: EGD (ESOPHAGOGASTRODUODENOSCOPY);  Surgeon: Rusty Vogt Jr., MD;  Location: [facility];  Service: Endoscopy;  Laterality: N/A;    HERNIA REPAIR      INTRALUMINAL GASTROINTESTINAL TRACT IMAGING VIA CAPSULE N/A 2018    Procedure: IMAGING PROCEDURE, GI TRACT, INTRALUMINAL, VIA CAPSULE;  Surgeon: Loraine Velazquez MD;  Location: [facility];  Service: Endoscopy;  Laterality: N/A;    LAPAROSCOPIC NISSEN FUNDOPLICATION N/A 2018    Procedure: FUNDOPLICATION, NISSEN, LAPAROSCOPIC;  Surgeon: Frank Akins MD;  Location: [facility] OR;  Service: General;  Laterality: N/A;    THYROIDECTOMY, PARTIAL       partial for benign nodule    UPPER GASTROINTESTINAL ENDOSCOPY  2016    Dr. Whalen, in care everywhere     Family History   Problem Relation Age of Onset    Hypertension Mother     Heart  disease Mother     Ulcers Mother     GI problems Mother         colitis    Hypertension Father     Breast cancer Paternal Grandmother     Colon cancer Neg Hx     Crohn's disease Neg Hx     Colon polyps Neg Hx     Ulcerative colitis Neg Hx     Stomach cancer Neg Hx     Esophageal cancer Neg Hx      Social History     Substance and Sexual Activity   Alcohol Use Yes    Comment: seldom     Social History     Tobacco Use   Smoking Status Never Smoker   Smokeless Tobacco Never Used     Social History     Substance and Sexual Activity   Drug Use No       Current Outpatient Medications:     ALPRAZolam (XANAX) 0.25 MG tablet, Take 1 tablet (0.25 mg total) by mouth 3 (three) times daily as needed for Anxiety., Disp: 21 tablet, Rfl: 0    atorvastatin (LIPITOR) 10 MG tablet, TAKE 1 TABLET ONCE DAILY, Disp: 90 tablet, Rfl: 3    buPROPion (WELLBUTRIN XL) 300 MG 24 hr tablet, TAKE 1 TABLET ONCE DAILY, Disp: 90 tablet, Rfl: 3    butalbital-acetaminophen-caffeine -40 mg (FIORICET, ESGIC) -40 mg per tablet, Take 1 tablet by mouth., Disp: , Rfl:     CALCIUM CARBONATE/VITAMIN D3 (CALTRATE 600 + D ORAL), Take by mouth 2 (two) times daily., Disp: , Rfl:     escitalopram oxalate (LEXAPRO) 20 MG tablet, TAKE 1 TABLET ONCE DAILY, Disp: 90 tablet, Rfl: 2    estradiol (VAGIFEM) 10 mcg Tab, Place 10 mcg vaginally., Disp: , Rfl:     felodipine (PLENDIL) 5 MG 24 hr tablet, Take 1 tablet (5 mg total) by mouth once daily., Disp: 90 tablet, Rfl: 0    hydroCHLOROthiazide (HYDRODIURIL) 25 MG tablet, TAKE 1 TABLET ONCE DAILY, Disp: 90 tablet, Rfl: 3    lactulose (CHRONULAC) 10 gram/15 mL solution, TAKE 1 TABLESPOONFUL (=15ML= 10GM) TWO TIMES A DAY, Disp: 946 mL, Rfl: 3    OMEGA-3 FATTY ACIDS-EPA ORAL, Take by mouth once daily., Disp: , Rfl:     pantoprazole (PROTONIX) 40 MG tablet, Take 1 tablet (40 mg total) by mouth once daily., Disp: 90 tablet, Rfl: 0    potassium chloride (MICRO-K) 10 MEQ CpSR, TAKE 1 CAPSULE  ONCE DAILY, Disp: 90 capsule, Rfl: 3    SYNTHROID 50 mcg tablet, TAKE 1 TABLET DAILY, Disp: 90 tablet, Rfl: 3    VIT A/VIT C/VIT E/ZINC/COPPER (PRESERVISION AREDS ORAL), Take by mouth once daily., Disp: , Rfl:     buPROPion (WELLBUTRIN XL) 300 MG 24 hr tablet, TAKE 1 TABLET ONCE DAILY, Disp: 90 tablet, Rfl: 3    escitalopram oxalate (LEXAPRO) 20 MG tablet, TAKE 1 TABLET ONCE DAILY, Disp: 90 tablet, Rfl: 3    ferrous gluconate (FERGON) 324 MG tablet, TAKE 1 TABLET BY MOUTH WITH BREAKFAST (Patient not taking: Reported on 10/14/2019), Disp: 30 tablet, Rfl: 5    FLUZONE QUAD [id number], PF, 60 mcg (15 mcg x 4)/0.5 mL Syrg, , Disp: , Rfl:     multivitamin with minerals tablet, Take 1 tablet by mouth., Disp: , Rfl:     Current Facility-Administered Medications:     influenza (FLUZONE HIGH-DOSE) vaccine 0.5 mL, 0.5 mL, Intramuscular, vaccine x 1 dose, Livier King MD       Objective:         Vitals:    10/14/19 1108   BP: 120/76   Pulse: 68     Physical Exam   Nursing note and vitals reviewed.  Constitutional: She is oriented to person, place, and time and well-developed, well-nourished, and in no distress.   HENT:   Head: Normocephalic.   Mouth/Throat: Oropharynx is clear and moist.   Eyes: Conjunctivae are normal. Pupils are equal, round, and reactive to light.   Neck: Normal range of motion. Neck supple.   Cardiovascular: Normal rate and normal heart sounds.    Pulmonary/Chest: Effort normal. No respiratory distress.   Abdominal: Soft. There is no tenderness.   Neurological: She is alert and oriented to person, place, and time.   Skin: Skin is warm. No rash noted. No erythema.     Psychiatric: Memory and affect normal.   Musculoskeletal:   No synovitis or effusion of small joints. No effusion over large joints.         Reviewed old and all outside pertinent medical records available.    All lab results personally reviewed and interpreted by me.  Lab Results   Component Value Date    WBC 4.94 10/14/2019    HGB  13.9 10/14/2019    HCT 43.0 10/14/2019    MCV 90 10/14/2019    MCH 29.2 10/14/2019    MCHC 32.3 10/14/2019    RDW 13.0 10/14/2019     10/14/2019    MPV 9.6 10/14/2019    PLTEST Adequate 04/13/2006       Lab Results   Component Value Date     10/14/2019    K 3.4 (L) 10/14/2019     10/14/2019    CO2 26 10/14/2019    GLU 98 10/14/2019    BUN 11 10/14/2019    CALCIUM 9.9 10/14/2019    PROT 7.4 10/14/2019    ALBUMIN 4.1 10/14/2019    BILITOT 0.4 10/14/2019    AST 23 10/14/2019    ALKPHOS 111 10/14/2019    ALT 24 10/14/2019       Lab Results   Component Value Date    COLORU Yellow 07/13/2015    APPEARANCEUA Clear 07/13/2015    SPECGRAV 1.020 07/13/2015    PHUR 6.0 07/13/2015    PROTEINUA Trace (A) 07/13/2015    KETONESU Negative 07/13/2015    LEUKOCYTESUR 3+ (A) 07/13/2015    NITRITE Negative 07/13/2015       Lab Results   Component Value Date    CRP 1.4 10/14/2019       Lab Results   Component Value Date    SEDRATE 17 10/14/2019       Lab Results   Component Value Date    RF <10.0 10/14/2019    SEDRATE 17 10/14/2019       No components found for: 25OHVITDTOT, 13AXMVFH2, 38ALBIFH1, METHODNOTE    No results found for: URICACID    No components found for: TSPOTTB            ASSESSMENT / PLAN:     Cheyenne West is a 64 y.o. White female with:      1. Urticaria  -Presents for initial evalution of 40+ year urticaria. Last flare several weeks ago  -No known triggers. Previous f/w ENT, been on numerous meds/ regimens without significant reported benefit  -Ddx from rheumatologic standpoint includes sjogrens, sle, RA. No features suggestive of underlying connective tissue disease.   -Will complete workup as below for further evaluation and assess for evidence of CTD    - JOSE Screen w/Reflex; Future  - Rheumatoid factor; Future  - Cyclic citrul peptide antibody, IgG; Future  - Sedimentation rate; Standing  - C-reactive protein; Future  - C3 complement; Standing  - C4 complement; Standing  -  Comprehensive metabolic panel; Future  - CBC auto differential; Standing              Method of contact with patient concerns: Vandan[facility] attn Rheumatology      Livier King M.D.  Rheumatology Department   [facility] [place] [address], [place] [address]  Phone: [phone]  Fax: [phone]

## 2019-11-11 ENCOUNTER — OFFICE VISIT (OUTPATIENT)
Dept: RHEUMATOLOGY | Facility: CLINIC | Age: 64
End: 2019-11-11
Payer: COMMERCIAL

## 2019-11-11 VITALS
HEIGHT: 59 IN | WEIGHT: 159.94 LBS | DIASTOLIC BLOOD PRESSURE: 76 MMHG | BODY MASS INDEX: 32.24 KG/M2 | SYSTOLIC BLOOD PRESSURE: 130 MMHG

## 2019-11-11 DIAGNOSIS — L50.9 URTICARIA: Primary | ICD-10-CM

## 2019-11-11 PROCEDURE — 3075F PR MOST RECENT SYSTOLIC BLOOD PRESS GE 130-139MM HG: ICD-10-PCS | Mod: CPTII,S$GLB,, | Performed by: STUDENT IN AN ORGANIZED HEALTH CARE EDUCATION/TRAINING PROGRAM

## 2019-11-11 PROCEDURE — 99214 PR OFFICE/OUTPT VISIT, EST, LEVL IV, 30-39 MIN: ICD-10-PCS | Mod: S$GLB,,, | Performed by: STUDENT IN AN ORGANIZED HEALTH CARE EDUCATION/TRAINING PROGRAM

## 2019-11-11 PROCEDURE — 3078F DIAST BP <80 MM HG: CPT | Mod: CPTII,S$GLB,, | Performed by: STUDENT IN AN ORGANIZED HEALTH CARE EDUCATION/TRAINING PROGRAM

## 2019-11-11 PROCEDURE — 3075F SYST BP GE 130 - 139MM HG: CPT | Mod: CPTII,S$GLB,, | Performed by: STUDENT IN AN ORGANIZED HEALTH CARE EDUCATION/TRAINING PROGRAM

## 2019-11-11 PROCEDURE — 99999 PR PBB SHADOW E&M-EST. PATIENT-LVL IV: CPT | Mod: PBBFAC,,, | Performed by: STUDENT IN AN ORGANIZED HEALTH CARE EDUCATION/TRAINING PROGRAM

## 2019-11-11 PROCEDURE — 3008F PR BODY MASS INDEX (BMI) DOCUMENTED: ICD-10-PCS | Mod: CPTII,S$GLB,, | Performed by: STUDENT IN AN ORGANIZED HEALTH CARE EDUCATION/TRAINING PROGRAM

## 2019-11-11 PROCEDURE — 99999 PR PBB SHADOW E&M-EST. PATIENT-LVL IV: ICD-10-PCS | Mod: PBBFAC,,, | Performed by: STUDENT IN AN ORGANIZED HEALTH CARE EDUCATION/TRAINING PROGRAM

## 2019-11-11 PROCEDURE — 3008F BODY MASS INDEX DOCD: CPT | Mod: CPTII,S$GLB,, | Performed by: STUDENT IN AN ORGANIZED HEALTH CARE EDUCATION/TRAINING PROGRAM

## 2019-11-11 PROCEDURE — 99214 OFFICE O/P EST MOD 30 MIN: CPT | Mod: S$GLB,,, | Performed by: STUDENT IN AN ORGANIZED HEALTH CARE EDUCATION/TRAINING PROGRAM

## 2019-11-11 PROCEDURE — 3078F PR MOST RECENT DIASTOLIC BLOOD PRESSURE < 80 MM HG: ICD-10-PCS | Mod: CPTII,S$GLB,, | Performed by: STUDENT IN AN ORGANIZED HEALTH CARE EDUCATION/TRAINING PROGRAM

## 2019-11-11 RX ORDER — HYDROXYCHLOROQUINE SULFATE 200 MG/1
400 TABLET, FILM COATED ORAL NIGHTLY
Qty: 60 TABLET | Refills: 6 | Status: SHIPPED | OUTPATIENT
Start: 2019-11-11 | End: 2020-01-07

## 2019-11-19 ENCOUNTER — LAB VISIT (OUTPATIENT)
Dept: LAB | Facility: HOSPITAL | Age: 64
End: 2019-11-19
Attending: FAMILY MEDICINE
Payer: COMMERCIAL

## 2019-11-19 DIAGNOSIS — D50.0 IRON DEFICIENCY ANEMIA DUE TO CHRONIC BLOOD LOSS: ICD-10-CM

## 2019-11-19 DIAGNOSIS — D50.9 IRON DEFICIENCY ANEMIA: ICD-10-CM

## 2019-11-19 LAB
BASOPHILS # BLD AUTO: 0.01 K/UL (ref 0–0.2)
BASOPHILS NFR BLD: 0.2 % (ref 0–1.9)
DIFFERENTIAL METHOD: ABNORMAL
EOSINOPHIL # BLD AUTO: 0 K/UL (ref 0–0.5)
EOSINOPHIL NFR BLD: 0 % (ref 0–8)
ERYTHROCYTE [DISTWIDTH] IN BLOOD BY AUTOMATED COUNT: 13.7 % (ref 11.5–14.5)
FERRITIN SERPL-MCNC: 46 NG/ML (ref 20–300)
HCT VFR BLD AUTO: 42.6 % (ref 37–48.5)
HGB BLD-MCNC: 13.5 G/DL (ref 12–16)
IMM GRANULOCYTES # BLD AUTO: 0.04 K/UL (ref 0–0.04)
IMM GRANULOCYTES NFR BLD AUTO: 0.8 % (ref 0–0.5)
IRON SERPL-MCNC: 122 UG/DL (ref 30–160)
LYMPHOCYTES # BLD AUTO: 1.5 K/UL (ref 1–4.8)
LYMPHOCYTES NFR BLD: 30.4 % (ref 18–48)
MCH RBC QN AUTO: 29.5 PG (ref 27–31)
MCHC RBC AUTO-ENTMCNC: 31.7 G/DL (ref 32–36)
MCV RBC AUTO: 93 FL (ref 82–98)
MONOCYTES # BLD AUTO: 0.4 K/UL (ref 0.3–1)
MONOCYTES NFR BLD: 8.3 % (ref 4–15)
NEUTROPHILS # BLD AUTO: 2.9 K/UL (ref 1.8–7.7)
NEUTROPHILS NFR BLD: 60.3 % (ref 38–73)
NRBC BLD-RTO: 0 /100 WBC
PLATELET # BLD AUTO: 222 K/UL (ref 150–350)
PMV BLD AUTO: 10.7 FL (ref 9.2–12.9)
RBC # BLD AUTO: 4.58 M/UL (ref 4–5.4)
SATURATED IRON: 27 % (ref 20–50)
TOTAL IRON BINDING CAPACITY: 460 UG/DL (ref 250–450)
TRANSFERRIN SERPL-MCNC: 311 MG/DL (ref 200–375)
WBC # BLD AUTO: 4.83 K/UL (ref 3.9–12.7)

## 2019-11-19 PROCEDURE — 36415 COLL VENOUS BLD VENIPUNCTURE: CPT | Mod: PO

## 2019-11-19 PROCEDURE — 85025 COMPLETE CBC W/AUTO DIFF WBC: CPT

## 2019-11-19 PROCEDURE — 82728 ASSAY OF FERRITIN: CPT

## 2019-11-19 PROCEDURE — 83540 ASSAY OF IRON: CPT

## 2019-11-19 RX ORDER — SULFASALAZINE 500 MG/1
500 TABLET ORAL 2 TIMES DAILY
Qty: 60 TABLET | Refills: 3 | Status: SHIPPED | OUTPATIENT
Start: 2019-11-19 | End: 2020-01-07

## 2019-11-20 ENCOUNTER — PATIENT MESSAGE (OUTPATIENT)
Dept: FAMILY MEDICINE | Facility: CLINIC | Age: 64
End: 2019-11-20

## 2019-11-20 ENCOUNTER — TELEPHONE (OUTPATIENT)
Dept: FAMILY MEDICINE | Facility: CLINIC | Age: 64
End: 2019-11-20

## 2019-11-20 NOTE — TELEPHONE ENCOUNTER
----- Message from Diana Anderson sent at 11/20/2019 11:37 AM CST -----  Contact: Self  Type:  Patient Returning Call    Who Called:Cheyenne  Who Left Message for Patient:  Does the patient know what this is regarding?:  Would the patient rather a call back or a response via [facility]? call  Best Call Back Number:[phone]  Additional Information:

## 2019-11-29 ENCOUNTER — PATIENT MESSAGE (OUTPATIENT)
Dept: ALLERGY | Facility: CLINIC | Age: 64
End: 2019-11-29

## 2019-12-04 ENCOUNTER — TELEPHONE (OUTPATIENT)
Dept: PHARMACY | Facility: CLINIC | Age: 64
End: 2019-12-04

## 2019-12-04 ENCOUNTER — OFFICE VISIT (OUTPATIENT)
Dept: ALLERGY | Facility: CLINIC | Age: 64
End: 2019-12-04
Payer: COMMERCIAL

## 2019-12-04 ENCOUNTER — LAB VISIT (OUTPATIENT)
Dept: LAB | Facility: HOSPITAL | Age: 64
End: 2019-12-04
Attending: ALLERGY & IMMUNOLOGY
Payer: COMMERCIAL

## 2019-12-04 VITALS — BODY MASS INDEX: 32.94 KG/M2 | WEIGHT: 163.38 LBS | HEIGHT: 59 IN | HEART RATE: 75 BPM | OXYGEN SATURATION: 95 %

## 2019-12-04 DIAGNOSIS — L50.8 CHRONIC URTICARIA: Primary | ICD-10-CM

## 2019-12-04 DIAGNOSIS — L50.8 CHRONIC URTICARIA: ICD-10-CM

## 2019-12-04 LAB
ALBUMIN SERPL BCP-MCNC: 4.1 G/DL (ref 3.5–5.2)
ALP SERPL-CCNC: 122 U/L (ref 55–135)
ALT SERPL W/O P-5'-P-CCNC: 44 U/L (ref 10–44)
ANION GAP SERPL CALC-SCNC: 12 MMOL/L (ref 8–16)
AST SERPL-CCNC: 39 U/L (ref 10–40)
BILIRUB SERPL-MCNC: 0.5 MG/DL (ref 0.1–1)
BUN SERPL-MCNC: 13 MG/DL (ref 8–23)
CALCIUM SERPL-MCNC: 9.8 MG/DL (ref 8.7–10.5)
CHLORIDE SERPL-SCNC: 105 MMOL/L (ref 95–110)
CO2 SERPL-SCNC: 25 MMOL/L (ref 23–29)
CREAT SERPL-MCNC: 0.8 MG/DL (ref 0.5–1.4)
ERYTHROCYTE [DISTWIDTH] IN BLOOD BY AUTOMATED COUNT: 13.3 % (ref 11.5–14.5)
EST. GFR  (AFRICAN AMERICAN): >60 ML/MIN/1.73 M^2
EST. GFR  (NON AFRICAN AMERICAN): >60 ML/MIN/1.73 M^2
GLUCOSE SERPL-MCNC: 96 MG/DL (ref 70–110)
HCT VFR BLD AUTO: 43 % (ref 37–48.5)
HGB BLD-MCNC: 14.2 G/DL (ref 12–16)
IGE SERPL-ACNC: <35 IU/ML (ref 0–100)
IMM GRANULOCYTES # BLD AUTO: 0.06 K/UL (ref 0–0.04)
MCH RBC QN AUTO: 30.1 PG (ref 27–31)
MCHC RBC AUTO-ENTMCNC: 33 G/DL (ref 32–36)
MCV RBC AUTO: 91 FL (ref 82–98)
NEUTROPHILS # BLD AUTO: 3.5 K/UL (ref 1.8–7.7)
PLATELET # BLD AUTO: 231 K/UL (ref 150–350)
PMV BLD AUTO: 10.2 FL (ref 9.2–12.9)
POTASSIUM SERPL-SCNC: 3.9 MMOL/L (ref 3.5–5.1)
PROT SERPL-MCNC: 7.5 G/DL (ref 6–8.4)
RBC # BLD AUTO: 4.72 M/UL (ref 4–5.4)
SODIUM SERPL-SCNC: 142 MMOL/L (ref 136–145)
WBC # BLD AUTO: 5.51 K/UL (ref 3.9–12.7)

## 2019-12-04 PROCEDURE — 85027 COMPLETE CBC AUTOMATED: CPT

## 2019-12-04 PROCEDURE — 99244 PR OFFICE CONSULTATION,LEVEL IV: ICD-10-PCS | Mod: S$GLB,,, | Performed by: ALLERGY & IMMUNOLOGY

## 2019-12-04 PROCEDURE — 99244 OFF/OP CNSLTJ NEW/EST MOD 40: CPT | Mod: S$GLB,,, | Performed by: ALLERGY & IMMUNOLOGY

## 2019-12-04 PROCEDURE — 84165 PATHOLOGIST INTERPRETATION SPE: ICD-10-PCS | Mod: 26,,, | Performed by: PATHOLOGY

## 2019-12-04 PROCEDURE — 86003 ALLG SPEC IGE CRUDE XTRC EA: CPT | Mod: 59

## 2019-12-04 PROCEDURE — 86003 ALLG SPEC IGE CRUDE XTRC EA: CPT

## 2019-12-04 PROCEDURE — 84165 PROTEIN E-PHORESIS SERUM: CPT

## 2019-12-04 PROCEDURE — 36415 COLL VENOUS BLD VENIPUNCTURE: CPT | Mod: PO

## 2019-12-04 PROCEDURE — 99999 PR PBB SHADOW E&M-EST. PATIENT-LVL III: CPT | Mod: PBBFAC,,, | Performed by: ALLERGY & IMMUNOLOGY

## 2019-12-04 PROCEDURE — 88184 FLOWCYTOMETRY/ TC 1 MARKER: CPT

## 2019-12-04 PROCEDURE — 84165 PROTEIN E-PHORESIS SERUM: CPT | Mod: 26,,, | Performed by: PATHOLOGY

## 2019-12-04 PROCEDURE — 88185 FLOWCYTOMETRY/TC ADD-ON: CPT

## 2019-12-04 PROCEDURE — 80053 COMPREHEN METABOLIC PANEL: CPT

## 2019-12-04 PROCEDURE — 99999 PR PBB SHADOW E&M-EST. PATIENT-LVL III: ICD-10-PCS | Mod: PBBFAC,,, | Performed by: ALLERGY & IMMUNOLOGY

## 2019-12-04 PROCEDURE — 82785 ASSAY OF IGE: CPT

## 2019-12-04 NOTE — TELEPHONE ENCOUNTER
Informed Patient  that [facility] received prescription for Xolair and prior authorization is required.  [facility] will be back in touch once insurance determination is received.

## 2019-12-04 NOTE — PROGRESS NOTES
Subjective:       Patient ID: Cheyenne West is a 64 y.o. female.    Chief Complaint:  Urticaria (chronic hives off and on for last 30 yrs)      63 yo woman presents for consult from Dr Juan Buck for chronic hives.  she states she has had them on and off for over 30 years. She has seen allergist in past and was on prednisone for while as was only thing that helps. Recently saw rheum and ruled out other autoimmune issues. She does have hypothyroid controlled on meds. Her hives are red raised and itch and burn. Will last entire day then fade and come back occ leaves a bruise after. No swelling. No SOB. Rheum prescribed sulfasalazine and plaquenil but she has not started. She takes benadryl prn but help off last few days. It does help. Claritin, zyrtec not as good but only took once a day in past. Never been on H 2 blocker. She had allergy test in past and told allergic to chocolate, almond, chicken and weed pollen but  none seems to be trigger. She has no rhinitis, asthma or eczema. No insect or latex allergy.       Environmental History: see history section for home environment  Review of Systems   Constitutional: Negative for appetite change, chills, fatigue and fever.   HENT: Negative for congestion, ear discharge, ear pain, facial swelling, nosebleeds, postnasal drip, rhinorrhea, sinus pressure, sneezing, sore throat, trouble swallowing and voice change.    Eyes: Negative for discharge, redness, itching and visual disturbance.   Respiratory: Negative for cough, choking, chest tightness, shortness of breath and wheezing.    Cardiovascular: Negative for chest pain, palpitations and leg swelling.   Gastrointestinal: Negative for abdominal distention, abdominal pain, constipation, diarrhea, nausea and vomiting.   Genitourinary: Negative for difficulty urinating.   Musculoskeletal: Negative for arthralgias, gait problem, joint swelling and myalgias.   Skin: Positive for color change and rash.    Neurological: Negative for dizziness, syncope, weakness, light-headedness and headaches.   Hematological: Negative for adenopathy. Does not bruise/bleed easily.   Psychiatric/Behavioral: Negative for agitation, behavioral problems, confusion and sleep disturbance. The patient is not nervous/anxious.         Objective:      Physical Exam   Constitutional: She is oriented to person, place, and time. She appears well-developed and well-nourished. No distress.   HENT:   Head: Normocephalic and atraumatic.   Right Ear: Hearing, tympanic membrane, external ear and ear canal normal.   Left Ear: Hearing, tympanic membrane, external ear and ear canal normal.   Nose: No mucosal edema, rhinorrhea, sinus tenderness or septal deviation. No epistaxis. Right sinus exhibits no maxillary sinus tenderness and no frontal sinus tenderness. Left sinus exhibits no maxillary sinus tenderness and no frontal sinus tenderness.   Mouth/Throat: Uvula is midline, oropharynx is clear and moist and mucous membranes are normal. No uvula swelling.   Eyes: Conjunctivae are normal. Right eye exhibits no discharge. Left eye exhibits no discharge.   Neck: Normal range of motion. No thyromegaly present.   Cardiovascular: Normal rate, regular rhythm and normal heart sounds.   No murmur heard.  Pulmonary/Chest: Effort normal and breath sounds normal. No respiratory distress. She has no wheezes.   Abdominal: Soft. She exhibits no distension. There is no tenderness.   Musculoskeletal: Normal range of motion. She exhibits no edema or tenderness.   Lymphadenopathy:     She has no cervical adenopathy.   Neurological: She is alert and oriented to person, place, and time.   Skin: Skin is warm and dry. Rash (scattered erythematous hives on abdomen) noted. No erythema.   Psychiatric: She has a normal mood and affect. Her behavior is normal. Judgment and thought content normal.   Nursing note and vitals reviewed.      Laboratory:   none performed   Assessment:        1. Chronic urticaria         Plan:       1. advised pt given her time course of 30 years and symptoms is likely autoimmune hives and not allergic trigger, will send labs to eval   2. cetirizine 10 mg BID and Pepcid 20 mg BID  3. start Xolair 300 mg monthly

## 2019-12-04 NOTE — LETTER
December 4, 2019      Juan Buck MD  [address] [facility] [place] [address]           [facility]  [address] [place] [address]  Phone: [phone]          Patient: Cheyenne West   MR Number: [id number]   YOB: 1955   Date of Visit: 12/4/2019       Dear Dr. Juan Buck:    Thank you for referring Cheyenne West to me for evaluation. Attached you will find relevant portions of my assessment and plan of care.    If you have questions, please do not hesitate to call me. I look forward to following Cheyenne West along with you.    Sincerely,    Sydney Romero MD    Enclosure  CC:  No Recipients    If you would like to receive this communication electronically, please contact [email or web address] or [phone] to request more information on [employer] Link access.    For providers and/or their staff who would like to refer a patient to [facility], please contact us through our one-stop-shop provider referral line, [facility] , at [phone].    If you feel you have received this communication in error or would no longer like to receive these types of communications, please e-mail [email or web address]

## 2019-12-05 LAB
ALBUMIN SERPL ELPH-MCNC: 4.33 G/DL (ref 3.35–5.55)
ALPHA1 GLOB SERPL ELPH-MCNC: 0.35 G/DL (ref 0.17–0.41)
ALPHA2 GLOB SERPL ELPH-MCNC: 0.77 G/DL (ref 0.43–0.99)
B-GLOBULIN SERPL ELPH-MCNC: 0.89 G/DL (ref 0.5–1.1)
GAMMA GLOB SERPL ELPH-MCNC: 0.86 G/DL (ref 0.67–1.58)
PATHOLOGIST INTERPRETATION SPE: NORMAL
PROT SERPL-MCNC: 7.2 G/DL (ref 6–8.4)

## 2019-12-09 LAB
A ALTERNATA IGE QN: <0.1 KU/L
A FUMIGATUS IGE QN: <0.1 KU/L
ALMOND IGE QN: <0.1 KU/L
BERMUDA GRASS IGE QN: <0.1 KU/L
CAT DANDER IGE QN: <0.1 KU/L
CEDAR IGE QN: <0.1 KU/L
CHICKEN CLASS: NORMAL
CHICKEN IGE QN: <0.1 KU/L
CHOCOLATE IGE QN: <0.1 KU/L
COW MILK IGE QN: <0.1 KU/L
D FARINAE IGE QN: 0.27 KU/L
D PTERONYSS IGE QN: 0.26 KU/L
DEPRECATED A ALTERNATA IGE RAST QL: NORMAL
DEPRECATED A FUMIGATUS IGE RAST QL: NORMAL
DEPRECATED ALMOND IGE RAST QL: NORMAL
DEPRECATED BERMUDA GRASS IGE RAST QL: NORMAL
DEPRECATED CAT DANDER IGE RAST QL: NORMAL
DEPRECATED CEDAR IGE RAST QL: NORMAL
DEPRECATED CHOCOLATE IGE RAST QL: NORMAL
DEPRECATED COW MILK IGE RAST QL: NORMAL
DEPRECATED D FARINAE IGE RAST QL: ABNORMAL
DEPRECATED D PTERONYSS IGE RAST QL: ABNORMAL
DEPRECATED DOG DANDER IGE RAST QL: NORMAL
DEPRECATED EGG WHITE IGE RAST QL: NORMAL
DEPRECATED ELDER IGE RAST QL: NORMAL
DEPRECATED ENGL PLANTAIN IGE RAST QL: NORMAL
DEPRECATED PEANUT IGE RAST QL: NORMAL
DEPRECATED PECAN/HICK TREE IGE RAST QL: NORMAL
DEPRECATED PECAN/HICK TREE IGE RAST QL: NORMAL
DEPRECATED ROACH IGE RAST QL: NORMAL
DEPRECATED SHRIMP IGE RAST QL: NORMAL
DEPRECATED SOYBEAN IGE RAST QL: NORMAL
DEPRECATED TIMOTHY IGE RAST QL: NORMAL
DEPRECATED TUNA IGE RAST QL: NORMAL
DEPRECATED WEST RAGWEED IGE RAST QL: NORMAL
DEPRECATED WHEAT IGE RAST QL: NORMAL
DEPRECATED WHITE OAK IGE RAST QL: NORMAL
DOG DANDER IGE QN: <0.1 KU/L
EGG WHITE IGE QN: <0.1 KU/L
ELDER IGE QN: <0.1 KU/L
ENGL PLANTAIN IGE QN: <0.1 KU/L
PEANUT IGE QN: <0.1 KU/L
PECAN/HICK TREE IGE QN: <0.1 KU/L
PECAN/HICK TREE IGE QN: <0.1 KU/L
ROACH IGE QN: <0.1 KU/L
SHRIMP IGE QN: <0.1 KU/L
SOYBEAN IGE QN: <0.1 KU/L
TIMOTHY IGE QN: <0.1 KU/L
TUNA IGE QN: <0.1 KU/L
WEST RAGWEED IGE QN: <0.1 KU/L
WHEAT IGE QN: <0.1 KU/L
WHITE OAK IGE QN: <0.1 KU/L

## 2019-12-12 ENCOUNTER — PATIENT MESSAGE (OUTPATIENT)
Dept: ALLERGY | Facility: CLINIC | Age: 64
End: 2019-12-12

## 2019-12-12 LAB
CIU ASSOCIATED BASOPHIL ACTIVATION: 35.2 % (ref 0–12)
IGE RECEPTOR ANTIBODY: ABNORMAL

## 2019-12-16 ENCOUNTER — TELEPHONE (OUTPATIENT)
Dept: ALLERGY | Facility: CLINIC | Age: 64
End: 2019-12-16

## 2019-12-16 DIAGNOSIS — L50.8 CHRONIC URTICARIA: Primary | ICD-10-CM

## 2019-12-16 NOTE — TELEPHONE ENCOUNTER
----- Message from Dominga Holder sent at 12/15/2019  9:51 AM CST -----  Hi Dr. Romero and Staff,     [facility] is unable to fill XOLAIR under this patient's pharmacy benefit. Facility administered medications administered at [facility] must come from within [facility].     It is possible that this medication may be covered under the patient's Medical Benefit.   Please re-enter the order in [facility] as a medication order. For any further questions, please contact [facility] at [phone]. Thank You     Dominga Holder   Patient Care Advocate   [facility]   [phone]

## 2019-12-17 RX ORDER — PANTOPRAZOLE SODIUM 40 MG/1
TABLET, DELAYED RELEASE ORAL
Qty: 90 TABLET | Refills: 0 | Status: SHIPPED | OUTPATIENT
Start: 2019-12-17 | End: 2020-01-07 | Stop reason: SDUPTHER

## 2019-12-23 ENCOUNTER — TELEPHONE (OUTPATIENT)
Dept: FAMILY MEDICINE | Facility: CLINIC | Age: 64
End: 2019-12-23

## 2019-12-23 ENCOUNTER — PATIENT MESSAGE (OUTPATIENT)
Dept: FAMILY MEDICINE | Facility: CLINIC | Age: 64
End: 2019-12-23

## 2019-12-23 DIAGNOSIS — Z12.31 BREAST CANCER SCREENING BY MAMMOGRAM: Primary | ICD-10-CM

## 2019-12-23 RX ORDER — FELODIPINE 5 MG/1
TABLET, EXTENDED RELEASE ORAL
Qty: 90 TABLET | Refills: 0 | Status: SHIPPED | OUTPATIENT
Start: 2019-12-23 | End: 2020-01-07 | Stop reason: SDUPTHER

## 2019-12-23 NOTE — TELEPHONE ENCOUNTER
----- Message from Cici Coyne sent at 12/23/2019  4:50 PM CST -----  Contact: pt  .Type:  Mammogram    Caller is requesting to schedule their annual mammogram appointment.  Order is not listed in EPIC.  Please enter order and contact patient to schedule.  Name of Caller: pt  Where would they like the mammogram performed? [facility]    Would the patient rather a call back or a response via [facility]? Call back   Best Call Back Number: [phone] (home)   Additional Information:

## 2019-12-24 ENCOUNTER — PATIENT MESSAGE (OUTPATIENT)
Dept: ALLERGY | Facility: CLINIC | Age: 64
End: 2019-12-24

## 2019-12-24 ENCOUNTER — OFFICE VISIT (OUTPATIENT)
Dept: URGENT CARE | Facility: CLINIC | Age: 64
End: 2019-12-24
Payer: COMMERCIAL

## 2019-12-24 VITALS
TEMPERATURE: 99 F | HEART RATE: 77 BPM | DIASTOLIC BLOOD PRESSURE: 91 MMHG | RESPIRATION RATE: 18 BRPM | SYSTOLIC BLOOD PRESSURE: 147 MMHG | WEIGHT: 163 LBS | HEIGHT: 59 IN | BODY MASS INDEX: 32.86 KG/M2 | OXYGEN SATURATION: 98 %

## 2019-12-24 DIAGNOSIS — L50.9 HIVES: Primary | ICD-10-CM

## 2019-12-24 PROCEDURE — 99214 OFFICE O/P EST MOD 30 MIN: CPT | Mod: S$GLB,,, | Performed by: PHYSICIAN ASSISTANT

## 2019-12-24 PROCEDURE — 99214 PR OFFICE/OUTPT VISIT, EST, LEVL IV, 30-39 MIN: ICD-10-PCS | Mod: S$GLB,,, | Performed by: PHYSICIAN ASSISTANT

## 2019-12-24 RX ORDER — PREDNISONE 10 MG/1
TABLET ORAL
Qty: 20 TABLET | Refills: 0 | Status: SHIPPED | OUTPATIENT
Start: 2019-12-24 | End: 2020-01-07 | Stop reason: ALTCHOICE

## 2019-12-24 NOTE — PATIENT INSTRUCTIONS
Hives (Adult)  Hives are pink or red bumps on the skin. These bumps are also known as wheals. The bumps can itch, burn, or sting. Hives can occur anywhere on the body. They vary in size and shape and can form in clusters. Individual hives can appear and go away quickly. New hives may develop as old ones fade. Hives are common and usually harmless. Occasionally hives are a sign of a serious allergy.  Hives are often caused by an allergic reaction. It may be an allergic reaction to foods such as fruit, shellfish, chocolate, nuts, or tomatoes. It may be a reaction to pollens, animal fur, or mold spores. Medicines, chemicals, and insect bites can also cause hives. And hives can be caused by hot sun or cold air. The cause of hives can be difficult to find.  You may be given medicines to relieve swelling and itching. Follow all instructions when using these medicines. The hives will usually fade in a few days, but can last up to 2 weeks.  Home care  Follow these tips:  · Try to find the cause of the hives and eliminate it. Discuss possible causes with your healthcare provider. Future reactions to the same allergen may be worse.  · Dont scratch the hives. Scratching will delay healing. To reduce itching, apply cool, wet compresses to the skin.  · Dress in soft, loose cotton clothing.  · Dont bathe in hot water. This can make the itching worse.  · Apply an ice pack or cool pack wrapped in a thin towel to your skin. This will help reduce redness and itching. But if your hives were caused by exposure to cold, then do not apply more cold to them.  · You may use over-the counter antihistamines to reduce itching. Some older antihistamines, such as diphenhydramine and chlorpheniramine, are inexpensive. But they need to be taken often and may make you sleepy. They are best used at bedtime. Dont use diphenhydramine if you have glaucoma or have trouble urinating because of an enlarged prostate. Newer antihistamines, such as  loratadine, cetirizine, and fexofenadine, are generally more expensive. But they tend to have fewer side effects, such as drowsiness. They can be taken less often.  · Another type of antihistamine is used to treat heartburn. This type includes ranitidine, nizatidine, famotidine, and cimetidine. These are sometimes used along with the above antihistamines if a single medicine is not working.  Follow-up care  Follow up with your healthcare provider if your symptoms don't get better in 2 days. Ask your provider about allergy testing if you have had a severe reaction, or have had several episodes of hives. He or she can use the allergy testing to find out what you are allergic to.  When to seek medical advice  Call your healthcare provider right away if any of these occur:  · Fever of 100.4°F (38.0°C) or higher, or as directed by your healthcare provider  · Redness, swelling, or pain  · Foul-smelling fluid coming from the rash  Call 911  Call 911 if any of the following occur:  · Swelling of the face, throat, or tongue  · Trouble breathing or swallowing  · Dizziness, weakness, or fainting  Date Last Reviewed: 9/1/2016  © 1804-7575 [employer]. [address] [place] [address], [place] [address]. All rights reserved. This information is not intended as a substitute for professional medical care. Always follow your healthcare professional's instructions.

## 2019-12-24 NOTE — PROGRESS NOTES
"Subjective:       Patient ID: Cheyenne West is a 64 y.o. female.    Vitals:  height is 4' 11" (1.499 m) and weight is 73.9 kg (163 lb). Her oral temperature is 98.9 °F (37.2 °C). Her blood pressure is 147/91 (abnormal) and her pulse is 77. Her respiration is 18 and oxygen saturation is 98%.     Chief Complaint: Rash    Pt present today with hives, symptoms comes and goes. Pt started that she gets this often.     Rash   This is a recurrent problem. The current episode started more than 1 month ago. The problem is unchanged. The affected locations include the chest, back, left lower leg, left upper leg, right lower leg, right upper leg, right arm, right buttock and left buttock. The rash is characterized by bruising, burning, redness, swelling, dryness, itchiness and blistering. It is unknown if there was an exposure to a precipitant. Pertinent negatives include no anorexia, congestion, cough, diarrhea, eye pain, facial edema, fatigue, fever, joint pain, nail changes, rhinorrhea, shortness of breath, sore throat or vomiting. Past treatments include nothing. There is no history of allergies, asthma, eczema or varicella.       Constitution: Negative for fatigue and fever.   HENT: Negative for facial swelling, congestion, sore throat and trouble swallowing.    Cardiovascular: Negative for chest pain and palpitations.   Eyes: Negative for eye pain and blurred vision.   Respiratory: Negative for cough and shortness of breath.    Gastrointestinal: Negative for vomiting and diarrhea.   Skin: Positive for color change, rash, bruising and hives.   Allergic/Immunologic: Positive for hives and itching.       Objective:      Physical Exam   Constitutional: She is oriented to person, place, and time. She appears well-developed and well-nourished. No distress.   HENT:   Head: Normocephalic and atraumatic.   Right Ear: External ear normal.   Left Ear: External ear normal.   Nose: Nose normal.   Eyes: Conjunctivae, EOM and " lids are normal.   Neck: Trachea normal, full passive range of motion without pain and phonation normal. Neck supple.   Pulmonary/Chest: Effort normal. No respiratory distress. She has no wheezes.   Musculoskeletal: Normal range of motion.   Neurological: She is alert and oriented to person, place, and time.   Skin: Skin is warm, dry, intact, not diaphoretic, not pale and rash (diffuse urticarial).   Psychiatric: She has a normal mood and affect. Her speech is normal and behavior is normal. Judgment and thought content normal. Cognition and memory are normal.   Nursing note and vitals reviewed.        Assessment:       1. Hives        Plan:         Hives    Other orders  -     predniSONE (DELTASONE) 10 MG tablet; Take 60 mg for 1 days, take 50mg for 1 days, take 40mg for 1 days, take 30mg for 1 days take 20 for 1 day  Dispense: 20 tablet; Refill: 0     Discussed risks versus benefits, patient would like to proceed.  Discussed cool showers as opposed to warm baths.  May return to clinic or allergies office with any issues.    Hives (Adult)  Hives are pink or red bumps on the skin. These bumps are also known as wheals. The bumps can itch, burn, or sting. Hives can occur anywhere on the body. They vary in size and shape and can form in clusters. Individual hives can appear and go away quickly. New hives may develop as old ones fade. Hives are common and usually harmless. Occasionally hives are a sign of a serious allergy.  Hives are often caused by an allergic reaction. It may be an allergic reaction to foods such as fruit, shellfish, chocolate, nuts, or tomatoes. It may be a reaction to pollens, animal fur, or mold spores. Medicines, chemicals, and insect bites can also cause hives. And hives can be caused by hot sun or cold air. The cause of hives can be difficult to find.  You may be given medicines to relieve swelling and itching. Follow all instructions when using these medicines. The hives will usually fade in a  few days, but can last up to 2 weeks.  Home care  Follow these tips:  · Try to find the cause of the hives and eliminate it. Discuss possible causes with your healthcare provider. Future reactions to the same allergen may be worse.  · Dont scratch the hives. Scratching will delay healing. To reduce itching, apply cool, wet compresses to the skin.  · Dress in soft, loose cotton clothing.  · Dont bathe in hot water. This can make the itching worse.  · Apply an ice pack or cool pack wrapped in a thin towel to your skin. This will help reduce redness and itching. But if your hives were caused by exposure to cold, then do not apply more cold to them.  · You may use over-the counter antihistamines to reduce itching. Some older antihistamines, such as diphenhydramine and chlorpheniramine, are inexpensive. But they need to be taken often and may make you sleepy. They are best used at bedtime. Dont use diphenhydramine if you have glaucoma or have trouble urinating because of an enlarged prostate. Newer antihistamines, such as loratadine, cetirizine, and fexofenadine, are generally more expensive. But they tend to have fewer side effects, such as drowsiness. They can be taken less often.  · Another type of antihistamine is used to treat heartburn. This type includes ranitidine, nizatidine, famotidine, and cimetidine. These are sometimes used along with the above antihistamines if a single medicine is not working.  Follow-up care  Follow up with your healthcare provider if your symptoms don't get better in 2 days. Ask your provider about allergy testing if you have had a severe reaction, or have had several episodes of hives. He or she can use the allergy testing to find out what you are allergic to.  When to seek medical advice  Call your healthcare provider right away if any of these occur:  · Fever of 100.4°F (38.0°C) or higher, or as directed by your healthcare provider  · Redness, swelling, or pain  · Foul-smelling  fluid coming from the rash  Call 911  Call 911 if any of the following occur:  · Swelling of the face, throat, or tongue  · Trouble breathing or swallowing  · Dizziness, weakness, or fainting  Date Last Reviewed: 9/1/2016  © [employer]4 [employer]. [address] [place] [address]. All rights reserved. This information is not intended as a substitute for professional medical care. Always follow your healthcare professional's instructions.

## 2019-12-26 ENCOUNTER — PATIENT MESSAGE (OUTPATIENT)
Dept: ALLERGY | Facility: CLINIC | Age: 64
End: 2019-12-26

## 2020-01-06 ENCOUNTER — PATIENT MESSAGE (OUTPATIENT)
Dept: ALLERGY | Facility: CLINIC | Age: 65
End: 2020-01-06

## 2020-01-07 ENCOUNTER — HOSPITAL ENCOUNTER (OUTPATIENT)
Dept: RADIOLOGY | Facility: HOSPITAL | Age: 65
Discharge: HOME | End: 2020-01-07
Attending: FAMILY MEDICINE
Payer: COMMERCIAL

## 2020-01-07 ENCOUNTER — TELEPHONE (OUTPATIENT)
Dept: ADMINISTRATIVE | Facility: HOSPITAL | Age: 65
End: 2020-01-07

## 2020-01-07 ENCOUNTER — OFFICE VISIT (OUTPATIENT)
Dept: FAMILY MEDICINE | Facility: CLINIC | Age: 65
End: 2020-01-07
Payer: COMMERCIAL

## 2020-01-07 VITALS — HEIGHT: 59 IN | BODY MASS INDEX: 33.87 KG/M2 | WEIGHT: 168 LBS

## 2020-01-07 VITALS
TEMPERATURE: 99 F | WEIGHT: 168.63 LBS | HEART RATE: 73 BPM | OXYGEN SATURATION: 96 % | DIASTOLIC BLOOD PRESSURE: 72 MMHG | SYSTOLIC BLOOD PRESSURE: 119 MMHG | HEIGHT: 59 IN | BODY MASS INDEX: 34 KG/M2

## 2020-01-07 DIAGNOSIS — Z00.00 ROUTINE HISTORY AND PHYSICAL EXAMINATION OF ADULT: Primary | ICD-10-CM

## 2020-01-07 DIAGNOSIS — K21.00 GASTROESOPHAGEAL REFLUX DISEASE WITH ESOPHAGITIS: ICD-10-CM

## 2020-01-07 DIAGNOSIS — E78.5 HYPERLIPIDEMIA, UNSPECIFIED HYPERLIPIDEMIA TYPE: ICD-10-CM

## 2020-01-07 DIAGNOSIS — D50.0 IRON DEFICIENCY ANEMIA DUE TO CHRONIC BLOOD LOSS: ICD-10-CM

## 2020-01-07 DIAGNOSIS — I10 ESSENTIAL HYPERTENSION: ICD-10-CM

## 2020-01-07 DIAGNOSIS — F41.1 GENERALIZED ANXIETY DISORDER: ICD-10-CM

## 2020-01-07 DIAGNOSIS — E03.4 HYPOTHYROIDISM DUE TO ACQUIRED ATROPHY OF THYROID: ICD-10-CM

## 2020-01-07 DIAGNOSIS — Z12.31 BREAST CANCER SCREENING BY MAMMOGRAM: ICD-10-CM

## 2020-01-07 PROCEDURE — 90471 IMMUNIZATION ADMIN: CPT | Mod: S$GLB,,, | Performed by: FAMILY MEDICINE

## 2020-01-07 PROCEDURE — 77067 SCR MAMMO BI INCL CAD: CPT | Mod: TC,PO

## 2020-01-07 PROCEDURE — 90471 TDAP VACCINE GREATER THAN OR EQUAL TO 7YO IM: ICD-10-PCS | Mod: S$GLB,,, | Performed by: FAMILY MEDICINE

## 2020-01-07 PROCEDURE — 77067 MAMMO DIGITAL SCREENING BILAT WITH TOMOSYNTHESIS_CAD: ICD-10-PCS | Mod: 26,,, | Performed by: RADIOLOGY

## 2020-01-07 PROCEDURE — 99999 PR PBB SHADOW E&M-EST. PATIENT-LVL IV: CPT | Mod: PBBFAC,,, | Performed by: FAMILY MEDICINE

## 2020-01-07 PROCEDURE — 3074F PR MOST RECENT SYSTOLIC BLOOD PRESSURE < 130 MM HG: ICD-10-PCS | Mod: CPTII,S$GLB,, | Performed by: FAMILY MEDICINE

## 2020-01-07 PROCEDURE — 3078F PR MOST RECENT DIASTOLIC BLOOD PRESSURE < 80 MM HG: ICD-10-PCS | Mod: CPTII,S$GLB,, | Performed by: FAMILY MEDICINE

## 2020-01-07 PROCEDURE — 77063 BREAST TOMOSYNTHESIS BI: CPT | Mod: 26,,, | Performed by: RADIOLOGY

## 2020-01-07 PROCEDURE — 90715 TDAP VACCINE 7 YRS/> IM: CPT | Mod: S$GLB,,, | Performed by: FAMILY MEDICINE

## 2020-01-07 PROCEDURE — 99396 PR PREVENTIVE VISIT,EST,40-64: ICD-10-PCS | Mod: 25,S$GLB,, | Performed by: FAMILY MEDICINE

## 2020-01-07 PROCEDURE — 3078F DIAST BP <80 MM HG: CPT | Mod: CPTII,S$GLB,, | Performed by: FAMILY MEDICINE

## 2020-01-07 PROCEDURE — 90715 TDAP VACCINE GREATER THAN OR EQUAL TO 7YO IM: ICD-10-PCS | Mod: S$GLB,,, | Performed by: FAMILY MEDICINE

## 2020-01-07 PROCEDURE — 77067 SCR MAMMO BI INCL CAD: CPT | Mod: 26,,, | Performed by: RADIOLOGY

## 2020-01-07 PROCEDURE — 77063 MAMMO DIGITAL SCREENING BILAT WITH TOMOSYNTHESIS_CAD: ICD-10-PCS | Mod: 26,,, | Performed by: RADIOLOGY

## 2020-01-07 PROCEDURE — 99999 PR PBB SHADOW E&M-EST. PATIENT-LVL IV: ICD-10-PCS | Mod: PBBFAC,,, | Performed by: FAMILY MEDICINE

## 2020-01-07 PROCEDURE — 3074F SYST BP LT 130 MM HG: CPT | Mod: CPTII,S$GLB,, | Performed by: FAMILY MEDICINE

## 2020-01-07 PROCEDURE — 99396 PREV VISIT EST AGE 40-64: CPT | Mod: 25,S$GLB,, | Performed by: FAMILY MEDICINE

## 2020-01-07 RX ORDER — FELODIPINE 5 MG/1
5 TABLET, EXTENDED RELEASE ORAL DAILY
Qty: 90 TABLET | Refills: 3 | Status: SHIPPED | OUTPATIENT
Start: 2020-01-07 | End: 2020-02-21 | Stop reason: SDUPTHER

## 2020-01-07 RX ORDER — PANTOPRAZOLE SODIUM 40 MG/1
40 TABLET, DELAYED RELEASE ORAL DAILY
Qty: 90 TABLET | Refills: 3 | Status: SHIPPED | OUTPATIENT
Start: 2020-01-07 | End: 2020-02-21 | Stop reason: SDUPTHER

## 2020-01-08 ENCOUNTER — PATIENT MESSAGE (OUTPATIENT)
Dept: ALLERGY | Facility: CLINIC | Age: 65
End: 2020-01-08

## 2020-01-08 RX ORDER — PREDNISONE 10 MG/1
TABLET ORAL
Qty: 40 TABLET | Refills: 0 | Status: SHIPPED | OUTPATIENT
Start: 2020-01-08 | End: 2020-02-03

## 2020-01-08 NOTE — PROGRESS NOTES
Patient presents physical exam.  Hypertension controlled.  Hyperlipidemia needs laboratory.  Previous iron deficiency followed by Hematology.  She would like us to follow the lab work currently.  Most recent laboratory reviewed recent normal CBC..  Reflux stable current medication.  Hypothyroidism due for laboratory.  Anxiety stable.    Cheyenne was seen today for annual exam and medication refill.    Diagnoses and all orders for this visit:    Routine history and physical examination of adult  -     Lipid panel; Future    Essential hypertension    Hyperlipidemia, unspecified hyperlipidemia type  -     Lipid panel; Future    Iron deficiency anemia due to chronic blood loss    Gastroesophageal reflux disease with esophagitis    Hypothyroidism due to acquired atrophy of thyroid  -     TSH; Future    Generalized anxiety disorder    Other orders  -     Tdap Vaccine  -     felodipine (PLENDIL) 5 MG 24 hr tablet; Take 1 tablet (5 mg total) by mouth once daily.  -     pantoprazole (PROTONIX) 40 MG tablet; Take 1 tablet (40 mg total) by mouth once daily.      Continue current medication.    Anticipatory guidance: Don't smoke.  Healthy diet and regular exercise recommended.          Past Medical History:  Past Medical History:   Diagnosis Date    Carpal tunnel syndrome of right wrist     Chronic gastritis     Dissociative fugue     Diverticulosis     Duodenitis     Dyspepsia     Generalized anxiety disorder     Hearing loss on left     Hearing loss on right     Helicobacter pylori (H. pylori)     History of blood transfusion     Hyperlipidemia     Hypertension     Hypothyroidism     IBS (irritable bowel syndrome)     Iron deficiency anemia     Mild mitral regurgitation by prior echocardiogram     Mild obesity     Mild vitamin D deficiency     Paraesophageal hiatal hernia     PONV (postoperative nausea and vomiting)     Reflux gastritis     Sleep apnea     mild improved with wt. loss    Thyroid goiter      Urticaria, chronic      Past Surgical History:   Procedure Laterality Date    BLADDER SUSPENSION      pubovaginal sling     SECTION, CLASSIC      CHOLECYSTECTOMY      COLONOSCOPY  2011    Dr. Goode, in [place]:diverticulosis, hemorrhoids, melanosis coli-repeat 10 years    COLONOSCOPY N/A 2018    Procedure: COLONOSCOPY;  Surgeon: Rusty Vogt Jr., MD;  Location: [facility];  Service: Endoscopy;  Laterality: N/A; repeat in 10 years for screening    ESOPHAGEAL DILATION      ESOPHAGOGASTRODUODENOSCOPY  2016    ESOPHAGOGASTRODUODENOSCOPY N/A 2018    Procedure: EGD (ESOPHAGOGASTRODUODENOSCOPY);  Surgeon: Rusty Vogt Jr., MD;  Location: [facility];  Service: Endoscopy;  Laterality: N/A;    HERNIA REPAIR      INTRALUMINAL GASTROINTESTINAL TRACT IMAGING VIA CAPSULE N/A 2018    Procedure: IMAGING PROCEDURE, GI TRACT, INTRALUMINAL, VIA CAPSULE;  Surgeon: Loraine Velazquez MD;  Location: [facility] ENDO;  Service: Endoscopy;  Laterality: N/A;    LAPAROSCOPIC NISSEN FUNDOPLICATION N/A 2018    Procedure: FUNDOPLICATION, NISSEN, LAPAROSCOPIC;  Surgeon: Frank Akins MD;  Location: [facility] OR;  Service: General;  Laterality: N/A;    THYROIDECTOMY, PARTIAL       partial for benign nodule    UPPER GASTROINTESTINAL ENDOSCOPY  2016    Dr. Whalen, in care everywhere     Review of patient's allergies indicates:   Allergen Reactions    Penicillins      Current Outpatient Medications on File Prior to Visit   Medication Sig Dispense Refill    ALPRAZolam (XANAX) 0.25 MG tablet Take 1 tablet (0.25 mg total) by mouth 3 (three) times daily as needed for Anxiety. 21 tablet 0    atorvastatin (LIPITOR) 10 MG tablet TAKE 1 TABLET ONCE DAILY 90 tablet 3    butalbital-acetaminophen-caffeine -40 mg (FIORICET, ESGIC) -40 mg per tablet Take 1 tablet by mouth.      CALCIUM CARBONATE/VITAMIN D3 (CALTRATE 600 + D ORAL) Take by mouth 2 (two) times daily.      escitalopram  oxalate (LEXAPRO) 20 MG tablet TAKE 1 TABLET ONCE DAILY 90 tablet 2    estradiol (VAGIFEM) 10 mcg Tab Place 10 mcg vaginally.      hydroCHLOROthiazide (HYDRODIURIL) 25 MG tablet TAKE 1 TABLET ONCE DAILY 90 tablet 3    lactulose (CHRONULAC) 10 gram/15 mL solution TAKE 1 TABLESPOONFUL (=15ML= 10GM) TWO TIMES A  mL 3    multivitamin with minerals tablet Take 1 tablet by mouth.      OMEGA-3 FATTY ACIDS-EPA ORAL Take by mouth once daily.      potassium chloride (MICRO-K) 10 MEQ CpSR TAKE 1 CAPSULE ONCE DAILY 90 capsule 3    SYNTHROID 50 mcg tablet TAKE 1 TABLET DAILY 90 tablet 3    VIT A/VIT C/VIT E/ZINC/COPPER (PRESERVISION AREDS ORAL) Take by mouth once daily.      [DISCONTINUED] buPROPion (WELLBUTRIN XL) 300 MG 24 hr tablet TAKE 1 TABLET ONCE DAILY 90 tablet 3    [DISCONTINUED] felodipine (PLENDIL) 5 MG 24 hr tablet TAKE 1 TABLET BY MOUTH EVERY DAY 90 tablet 0    [DISCONTINUED] pantoprazole (PROTONIX) 40 MG tablet TAKE 1 TABLET BY MOUTH EVERY DAY 90 tablet 0    buPROPion (WELLBUTRIN XL) 300 MG 24 hr tablet TAKE 1 TABLET ONCE DAILY 90 tablet 3    omalizumab (XOLAIR) 150 mg/mL injection Inject 2 mLs (300 mg total) into the skin every 28 days. (Patient not taking: Reported on 1/7/2020) 2 Syringe 12    [DISCONTINUED] escitalopram oxalate (LEXAPRO) 20 MG tablet TAKE 1 TABLET ONCE DAILY 90 tablet 3    [DISCONTINUED] ferrous gluconate (FERGON) 324 MG tablet TAKE 1 TABLET BY MOUTH WITH BREAKFAST (Patient not taking: Reported on 1/7/2020) 30 tablet 5    [DISCONTINUED] FLUZONE QUAD 8620-2592, PF, 60 mcg (15 mcg x 4)/0.5 mL Syrg       [DISCONTINUED] hydroxychloroquine (PLAQUENIL) 200 mg tablet Take 2 tablets (400 mg total) by mouth every evening. (Patient not taking: Reported on 1/7/2020) 60 tablet 6    [DISCONTINUED] predniSONE (DELTASONE) 10 MG tablet Take 60 mg for 1 days, take 50mg for 1 days, take 40mg for 1 days, take 30mg for 1 days take 20 for 1 day (Patient not taking: Reported on 1/7/2020) 20  tablet 0    [DISCONTINUED] sulfaSALAzine (AZULFIDINE) 500 mg Tab Take 1 tablet (500 mg total) by mouth 2 (two) times daily. (Patient not taking: Reported on 1/7/2020) 60 tablet 3     Current Facility-Administered Medications on File Prior to Visit   Medication Dose Route Frequency Provider Last Rate Last Dose    omalizumab injection 300 mg  300 mg Subcutaneous Q28 Days Sydney Romero MD        [DISCONTINUED] influenza (FLUZONE HIGH-DOSE) vaccine 0.5 mL  0.5 mL Intramuscular vaccine x 1 dose Livier King MD         Social History     Socioeconomic History    Marital status:      Spouse name: Not on file    Number of children: Not on file    Years of education: Not on file    Highest education level: Not on file   Occupational History    Not on file   Social Needs    Financial resource strain: Not on file    Food insecurity:     Worry: Not on file     Inability: Not on file    Transportation needs:     Medical: Not on file     Non-medical: Not on file   Tobacco Use    Smoking status: Never Smoker    Smokeless tobacco: Never Used   Substance and Sexual Activity    Alcohol use: Yes     Comment: seldom    Drug use: No    Sexual activity: Yes   Lifestyle    Physical activity:     Days per week: Not on file     Minutes per session: Not on file    Stress: Not on file   Relationships    Social connections:     Talks on phone: Not on file     Gets together: Not on file     Attends Baptism service: Not on file     Active member of club or organization: Not on file     Attends meetings of clubs or organizations: Not on file     Relationship status: Not on file   Other Topics Concern    Not on file   Social History Narrative    Not on file     Family History   Problem Relation Age of Onset    Hypertension Mother     Heart disease Mother     Ulcers Mother     GI problems Mother         colitis    Hypertension Father     Breast cancer Paternal Grandmother     Colon cancer Neg Hx      "Crohn's disease Neg Hx     Colon polyps Neg Hx     Ulcerative colitis Neg Hx     Stomach cancer Neg Hx     Esophageal cancer Neg Hx     Allergic rhinitis Neg Hx     Allergies Neg Hx     Angioedema Neg Hx     Atopy Neg Hx     Eczema Neg Hx     Immunodeficiency Neg Hx     Rhinitis Neg Hx     Urticaria Neg Hx     Asthma Neg Hx              ROS:  GENERAL: No fever, chills,  or significant weight changes.  HEENT: No headache.  Positive hearing aids.  No dysphagia  Eyes: No vision complaints  CHEST: Denies WATSON, cyanosis, wheezing, cough and sputum production.  CARDIOVASCULAR: Denies chest pain, PND, orthopnea or reduced exercise tolerance.  ABDOMEN: Appetite fine. Denies diarrhea, abdominal pain, hematemesis or blood in stool.  URINARY: No flank pain, dysuria or hematuria.  MUSCULOSKELETAL: No warmth swelling or tenderness of the joints  NEUROLOGIC: No focal weakness numbness or paresthesia  PSYCHIATRIC: Denies depression        Vitals:    01/07/20 0855   BP: 119/72   Pulse: 73   Temp: 98.5 °F (36.9 °C)   SpO2: 96%   Weight: 76.5 kg (168 lb 9.6 oz)   Height: 4' 11" (1.499 m)     Wt Readings from Last 3 Encounters:   01/07/20 76.2 kg (168 lb)   01/07/20 76.5 kg (168 lb 9.6 oz)   12/24/19 73.9 kg (163 lb)       OBJECTIVE:   APPEARANCE: Well nourished, well developed, in no acute distress.    HEAD: Normocephalic.  Atraumatic.  No sinus tenderness.  EYES:   Right eye: Pupil reactive.  Conjunctiva clear.    Left eye: Pupil reactive.  Conjunctiva clear.  EOMI.    EARS: TM's intact. Light reflex normal. No retraction or perforation.    NOSE:  clear.  MOUTH & THROAT:  No pharyngeal erythema or exudate. No lesions.  NECK: Supple. No bruits.  No JVD.  No cervical lymphadenopathy.  No thyromegaly.    CHEST: Breath sounds clear bilaterally.  Normal respiratory effort  CARDIOVASCULAR: Normal rate.  Regular rhythm.  No murmurs.  No rub.  No gallops.  ABDOMEN: Bowel sounds normal.  Soft.  No tenderness.  No " organomegaly.  PERIPHERAL VASCULAR: No cyanosis.  No clubbing.  No edema.  NEUROLOGIC: No focal findings.  MENTAL STATUS: Alert.  Oriented x 3.

## 2020-01-09 DIAGNOSIS — L50.1 CHRONIC IDIOPATHIC URTICARIA: Primary | ICD-10-CM

## 2020-01-22 ENCOUNTER — PATIENT MESSAGE (OUTPATIENT)
Dept: ALLERGY | Facility: CLINIC | Age: 65
End: 2020-01-22

## 2020-01-23 ENCOUNTER — PATIENT MESSAGE (OUTPATIENT)
Dept: ALLERGY | Facility: CLINIC | Age: 65
End: 2020-01-23

## 2020-01-27 ENCOUNTER — PATIENT MESSAGE (OUTPATIENT)
Dept: ALLERGY | Facility: CLINIC | Age: 65
End: 2020-01-27

## 2020-01-29 RX ORDER — ESCITALOPRAM OXALATE 20 MG/1
TABLET ORAL
Qty: 90 TABLET | Refills: 3 | Status: SHIPPED | OUTPATIENT
Start: 2020-01-29 | End: 2021-02-23 | Stop reason: SDUPTHER

## 2020-01-29 RX ORDER — LEVOTHYROXINE SODIUM 50 UG/1
TABLET ORAL
Qty: 90 TABLET | Refills: 3 | Status: SHIPPED | OUTPATIENT
Start: 2020-01-29 | End: 2021-02-23 | Stop reason: SDUPTHER

## 2020-01-30 ENCOUNTER — CLINICAL SUPPORT (OUTPATIENT)
Dept: ALLERGY | Facility: CLINIC | Age: 65
End: 2020-01-30
Payer: COMMERCIAL

## 2020-01-30 DIAGNOSIS — L50.8 CHRONIC URTICARIA: Primary | ICD-10-CM

## 2020-01-30 PROCEDURE — 99999 PR PBB SHADOW E&M-EST. PATIENT-LVL I: CPT | Mod: PBBFAC,,,

## 2020-01-30 PROCEDURE — 99999 PR PBB SHADOW E&M-EST. PATIENT-LVL I: ICD-10-PCS | Mod: PBBFAC,,,

## 2020-01-30 PROCEDURE — 99499 NO LOS: ICD-10-PCS | Mod: S$GLB,,, | Performed by: ALLERGY & IMMUNOLOGY

## 2020-01-30 PROCEDURE — 96372 THER/PROPH/DIAG INJ SC/IM: CPT | Mod: S$GLB,,, | Performed by: ALLERGY & IMMUNOLOGY

## 2020-01-30 PROCEDURE — 96372 PR INJECTION,THERAP/PROPH/DIAG2ST, IM OR SUBCUT: ICD-10-PCS | Mod: S$GLB,,, | Performed by: ALLERGY & IMMUNOLOGY

## 2020-01-30 PROCEDURE — 99499 UNLISTED E&M SERVICE: CPT | Mod: S$GLB,,, | Performed by: ALLERGY & IMMUNOLOGY

## 2020-01-30 NOTE — PROGRESS NOTES
..Pt identifiers verified. 2 person medication verification performed         Xolair 150mg given per order subcutaneous injection to upper left arm  Xolair 150mg given per order subcutaneous injection to upper right arm     Aseptic technique utilized. Bandage applied. Pt tolerated well.        Pt instructed to remain in clinic for 30 minutes.  Verbalized understanding

## 2020-02-03 ENCOUNTER — PATIENT MESSAGE (OUTPATIENT)
Dept: ALLERGY | Facility: CLINIC | Age: 65
End: 2020-02-03

## 2020-02-03 RX ORDER — PREDNISONE 10 MG/1
TABLET ORAL
Qty: 40 TABLET | Refills: 0 | Status: SHIPPED | OUTPATIENT
Start: 2020-02-03 | End: 2020-04-20 | Stop reason: SDUPTHER

## 2020-02-12 ENCOUNTER — PATIENT MESSAGE (OUTPATIENT)
Dept: RHEUMATOLOGY | Facility: CLINIC | Age: 65
End: 2020-02-12

## 2020-02-21 ENCOUNTER — PATIENT MESSAGE (OUTPATIENT)
Dept: FAMILY MEDICINE | Facility: CLINIC | Age: 65
End: 2020-02-21

## 2020-02-21 RX ORDER — PANTOPRAZOLE SODIUM 40 MG/1
40 TABLET, DELAYED RELEASE ORAL DAILY
Qty: 90 TABLET | Refills: 3 | Status: SHIPPED | OUTPATIENT
Start: 2020-02-21 | End: 2021-02-15

## 2020-02-21 RX ORDER — FELODIPINE 5 MG/1
5 TABLET, EXTENDED RELEASE ORAL DAILY
Qty: 90 TABLET | Refills: 3 | Status: SHIPPED | OUTPATIENT
Start: 2020-02-21 | End: 2020-04-03

## 2020-02-27 ENCOUNTER — CLINICAL SUPPORT (OUTPATIENT)
Dept: ALLERGY | Facility: CLINIC | Age: 65
End: 2020-02-27
Payer: COMMERCIAL

## 2020-02-27 DIAGNOSIS — L50.8 CHRONIC URTICARIA: Primary | ICD-10-CM

## 2020-02-27 PROCEDURE — 99999 PR PBB SHADOW E&M-EST. PATIENT-LVL I: CPT | Mod: PBBFAC,,,

## 2020-02-27 PROCEDURE — 96372 PR INJECTION,THERAP/PROPH/DIAG2ST, IM OR SUBCUT: ICD-10-PCS | Mod: S$GLB,,, | Performed by: ALLERGY & IMMUNOLOGY

## 2020-02-27 PROCEDURE — 96372 THER/PROPH/DIAG INJ SC/IM: CPT | Mod: S$GLB,,, | Performed by: ALLERGY & IMMUNOLOGY

## 2020-02-27 PROCEDURE — 99999 PR PBB SHADOW E&M-EST. PATIENT-LVL I: ICD-10-PCS | Mod: PBBFAC,,,

## 2020-02-27 PROCEDURE — 99499 NO LOS: ICD-10-PCS | Mod: S$GLB,,, | Performed by: ALLERGY & IMMUNOLOGY

## 2020-02-27 PROCEDURE — 99499 UNLISTED E&M SERVICE: CPT | Mod: S$GLB,,, | Performed by: ALLERGY & IMMUNOLOGY

## 2020-02-27 NOTE — PROGRESS NOTES
..Pt identifiers verified. 2 person medication verification performed         Xolair 150mg given per order subcutaneous injection to upper left arm  Xolair 150mg given per order subcutaneous injection to upper right arm     Aseptic technique utilized. Bandage applied. Pt tolerated well.        Pt instructed to remain in clinic for 2 hours.  Verbalized understanding

## 2020-03-18 ENCOUNTER — PATIENT MESSAGE (OUTPATIENT)
Dept: ALLERGY | Facility: CLINIC | Age: 65
End: 2020-03-18

## 2020-03-26 ENCOUNTER — CLINICAL SUPPORT (OUTPATIENT)
Dept: ALLERGY | Facility: CLINIC | Age: 65
End: 2020-03-26
Payer: COMMERCIAL

## 2020-03-26 DIAGNOSIS — L50.8 CHRONIC URTICARIA: Primary | ICD-10-CM

## 2020-03-26 PROCEDURE — 96372 THER/PROPH/DIAG INJ SC/IM: CPT | Mod: S$GLB,,, | Performed by: ALLERGY & IMMUNOLOGY

## 2020-03-26 PROCEDURE — 99999 PR PBB SHADOW E&M-EST. PATIENT-LVL I: ICD-10-PCS | Mod: PBBFAC,,,

## 2020-03-26 PROCEDURE — 99499 UNLISTED E&M SERVICE: CPT | Mod: S$GLB,,, | Performed by: ALLERGY & IMMUNOLOGY

## 2020-03-26 PROCEDURE — 99499 NO LOS: ICD-10-PCS | Mod: S$GLB,,, | Performed by: ALLERGY & IMMUNOLOGY

## 2020-03-26 PROCEDURE — 96372 PR INJECTION,THERAP/PROPH/DIAG2ST, IM OR SUBCUT: ICD-10-PCS | Mod: S$GLB,,, | Performed by: ALLERGY & IMMUNOLOGY

## 2020-03-26 PROCEDURE — 99999 PR PBB SHADOW E&M-EST. PATIENT-LVL I: CPT | Mod: PBBFAC,,,

## 2020-03-31 ENCOUNTER — PATIENT MESSAGE (OUTPATIENT)
Dept: ALLERGY | Facility: CLINIC | Age: 65
End: 2020-03-31

## 2020-04-03 ENCOUNTER — PATIENT MESSAGE (OUTPATIENT)
Dept: FAMILY MEDICINE | Facility: CLINIC | Age: 65
End: 2020-04-03

## 2020-04-03 RX ORDER — ALPRAZOLAM 0.25 MG/1
0.25 TABLET ORAL 3 TIMES DAILY PRN
Qty: 21 TABLET | Refills: 0 | Status: SHIPPED | OUTPATIENT
Start: 2020-04-03 | End: 2022-06-29 | Stop reason: SDUPTHER

## 2020-04-03 RX ORDER — FELODIPINE 5 MG/1
TABLET, EXTENDED RELEASE ORAL
Qty: 90 TABLET | Refills: 2 | Status: SHIPPED | OUTPATIENT
Start: 2020-04-03 | End: 2021-02-18

## 2020-04-10 ENCOUNTER — PATIENT MESSAGE (OUTPATIENT)
Dept: ALLERGY | Facility: CLINIC | Age: 65
End: 2020-04-10

## 2020-04-20 ENCOUNTER — PATIENT MESSAGE (OUTPATIENT)
Dept: ALLERGY | Facility: CLINIC | Age: 65
End: 2020-04-20

## 2020-04-20 DIAGNOSIS — L50.8 CHRONIC URTICARIA: Primary | ICD-10-CM

## 2020-04-20 RX ORDER — PREDNISONE 10 MG/1
TABLET ORAL
Qty: 40 TABLET | Refills: 0 | Status: SHIPPED | OUTPATIENT
Start: 2020-04-20 | End: 2020-04-21 | Stop reason: SDUPTHER

## 2020-04-21 DIAGNOSIS — L50.8 CHRONIC URTICARIA: ICD-10-CM

## 2020-04-21 RX ORDER — PREDNISONE 10 MG/1
TABLET ORAL
Qty: 40 TABLET | Refills: 0 | Status: SHIPPED | OUTPATIENT
Start: 2020-04-21 | End: 2020-07-30

## 2020-04-23 ENCOUNTER — CLINICAL SUPPORT (OUTPATIENT)
Dept: ALLERGY | Facility: CLINIC | Age: 65
End: 2020-04-23
Payer: COMMERCIAL

## 2020-04-23 DIAGNOSIS — L50.8 CHRONIC URTICARIA: Primary | ICD-10-CM

## 2020-04-23 PROCEDURE — 96372 PR INJECTION,THERAP/PROPH/DIAG2ST, IM OR SUBCUT: ICD-10-PCS | Mod: S$GLB,,, | Performed by: ALLERGY & IMMUNOLOGY

## 2020-04-23 PROCEDURE — 96373 THER/PROPH/DIAG INJ IA: CPT | Mod: S$GLB,,, | Performed by: INTERNAL MEDICINE

## 2020-04-23 PROCEDURE — 99499 NO LOS: ICD-10-PCS | Mod: S$GLB,,, | Performed by: ALLERGY & IMMUNOLOGY

## 2020-04-23 PROCEDURE — 99999 PR PBB SHADOW E&M-EST. PATIENT-LVL I: ICD-10-PCS | Mod: PBBFAC,,,

## 2020-04-23 PROCEDURE — 96372 THER/PROPH/DIAG INJ SC/IM: CPT | Mod: S$GLB,,, | Performed by: ALLERGY & IMMUNOLOGY

## 2020-04-23 PROCEDURE — 99999 PR PBB SHADOW E&M-EST. PATIENT-LVL I: CPT | Mod: PBBFAC,,,

## 2020-04-23 PROCEDURE — 96373 PR INJECTION,THERAP/PROPH/DIAGNOST, INTRA-ARTERIAL: ICD-10-PCS | Mod: S$GLB,,, | Performed by: INTERNAL MEDICINE

## 2020-04-23 PROCEDURE — 99499 UNLISTED E&M SERVICE: CPT | Mod: S$GLB,,, | Performed by: ALLERGY & IMMUNOLOGY

## 2020-04-23 NOTE — PROGRESS NOTES
.No rxn noted; pt released at this time.  Next injection appointment scheduled per pt preferences

## 2020-04-23 NOTE — PROGRESS NOTES
.Pt identifiers verified. 2 person medication verification performed         Xolair 150mg given per order subcutaneous injection to upper left arm  Xolair 150mg given per order subcutaneous injection to upper right arm     Aseptic technique utilized. Bandage applied. Pt tolerated well.        Pt instructed to remain in clinic for 30 minutes.  Verbalized understanding/[facility]

## 2020-05-21 ENCOUNTER — CLINICAL SUPPORT (OUTPATIENT)
Dept: ALLERGY | Facility: CLINIC | Age: 65
End: 2020-05-21
Payer: COMMERCIAL

## 2020-05-21 DIAGNOSIS — L50.8 CHRONIC URTICARIA: Primary | ICD-10-CM

## 2020-05-21 PROCEDURE — 99499 UNLISTED E&M SERVICE: CPT | Mod: S$GLB,,, | Performed by: ALLERGY & IMMUNOLOGY

## 2020-05-21 PROCEDURE — 96372 PR INJECTION,THERAP/PROPH/DIAG2ST, IM OR SUBCUT: ICD-10-PCS | Mod: S$GLB,,, | Performed by: ALLERGY & IMMUNOLOGY

## 2020-05-21 PROCEDURE — 99499 NO LOS: ICD-10-PCS | Mod: S$GLB,,, | Performed by: ALLERGY & IMMUNOLOGY

## 2020-05-21 PROCEDURE — 96372 THER/PROPH/DIAG INJ SC/IM: CPT | Mod: S$GLB,,, | Performed by: ALLERGY & IMMUNOLOGY

## 2020-05-21 NOTE — PROGRESS NOTES
.No rxn noted; pt released at this time.  Next  injection appointment scheduled per pt preferences

## 2020-05-21 NOTE — PROGRESS NOTES
..Pt identifiers verified. 2 person medication verification performed         Xolair 150mg given per order subcutaneous injection to upper left arm  Xolair 150mg given per order subcutaneous injection to upper right arm     Aseptic technique utilized. Bandage applied. Pt tolerated well.        Pt instructed to remain in clinic for 30 minutes.  Verbalized understanding/[facility]

## 2020-06-11 RX ORDER — ATORVASTATIN CALCIUM 10 MG/1
TABLET, FILM COATED ORAL
Qty: 90 TABLET | Refills: 1 | Status: SHIPPED | OUTPATIENT
Start: 2020-06-11 | End: 2020-11-30

## 2020-06-11 RX ORDER — HYDROCHLOROTHIAZIDE 25 MG/1
TABLET ORAL
Qty: 90 TABLET | Refills: 1 | Status: SHIPPED | OUTPATIENT
Start: 2020-06-11 | End: 2020-11-30

## 2020-06-11 RX ORDER — POTASSIUM CHLORIDE 750 MG/1
CAPSULE, EXTENDED RELEASE ORAL
Qty: 90 CAPSULE | Refills: 1 | Status: SHIPPED | OUTPATIENT
Start: 2020-06-11 | End: 2020-11-30

## 2020-06-11 RX ORDER — BUPROPION HYDROCHLORIDE 300 MG/1
TABLET ORAL
Qty: 90 TABLET | Refills: 1 | Status: SHIPPED | OUTPATIENT
Start: 2020-06-11 | End: 2020-11-30

## 2020-06-25 ENCOUNTER — CLINICAL SUPPORT (OUTPATIENT)
Dept: ALLERGY | Facility: CLINIC | Age: 65
End: 2020-06-25
Payer: COMMERCIAL

## 2020-06-25 DIAGNOSIS — L50.8 CHRONIC URTICARIA: Primary | ICD-10-CM

## 2020-06-25 PROCEDURE — 99999 PR PBB SHADOW E&M-EST. PATIENT-LVL I: ICD-10-PCS | Mod: PBBFAC,,,

## 2020-06-25 PROCEDURE — 99999 PR PBB SHADOW E&M-EST. PATIENT-LVL I: CPT | Mod: PBBFAC,,,

## 2020-06-25 PROCEDURE — 96372 THER/PROPH/DIAG INJ SC/IM: CPT | Mod: S$GLB,,, | Performed by: ALLERGY & IMMUNOLOGY

## 2020-06-25 PROCEDURE — 96372 PR INJECTION,THERAP/PROPH/DIAG2ST, IM OR SUBCUT: ICD-10-PCS | Mod: S$GLB,,, | Performed by: ALLERGY & IMMUNOLOGY

## 2020-06-25 NOTE — PROGRESS NOTES
.No rxn noted; pt released at this time.  Next Xolair injection appointment scheduled per pt preferences

## 2020-06-25 NOTE — PROGRESS NOTES
..Pt identifiers verified. 2 person medication verification performed         Xolair 150mg given per order subcutaneous injection to upper left arm  Xolair 150mg given per order subcutaneous injection to upper right arm     Aseptic technique utilized. Bandage applied. Pt tolerated well.        Pt instructed to remain in clinic for 30 minutes.  Verbalized understanding/[facility]

## 2020-07-30 ENCOUNTER — LAB VISIT (OUTPATIENT)
Dept: LAB | Facility: HOSPITAL | Age: 65
End: 2020-07-30
Attending: FAMILY MEDICINE
Payer: COMMERCIAL

## 2020-07-30 ENCOUNTER — OFFICE VISIT (OUTPATIENT)
Dept: FAMILY MEDICINE | Facility: CLINIC | Age: 65
End: 2020-07-30
Payer: COMMERCIAL

## 2020-07-30 VITALS
HEIGHT: 59 IN | BODY MASS INDEX: 33.67 KG/M2 | TEMPERATURE: 98 F | WEIGHT: 167 LBS | SYSTOLIC BLOOD PRESSURE: 126 MMHG | HEART RATE: 70 BPM | DIASTOLIC BLOOD PRESSURE: 82 MMHG

## 2020-07-30 DIAGNOSIS — M79.604 LEG PAIN, BILATERAL: ICD-10-CM

## 2020-07-30 DIAGNOSIS — M79.604 LEG PAIN, BILATERAL: Primary | ICD-10-CM

## 2020-07-30 DIAGNOSIS — D50.0 IRON DEFICIENCY ANEMIA DUE TO CHRONIC BLOOD LOSS: ICD-10-CM

## 2020-07-30 DIAGNOSIS — M79.605 LEG PAIN, BILATERAL: Primary | ICD-10-CM

## 2020-07-30 DIAGNOSIS — M79.605 LEG PAIN, BILATERAL: ICD-10-CM

## 2020-07-30 LAB
BASOPHILS # BLD AUTO: 0 K/UL (ref 0–0.2)
BASOPHILS NFR BLD: 0 % (ref 0–1.9)
DIFFERENTIAL METHOD: ABNORMAL
EOSINOPHIL # BLD AUTO: 0 K/UL (ref 0–0.5)
EOSINOPHIL NFR BLD: 0 % (ref 0–8)
ERYTHROCYTE [DISTWIDTH] IN BLOOD BY AUTOMATED COUNT: 12.9 % (ref 11.5–14.5)
FERRITIN SERPL-MCNC: 57 NG/ML (ref 20–300)
HCT VFR BLD AUTO: 44.4 % (ref 37–48.5)
HGB BLD-MCNC: 14 G/DL (ref 12–16)
IMM GRANULOCYTES # BLD AUTO: 0.03 K/UL (ref 0–0.04)
IMM GRANULOCYTES NFR BLD AUTO: 0.7 % (ref 0–0.5)
IRON SERPL-MCNC: 84 UG/DL (ref 30–160)
LYMPHOCYTES # BLD AUTO: 1.3 K/UL (ref 1–4.8)
LYMPHOCYTES NFR BLD: 29.1 % (ref 18–48)
MCH RBC QN AUTO: 28.9 PG (ref 27–31)
MCHC RBC AUTO-ENTMCNC: 31.5 G/DL (ref 32–36)
MCV RBC AUTO: 92 FL (ref 82–98)
MONOCYTES # BLD AUTO: 0.5 K/UL (ref 0.3–1)
MONOCYTES NFR BLD: 11.1 % (ref 4–15)
NEUTROPHILS # BLD AUTO: 2.7 K/UL (ref 1.8–7.7)
NEUTROPHILS NFR BLD: 59.1 % (ref 38–73)
NRBC BLD-RTO: 0 /100 WBC
PLATELET # BLD AUTO: 207 K/UL (ref 150–350)
PMV BLD AUTO: 11.7 FL (ref 9.2–12.9)
RBC # BLD AUTO: 4.84 M/UL (ref 4–5.4)
SATURATED IRON: 21 % (ref 20–50)
TOTAL IRON BINDING CAPACITY: 401 UG/DL (ref 250–450)
TRANSFERRIN SERPL-MCNC: 271 MG/DL (ref 200–375)
WBC # BLD AUTO: 4.61 K/UL (ref 3.9–12.7)

## 2020-07-30 PROCEDURE — 3079F PR MOST RECENT DIASTOLIC BLOOD PRESSURE 80-89 MM HG: ICD-10-PCS | Mod: CPTII,S$GLB,, | Performed by: FAMILY MEDICINE

## 2020-07-30 PROCEDURE — 82728 ASSAY OF FERRITIN: CPT

## 2020-07-30 PROCEDURE — 3079F DIAST BP 80-89 MM HG: CPT | Mod: CPTII,S$GLB,, | Performed by: FAMILY MEDICINE

## 2020-07-30 PROCEDURE — 3008F BODY MASS INDEX DOCD: CPT | Mod: CPTII,S$GLB,, | Performed by: FAMILY MEDICINE

## 2020-07-30 PROCEDURE — 85025 COMPLETE CBC W/AUTO DIFF WBC: CPT

## 2020-07-30 PROCEDURE — 36415 COLL VENOUS BLD VENIPUNCTURE: CPT | Mod: PO

## 2020-07-30 PROCEDURE — 3074F SYST BP LT 130 MM HG: CPT | Mod: CPTII,S$GLB,, | Performed by: FAMILY MEDICINE

## 2020-07-30 PROCEDURE — 99999 PR PBB SHADOW E&M-EST. PATIENT-LVL III: ICD-10-PCS | Mod: PBBFAC,,, | Performed by: FAMILY MEDICINE

## 2020-07-30 PROCEDURE — 99213 PR OFFICE/OUTPT VISIT, EST, LEVL III, 20-29 MIN: ICD-10-PCS | Mod: S$GLB,,, | Performed by: FAMILY MEDICINE

## 2020-07-30 PROCEDURE — 99999 PR PBB SHADOW E&M-EST. PATIENT-LVL III: CPT | Mod: PBBFAC,,, | Performed by: FAMILY MEDICINE

## 2020-07-30 PROCEDURE — 3008F PR BODY MASS INDEX (BMI) DOCUMENTED: ICD-10-PCS | Mod: CPTII,S$GLB,, | Performed by: FAMILY MEDICINE

## 2020-07-30 PROCEDURE — 99213 OFFICE O/P EST LOW 20 MIN: CPT | Mod: S$GLB,,, | Performed by: FAMILY MEDICINE

## 2020-07-30 PROCEDURE — 83540 ASSAY OF IRON: CPT

## 2020-07-30 PROCEDURE — 3074F PR MOST RECENT SYSTOLIC BLOOD PRESSURE < 130 MM HG: ICD-10-PCS | Mod: CPTII,S$GLB,, | Performed by: FAMILY MEDICINE

## 2020-07-30 RX ORDER — DIPHENHYDRAMINE HCL 25 MG
25 CAPSULE ORAL
COMMUNITY
End: 2023-06-02

## 2020-07-30 RX ORDER — POLYETHYLENE GLYCOL 3350 17 G/17G
POWDER, FOR SOLUTION ORAL
COMMUNITY
End: 2023-06-02

## 2020-07-30 RX ORDER — CETIRIZINE HYDROCHLORIDE 10 MG/1
10 TABLET ORAL 2 TIMES DAILY
COMMUNITY
End: 2023-06-02

## 2020-07-31 NOTE — PROGRESS NOTES
Patient complains of some discomfort in the legs at night.  Describes aching intermittent symptoms.  More when she lies down.  She was concerned regarding possible restless leg.  Does not notice symptoms in the car.  Does not notice significant change with movement.  Denies any back pain.  No claudication.  Similar symptoms in the past resolved with treatment with iron when she had iron deficiency, resolved.        Cheyenne was seen today for leg pain.    Diagnoses and all orders for this visit:    Leg pain, bilateral  -     CBC auto differential; Future  -     Iron and TIBC; Future  -     Ferritin; Future    Iron deficiency anemia due to chronic blood loss  -     CBC auto differential; Future  -     Iron and TIBC; Future  -     Ferritin; Future      Laboratory as above.  If no evidence iron deficiency consider treatment restless legs.            Past Medical History:  Past Medical History:   Diagnosis Date    Carpal tunnel syndrome of right wrist     Chronic gastritis     Dissociative fugue     Diverticulosis     Duodenitis     Dyspepsia     Generalized anxiety disorder     Hearing loss on left     Hearing loss on right     Helicobacter pylori (H. pylori)     History of blood transfusion     Hyperlipidemia     Hypertension     Hypothyroidism     IBS (irritable bowel syndrome)     Iron deficiency anemia     Mild mitral regurgitation by prior echocardiogram     Mild obesity     Mild vitamin D deficiency     Paraesophageal hiatal hernia     PONV (postoperative nausea and vomiting)     Reflux gastritis     Sleep apnea     mild improved with wt. loss    Thyroid goiter     Urticaria, chronic      Past Surgical History:   Procedure Laterality Date    BLADDER SUSPENSION      pubovaginal sling     SECTION, CLASSIC      CHOLECYSTECTOMY      COLONOSCOPY  2011    Dr. Goode, in legacy:diverticulosis, hemorrhoids, melanosis coli-repeat 10 years    COLONOSCOPY N/A 2018     Procedure: COLONOSCOPY;  Surgeon: Rusty Vogt Jr., MD;  Location: [facility] ENDO;  Service: Endoscopy;  Laterality: N/A; repeat in 10 years for screening    ESOPHAGEAL DILATION      ESOPHAGOGASTRODUODENOSCOPY  03/04/2016    ESOPHAGOGASTRODUODENOSCOPY N/A 9/6/2018    Procedure: EGD (ESOPHAGOGASTRODUODENOSCOPY);  Surgeon: Rusty Vogt Jr., MD;  Location: [facility] ENDO;  Service: Endoscopy;  Laterality: N/A;    HERNIA REPAIR      INTRALUMINAL GASTROINTESTINAL TRACT IMAGING VIA CAPSULE N/A 9/26/2018    Procedure: IMAGING PROCEDURE, GI TRACT, INTRALUMINAL, VIA CAPSULE;  Surgeon: Loraine Velazquez MD;  Location: [facility] ENDO;  Service: Endoscopy;  Laterality: N/A;    LAPAROSCOPIC NISSEN FUNDOPLICATION N/A 11/29/2018    Procedure: FUNDOPLICATION, NISSEN, LAPAROSCOPIC;  Surgeon: Frank Akins MD;  Location: [facility] OR;  Service: General;  Laterality: N/A;    THYROIDECTOMY, PARTIAL       partial for benign nodule    UPPER GASTROINTESTINAL ENDOSCOPY  03/04/2016    Dr. Whalen, in care everywhere     Review of patient's allergies indicates:   Allergen Reactions    Penicillins      Current Outpatient Medications on File Prior to Visit   Medication Sig Dispense Refill    ALPRAZolam (XANAX) 0.25 MG tablet Take 1 tablet (0.25 mg total) by mouth 3 (three) times daily as needed for Anxiety. 21 tablet 0    atorvastatin (LIPITOR) 10 MG tablet TAKE 1 TABLET ONCE DAILY 90 tablet 1    buPROPion (WELLBUTRIN XL) 300 MG 24 hr tablet TAKE 1 TABLET ONCE DAILY 90 tablet 1    CALCIUM CARBONATE/VITAMIN D3 (CALTRATE 600 + D ORAL) Take by mouth 2 (two) times daily.      cetirizine (ZYRTEC) 10 MG tablet Take 10 mg by mouth 2 (two) times a day.      diphenhydrAMINE (BENADRYL) 25 mg capsule Take 25 mg by mouth as needed for Itching.      escitalopram oxalate (LEXAPRO) 20 MG tablet TAKE 1 TABLET ONCE DAILY 90 tablet 3    famotidine (PEPCID AC ORAL) Take by mouth 2 (two) times a day.      felodipine (PLENDIL) 5 MG 24 hr tablet TAKE 1  TABLET BY MOUTH EVERY DAY 90 tablet 2    hydroCHLOROthiazide (HYDRODIURIL) 25 MG tablet TAKE 1 TABLET ONCE DAILY 90 tablet 1    omalizumab (XOLAIR) 150 mg/mL injection Inject 2 mLs (300 mg total) into the skin every 28 days. 2 Syringe 12    OMEGA-3 FATTY ACIDS-EPA ORAL Take by mouth once daily.      pantoprazole (PROTONIX) 40 MG tablet Take 1 tablet (40 mg total) by mouth once daily. 90 tablet 3    polyethylene glycol (GLYCOLAX) 17 gram PwPk Take by mouth.      potassium chloride (MICRO-K) 10 MEQ CpSR TAKE 1 CAPSULE ONCE DAILY 90 capsule 1    SYNTHROID 50 mcg tablet TAKE 1 TABLET DAILY 90 tablet 3    VIT A/VIT C/VIT E/ZINC/COPPER (PRESERVISION AREDS ORAL) Take by mouth once daily.      [DISCONTINUED] multivitamin with minerals tablet Take 1 tablet by mouth.      estradiol (VAGIFEM) 10 mcg Tab Place 10 mcg vaginally.      [DISCONTINUED] butalbital-acetaminophen-caffeine -40 mg (FIORICET, ESGIC) -40 mg per tablet Take 1 tablet by mouth.      [DISCONTINUED] lactulose (CHRONULAC) 10 gram/15 mL solution TAKE 1 TABLESPOONFUL (=15ML= 10GM) TWO TIMES A DAY (Patient not taking: Reported on 7/30/2020) 946 mL 3    [DISCONTINUED] predniSONE (DELTASONE) 10 MG tablet 4 TABS ON DAY 1, 3 TABS ON DAY 2,2 TAB DAILY ON DAYS 3-5 THEN 1 TAB DAILY TIL START XOLAIR FOR HIVES (Patient not taking: Reported on 7/30/2020) 40 tablet 0     Current Facility-Administered Medications on File Prior to Visit   Medication Dose Route Frequency Provider Last Rate Last Dose    omalizumab injection 300 mg  300 mg Subcutaneous Q28 Days Sydney Romero MD   300 mg at 06/25/20 1005     Social History     Socioeconomic History    Marital status:      Spouse name: Not on file    Number of children: Not on file    Years of education: Not on file    Highest education level: Not on file   Occupational History    Not on file   Social Needs    Financial resource strain: Not hard at all    Food insecurity     Worry: Never  "true     Inability: Never true    Transportation needs     Medical: No     Non-medical: No   Tobacco Use    Smoking status: Never Smoker    Smokeless tobacco: Never Used   Substance and Sexual Activity    Alcohol use: Yes     Frequency: Monthly or less     Drinks per session: 3 or 4     Binge frequency: Never     Comment: seldom    Drug use: No    Sexual activity: Yes   Lifestyle    Physical activity     Days per week: 2 days     Minutes per session: 20 min    Stress: Only a little   Relationships    Social connections     Talks on phone: Once a week     Gets together: Never     Attends [employer] service: Not on file     Active member of club or organization: Yes     Attends meetings of clubs or organizations: More than 4 times per year     Relationship status:    Other Topics Concern    Not on file   Social History Narrative    Not on file     Family History   Problem Relation Age of Onset    Hypertension Mother     Heart disease Mother     Ulcers Mother     GI problems Mother         colitis    Hypertension Father     Breast cancer Paternal Grandmother     Colon cancer Neg Hx     Crohn's disease Neg Hx     Colon polyps Neg Hx     Ulcerative colitis Neg Hx     Stomach cancer Neg Hx     Esophageal cancer Neg Hx     Allergic rhinitis Neg Hx     Allergies Neg Hx     Angioedema Neg Hx     Atopy Neg Hx     Eczema Neg Hx     Immunodeficiency Neg Hx     Rhinitis Neg Hx     Urticaria Neg Hx     Asthma Neg Hx            ROS:  GENERAL: No fever, chills,  or significant weight changes.   CARDIOVASCULAR: Denies chest pain, PND, orthopnea or reduced exercise tolerance.  ABDOMEN: Appetite fine. Denies diarrhea, abdominal pain, hematemesis or blood in stool.  URINARY: No flank pain, dysuria or hematuria.    Vitals:    07/30/20 0903   BP: 126/82   Pulse: 70   Temp: 97.7 °F (36.5 °C)   Weight: 75.8 kg (167 lb)   Height: 4' 11" (1.499 m)   Answers for HPI/ROS submitted by the patient on " 7/30/2020   activity change: No  unexpected weight change: No  neck pain: No  hearing loss: Yes  rhinorrhea: No  trouble swallowing: No  eye discharge: No  visual disturbance: No  chest tightness: No  wheezing: No  chest pain: No  palpitations: No  blood in stool: No  constipation: No  vomiting: No  diarrhea: No  polydipsia: No  polyuria: No  difficulty urinating: No  hematuria: No  menstrual problem: No  dysuria: No  joint swelling: No  arthralgias: No  headaches: No  weakness: No  confusion: No  dysphoric mood: No    Wt Readings from Last 3 Encounters:   07/30/20 75.8 kg (167 lb)   01/07/20 76.2 kg (168 lb)   01/07/20 76.5 kg (168 lb 9.6 oz)       OBJECTIVE:   APPEARANCE: Well nourished, well developed, in no acute distress.    HEAD: Normocephalic.  Atraumatic.  No sinus tenderness.  EYES:   Right eye: Pupil reactive.  Conjunctiva clear.    Left eye: Pupil reactive.  Conjunctiva clear.  EOMI.    EARS: TM's intact. Light reflex normal. No retraction or perforation.    NECK: Supple. No bruits.  No JVD.  No cervical lymphadenopathy.  No thyromegaly.    CHEST: Breath sounds clear bilaterally.  Normal respiratory effort  CARDIOVASCULAR: Normal rate.  Regular rhythm.  No murmurs.  No rub.  No gallops.  ABDOMEN: Bowel sounds normal.  Soft.  No tenderness.  No organomegaly.  PERIPHERAL VASCULAR: No cyanosis.  No clubbing.  No edema.  Pedal pulses palpable.  NEUROLOGIC: No focal findings.  MENTAL STATUS: Alert.  Oriented x 3.

## 2020-08-07 ENCOUNTER — CLINICAL SUPPORT (OUTPATIENT)
Dept: ALLERGY | Facility: CLINIC | Age: 65
End: 2020-08-07
Payer: COMMERCIAL

## 2020-08-07 DIAGNOSIS — L50.8 CHRONIC URTICARIA: Primary | ICD-10-CM

## 2020-08-07 PROCEDURE — 99499 NO LOS: ICD-10-PCS | Mod: S$GLB,,, | Performed by: ALLERGY & IMMUNOLOGY

## 2020-08-07 PROCEDURE — 99999 PR PBB SHADOW E&M-EST. PATIENT-LVL I: ICD-10-PCS | Mod: PBBFAC,,,

## 2020-08-07 PROCEDURE — 96372 THER/PROPH/DIAG INJ SC/IM: CPT | Mod: S$GLB,,, | Performed by: ALLERGY & IMMUNOLOGY

## 2020-08-07 PROCEDURE — 96372 PR INJECTION,THERAP/PROPH/DIAG2ST, IM OR SUBCUT: ICD-10-PCS | Mod: S$GLB,,, | Performed by: ALLERGY & IMMUNOLOGY

## 2020-08-07 PROCEDURE — 99499 UNLISTED E&M SERVICE: CPT | Mod: S$GLB,,, | Performed by: ALLERGY & IMMUNOLOGY

## 2020-08-07 PROCEDURE — 99999 PR PBB SHADOW E&M-EST. PATIENT-LVL I: CPT | Mod: PBBFAC,,,

## 2020-08-07 NOTE — PROGRESS NOTES
..Pt identifiers verified. 2 person medication verification performed         Xolair 150mg given per order subcutaneous injection to upper left arm  Xolair 150mg given per order subcutaneous injection to upper right arm     Aseptic technique utilized. Bandage applied. Pt tolerated well.        Pt instructed to remain in clinic for 30 minutes.  Verbalized understanding/[facility]

## 2020-08-17 ENCOUNTER — TELEPHONE (OUTPATIENT)
Dept: FAMILY MEDICINE | Facility: CLINIC | Age: 65
End: 2020-08-17

## 2020-08-17 RX ORDER — GABAPENTIN 300 MG/1
300 CAPSULE ORAL NIGHTLY
Qty: 90 CAPSULE | Refills: 1 | Status: SHIPPED | OUTPATIENT
Start: 2020-08-17 | End: 2021-02-23 | Stop reason: SDUPTHER

## 2020-08-17 NOTE — TELEPHONE ENCOUNTER
Prescription sent for gabapentin 300 mg nightly.  Try this for couple of weeks and follow-up appointment afterwards.

## 2020-08-17 NOTE — TELEPHONE ENCOUNTER
----- Message from Tania Pool MA sent at 8/17/2020 11:17 AM CDT -----  She would like to try medication, she can't sleep at night.

## 2020-09-08 ENCOUNTER — PATIENT OUTREACH (OUTPATIENT)
Dept: ADMINISTRATIVE | Facility: OTHER | Age: 65
End: 2020-09-08

## 2020-09-08 ENCOUNTER — CLINICAL SUPPORT (OUTPATIENT)
Dept: ALLERGY | Facility: CLINIC | Age: 65
End: 2020-09-08
Payer: COMMERCIAL

## 2020-09-08 DIAGNOSIS — L50.8 CHRONIC URTICARIA: Primary | ICD-10-CM

## 2020-09-08 PROCEDURE — 99499 UNLISTED E&M SERVICE: CPT | Mod: S$GLB,,, | Performed by: ALLERGY & IMMUNOLOGY

## 2020-09-08 PROCEDURE — 96372 PR INJECTION,THERAP/PROPH/DIAG2ST, IM OR SUBCUT: ICD-10-PCS | Mod: S$GLB,,, | Performed by: ALLERGY & IMMUNOLOGY

## 2020-09-08 PROCEDURE — 99999 PR PBB SHADOW E&M-EST. PATIENT-LVL I: ICD-10-PCS | Mod: PBBFAC,,,

## 2020-09-08 PROCEDURE — 99999 PR PBB SHADOW E&M-EST. PATIENT-LVL I: CPT | Mod: PBBFAC,,,

## 2020-09-08 PROCEDURE — 99499 NO LOS: ICD-10-PCS | Mod: S$GLB,,, | Performed by: ALLERGY & IMMUNOLOGY

## 2020-09-08 PROCEDURE — 96372 THER/PROPH/DIAG INJ SC/IM: CPT | Mod: S$GLB,,, | Performed by: ALLERGY & IMMUNOLOGY

## 2020-09-08 NOTE — PROGRESS NOTES
..Pt identifiers verified. 2 person medication verification performed         Xolair 150mg given per order subcutaneous injection to upper left arm  Xolair 150mg given per order subcutaneous injection to upper right arm     Aseptic technique utilized. Bandage applied. Pt tolerated well.        Pt instructed to remain in clinic for 30 minutes.  Verbalized understanding/[facility]

## 2020-09-08 NOTE — PROGRESS NOTES
No rxn noted; pt released at this time.  Next Xolair injection appointment scheduled per pt preferences

## 2020-09-08 NOTE — PROGRESS NOTES
Chart reviewed.   Immunizations: Triggered Imm Registry     Orders placed: n/a  Upcoming appts to satisfy AANHI topics: n/a

## 2020-10-08 ENCOUNTER — CLINICAL SUPPORT (OUTPATIENT)
Dept: ALLERGY | Facility: CLINIC | Age: 65
End: 2020-10-08
Payer: COMMERCIAL

## 2020-10-08 DIAGNOSIS — L50.8 CHRONIC URTICARIA: Primary | ICD-10-CM

## 2020-10-08 PROCEDURE — 96372 PR INJECTION,THERAP/PROPH/DIAG2ST, IM OR SUBCUT: ICD-10-PCS | Mod: S$GLB,,, | Performed by: ALLERGY & IMMUNOLOGY

## 2020-10-08 PROCEDURE — 99999 PR PBB SHADOW E&M-EST. PATIENT-LVL I: CPT | Mod: PBBFAC,,,

## 2020-10-08 PROCEDURE — 99999 PR PBB SHADOW E&M-EST. PATIENT-LVL I: ICD-10-PCS | Mod: PBBFAC,,,

## 2020-10-08 PROCEDURE — 99499 UNLISTED E&M SERVICE: CPT | Mod: S$GLB,,, | Performed by: ALLERGY & IMMUNOLOGY

## 2020-10-08 PROCEDURE — 99499 NO LOS: ICD-10-PCS | Mod: S$GLB,,, | Performed by: ALLERGY & IMMUNOLOGY

## 2020-10-08 PROCEDURE — 96372 THER/PROPH/DIAG INJ SC/IM: CPT | Mod: S$GLB,,, | Performed by: ALLERGY & IMMUNOLOGY

## 2020-10-08 NOTE — PROGRESS NOTES
..Pt identifiers verified. 2 person medication verification performed         Xolair 150mg given per order subcutaneous injection to upper left arm  Xolair 150mg given per order subcutaneous injection to upper right arm     Aseptic technique utilized. Bandage applied. Pt tolerated well.        Pt instructed to remain in clinic for 30 minutes.  Verbalized understanding/[facility]

## 2020-10-12 ENCOUNTER — PATIENT MESSAGE (OUTPATIENT)
Dept: FAMILY MEDICINE | Facility: CLINIC | Age: 65
End: 2020-10-12

## 2020-11-06 ENCOUNTER — PATIENT OUTREACH (OUTPATIENT)
Dept: ADMINISTRATIVE | Facility: OTHER | Age: 65
End: 2020-11-06

## 2020-11-06 NOTE — PROGRESS NOTES
Health Maintenance Due   Topic Date Due    HIV Screening  03/22/1970    Shingles Vaccine (2 of 3) 01/10/2018    Influenza Vaccine (1) 08/01/2020     Updates were requested from care everywhere.  Chart was reviewed for overdue Proactive [facility] Encounters (ANAHI) topics (CRS, Breast Cancer Screening, Eye exam)  Health Maintenance has been updated.  LINKS immunization registry triggered.  Immunizations were reconciled.

## 2020-11-09 ENCOUNTER — OFFICE VISIT (OUTPATIENT)
Dept: ALLERGY | Facility: CLINIC | Age: 65
End: 2020-11-09
Payer: COMMERCIAL

## 2020-11-09 ENCOUNTER — IMMUNIZATION (OUTPATIENT)
Dept: PHARMACY | Facility: CLINIC | Age: 65
End: 2020-11-09
Payer: COMMERCIAL

## 2020-11-09 VITALS — BODY MASS INDEX: 30.14 KG/M2 | HEIGHT: 59 IN | TEMPERATURE: 98 F | WEIGHT: 149.5 LBS

## 2020-11-09 DIAGNOSIS — L50.8 CHRONIC URTICARIA: Primary | ICD-10-CM

## 2020-11-09 PROCEDURE — 3008F BODY MASS INDEX DOCD: CPT | Mod: CPTII,S$GLB,, | Performed by: ALLERGY & IMMUNOLOGY

## 2020-11-09 PROCEDURE — 99213 OFFICE O/P EST LOW 20 MIN: CPT | Mod: S$GLB,,, | Performed by: ALLERGY & IMMUNOLOGY

## 2020-11-09 PROCEDURE — 99999 PR PBB SHADOW E&M-EST. PATIENT-LVL IV: ICD-10-PCS | Mod: PBBFAC,,, | Performed by: ALLERGY & IMMUNOLOGY

## 2020-11-09 PROCEDURE — 3008F PR BODY MASS INDEX (BMI) DOCUMENTED: ICD-10-PCS | Mod: CPTII,S$GLB,, | Performed by: ALLERGY & IMMUNOLOGY

## 2020-11-09 PROCEDURE — 99213 PR OFFICE/OUTPT VISIT, EST, LEVL III, 20-29 MIN: ICD-10-PCS | Mod: S$GLB,,, | Performed by: ALLERGY & IMMUNOLOGY

## 2020-11-09 PROCEDURE — 99999 PR PBB SHADOW E&M-EST. PATIENT-LVL IV: CPT | Mod: PBBFAC,,, | Performed by: ALLERGY & IMMUNOLOGY

## 2020-11-09 NOTE — PROGRESS NOTES
Subjective:       Patient ID: Cheyenne West is a 65 y.o. female.    Chief Complaint:  No chief complaint on file.      63 yo woman presents for continued evaluation of chronic urticaria. She was last seen 12/4/2019. She has 30+ year history of hives off and on. 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 She was on cetirizine 20 mg BID, famotidine 20 mg BID and not controlled so started Xolair 300 mg every 28 days. She has been on this since 1/30/2020. This has worked well, no more  Hives. She had 1-2 hies about 2 days prior to shot a few months ago but nothing more. She occ has burning of her skin but no hives. She is on zyrtec but 10 mg BID and Pepcid 20 mg BID. However Xolair cost $445 per month and she is wondering if she can stop. No new medical issues.    Prior History taken 12/4/2019: consult from Dr Juan Buck for chronic hives.  she states she has had them on and off for over 30 years. She has seen allergist in past and was on prednisone for while as was only thing that helps. Recently saw rheum and ruled out other autoimmune issues. She does have hypothyroid controlled on meds. Her hives are red raised and itch and burn. Will last entire day then fade and come back occ leaves a bruise after. No swelling. No SOB. Rheum prescribed sulfasalazine and plaquenil but she has not started. She takes benadryl prn but help off last few days. It does help. Claritin, zyrtec not as good but only took once a day in past. Never been on H 2 blocker. She had allergy test in past and told allergic to chocolate, almond, chicken and weed pollen but  none seems to be trigger. She has no rhinitis, asthma or eczema. No insect or latex allergy.       Environmental History: see history section for home environment  Review of Systems   Constitutional: Negative for appetite change, chills, fatigue and fever.   HENT: Negative for congestion, ear discharge,  ear pain, facial swelling, nosebleeds, postnasal drip, rhinorrhea, sinus pressure, sneezing, sore throat, trouble swallowing and voice change.    Eyes: Negative for discharge, redness, itching and visual disturbance.   Respiratory: Negative for cough, choking, chest tightness, shortness of breath and wheezing.    Cardiovascular: Negative for chest pain, palpitations and leg swelling.   Gastrointestinal: Negative for abdominal distention, abdominal pain, constipation, diarrhea, nausea and vomiting.   Genitourinary: Negative for difficulty urinating.   Musculoskeletal: Negative for arthralgias, gait problem, joint swelling and myalgias.   Skin: Positive for color change and rash.   Neurological: Negative for dizziness, syncope, weakness, light-headedness and headaches.   Hematological: Negative for adenopathy. Does not bruise/bleed easily.   Psychiatric/Behavioral: Negative for agitation, behavioral problems, confusion and sleep disturbance. The patient is not nervous/anxious.         Objective:      Physical Exam  Vitals signs and nursing note reviewed.   Constitutional:       General: She is not in acute distress.     Appearance: She is well-developed.   HENT:      Head: Normocephalic and atraumatic.      Right Ear: Hearing, tympanic membrane, ear canal and external ear normal.      Left Ear: Hearing, tympanic membrane, ear canal and external ear normal.      Nose: No septal deviation, mucosal edema or rhinorrhea.      Right Sinus: No maxillary sinus tenderness or frontal sinus tenderness.      Left Sinus: No maxillary sinus tenderness or frontal sinus tenderness.      Mouth/Throat:      Pharynx: Uvula midline. No uvula swelling.   Eyes:      General:         Right eye: No discharge.         Left eye: No discharge.      Conjunctiva/sclera: Conjunctivae normal.   Neck:      Musculoskeletal: Normal range of motion.      Thyroid: No thyromegaly.   Cardiovascular:      Rate and Rhythm: Normal rate and regular rhythm.       Heart sounds: Normal heart sounds. No murmur.   Pulmonary:      Effort: Pulmonary effort is normal. No respiratory distress.      Breath sounds: Normal breath sounds. No wheezing.   Abdominal:      General: There is no distension.      Palpations: Abdomen is soft.      Tenderness: There is no abdominal tenderness.   Musculoskeletal: Normal range of motion.         General: No tenderness.   Lymphadenopathy:      Cervical: No cervical adenopathy.   Skin:     General: Skin is warm and dry.      Findings: No erythema or rash.   Neurological:      Mental Status: She is alert and oriented to person, place, and time.   Psychiatric:         Behavior: Behavior normal.         Thought Content: Thought content normal.         Judgment: Judgment normal.         Laboratory:   none performed   Assessment:       1. Chronic urticaria         Plan:       1. Xolair is very expensive for pt, will fill put paperwork for val from company. advised pt can hold for now until val approved but am concerned with prior history hives will come back  2. continue cetirizine 10-20 mg BID and Pepcid 20 mg BID  3. Can add benadryl as needed

## 2020-11-30 RX ORDER — HYDROCHLOROTHIAZIDE 25 MG/1
TABLET ORAL
Qty: 90 TABLET | Refills: 0 | Status: SHIPPED | OUTPATIENT
Start: 2020-11-30 | End: 2021-02-18

## 2020-11-30 RX ORDER — POTASSIUM CHLORIDE 750 MG/1
CAPSULE, EXTENDED RELEASE ORAL
Qty: 90 CAPSULE | Refills: 0 | Status: SHIPPED | OUTPATIENT
Start: 2020-11-30 | End: 2021-02-18

## 2020-11-30 RX ORDER — ATORVASTATIN CALCIUM 10 MG/1
TABLET, FILM COATED ORAL
Qty: 90 TABLET | Refills: 0 | Status: SHIPPED | OUTPATIENT
Start: 2020-11-30 | End: 2021-02-18

## 2020-11-30 RX ORDER — BUPROPION HYDROCHLORIDE 300 MG/1
TABLET ORAL
Qty: 90 TABLET | Refills: 0 | Status: SHIPPED | OUTPATIENT
Start: 2020-11-30 | End: 2021-02-18

## 2020-12-11 ENCOUNTER — PATIENT MESSAGE (OUTPATIENT)
Dept: ALLERGY | Facility: CLINIC | Age: 65
End: 2020-12-11

## 2020-12-19 ENCOUNTER — PATIENT MESSAGE (OUTPATIENT)
Dept: ALLERGY | Facility: CLINIC | Age: 65
End: 2020-12-19

## 2020-12-21 ENCOUNTER — PATIENT MESSAGE (OUTPATIENT)
Dept: ALLERGY | Facility: CLINIC | Age: 65
End: 2020-12-21

## 2020-12-21 RX ORDER — PREDNISONE 10 MG/1
TABLET ORAL
Qty: 30 TABLET | Refills: 1 | Status: SHIPPED | OUTPATIENT
Start: 2020-12-21 | End: 2021-03-18

## 2021-01-15 ENCOUNTER — PATIENT MESSAGE (OUTPATIENT)
Dept: ALLERGY | Facility: CLINIC | Age: 66
End: 2021-01-15

## 2021-01-15 DIAGNOSIS — L50.1 CHRONIC IDIOPATHIC URTICARIA: Primary | ICD-10-CM

## 2021-01-18 ENCOUNTER — PATIENT MESSAGE (OUTPATIENT)
Dept: ALLERGY | Facility: CLINIC | Age: 66
End: 2021-01-18

## 2021-01-20 DIAGNOSIS — Z12.31 OTHER SCREENING MAMMOGRAM: ICD-10-CM

## 2021-01-25 ENCOUNTER — PATIENT OUTREACH (OUTPATIENT)
Dept: ADMINISTRATIVE | Facility: OTHER | Age: 66
End: 2021-01-25

## 2021-01-26 ENCOUNTER — CLINICAL SUPPORT (OUTPATIENT)
Dept: ALLERGY | Facility: CLINIC | Age: 66
End: 2021-01-26
Payer: COMMERCIAL

## 2021-01-26 DIAGNOSIS — L50.8 CHRONIC URTICARIA: Primary | ICD-10-CM

## 2021-01-26 PROCEDURE — 99999 PR PBB SHADOW E&M-EST. PATIENT-LVL I: ICD-10-PCS | Mod: PBBFAC,,,

## 2021-01-26 PROCEDURE — 99499 NO LOS: ICD-10-PCS | Mod: S$GLB,,, | Performed by: ALLERGY & IMMUNOLOGY

## 2021-01-26 PROCEDURE — 99499 UNLISTED E&M SERVICE: CPT | Mod: S$GLB,,, | Performed by: ALLERGY & IMMUNOLOGY

## 2021-01-26 PROCEDURE — 96372 THER/PROPH/DIAG INJ SC/IM: CPT | Mod: S$GLB,,, | Performed by: ALLERGY & IMMUNOLOGY

## 2021-01-26 PROCEDURE — 99999 PR PBB SHADOW E&M-EST. PATIENT-LVL I: CPT | Mod: PBBFAC,,,

## 2021-01-26 PROCEDURE — 96372 PR INJECTION,THERAP/PROPH/DIAG2ST, IM OR SUBCUT: ICD-10-PCS | Mod: S$GLB,,, | Performed by: ALLERGY & IMMUNOLOGY

## 2021-02-15 RX ORDER — PANTOPRAZOLE SODIUM 40 MG/1
TABLET, DELAYED RELEASE ORAL
Qty: 90 TABLET | Refills: 1 | Status: SHIPPED | OUTPATIENT
Start: 2021-02-15 | End: 2021-02-18

## 2021-02-18 ENCOUNTER — TELEPHONE (OUTPATIENT)
Dept: FAMILY MEDICINE | Facility: CLINIC | Age: 66
End: 2021-02-18

## 2021-02-18 RX ORDER — PANTOPRAZOLE SODIUM 40 MG/1
TABLET, DELAYED RELEASE ORAL
Qty: 90 TABLET | Refills: 0 | Status: SHIPPED | OUTPATIENT
Start: 2021-02-18 | End: 2021-10-29

## 2021-02-18 RX ORDER — BUPROPION HYDROCHLORIDE 300 MG/1
TABLET ORAL
Qty: 90 TABLET | Refills: 0 | Status: SHIPPED | OUTPATIENT
Start: 2021-02-18 | End: 2021-05-28

## 2021-02-18 RX ORDER — POTASSIUM CHLORIDE 750 MG/1
CAPSULE, EXTENDED RELEASE ORAL
Qty: 90 CAPSULE | Refills: 0 | Status: SHIPPED | OUTPATIENT
Start: 2021-02-18 | End: 2021-05-28

## 2021-02-18 RX ORDER — HYDROCHLOROTHIAZIDE 25 MG/1
TABLET ORAL
Qty: 90 TABLET | Refills: 0 | Status: SHIPPED | OUTPATIENT
Start: 2021-02-18 | End: 2021-05-28

## 2021-02-18 RX ORDER — ATORVASTATIN CALCIUM 10 MG/1
TABLET, FILM COATED ORAL
Qty: 90 TABLET | Refills: 0 | Status: SHIPPED | OUTPATIENT
Start: 2021-02-18 | End: 2021-05-28

## 2021-02-18 RX ORDER — FELODIPINE 5 MG/1
TABLET, EXTENDED RELEASE ORAL
Qty: 90 TABLET | Refills: 0 | Status: SHIPPED | OUTPATIENT
Start: 2021-02-18 | End: 2021-05-28

## 2021-02-23 ENCOUNTER — LAB VISIT (OUTPATIENT)
Dept: LAB | Facility: HOSPITAL | Age: 66
End: 2021-02-23
Attending: FAMILY MEDICINE
Payer: COMMERCIAL

## 2021-02-23 ENCOUNTER — OFFICE VISIT (OUTPATIENT)
Dept: FAMILY MEDICINE | Facility: CLINIC | Age: 66
End: 2021-02-23
Payer: COMMERCIAL

## 2021-02-23 VITALS
DIASTOLIC BLOOD PRESSURE: 73 MMHG | HEART RATE: 67 BPM | TEMPERATURE: 98 F | SYSTOLIC BLOOD PRESSURE: 114 MMHG | HEIGHT: 59 IN | BODY MASS INDEX: 29.27 KG/M2 | WEIGHT: 145.19 LBS

## 2021-02-23 DIAGNOSIS — E78.5 HYPERLIPIDEMIA, UNSPECIFIED HYPERLIPIDEMIA TYPE: ICD-10-CM

## 2021-02-23 DIAGNOSIS — L50.8 CHRONIC URTICARIA: ICD-10-CM

## 2021-02-23 DIAGNOSIS — Z98.890 STATUS POST LAPAROSCOPIC NISSEN FUNDOPLICATION: ICD-10-CM

## 2021-02-23 DIAGNOSIS — Z00.00 ROUTINE HISTORY AND PHYSICAL EXAMINATION OF ADULT: Primary | ICD-10-CM

## 2021-02-23 DIAGNOSIS — G25.81 RLS (RESTLESS LEGS SYNDROME): ICD-10-CM

## 2021-02-23 DIAGNOSIS — I10 ESSENTIAL HYPERTENSION: ICD-10-CM

## 2021-02-23 DIAGNOSIS — I34.0 MILD MITRAL REGURGITATION BY PRIOR ECHOCARDIOGRAM: ICD-10-CM

## 2021-02-23 DIAGNOSIS — E61.1 IRON DEFICIENCY: ICD-10-CM

## 2021-02-23 DIAGNOSIS — F41.1 GENERALIZED ANXIETY DISORDER: ICD-10-CM

## 2021-02-23 DIAGNOSIS — K21.00 GASTROESOPHAGEAL REFLUX DISEASE WITH ESOPHAGITIS, UNSPECIFIED WHETHER HEMORRHAGE: ICD-10-CM

## 2021-02-23 DIAGNOSIS — E03.9 HYPOTHYROIDISM, UNSPECIFIED TYPE: ICD-10-CM

## 2021-02-23 DIAGNOSIS — Z00.00 ROUTINE HISTORY AND PHYSICAL EXAMINATION OF ADULT: ICD-10-CM

## 2021-02-23 LAB
ALBUMIN SERPL BCP-MCNC: 3.9 G/DL (ref 3.5–5.2)
ALP SERPL-CCNC: 100 U/L (ref 55–135)
ALT SERPL W/O P-5'-P-CCNC: 34 U/L (ref 10–44)
ANION GAP SERPL CALC-SCNC: 9 MMOL/L (ref 8–16)
AST SERPL-CCNC: 28 U/L (ref 10–40)
BASOPHILS # BLD AUTO: 0 K/UL (ref 0–0.2)
BASOPHILS NFR BLD: 0 % (ref 0–1.9)
BILIRUB SERPL-MCNC: 0.4 MG/DL (ref 0.1–1)
BUN SERPL-MCNC: 15 MG/DL (ref 8–23)
CALCIUM SERPL-MCNC: 9.6 MG/DL (ref 8.7–10.5)
CHLORIDE SERPL-SCNC: 104 MMOL/L (ref 95–110)
CHOLEST SERPL-MCNC: 183 MG/DL (ref 120–199)
CHOLEST/HDLC SERPL: 2.3 {RATIO} (ref 2–5)
CO2 SERPL-SCNC: 30 MMOL/L (ref 23–29)
CREAT SERPL-MCNC: 0.8 MG/DL (ref 0.5–1.4)
DIFFERENTIAL METHOD: ABNORMAL
EOSINOPHIL # BLD AUTO: 0 K/UL (ref 0–0.5)
EOSINOPHIL NFR BLD: 0 % (ref 0–8)
ERYTHROCYTE [DISTWIDTH] IN BLOOD BY AUTOMATED COUNT: 13.8 % (ref 11.5–14.5)
EST. GFR  (AFRICAN AMERICAN): >60 ML/MIN/1.73 M^2
EST. GFR  (NON AFRICAN AMERICAN): >60 ML/MIN/1.73 M^2
GLUCOSE SERPL-MCNC: 101 MG/DL (ref 70–110)
HCT VFR BLD AUTO: 43.7 % (ref 37–48.5)
HDLC SERPL-MCNC: 79 MG/DL (ref 40–75)
HDLC SERPL: 43.2 % (ref 20–50)
HGB BLD-MCNC: 13.7 G/DL (ref 12–16)
IMM GRANULOCYTES # BLD AUTO: 0.04 K/UL (ref 0–0.04)
IMM GRANULOCYTES NFR BLD AUTO: 0.8 % (ref 0–0.5)
IRON SERPL-MCNC: 88 UG/DL (ref 30–160)
LDLC SERPL CALC-MCNC: 87.4 MG/DL (ref 63–159)
LYMPHOCYTES # BLD AUTO: 1.4 K/UL (ref 1–4.8)
LYMPHOCYTES NFR BLD: 26.6 % (ref 18–48)
MCH RBC QN AUTO: 29.8 PG (ref 27–31)
MCHC RBC AUTO-ENTMCNC: 31.4 G/DL (ref 32–36)
MCV RBC AUTO: 95 FL (ref 82–98)
MONOCYTES # BLD AUTO: 0.5 K/UL (ref 0.3–1)
MONOCYTES NFR BLD: 9.7 % (ref 4–15)
NEUTROPHILS # BLD AUTO: 3.2 K/UL (ref 1.8–7.7)
NEUTROPHILS NFR BLD: 62.9 % (ref 38–73)
NONHDLC SERPL-MCNC: 104 MG/DL
NRBC BLD-RTO: 0 /100 WBC
PLATELET # BLD AUTO: 222 K/UL (ref 150–350)
PMV BLD AUTO: 10.7 FL (ref 9.2–12.9)
POTASSIUM SERPL-SCNC: 3.7 MMOL/L (ref 3.5–5.1)
PROT SERPL-MCNC: 7 G/DL (ref 6–8.4)
RBC # BLD AUTO: 4.6 M/UL (ref 4–5.4)
SATURATED IRON: 25 % (ref 20–50)
SODIUM SERPL-SCNC: 143 MMOL/L (ref 136–145)
TOTAL IRON BINDING CAPACITY: 348 UG/DL (ref 250–450)
TRANSFERRIN SERPL-MCNC: 235 MG/DL (ref 200–375)
TRIGL SERPL-MCNC: 83 MG/DL (ref 30–150)
WBC # BLD AUTO: 5.07 K/UL (ref 3.9–12.7)

## 2021-02-23 PROCEDURE — 99396 PREV VISIT EST AGE 40-64: CPT | Mod: S$GLB,,, | Performed by: FAMILY MEDICINE

## 2021-02-23 PROCEDURE — 84443 ASSAY THYROID STIM HORMONE: CPT

## 2021-02-23 PROCEDURE — 80053 COMPREHEN METABOLIC PANEL: CPT

## 2021-02-23 PROCEDURE — 3008F BODY MASS INDEX DOCD: CPT | Mod: CPTII,S$GLB,, | Performed by: FAMILY MEDICINE

## 2021-02-23 PROCEDURE — 82728 ASSAY OF FERRITIN: CPT

## 2021-02-23 PROCEDURE — 36415 COLL VENOUS BLD VENIPUNCTURE: CPT | Mod: PO

## 2021-02-23 PROCEDURE — 99396 PR PREVENTIVE VISIT,EST,40-64: ICD-10-PCS | Mod: S$GLB,,, | Performed by: FAMILY MEDICINE

## 2021-02-23 PROCEDURE — 99999 PR PBB SHADOW E&M-EST. PATIENT-LVL IV: ICD-10-PCS | Mod: PBBFAC,,, | Performed by: FAMILY MEDICINE

## 2021-02-23 PROCEDURE — 80061 LIPID PANEL: CPT

## 2021-02-23 PROCEDURE — 99999 PR PBB SHADOW E&M-EST. PATIENT-LVL IV: CPT | Mod: PBBFAC,,, | Performed by: FAMILY MEDICINE

## 2021-02-23 PROCEDURE — 3074F SYST BP LT 130 MM HG: CPT | Mod: CPTII,S$GLB,, | Performed by: FAMILY MEDICINE

## 2021-02-23 PROCEDURE — 3078F PR MOST RECENT DIASTOLIC BLOOD PRESSURE < 80 MM HG: ICD-10-PCS | Mod: CPTII,S$GLB,, | Performed by: FAMILY MEDICINE

## 2021-02-23 PROCEDURE — 83540 ASSAY OF IRON: CPT

## 2021-02-23 PROCEDURE — 1126F AMNT PAIN NOTED NONE PRSNT: CPT | Mod: S$GLB,,, | Performed by: FAMILY MEDICINE

## 2021-02-23 PROCEDURE — 3078F DIAST BP <80 MM HG: CPT | Mod: CPTII,S$GLB,, | Performed by: FAMILY MEDICINE

## 2021-02-23 PROCEDURE — 3008F PR BODY MASS INDEX (BMI) DOCUMENTED: ICD-10-PCS | Mod: CPTII,S$GLB,, | Performed by: FAMILY MEDICINE

## 2021-02-23 PROCEDURE — 85025 COMPLETE CBC W/AUTO DIFF WBC: CPT

## 2021-02-23 PROCEDURE — 3074F PR MOST RECENT SYSTOLIC BLOOD PRESSURE < 130 MM HG: ICD-10-PCS | Mod: CPTII,S$GLB,, | Performed by: FAMILY MEDICINE

## 2021-02-23 PROCEDURE — 1126F PR PAIN SEVERITY QUANTIFIED, NO PAIN PRESENT: ICD-10-PCS | Mod: S$GLB,,, | Performed by: FAMILY MEDICINE

## 2021-02-23 RX ORDER — GABAPENTIN 300 MG/1
300 CAPSULE ORAL NIGHTLY
Qty: 90 CAPSULE | Refills: 3 | Status: SHIPPED | OUTPATIENT
Start: 2021-02-23 | End: 2022-01-10 | Stop reason: SDUPTHER

## 2021-02-23 RX ORDER — ESCITALOPRAM OXALATE 20 MG/1
20 TABLET ORAL DAILY
Qty: 90 TABLET | Refills: 3 | Status: SHIPPED | OUTPATIENT
Start: 2021-02-23 | End: 2022-01-10 | Stop reason: SDUPTHER

## 2021-02-23 RX ORDER — LEVOTHYROXINE SODIUM 50 UG/1
50 TABLET ORAL DAILY
Qty: 90 TABLET | Refills: 3 | Status: SHIPPED | OUTPATIENT
Start: 2021-02-23 | End: 2022-01-10 | Stop reason: SDUPTHER

## 2021-02-24 LAB
FERRITIN SERPL-MCNC: 95 NG/ML (ref 20–300)
TSH SERPL DL<=0.005 MIU/L-ACNC: 1.74 UIU/ML (ref 0.4–4)

## 2021-02-25 ENCOUNTER — PATIENT OUTREACH (OUTPATIENT)
Dept: ADMINISTRATIVE | Facility: OTHER | Age: 66
End: 2021-02-25

## 2021-02-26 ENCOUNTER — CLINICAL SUPPORT (OUTPATIENT)
Dept: ALLERGY | Facility: CLINIC | Age: 66
End: 2021-02-26
Payer: COMMERCIAL

## 2021-02-26 ENCOUNTER — PATIENT MESSAGE (OUTPATIENT)
Dept: ALLERGY | Facility: CLINIC | Age: 66
End: 2021-02-26

## 2021-02-26 DIAGNOSIS — L50.8 CHRONIC URTICARIA: Primary | ICD-10-CM

## 2021-02-26 PROCEDURE — 99999 PR PBB SHADOW E&M-EST. PATIENT-LVL I: ICD-10-PCS | Mod: PBBFAC,,,

## 2021-02-26 PROCEDURE — 99999 PR PBB SHADOW E&M-EST. PATIENT-LVL I: CPT | Mod: PBBFAC,,,

## 2021-02-26 PROCEDURE — 99499 NO LOS: ICD-10-PCS | Mod: S$GLB,,, | Performed by: ALLERGY & IMMUNOLOGY

## 2021-02-26 PROCEDURE — 96372 THER/PROPH/DIAG INJ SC/IM: CPT | Mod: S$GLB,,, | Performed by: ALLERGY & IMMUNOLOGY

## 2021-02-26 PROCEDURE — 99499 UNLISTED E&M SERVICE: CPT | Mod: S$GLB,,, | Performed by: ALLERGY & IMMUNOLOGY

## 2021-02-26 PROCEDURE — 96372 PR INJECTION,THERAP/PROPH/DIAG2ST, IM OR SUBCUT: ICD-10-PCS | Mod: S$GLB,,, | Performed by: ALLERGY & IMMUNOLOGY

## 2021-03-01 ENCOUNTER — PATIENT MESSAGE (OUTPATIENT)
Dept: ALLERGY | Facility: CLINIC | Age: 66
End: 2021-03-01

## 2021-03-02 ENCOUNTER — TELEPHONE (OUTPATIENT)
Dept: ALLERGY | Facility: CLINIC | Age: 66
End: 2021-03-02

## 2021-03-03 ENCOUNTER — HOSPITAL ENCOUNTER (OUTPATIENT)
Dept: RADIOLOGY | Facility: HOSPITAL | Age: 66
Discharge: HOME | End: 2021-03-03
Attending: FAMILY MEDICINE
Payer: COMMERCIAL

## 2021-03-03 DIAGNOSIS — Z12.31 OTHER SCREENING MAMMOGRAM: ICD-10-CM

## 2021-03-03 PROCEDURE — 77067 SCR MAMMO BI INCL CAD: CPT | Mod: 26,,, | Performed by: RADIOLOGY

## 2021-03-03 PROCEDURE — 77063 MAMMO DIGITAL SCREENING BILAT WITH TOMO: ICD-10-PCS | Mod: 26,,, | Performed by: RADIOLOGY

## 2021-03-03 PROCEDURE — 77063 BREAST TOMOSYNTHESIS BI: CPT | Mod: 26,,, | Performed by: RADIOLOGY

## 2021-03-03 PROCEDURE — 77067 MAMMO DIGITAL SCREENING BILAT WITH TOMO: ICD-10-PCS | Mod: 26,,, | Performed by: RADIOLOGY

## 2021-03-03 PROCEDURE — 77067 SCR MAMMO BI INCL CAD: CPT | Mod: TC,PO

## 2021-03-04 ENCOUNTER — PATIENT MESSAGE (OUTPATIENT)
Dept: ALLERGY | Facility: CLINIC | Age: 66
End: 2021-03-04

## 2021-03-11 ENCOUNTER — PATIENT MESSAGE (OUTPATIENT)
Dept: ALLERGY | Facility: CLINIC | Age: 66
End: 2021-03-11

## 2021-03-12 ENCOUNTER — TELEPHONE (OUTPATIENT)
Dept: ALLERGY | Facility: CLINIC | Age: 66
End: 2021-03-12

## 2021-03-18 RX ORDER — PREDNISONE 10 MG/1
TABLET ORAL
Qty: 30 TABLET | Refills: 1 | Status: SHIPPED | OUTPATIENT
Start: 2021-03-18 | End: 2021-05-11 | Stop reason: SDUPTHER

## 2021-03-19 ENCOUNTER — PATIENT MESSAGE (OUTPATIENT)
Dept: ALLERGY | Facility: CLINIC | Age: 66
End: 2021-03-19

## 2021-03-26 ENCOUNTER — CLINICAL SUPPORT (OUTPATIENT)
Dept: ALLERGY | Facility: CLINIC | Age: 66
End: 2021-03-26
Payer: COMMERCIAL

## 2021-03-26 DIAGNOSIS — L50.8 CHRONIC URTICARIA: Primary | ICD-10-CM

## 2021-03-26 PROCEDURE — 99999 PR PBB SHADOW E&M-EST. PATIENT-LVL I: ICD-10-PCS | Mod: PBBFAC,,,

## 2021-03-26 PROCEDURE — 99999 PR PBB SHADOW E&M-EST. PATIENT-LVL I: CPT | Mod: PBBFAC,,,

## 2021-03-26 PROCEDURE — 96372 THER/PROPH/DIAG INJ SC/IM: CPT | Mod: S$GLB,,, | Performed by: ALLERGY & IMMUNOLOGY

## 2021-03-26 PROCEDURE — 99499 UNLISTED E&M SERVICE: CPT | Mod: S$GLB,,, | Performed by: FAMILY MEDICINE

## 2021-03-26 PROCEDURE — 99499 NO LOS: ICD-10-PCS | Mod: S$GLB,,, | Performed by: FAMILY MEDICINE

## 2021-03-26 PROCEDURE — 96372 PR INJECTION,THERAP/PROPH/DIAG2ST, IM OR SUBCUT: ICD-10-PCS | Mod: S$GLB,,, | Performed by: ALLERGY & IMMUNOLOGY

## 2021-04-01 ENCOUNTER — PATIENT MESSAGE (OUTPATIENT)
Dept: ALLERGY | Facility: CLINIC | Age: 66
End: 2021-04-01

## 2021-04-13 ENCOUNTER — PATIENT MESSAGE (OUTPATIENT)
Dept: ALLERGY | Facility: CLINIC | Age: 66
End: 2021-04-13

## 2021-04-22 ENCOUNTER — CLINICAL SUPPORT (OUTPATIENT)
Dept: ALLERGY | Facility: CLINIC | Age: 66
End: 2021-04-22
Payer: COMMERCIAL

## 2021-04-22 DIAGNOSIS — L50.8 CHRONIC URTICARIA: Primary | ICD-10-CM

## 2021-04-22 PROCEDURE — 99499 NO LOS: ICD-10-PCS | Mod: S$GLB,,, | Performed by: FAMILY MEDICINE

## 2021-04-22 PROCEDURE — 99999 PR PBB SHADOW E&M-EST. PATIENT-LVL I: CPT | Mod: PBBFAC,,,

## 2021-04-22 PROCEDURE — 96372 PR INJECTION,THERAP/PROPH/DIAG2ST, IM OR SUBCUT: ICD-10-PCS | Mod: S$GLB,,, | Performed by: ALLERGY & IMMUNOLOGY

## 2021-04-22 PROCEDURE — 99499 UNLISTED E&M SERVICE: CPT | Mod: S$GLB,,, | Performed by: FAMILY MEDICINE

## 2021-04-22 PROCEDURE — 99999 PR PBB SHADOW E&M-EST. PATIENT-LVL I: ICD-10-PCS | Mod: PBBFAC,,,

## 2021-04-22 PROCEDURE — 96372 THER/PROPH/DIAG INJ SC/IM: CPT | Mod: S$GLB,,, | Performed by: ALLERGY & IMMUNOLOGY

## 2021-05-11 ENCOUNTER — PATIENT MESSAGE (OUTPATIENT)
Dept: ALLERGY | Facility: CLINIC | Age: 66
End: 2021-05-11

## 2021-05-11 RX ORDER — PREDNISONE 10 MG/1
10 TABLET ORAL DAILY
Qty: 30 TABLET | Refills: 6 | Status: SHIPPED | OUTPATIENT
Start: 2021-05-11 | End: 2022-01-03

## 2021-05-20 ENCOUNTER — CLINICAL SUPPORT (OUTPATIENT)
Dept: ALLERGY | Facility: CLINIC | Age: 66
End: 2021-05-20
Payer: COMMERCIAL

## 2021-05-20 DIAGNOSIS — L50.1 CHRONIC IDIOPATHIC URTICARIA: Primary | ICD-10-CM

## 2021-05-20 PROCEDURE — 99999 PR PBB SHADOW E&M-EST. PATIENT-LVL I: ICD-10-PCS | Mod: PBBFAC,,,

## 2021-05-20 PROCEDURE — 96372 THER/PROPH/DIAG INJ SC/IM: CPT | Mod: S$GLB,,, | Performed by: ALLERGY & IMMUNOLOGY

## 2021-05-20 PROCEDURE — 96372 PR INJECTION,THERAP/PROPH/DIAG2ST, IM OR SUBCUT: ICD-10-PCS | Mod: S$GLB,,, | Performed by: ALLERGY & IMMUNOLOGY

## 2021-05-20 PROCEDURE — 99999 PR PBB SHADOW E&M-EST. PATIENT-LVL I: CPT | Mod: PBBFAC,,,

## 2021-05-20 PROCEDURE — 99499 UNLISTED E&M SERVICE: CPT | Mod: S$GLB,,, | Performed by: ALLERGY & IMMUNOLOGY

## 2021-05-20 PROCEDURE — 99499 NO LOS: ICD-10-PCS | Mod: S$GLB,,, | Performed by: ALLERGY & IMMUNOLOGY

## 2021-05-28 RX ORDER — POTASSIUM CHLORIDE 750 MG/1
CAPSULE, EXTENDED RELEASE ORAL
Qty: 90 CAPSULE | Refills: 2 | Status: SHIPPED | OUTPATIENT
Start: 2021-05-28 | End: 2022-01-10 | Stop reason: SDUPTHER

## 2021-05-28 RX ORDER — HYDROCHLOROTHIAZIDE 25 MG/1
TABLET ORAL
Qty: 90 TABLET | Refills: 2 | Status: SHIPPED | OUTPATIENT
Start: 2021-05-28 | End: 2021-12-07 | Stop reason: ALTCHOICE

## 2021-05-28 RX ORDER — ATORVASTATIN CALCIUM 10 MG/1
TABLET, FILM COATED ORAL
Qty: 90 TABLET | Refills: 2 | Status: SHIPPED | OUTPATIENT
Start: 2021-05-28 | End: 2022-01-10 | Stop reason: SDUPTHER

## 2021-05-28 RX ORDER — FELODIPINE 5 MG/1
TABLET, EXTENDED RELEASE ORAL
Qty: 90 TABLET | Refills: 2 | Status: SHIPPED | OUTPATIENT
Start: 2021-05-28 | End: 2022-01-10 | Stop reason: SDUPTHER

## 2021-05-28 RX ORDER — BUPROPION HYDROCHLORIDE 300 MG/1
TABLET ORAL
Qty: 90 TABLET | Refills: 2 | Status: SHIPPED | OUTPATIENT
Start: 2021-05-28 | End: 2022-01-10 | Stop reason: SDUPTHER

## 2021-06-29 ENCOUNTER — PATIENT MESSAGE (OUTPATIENT)
Dept: ALLERGY | Facility: CLINIC | Age: 66
End: 2021-06-29

## 2021-06-30 ENCOUNTER — PATIENT OUTREACH (OUTPATIENT)
Dept: ADMINISTRATIVE | Facility: OTHER | Age: 66
End: 2021-06-30

## 2021-07-01 ENCOUNTER — CLINICAL SUPPORT (OUTPATIENT)
Dept: ALLERGY | Facility: CLINIC | Age: 66
End: 2021-07-01
Payer: COMMERCIAL

## 2021-07-01 DIAGNOSIS — L50.1 CHRONIC IDIOPATHIC URTICARIA: ICD-10-CM

## 2021-07-01 PROCEDURE — 99499 NO LOS: ICD-10-PCS | Mod: S$GLB,,, | Performed by: ALLERGY & IMMUNOLOGY

## 2021-07-01 PROCEDURE — 99499 UNLISTED E&M SERVICE: CPT | Mod: S$GLB,,, | Performed by: ALLERGY & IMMUNOLOGY

## 2021-07-01 PROCEDURE — 96372 PR INJECTION,THERAP/PROPH/DIAG2ST, IM OR SUBCUT: ICD-10-PCS | Mod: S$GLB,,, | Performed by: ALLERGY & IMMUNOLOGY

## 2021-07-01 PROCEDURE — 96372 THER/PROPH/DIAG INJ SC/IM: CPT | Mod: S$GLB,,, | Performed by: ALLERGY & IMMUNOLOGY

## 2021-07-06 ENCOUNTER — PATIENT MESSAGE (OUTPATIENT)
Dept: FAMILY MEDICINE | Facility: CLINIC | Age: 66
End: 2021-07-06

## 2021-07-06 ENCOUNTER — CLINICAL SUPPORT (OUTPATIENT)
Dept: FAMILY MEDICINE | Facility: CLINIC | Age: 66
End: 2021-07-06
Payer: COMMERCIAL

## 2021-07-06 DIAGNOSIS — U07.1 COVID-19: ICD-10-CM

## 2021-07-06 DIAGNOSIS — R05.9 COUGH: Primary | ICD-10-CM

## 2021-07-06 DIAGNOSIS — R05.9 COUGH: ICD-10-CM

## 2021-07-06 DIAGNOSIS — R50.9 FEVER, UNSPECIFIED FEVER CAUSE: ICD-10-CM

## 2021-07-06 PROCEDURE — U0005 INFEC AGEN DETEC AMPLI PROBE: HCPCS | Performed by: FAMILY MEDICINE

## 2021-07-06 PROCEDURE — U0003 INFECTIOUS AGENT DETECTION BY NUCLEIC ACID (DNA OR RNA); SEVERE ACUTE RESPIRATORY SYNDROME CORONAVIRUS 2 (SARS-COV-2) (CORONAVIRUS DISEASE [COVID-19]), AMPLIFIED PROBE TECHNIQUE, MAKING USE OF HIGH THROUGHPUT TECHNOLOGIES AS DESCRIBED BY CMS-2020-01-R: HCPCS | Performed by: FAMILY MEDICINE

## 2021-07-07 LAB
SARS-COV-2 RNA RESP QL NAA+PROBE: NOT DETECTED
SARS-COV-2- CYCLE NUMBER: -1

## 2021-07-22 ENCOUNTER — PATIENT MESSAGE (OUTPATIENT)
Dept: ALLERGY | Facility: CLINIC | Age: 66
End: 2021-07-22

## 2021-07-29 ENCOUNTER — CLINICAL SUPPORT (OUTPATIENT)
Dept: ALLERGY | Facility: CLINIC | Age: 66
End: 2021-07-29
Payer: COMMERCIAL

## 2021-07-29 DIAGNOSIS — L50.1 CHRONIC IDIOPATHIC URTICARIA: ICD-10-CM

## 2021-07-29 PROCEDURE — 99499 NO LOS: ICD-10-PCS | Mod: S$GLB,,, | Performed by: ALLERGY & IMMUNOLOGY

## 2021-07-29 PROCEDURE — 96372 THER/PROPH/DIAG INJ SC/IM: CPT | Mod: S$GLB,,, | Performed by: ALLERGY & IMMUNOLOGY

## 2021-07-29 PROCEDURE — 99499 UNLISTED E&M SERVICE: CPT | Mod: S$GLB,,, | Performed by: ALLERGY & IMMUNOLOGY

## 2021-07-29 PROCEDURE — 96372 PR INJECTION,THERAP/PROPH/DIAG2ST, IM OR SUBCUT: ICD-10-PCS | Mod: S$GLB,,, | Performed by: ALLERGY & IMMUNOLOGY

## 2021-08-25 ENCOUNTER — PATIENT OUTREACH (OUTPATIENT)
Dept: ADMINISTRATIVE | Facility: OTHER | Age: 66
End: 2021-08-25

## 2021-08-26 ENCOUNTER — CLINICAL SUPPORT (OUTPATIENT)
Dept: ALLERGY | Facility: CLINIC | Age: 66
End: 2021-08-26
Payer: COMMERCIAL

## 2021-08-26 DIAGNOSIS — L50.1 CHRONIC IDIOPATHIC URTICARIA: ICD-10-CM

## 2021-08-26 PROCEDURE — 96372 PR INJECTION,THERAP/PROPH/DIAG2ST, IM OR SUBCUT: ICD-10-PCS | Mod: S$GLB,,, | Performed by: ALLERGY & IMMUNOLOGY

## 2021-08-26 PROCEDURE — 99499 NO LOS: ICD-10-PCS | Mod: S$GLB,,, | Performed by: ALLERGY & IMMUNOLOGY

## 2021-08-26 PROCEDURE — 96372 THER/PROPH/DIAG INJ SC/IM: CPT | Mod: S$GLB,,, | Performed by: ALLERGY & IMMUNOLOGY

## 2021-08-26 PROCEDURE — 99499 UNLISTED E&M SERVICE: CPT | Mod: S$GLB,,, | Performed by: ALLERGY & IMMUNOLOGY

## 2021-08-27 ENCOUNTER — PATIENT MESSAGE (OUTPATIENT)
Dept: ALLERGY | Facility: CLINIC | Age: 66
End: 2021-08-27

## 2021-09-23 ENCOUNTER — CLINICAL SUPPORT (OUTPATIENT)
Dept: ALLERGY | Facility: CLINIC | Age: 66
End: 2021-09-23
Payer: COMMERCIAL

## 2021-09-23 DIAGNOSIS — L50.1 CHRONIC IDIOPATHIC URTICARIA: ICD-10-CM

## 2021-09-23 PROCEDURE — 99499 UNLISTED E&M SERVICE: CPT | Mod: S$GLB,,, | Performed by: ALLERGY & IMMUNOLOGY

## 2021-09-23 PROCEDURE — 96372 THER/PROPH/DIAG INJ SC/IM: CPT | Mod: S$GLB,,, | Performed by: ALLERGY & IMMUNOLOGY

## 2021-09-23 PROCEDURE — 96372 PR INJECTION,THERAP/PROPH/DIAG2ST, IM OR SUBCUT: ICD-10-PCS | Mod: S$GLB,,, | Performed by: ALLERGY & IMMUNOLOGY

## 2021-09-23 PROCEDURE — 99499 NO LOS: ICD-10-PCS | Mod: S$GLB,,, | Performed by: ALLERGY & IMMUNOLOGY

## 2021-09-30 ENCOUNTER — OFFICE VISIT (OUTPATIENT)
Dept: FAMILY MEDICINE | Facility: CLINIC | Age: 66
End: 2021-09-30
Payer: COMMERCIAL

## 2021-09-30 ENCOUNTER — HOSPITAL ENCOUNTER (OUTPATIENT)
Dept: RADIOLOGY | Facility: HOSPITAL | Age: 66
Discharge: HOME | End: 2021-09-30
Attending: NURSE PRACTITIONER
Payer: COMMERCIAL

## 2021-09-30 ENCOUNTER — PATIENT MESSAGE (OUTPATIENT)
Dept: FAMILY MEDICINE | Facility: CLINIC | Age: 66
End: 2021-09-30

## 2021-09-30 VITALS
SYSTOLIC BLOOD PRESSURE: 152 MMHG | TEMPERATURE: 99 F | HEART RATE: 66 BPM | HEIGHT: 59 IN | WEIGHT: 157 LBS | DIASTOLIC BLOOD PRESSURE: 96 MMHG | BODY MASS INDEX: 31.65 KG/M2

## 2021-09-30 DIAGNOSIS — M25.562 ACUTE PAIN OF LEFT KNEE: Primary | ICD-10-CM

## 2021-09-30 DIAGNOSIS — R93.6 ABNORMAL X-RAY OF KNEE: ICD-10-CM

## 2021-09-30 DIAGNOSIS — M25.562 ACUTE PAIN OF LEFT KNEE: ICD-10-CM

## 2021-09-30 PROCEDURE — 1159F PR MEDICATION LIST DOCUMENTED IN MEDICAL RECORD: ICD-10-PCS | Mod: CPTII,S$GLB,, | Performed by: NURSE PRACTITIONER

## 2021-09-30 PROCEDURE — 96372 PR INJECTION,THERAP/PROPH/DIAG2ST, IM OR SUBCUT: ICD-10-PCS | Mod: S$GLB,,, | Performed by: NURSE PRACTITIONER

## 2021-09-30 PROCEDURE — 3080F PR MOST RECENT DIASTOLIC BLOOD PRESSURE >= 90 MM HG: ICD-10-PCS | Mod: CPTII,S$GLB,, | Performed by: NURSE PRACTITIONER

## 2021-09-30 PROCEDURE — 99213 OFFICE O/P EST LOW 20 MIN: CPT | Mod: 25,S$GLB,, | Performed by: NURSE PRACTITIONER

## 2021-09-30 PROCEDURE — 1159F MED LIST DOCD IN RCRD: CPT | Mod: CPTII,S$GLB,, | Performed by: NURSE PRACTITIONER

## 2021-09-30 PROCEDURE — 99999 PR PBB SHADOW E&M-EST. PATIENT-LVL V: ICD-10-PCS | Mod: PBBFAC,,, | Performed by: NURSE PRACTITIONER

## 2021-09-30 PROCEDURE — 99213 PR OFFICE/OUTPT VISIT, EST, LEVL III, 20-29 MIN: ICD-10-PCS | Mod: 25,S$GLB,, | Performed by: NURSE PRACTITIONER

## 2021-09-30 PROCEDURE — 73560 XR KNEE ORTHO LEFT: ICD-10-PCS | Mod: 26,RT,, | Performed by: RADIOLOGY

## 2021-09-30 PROCEDURE — 73562 XR KNEE ORTHO LEFT: ICD-10-PCS | Mod: 26,LT,, | Performed by: RADIOLOGY

## 2021-09-30 PROCEDURE — 73560 X-RAY EXAM OF KNEE 1 OR 2: CPT | Mod: 26,RT,, | Performed by: RADIOLOGY

## 2021-09-30 PROCEDURE — 96372 THER/PROPH/DIAG INJ SC/IM: CPT | Mod: S$GLB,,, | Performed by: NURSE PRACTITIONER

## 2021-09-30 PROCEDURE — 3008F PR BODY MASS INDEX (BMI) DOCUMENTED: ICD-10-PCS | Mod: CPTII,S$GLB,, | Performed by: NURSE PRACTITIONER

## 2021-09-30 PROCEDURE — 3077F SYST BP >= 140 MM HG: CPT | Mod: CPTII,S$GLB,, | Performed by: NURSE PRACTITIONER

## 2021-09-30 PROCEDURE — 73562 X-RAY EXAM OF KNEE 3: CPT | Mod: 26,LT,, | Performed by: RADIOLOGY

## 2021-09-30 PROCEDURE — 73560 X-RAY EXAM OF KNEE 1 OR 2: CPT | Mod: 59,TC,PO,RT

## 2021-09-30 PROCEDURE — 3080F DIAST BP >= 90 MM HG: CPT | Mod: CPTII,S$GLB,, | Performed by: NURSE PRACTITIONER

## 2021-09-30 PROCEDURE — 99999 PR PBB SHADOW E&M-EST. PATIENT-LVL V: CPT | Mod: PBBFAC,,, | Performed by: NURSE PRACTITIONER

## 2021-09-30 PROCEDURE — 3008F BODY MASS INDEX DOCD: CPT | Mod: CPTII,S$GLB,, | Performed by: NURSE PRACTITIONER

## 2021-09-30 PROCEDURE — 3077F PR MOST RECENT SYSTOLIC BLOOD PRESSURE >= 140 MM HG: ICD-10-PCS | Mod: CPTII,S$GLB,, | Performed by: NURSE PRACTITIONER

## 2021-09-30 RX ORDER — KETOROLAC TROMETHAMINE 10 MG/1
10 TABLET, FILM COATED ORAL 2 TIMES DAILY PRN
Qty: 10 TABLET | Refills: 0 | Status: SHIPPED | OUTPATIENT
Start: 2021-09-30 | End: 2022-01-04 | Stop reason: ALTCHOICE

## 2021-09-30 RX ORDER — KETOROLAC TROMETHAMINE 30 MG/ML
30 INJECTION, SOLUTION INTRAMUSCULAR; INTRAVENOUS
Status: COMPLETED | OUTPATIENT
Start: 2021-09-30 | End: 2021-09-30

## 2021-09-30 RX ADMIN — KETOROLAC TROMETHAMINE 30 MG: 30 INJECTION, SOLUTION INTRAMUSCULAR; INTRAVENOUS at 12:09

## 2021-10-01 ENCOUNTER — TELEPHONE (OUTPATIENT)
Dept: ORTHOPEDICS | Facility: CLINIC | Age: 66
End: 2021-10-01

## 2021-10-29 ENCOUNTER — PATIENT OUTREACH (OUTPATIENT)
Dept: ADMINISTRATIVE | Facility: OTHER | Age: 66
End: 2021-10-29
Payer: COMMERCIAL

## 2021-10-29 RX ORDER — PANTOPRAZOLE SODIUM 40 MG/1
TABLET, DELAYED RELEASE ORAL
Qty: 90 TABLET | Refills: 1 | Status: SHIPPED | OUTPATIENT
Start: 2021-10-29 | End: 2021-11-03

## 2021-11-03 ENCOUNTER — OFFICE VISIT (OUTPATIENT)
Dept: ORTHOPEDICS | Facility: CLINIC | Age: 66
End: 2021-11-03
Payer: COMMERCIAL

## 2021-11-03 VITALS — HEIGHT: 59 IN | BODY MASS INDEX: 31.65 KG/M2 | WEIGHT: 157 LBS

## 2021-11-03 DIAGNOSIS — M17.12 PRIMARY OSTEOARTHRITIS OF LEFT KNEE: Primary | ICD-10-CM

## 2021-11-03 DIAGNOSIS — R93.6 ABNORMAL X-RAY OF KNEE: ICD-10-CM

## 2021-11-03 DIAGNOSIS — M25.562 ACUTE PAIN OF LEFT KNEE: ICD-10-CM

## 2021-11-03 PROCEDURE — 3008F PR BODY MASS INDEX (BMI) DOCUMENTED: ICD-10-PCS | Mod: CPTII,S$GLB,, | Performed by: ORTHOPAEDIC SURGERY

## 2021-11-03 PROCEDURE — 99999 PR PBB SHADOW E&M-EST. PATIENT-LVL IV: CPT | Mod: PBBFAC,,, | Performed by: ORTHOPAEDIC SURGERY

## 2021-11-03 PROCEDURE — 1160F PR REVIEW ALL MEDS BY PRESCRIBER/CLIN PHARMACIST DOCUMENTED: ICD-10-PCS | Mod: CPTII,S$GLB,, | Performed by: ORTHOPAEDIC SURGERY

## 2021-11-03 PROCEDURE — 20610 LARGE JOINT ASPIRATION/INJECTION: L KNEE: ICD-10-PCS | Mod: LT,S$GLB,, | Performed by: ORTHOPAEDIC SURGERY

## 2021-11-03 PROCEDURE — 99203 PR OFFICE/OUTPT VISIT, NEW, LEVL III, 30-44 MIN: ICD-10-PCS | Mod: 25,S$GLB,, | Performed by: ORTHOPAEDIC SURGERY

## 2021-11-03 PROCEDURE — 20610 DRAIN/INJ JOINT/BURSA W/O US: CPT | Mod: LT,S$GLB,, | Performed by: ORTHOPAEDIC SURGERY

## 2021-11-03 PROCEDURE — 3008F BODY MASS INDEX DOCD: CPT | Mod: CPTII,S$GLB,, | Performed by: ORTHOPAEDIC SURGERY

## 2021-11-03 PROCEDURE — 99203 OFFICE O/P NEW LOW 30 MIN: CPT | Mod: 25,S$GLB,, | Performed by: ORTHOPAEDIC SURGERY

## 2021-11-03 PROCEDURE — 99999 PR PBB SHADOW E&M-EST. PATIENT-LVL IV: ICD-10-PCS | Mod: PBBFAC,,, | Performed by: ORTHOPAEDIC SURGERY

## 2021-11-03 PROCEDURE — 1159F MED LIST DOCD IN RCRD: CPT | Mod: CPTII,S$GLB,, | Performed by: ORTHOPAEDIC SURGERY

## 2021-11-03 PROCEDURE — 1160F RVW MEDS BY RX/DR IN RCRD: CPT | Mod: CPTII,S$GLB,, | Performed by: ORTHOPAEDIC SURGERY

## 2021-11-03 PROCEDURE — 1159F PR MEDICATION LIST DOCUMENTED IN MEDICAL RECORD: ICD-10-PCS | Mod: CPTII,S$GLB,, | Performed by: ORTHOPAEDIC SURGERY

## 2021-11-03 RX ORDER — TRIAMCINOLONE ACETONIDE 40 MG/ML
40 INJECTION, SUSPENSION INTRA-ARTICULAR; INTRAMUSCULAR
Status: DISCONTINUED | OUTPATIENT
Start: 2021-11-03 | End: 2021-11-03 | Stop reason: HOSPADM

## 2021-11-03 RX ADMIN — TRIAMCINOLONE ACETONIDE 40 MG: 40 INJECTION, SUSPENSION INTRA-ARTICULAR; INTRAMUSCULAR at 09:11

## 2021-11-04 ENCOUNTER — PATIENT OUTREACH (OUTPATIENT)
Dept: ADMINISTRATIVE | Facility: HOSPITAL | Age: 66
End: 2021-11-04
Payer: COMMERCIAL

## 2021-11-05 ENCOUNTER — CLINICAL SUPPORT (OUTPATIENT)
Dept: ALLERGY | Facility: CLINIC | Age: 66
End: 2021-11-05
Payer: COMMERCIAL

## 2021-11-05 DIAGNOSIS — L50.1 CHRONIC IDIOPATHIC URTICARIA: ICD-10-CM

## 2021-11-05 PROCEDURE — 96372 THER/PROPH/DIAG INJ SC/IM: CPT | Mod: S$GLB,,, | Performed by: ALLERGY & IMMUNOLOGY

## 2021-11-05 PROCEDURE — 99499 NO LOS: ICD-10-PCS | Mod: S$GLB,,, | Performed by: ALLERGY & IMMUNOLOGY

## 2021-11-05 PROCEDURE — 99499 UNLISTED E&M SERVICE: CPT | Mod: S$GLB,,, | Performed by: ALLERGY & IMMUNOLOGY

## 2021-11-05 PROCEDURE — 96372 PR INJECTION,THERAP/PROPH/DIAG2ST, IM OR SUBCUT: ICD-10-PCS | Mod: S$GLB,,, | Performed by: ALLERGY & IMMUNOLOGY

## 2021-12-01 ENCOUNTER — PATIENT OUTREACH (OUTPATIENT)
Dept: ADMINISTRATIVE | Facility: OTHER | Age: 66
End: 2021-12-01
Payer: COMMERCIAL

## 2021-12-03 ENCOUNTER — CLINICAL SUPPORT (OUTPATIENT)
Dept: ALLERGY | Facility: CLINIC | Age: 66
End: 2021-12-03
Payer: COMMERCIAL

## 2021-12-03 DIAGNOSIS — L50.1 CHRONIC IDIOPATHIC URTICARIA: ICD-10-CM

## 2021-12-03 PROCEDURE — 99499 NO LOS: ICD-10-PCS | Mod: S$GLB,,, | Performed by: ALLERGY & IMMUNOLOGY

## 2021-12-03 PROCEDURE — 96372 THER/PROPH/DIAG INJ SC/IM: CPT | Mod: S$GLB,,, | Performed by: ALLERGY & IMMUNOLOGY

## 2021-12-03 PROCEDURE — 99499 UNLISTED E&M SERVICE: CPT | Mod: S$GLB,,, | Performed by: ALLERGY & IMMUNOLOGY

## 2021-12-03 PROCEDURE — 96372 PR INJECTION,THERAP/PROPH/DIAG2ST, IM OR SUBCUT: ICD-10-PCS | Mod: S$GLB,,, | Performed by: ALLERGY & IMMUNOLOGY

## 2021-12-07 ENCOUNTER — OFFICE VISIT (OUTPATIENT)
Dept: FAMILY MEDICINE | Facility: CLINIC | Age: 66
End: 2021-12-07
Payer: COMMERCIAL

## 2021-12-07 ENCOUNTER — PATIENT MESSAGE (OUTPATIENT)
Dept: FAMILY MEDICINE | Facility: CLINIC | Age: 66
End: 2021-12-07

## 2021-12-07 VITALS
WEIGHT: 158 LBS | HEART RATE: 71 BPM | BODY MASS INDEX: 31.85 KG/M2 | DIASTOLIC BLOOD PRESSURE: 96 MMHG | TEMPERATURE: 97 F | HEIGHT: 59 IN | SYSTOLIC BLOOD PRESSURE: 138 MMHG

## 2021-12-07 DIAGNOSIS — I10 ESSENTIAL HYPERTENSION: Primary | ICD-10-CM

## 2021-12-07 PROCEDURE — 90471 FLU VACCINE (QUAD) GREATER THAN OR EQUAL TO 3YO PRESERVATIVE FREE IM: ICD-10-PCS | Mod: S$GLB,,, | Performed by: FAMILY MEDICINE

## 2021-12-07 PROCEDURE — 90471 IMMUNIZATION ADMIN: CPT | Mod: S$GLB,,, | Performed by: FAMILY MEDICINE

## 2021-12-07 PROCEDURE — 99213 OFFICE O/P EST LOW 20 MIN: CPT | Mod: 25,S$GLB,, | Performed by: FAMILY MEDICINE

## 2021-12-07 PROCEDURE — 90686 FLU VACCINE (QUAD) GREATER THAN OR EQUAL TO 3YO PRESERVATIVE FREE IM: ICD-10-PCS | Mod: S$GLB,,, | Performed by: FAMILY MEDICINE

## 2021-12-07 PROCEDURE — 99999 PR PBB SHADOW E&M-EST. PATIENT-LVL IV: ICD-10-PCS | Mod: PBBFAC,,, | Performed by: FAMILY MEDICINE

## 2021-12-07 PROCEDURE — 99999 PR PBB SHADOW E&M-EST. PATIENT-LVL IV: CPT | Mod: PBBFAC,,, | Performed by: FAMILY MEDICINE

## 2021-12-07 PROCEDURE — 99213 PR OFFICE/OUTPT VISIT, EST, LEVL III, 20-29 MIN: ICD-10-PCS | Mod: 25,S$GLB,, | Performed by: FAMILY MEDICINE

## 2021-12-07 PROCEDURE — 90686 IIV4 VACC NO PRSV 0.5 ML IM: CPT | Mod: S$GLB,,, | Performed by: FAMILY MEDICINE

## 2021-12-07 RX ORDER — OLMESARTAN MEDOXOMIL AND HYDROCHLOROTHIAZIDE 20/12.5 20; 12.5 MG/1; MG/1
1 TABLET ORAL DAILY
Qty: 30 TABLET | Refills: 1 | Status: SHIPPED | OUTPATIENT
Start: 2021-12-07 | End: 2021-12-29

## 2021-12-28 ENCOUNTER — PATIENT MESSAGE (OUTPATIENT)
Dept: ALLERGY | Facility: CLINIC | Age: 66
End: 2021-12-28
Payer: COMMERCIAL

## 2021-12-29 RX ORDER — OLMESARTAN MEDOXOMIL AND HYDROCHLOROTHIAZIDE 20/12.5 20; 12.5 MG/1; MG/1
TABLET ORAL
Qty: 30 TABLET | Refills: 0 | Status: SHIPPED | OUTPATIENT
Start: 2021-12-29 | End: 2022-01-04 | Stop reason: SDUPTHER

## 2022-01-04 ENCOUNTER — OFFICE VISIT (OUTPATIENT)
Dept: FAMILY MEDICINE | Facility: CLINIC | Age: 67
End: 2022-01-04
Payer: COMMERCIAL

## 2022-01-04 ENCOUNTER — LAB VISIT (OUTPATIENT)
Dept: LAB | Facility: HOSPITAL | Age: 67
End: 2022-01-04
Attending: FAMILY MEDICINE
Payer: COMMERCIAL

## 2022-01-04 VITALS
HEART RATE: 71 BPM | BODY MASS INDEX: 32.25 KG/M2 | WEIGHT: 160 LBS | DIASTOLIC BLOOD PRESSURE: 73 MMHG | HEIGHT: 59 IN | TEMPERATURE: 97 F | SYSTOLIC BLOOD PRESSURE: 112 MMHG

## 2022-01-04 DIAGNOSIS — E03.9 HYPOTHYROIDISM, UNSPECIFIED TYPE: ICD-10-CM

## 2022-01-04 DIAGNOSIS — Z79.899 ENCOUNTER FOR LONG-TERM (CURRENT) USE OF MEDICATIONS: ICD-10-CM

## 2022-01-04 DIAGNOSIS — L50.8 CHRONIC URTICARIA: ICD-10-CM

## 2022-01-04 DIAGNOSIS — I10 ESSENTIAL HYPERTENSION: ICD-10-CM

## 2022-01-04 DIAGNOSIS — E78.5 HYPERLIPIDEMIA, UNSPECIFIED HYPERLIPIDEMIA TYPE: ICD-10-CM

## 2022-01-04 DIAGNOSIS — Z12.31 ENCOUNTER FOR SCREENING MAMMOGRAM FOR MALIGNANT NEOPLASM OF BREAST: ICD-10-CM

## 2022-01-04 DIAGNOSIS — Z00.00 ROUTINE HISTORY AND PHYSICAL EXAMINATION OF ADULT: ICD-10-CM

## 2022-01-04 DIAGNOSIS — F41.1 GENERALIZED ANXIETY DISORDER: ICD-10-CM

## 2022-01-04 DIAGNOSIS — Z00.00 ROUTINE HISTORY AND PHYSICAL EXAMINATION OF ADULT: Primary | ICD-10-CM

## 2022-01-04 LAB
ALBUMIN SERPL BCP-MCNC: 4.3 G/DL (ref 3.5–5.2)
ALP SERPL-CCNC: 93 U/L (ref 55–135)
ALT SERPL W/O P-5'-P-CCNC: 44 U/L (ref 10–44)
ANION GAP SERPL CALC-SCNC: 9 MMOL/L (ref 8–16)
AST SERPL-CCNC: 35 U/L (ref 10–40)
BASOPHILS # BLD AUTO: 0.02 K/UL (ref 0–0.2)
BASOPHILS NFR BLD: 0.3 % (ref 0–1.9)
BILIRUB SERPL-MCNC: 0.5 MG/DL (ref 0.1–1)
BUN SERPL-MCNC: 12 MG/DL (ref 8–23)
CALCIUM SERPL-MCNC: 10.2 MG/DL (ref 8.7–10.5)
CHLORIDE SERPL-SCNC: 102 MMOL/L (ref 95–110)
CHOLEST SERPL-MCNC: 227 MG/DL (ref 120–199)
CHOLEST/HDLC SERPL: 2.4 {RATIO} (ref 2–5)
CO2 SERPL-SCNC: 28 MMOL/L (ref 23–29)
CREAT SERPL-MCNC: 0.9 MG/DL (ref 0.5–1.4)
DIFFERENTIAL METHOD: ABNORMAL
EOSINOPHIL # BLD AUTO: 0 K/UL (ref 0–0.5)
EOSINOPHIL NFR BLD: 0.2 % (ref 0–8)
ERYTHROCYTE [DISTWIDTH] IN BLOOD BY AUTOMATED COUNT: 13.5 % (ref 11.5–14.5)
EST. GFR  (AFRICAN AMERICAN): >60 ML/MIN/1.73 M^2
EST. GFR  (NON AFRICAN AMERICAN): >60 ML/MIN/1.73 M^2
ESTIMATED AVG GLUCOSE: 111 MG/DL (ref 68–131)
GLUCOSE SERPL-MCNC: 104 MG/DL (ref 70–110)
HBA1C MFR BLD: 5.5 % (ref 4–5.6)
HCT VFR BLD AUTO: 44.9 % (ref 37–48.5)
HDLC SERPL-MCNC: 95 MG/DL (ref 40–75)
HDLC SERPL: 41.9 % (ref 20–50)
HGB BLD-MCNC: 14 G/DL (ref 12–16)
IMM GRANULOCYTES # BLD AUTO: 0.05 K/UL (ref 0–0.04)
IMM GRANULOCYTES NFR BLD AUTO: 0.9 % (ref 0–0.5)
LDLC SERPL CALC-MCNC: 116.2 MG/DL (ref 63–159)
LYMPHOCYTES # BLD AUTO: 1.2 K/UL (ref 1–4.8)
LYMPHOCYTES NFR BLD: 20.7 % (ref 18–48)
MCH RBC QN AUTO: 30.2 PG (ref 27–31)
MCHC RBC AUTO-ENTMCNC: 31.2 G/DL (ref 32–36)
MCV RBC AUTO: 97 FL (ref 82–98)
MONOCYTES # BLD AUTO: 0.5 K/UL (ref 0.3–1)
MONOCYTES NFR BLD: 7.7 % (ref 4–15)
NEUTROPHILS # BLD AUTO: 4.1 K/UL (ref 1.8–7.7)
NEUTROPHILS NFR BLD: 70.2 % (ref 38–73)
NONHDLC SERPL-MCNC: 132 MG/DL
NRBC BLD-RTO: 0 /100 WBC
PLATELET # BLD AUTO: 228 K/UL (ref 150–450)
PMV BLD AUTO: 9.9 FL (ref 9.2–12.9)
POTASSIUM SERPL-SCNC: 4.4 MMOL/L (ref 3.5–5.1)
PROT SERPL-MCNC: 6.8 G/DL (ref 6–8.4)
RBC # BLD AUTO: 4.64 M/UL (ref 4–5.4)
SODIUM SERPL-SCNC: 139 MMOL/L (ref 136–145)
TRIGL SERPL-MCNC: 79 MG/DL (ref 30–150)
TSH SERPL DL<=0.005 MIU/L-ACNC: 1.78 UIU/ML (ref 0.4–4)
WBC # BLD AUTO: 5.84 K/UL (ref 3.9–12.7)

## 2022-01-04 PROCEDURE — 84443 ASSAY THYROID STIM HORMONE: CPT | Performed by: FAMILY MEDICINE

## 2022-01-04 PROCEDURE — 90471 ZOSTER RECOMBINANT VACCINE: ICD-10-PCS | Mod: S$GLB,,, | Performed by: FAMILY MEDICINE

## 2022-01-04 PROCEDURE — 83036 HEMOGLOBIN GLYCOSYLATED A1C: CPT | Performed by: FAMILY MEDICINE

## 2022-01-04 PROCEDURE — 1159F MED LIST DOCD IN RCRD: CPT | Mod: CPTII,S$GLB,, | Performed by: FAMILY MEDICINE

## 2022-01-04 PROCEDURE — 99999 PR PBB SHADOW E&M-EST. PATIENT-LVL IV: CPT | Mod: PBBFAC,,, | Performed by: FAMILY MEDICINE

## 2022-01-04 PROCEDURE — 3078F DIAST BP <80 MM HG: CPT | Mod: CPTII,S$GLB,, | Performed by: FAMILY MEDICINE

## 2022-01-04 PROCEDURE — 90750 ZOSTER RECOMBINANT VACCINE: ICD-10-PCS | Mod: S$GLB,,, | Performed by: FAMILY MEDICINE

## 2022-01-04 PROCEDURE — 99396 PREV VISIT EST AGE 40-64: CPT | Mod: 25,S$GLB,, | Performed by: FAMILY MEDICINE

## 2022-01-04 PROCEDURE — 3008F PR BODY MASS INDEX (BMI) DOCUMENTED: ICD-10-PCS | Mod: CPTII,S$GLB,, | Performed by: FAMILY MEDICINE

## 2022-01-04 PROCEDURE — 3078F PR MOST RECENT DIASTOLIC BLOOD PRESSURE < 80 MM HG: ICD-10-PCS | Mod: CPTII,S$GLB,, | Performed by: FAMILY MEDICINE

## 2022-01-04 PROCEDURE — 90471 IMMUNIZATION ADMIN: CPT | Mod: S$GLB,,, | Performed by: FAMILY MEDICINE

## 2022-01-04 PROCEDURE — 85025 COMPLETE CBC W/AUTO DIFF WBC: CPT | Performed by: FAMILY MEDICINE

## 2022-01-04 PROCEDURE — 1159F PR MEDICATION LIST DOCUMENTED IN MEDICAL RECORD: ICD-10-PCS | Mod: CPTII,S$GLB,, | Performed by: FAMILY MEDICINE

## 2022-01-04 PROCEDURE — 80061 LIPID PANEL: CPT | Performed by: FAMILY MEDICINE

## 2022-01-04 PROCEDURE — 99396 PR PREVENTIVE VISIT,EST,40-64: ICD-10-PCS | Mod: 25,S$GLB,, | Performed by: FAMILY MEDICINE

## 2022-01-04 PROCEDURE — 99999 PR PBB SHADOW E&M-EST. PATIENT-LVL IV: ICD-10-PCS | Mod: PBBFAC,,, | Performed by: FAMILY MEDICINE

## 2022-01-04 PROCEDURE — 80053 COMPREHEN METABOLIC PANEL: CPT | Performed by: FAMILY MEDICINE

## 2022-01-04 PROCEDURE — 3074F SYST BP LT 130 MM HG: CPT | Mod: CPTII,S$GLB,, | Performed by: FAMILY MEDICINE

## 2022-01-04 PROCEDURE — 3008F BODY MASS INDEX DOCD: CPT | Mod: CPTII,S$GLB,, | Performed by: FAMILY MEDICINE

## 2022-01-04 PROCEDURE — 90750 HZV VACC RECOMBINANT IM: CPT | Mod: S$GLB,,, | Performed by: FAMILY MEDICINE

## 2022-01-04 PROCEDURE — 3074F PR MOST RECENT SYSTOLIC BLOOD PRESSURE < 130 MM HG: ICD-10-PCS | Mod: CPTII,S$GLB,, | Performed by: FAMILY MEDICINE

## 2022-01-04 PROCEDURE — 36415 COLL VENOUS BLD VENIPUNCTURE: CPT | Mod: PO | Performed by: FAMILY MEDICINE

## 2022-01-04 RX ORDER — OLMESARTAN MEDOXOMIL AND HYDROCHLOROTHIAZIDE 20/12.5 20; 12.5 MG/1; MG/1
1 TABLET ORAL DAILY
Qty: 90 TABLET | Refills: 3 | Status: SHIPPED | OUTPATIENT
Start: 2022-01-04 | End: 2022-02-03

## 2022-01-04 NOTE — PROGRESS NOTES
Physical exam follow-up hypertension.  Blood pressure better controlled.  Needs refills on meds.  Hyperlipidemia stable on statin.  Hypothyroidism clinically euthyroid.  Chronic urticaria followed by allergist.  Uses prednisone 5 mg every other day.  Anxiety stable with current Lexapro.  She very rarely uses Xanax.      Cheyenne was seen today for follow-up.    Diagnoses and all orders for this visit:    Routine history and physical examination of adult  -     CBC Auto Differential; Future  -     Comprehensive Metabolic Panel; Future  -     Hemoglobin A1C; Future  -     Lipid Panel; Future  -     TSH; Future  -     Mammo Digital Screening Bilat w/ Anjum; Future    Essential hypertension  -     CBC Auto Differential; Future  -     Comprehensive Metabolic Panel; Future    Hyperlipidemia, unspecified hyperlipidemia type  -     Comprehensive Metabolic Panel; Future  -     Lipid Panel; Future    Hypothyroidism, unspecified type  -     TSH; Future    Encounter for long-term (current) use of medications  -     Hemoglobin A1C; Future    Chronic urticaria  -     CBC Auto Differential; Future    Encounter for screening mammogram for malignant neoplasm of breast  -     Mammo Digital Screening Bilat w/ Anjum; Future    Generalized anxiety disorder    Other orders  -     olmesartan-hydrochlorothiazide (BENICAR HCT) 20-12.5 mg per tablet; Take 1 tablet by mouth once daily.  -     Zoster Recombinant Vaccine  -     Zoster Recombinant Vaccine; Future      Continue current medications.   she will schedule Pap smear with her gynecologist.    Anticipatory guidance: Don't smoke.  Healthy diet and regular exercise recommended.          Past Medical History:  Past Medical History:   Diagnosis Date    Carpal tunnel syndrome of right wrist     Chronic gastritis     Dissociative fugue     Diverticulosis     Duodenitis     Dyspepsia     Generalized anxiety disorder     Hearing loss on left     Hearing loss on right     Helicobacter  pylori (H. pylori)     History of blood transfusion     Hyperlipidemia     Hypertension     Hypothyroidism     IBS (irritable bowel syndrome)     Iron deficiency anemia     Mild mitral regurgitation by prior echocardiogram     Mild obesity     Mild vitamin D deficiency     Paraesophageal hiatal hernia     PONV (postoperative nausea and vomiting)     Reflux gastritis     RLS (restless legs syndrome) 2021    Sleep apnea     mild improved with wt. loss    Thyroid goiter     Urticaria, chronic      Past Surgical History:   Procedure Laterality Date    BLADDER SUSPENSION      pubovaginal sling     SECTION, CLASSIC      CHOLECYSTECTOMY      COLONOSCOPY  2011    Dr. Goode, in legacy:diverticulosis, hemorrhoids, melanosis coli-repeat 10 years    COLONOSCOPY N/A 2018    Procedure: COLONOSCOPY;  Surgeon: Rusty Vogt Jr., MD;  Location: [facility];  Service: Endoscopy;  Laterality: N/A; repeat in 10 years for screening    ESOPHAGEAL DILATION      ESOPHAGOGASTRODUODENOSCOPY  2016    ESOPHAGOGASTRODUODENOSCOPY N/A 2018    Procedure: EGD (ESOPHAGOGASTRODUODENOSCOPY);  Surgeon: Rusty Vogt Jr., MD;  Location: [facility];  Service: Endoscopy;  Laterality: N/A;    HERNIA REPAIR      INTRALUMINAL GASTROINTESTINAL TRACT IMAGING VIA CAPSULE N/A 2018    Procedure: IMAGING PROCEDURE, GI TRACT, INTRALUMINAL, VIA CAPSULE;  Surgeon: Loraine Velazquez MD;  Location: [facility];  Service: Endoscopy;  Laterality: N/A;    LAPAROSCOPIC NISSEN FUNDOPLICATION N/A 2018    Procedure: FUNDOPLICATION, NISSEN, LAPAROSCOPIC;  Surgeon: Frank Akins MD;  Location: [facility] OR;  Service: General;  Laterality: N/A;    THYROIDECTOMY, PARTIAL       partial for benign nodule    UPPER GASTROINTESTINAL ENDOSCOPY  2016    Dr. Whalen, in care everywhere     Review of patient's allergies indicates:   Allergen Reactions    Penicillins      Current Outpatient Medications on  File Prior to Visit   Medication Sig Dispense Refill    ALPRAZolam (XANAX) 0.25 MG tablet Take 1 tablet (0.25 mg total) by mouth 3 (three) times daily as needed for Anxiety. 21 tablet 0    atorvastatin (LIPITOR) 10 MG tablet TAKE 1 TABLET ONCE DAILY 90 tablet 2    buPROPion (WELLBUTRIN XL) 300 MG 24 hr tablet TAKE 1 TABLET ONCE DAILY 90 tablet 2    CALCIUM CARBONATE/VITAMIN D3 (CALTRATE 600 + D ORAL) Take by mouth 2 (two) times daily.      cetirizine (ZYRTEC) 10 MG tablet Take 10 mg by mouth 2 (two) times a day.      diphenhydrAMINE (BENADRYL) 25 mg capsule Take 25 mg by mouth as needed for Itching.      EScitalopram oxalate (LEXAPRO) 20 MG tablet Take 1 tablet (20 mg total) by mouth once daily. 90 tablet 3    estradiol (VAGIFEM) 10 mcg Tab Place 10 mcg vaginally.      famotidine (PEPCID AC ORAL) Take by mouth 2 (two) times a day.      felodipine (PLENDIL) 5 MG 24 hr tablet TAKE 1 TABLET ONCE DAILY 90 tablet 2    gabapentin (NEURONTIN) 300 MG capsule Take 1 capsule (300 mg total) by mouth every evening. 90 capsule 3    levothyroxine (SYNTHROID) 50 MCG tablet Take 1 tablet (50 mcg total) by mouth once daily. 90 tablet 3    omalizumab (XOLAIR) 150 mg/mL injection Inject 2 mLs (300 mg total) into the skin every 28 days. 2 Syringe 12    OMEGA-3 FATTY ACIDS-EPA ORAL Take by mouth once daily.      pantoprazole (PROTONIX) 40 MG tablet TAKE 1 TABLET BY MOUTH EVERY DAY 90 tablet 1    polyethylene glycol (GLYCOLAX) 17 gram PwPk Take by mouth as needed.       potassium chloride (MICRO-K) 10 MEQ CpSR TAKE 1 CAPSULE ONCE DAILY 90 capsule 2    predniSONE (DELTASONE) 10 MG tablet Take 1 tablet (10 mg total) by mouth once daily. Take half a tab every other day. 20 tablet 0    VIT A/VIT C/VIT E/ZINC/COPPER (PRESERVISION AREDS ORAL) Take by mouth once daily.      [DISCONTINUED] ketorolac (TORADOL) 10 mg tablet Take 1 tablet (10 mg total) by mouth 2 (two) times daily as needed for Pain. 10 tablet 0     [DISCONTINUED] olmesartan-hydrochlorothiazide (BENICAR HCT) 20-12.5 mg per tablet TAKE 1 TABLET BY MOUTH EVERY DAY 30 tablet 0     Current Facility-Administered Medications on File Prior to Visit   Medication Dose Route Frequency Provider Last Rate Last Admin    omalizumab injection 300 mg  300 mg Subcutaneous Q28 Days Sydney Romero MD   300 mg at 12/03/21 0925     Social History     Socioeconomic History    Marital status:    Tobacco Use    Smoking status: Never Smoker    Smokeless tobacco: Never Used   Substance and Sexual Activity    Alcohol use: Yes     Comment: seldom    Drug use: No    Sexual activity: Yes     Social Determinants of Health     Financial Resource Strain: Low Risk     Difficulty of Paying Living Expenses: Not hard at all   Food Insecurity: Unknown    Worried About Running Out of Food in the Last Year: Never true   Transportation Needs: No Transportation Needs    Lack of Transportation (Medical): No    Lack of Transportation (Non-Medical): No   Physical Activity: Unknown    Days of Exercise per Week: Patient refused   Stress: No Stress Concern Present    Feeling of Stress : Only a little   Social Connections: Unknown    Frequency of Communication with Friends and Family: Three times a week    Frequency of Social Gatherings with Friends and Family: Once a week    Active Member of Clubs or Organizations: No    Attends Club or Organization Meetings: Patient refused    Marital Status:    Housing Stability: Low Risk     Unable to Pay for Housing in the Last Year: No    Number of Places Lived in the Last Year: 1    Unstable Housing in the Last Year: No     Family History   Problem Relation Age of Onset    Hypertension Mother     Heart disease Mother     Ulcers Mother     GI problems Mother         colitis    Hypertension Father     Breast cancer Paternal Grandmother     Breast cancer Cousin     Colon cancer Neg Hx     Crohn's disease Neg Hx     Colon  "polyps Neg Hx     Ulcerative colitis Neg Hx     Stomach cancer Neg Hx     Esophageal cancer Neg Hx     Allergic rhinitis Neg Hx     Allergies Neg Hx     Angioedema Neg Hx     Atopy Neg Hx     Eczema Neg Hx     Immunodeficiency Neg Hx     Rhinitis Neg Hx     Urticaria Neg Hx     Asthma Neg Hx              ROS:  GENERAL: No fever, chills,  or significant weight changes.  HEENT: No headache.  Uses hearing aids  No dysphagia  Eyes: No vision complaints  CHEST: Denies WATSON, cyanosis, wheezing, cough and sputum production.  CARDIOVASCULAR: Denies chest pain, PND, orthopnea or reduced exercise tolerance.  ABDOMEN: Appetite fine. Denies diarrhea, abdominal pain, hematemesis or blood in stool.  URINARY: No flank pain, dysuria or hematuria.  MUSCULOSKELETAL: No warmth swelling or tenderness of the joints  NEUROLOGIC: No focal weakness numbness or paresthesia  PSYCHIATRIC: Denies complains        Vitals:    01/04/22 0925   BP: 112/73   Pulse: 71   Temp: 97.3 °F (36.3 °C)   TempSrc: Temporal   Weight: 72.6 kg (160 lb)   Height: 4' 11" (1.499 m)     Wt Readings from Last 3 Encounters:   01/04/22 72.6 kg (160 lb)   12/07/21 71.7 kg (158 lb)   11/03/21 71.2 kg (157 lb)       OBJECTIVE:   APPEARANCE: Well nourished, well developed, in no acute distress.    HEAD: Normocephalic.  Atraumatic.  No sinus tenderness.  EYES:   Right eye: Pupil reactive.  Conjunctiva clear.    Left eye: Pupil reactive.  Conjunctiva clear.  EOMI.    EARS:  Bilateral hearing aids   NOSE:  clear.  MOUTH & THROAT:  No pharyngeal erythema or exudate. No lesions.  NECK: Supple. No bruits.  No JVD.  No cervical lymphadenopathy.  No thyromegaly.    CHEST: Breath sounds clear bilaterally.  Normal respiratory effort  CARDIOVASCULAR: Normal rate.  Regular rhythm.  No murmurs.  No rub.  No gallops.  ABDOMEN: Bowel sounds normal.  Soft.  No tenderness.  No organomegaly.  PERIPHERAL VASCULAR: No cyanosis.  No clubbing.  No edema.  NEUROLOGIC: No focal " findings.  MENTAL STATUS: Alert.  Oriented x 3.

## 2022-01-06 ENCOUNTER — CLINICAL SUPPORT (OUTPATIENT)
Dept: ALLERGY | Facility: CLINIC | Age: 67
End: 2022-01-06
Payer: COMMERCIAL

## 2022-01-06 ENCOUNTER — PATIENT MESSAGE (OUTPATIENT)
Dept: FAMILY MEDICINE | Facility: CLINIC | Age: 67
End: 2022-01-06
Payer: COMMERCIAL

## 2022-01-06 DIAGNOSIS — L50.1 CHRONIC IDIOPATHIC URTICARIA: ICD-10-CM

## 2022-01-06 PROCEDURE — 99499 UNLISTED E&M SERVICE: CPT | Mod: S$GLB,,, | Performed by: ALLERGY & IMMUNOLOGY

## 2022-01-06 PROCEDURE — 96372 THER/PROPH/DIAG INJ SC/IM: CPT | Mod: S$GLB,,, | Performed by: ALLERGY & IMMUNOLOGY

## 2022-01-06 PROCEDURE — 99499 NO LOS: ICD-10-PCS | Mod: S$GLB,,, | Performed by: ALLERGY & IMMUNOLOGY

## 2022-01-06 PROCEDURE — 96372 PR INJECTION,THERAP/PROPH/DIAG2ST, IM OR SUBCUT: ICD-10-PCS | Mod: S$GLB,,, | Performed by: ALLERGY & IMMUNOLOGY

## 2022-01-06 NOTE — PROGRESS NOTES
Pt received 300mg of xolair, 150mg each arm, waited in clinic 30 min, no reactions. Documented on MAR

## 2022-01-10 RX ORDER — BUPROPION HYDROCHLORIDE 300 MG/1
300 TABLET ORAL DAILY
Qty: 90 TABLET | Refills: 3 | Status: SHIPPED | OUTPATIENT
Start: 2022-01-10 | End: 2022-05-27 | Stop reason: SDUPTHER

## 2022-01-10 RX ORDER — FELODIPINE 5 MG/1
5 TABLET, EXTENDED RELEASE ORAL DAILY
Qty: 90 TABLET | Refills: 3 | Status: SHIPPED | OUTPATIENT
Start: 2022-01-10 | End: 2022-05-27 | Stop reason: SDUPTHER

## 2022-01-10 RX ORDER — ESCITALOPRAM OXALATE 20 MG/1
20 TABLET ORAL DAILY
Qty: 90 TABLET | Refills: 3 | Status: SHIPPED | OUTPATIENT
Start: 2022-01-10 | End: 2022-05-27 | Stop reason: SDUPTHER

## 2022-01-10 RX ORDER — GABAPENTIN 300 MG/1
300 CAPSULE ORAL NIGHTLY
Qty: 90 CAPSULE | Refills: 3 | Status: SHIPPED | OUTPATIENT
Start: 2022-01-10 | End: 2022-05-27 | Stop reason: SDUPTHER

## 2022-01-10 RX ORDER — ATORVASTATIN CALCIUM 10 MG/1
10 TABLET, FILM COATED ORAL DAILY
Qty: 90 TABLET | Refills: 3 | Status: SHIPPED | OUTPATIENT
Start: 2022-01-10 | End: 2022-05-27 | Stop reason: SDUPTHER

## 2022-01-10 RX ORDER — POTASSIUM CHLORIDE 750 MG/1
10 CAPSULE, EXTENDED RELEASE ORAL DAILY
Qty: 90 CAPSULE | Refills: 3 | Status: SHIPPED | OUTPATIENT
Start: 2022-01-10 | End: 2023-06-02 | Stop reason: SDUPTHER

## 2022-01-10 RX ORDER — LEVOTHYROXINE SODIUM 50 UG/1
50 TABLET ORAL DAILY
Qty: 90 TABLET | Refills: 3 | Status: SHIPPED | OUTPATIENT
Start: 2022-01-10 | End: 2022-05-27 | Stop reason: SDUPTHER

## 2022-01-10 NOTE — TELEPHONE ENCOUNTER
No new care gaps identified.  Powered by [employer]. Reference number: [id number].   1/10/2022 12:19:47 PM CST

## 2022-01-13 ENCOUNTER — PATIENT OUTREACH (OUTPATIENT)
Dept: ADMINISTRATIVE | Facility: HOSPITAL | Age: 67
End: 2022-01-13
Payer: COMMERCIAL

## 2022-01-13 NOTE — PROGRESS NOTES
Chart Review: Pap Smear. Reached out to patient to confirm if pap smear was scheduled. No answer left a VM to callback and confirm.

## 2022-01-19 ENCOUNTER — OFFICE VISIT (OUTPATIENT)
Dept: ALLERGY | Facility: CLINIC | Age: 67
End: 2022-01-19
Payer: COMMERCIAL

## 2022-01-19 VITALS — WEIGHT: 160.25 LBS | OXYGEN SATURATION: 97 % | BODY MASS INDEX: 32.31 KG/M2 | HEART RATE: 76 BPM | HEIGHT: 59 IN

## 2022-01-19 DIAGNOSIS — L50.8 CHRONIC URTICARIA: Primary | ICD-10-CM

## 2022-01-19 DIAGNOSIS — L50.1 CHRONIC IDIOPATHIC URTICARIA: Primary | ICD-10-CM

## 2022-01-19 PROCEDURE — 1160F RVW MEDS BY RX/DR IN RCRD: CPT | Mod: CPTII,S$GLB,, | Performed by: ALLERGY & IMMUNOLOGY

## 2022-01-19 PROCEDURE — 3008F BODY MASS INDEX DOCD: CPT | Mod: CPTII,S$GLB,, | Performed by: ALLERGY & IMMUNOLOGY

## 2022-01-19 PROCEDURE — 1159F PR MEDICATION LIST DOCUMENTED IN MEDICAL RECORD: ICD-10-PCS | Mod: CPTII,S$GLB,, | Performed by: ALLERGY & IMMUNOLOGY

## 2022-01-19 PROCEDURE — 99999 PR PBB SHADOW E&M-EST. PATIENT-LVL IV: ICD-10-PCS | Mod: PBBFAC,,, | Performed by: ALLERGY & IMMUNOLOGY

## 2022-01-19 PROCEDURE — 1159F MED LIST DOCD IN RCRD: CPT | Mod: CPTII,S$GLB,, | Performed by: ALLERGY & IMMUNOLOGY

## 2022-01-19 PROCEDURE — 3008F PR BODY MASS INDEX (BMI) DOCUMENTED: ICD-10-PCS | Mod: CPTII,S$GLB,, | Performed by: ALLERGY & IMMUNOLOGY

## 2022-01-19 PROCEDURE — 3044F PR MOST RECENT HEMOGLOBIN A1C LEVEL <7.0%: ICD-10-PCS | Mod: CPTII,S$GLB,, | Performed by: ALLERGY & IMMUNOLOGY

## 2022-01-19 PROCEDURE — 3044F HG A1C LEVEL LT 7.0%: CPT | Mod: CPTII,S$GLB,, | Performed by: ALLERGY & IMMUNOLOGY

## 2022-01-19 PROCEDURE — 99999 PR PBB SHADOW E&M-EST. PATIENT-LVL IV: CPT | Mod: PBBFAC,,, | Performed by: ALLERGY & IMMUNOLOGY

## 2022-01-19 PROCEDURE — 99214 PR OFFICE/OUTPT VISIT, EST, LEVL IV, 30-39 MIN: ICD-10-PCS | Mod: S$GLB,,, | Performed by: ALLERGY & IMMUNOLOGY

## 2022-01-19 PROCEDURE — 99214 OFFICE O/P EST MOD 30 MIN: CPT | Mod: S$GLB,,, | Performed by: ALLERGY & IMMUNOLOGY

## 2022-01-19 PROCEDURE — 1160F PR REVIEW ALL MEDS BY PRESCRIBER/CLIN PHARMACIST DOCUMENTED: ICD-10-PCS | Mod: CPTII,S$GLB,, | Performed by: ALLERGY & IMMUNOLOGY

## 2022-01-19 RX ORDER — FAMOTIDINE 20 MG/1
20 TABLET, FILM COATED ORAL 2 TIMES DAILY
Qty: 60 TABLET | Refills: 11 | Status: SHIPPED | OUTPATIENT
Start: 2022-01-19 | End: 2023-01-30

## 2022-01-19 NOTE — PROGRESS NOTES
Subjective:       Patient ID: Cheyenne West is a 66 y.o. female.    Chief Complaint:  Annual Exam (Xolair renewal)      65 yo woman presents for continued evaluation of chronic urticaria. She was last seen 11/9/2020. She has 30+ year history of hives off and on. 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 She was on cetirizine 20 mg BID, famotidine 20 mg BID and not controlled so started Xolair 300 mg every 28 days. She has been on this since 1/30/2020. she is however taking prednisone 5 mg every other day and not taking any antihistamines zuniga. She is afraid to get off prednisone for fear hives will come back. This has worked well, no more hives. She occ has burning of her skin but no hives. No new medical issues.    Prior History taken 12/4/2019: consult from Dr Juan Bukc for chronic hives.  she states she has had them on and off for over 30 years. She has seen allergist in past and was on prednisone for while as was only thing that helps. Recently saw rheum and ruled out other autoimmune issues. She does have hypothyroid controlled on meds. Her hives are red raised and itch and burn. Will last entire day then fade and come back occ leaves a bruise after. No swelling. No SOB. Rheum prescribed sulfasalazine and plaquenil but she has not started. She takes benadryl prn but help off last few days. It does help. Claritin, zyrtec not as good but only took once a day in past. Never been on H 2 blocker. She had allergy test in past and told allergic to chocolate, almond, chicken and weed pollen but  none seems to be trigger. She has no rhinitis, asthma or eczema. No insect or latex allergy.       Environmental History: see history section for home environment  Review of Systems   Constitutional: Negative for appetite change, chills, fatigue and fever.   HENT: Negative for congestion, ear discharge, ear pain, facial swelling, nosebleeds,  postnasal drip, rhinorrhea, sinus pressure, sneezing, sore throat, trouble swallowing and voice change.    Eyes: Negative for discharge, redness, itching and visual disturbance.   Respiratory: Negative for cough, choking, chest tightness, shortness of breath and wheezing.    Cardiovascular: Negative for chest pain, palpitations and leg swelling.   Gastrointestinal: Negative for abdominal distention, abdominal pain, constipation, diarrhea, nausea and vomiting.   Genitourinary: Negative for difficulty urinating.   Musculoskeletal: Negative for arthralgias, gait problem, joint swelling and myalgias.   Skin: Positive for color change and rash.   Neurological: Negative for dizziness, syncope, weakness, light-headedness and headaches.   Hematological: Negative for adenopathy. Does not bruise/bleed easily.   Psychiatric/Behavioral: Negative for agitation, behavioral problems, confusion and sleep disturbance. The patient is not nervous/anxious.         Objective:      Physical Exam  Vitals and nursing note reviewed.   Constitutional:       General: She is not in acute distress.     Appearance: She is well-developed.   HENT:      Head: Normocephalic and atraumatic.      Right Ear: Hearing, tympanic membrane, ear canal and external ear normal.      Left Ear: Hearing, tympanic membrane, ear canal and external ear normal.      Nose: No septal deviation, mucosal edema or rhinorrhea.      Right Sinus: No maxillary sinus tenderness or frontal sinus tenderness.      Left Sinus: No maxillary sinus tenderness or frontal sinus tenderness.      Mouth/Throat:      Pharynx: Uvula midline. No uvula swelling.   Eyes:      General:         Right eye: No discharge.         Left eye: No discharge.      Conjunctiva/sclera: Conjunctivae normal.   Neck:      Thyroid: No thyromegaly.   Cardiovascular:      Rate and Rhythm: Normal rate and regular rhythm.      Heart sounds: Normal heart sounds. No murmur heard.      Pulmonary:      Effort:  Pulmonary effort is normal. No respiratory distress.      Breath sounds: Normal breath sounds. No wheezing.   Abdominal:      General: There is no distension.      Palpations: Abdomen is soft.      Tenderness: There is no abdominal tenderness.   Musculoskeletal:         General: No tenderness. Normal range of motion.      Cervical back: Normal range of motion.   Lymphadenopathy:      Cervical: No cervical adenopathy.   Skin:     General: Skin is warm and dry.      Findings: No erythema or rash.   Neurological:      Mental Status: She is alert and oriented to person, place, and time.   Psychiatric:         Behavior: Behavior normal.         Thought Content: Thought content normal.         Judgment: Judgment normal.         Laboratory:   none performed   Assessment:       1. Chronic urticaria         Plan:       1. Advised pt prednisone was supposed to be prn only and need to get off of it. As only on QOD can just stop but will start cetirizine 10 mg BID and famotidine 20 mg BID first and stop prednisone in few days  2. continue Xolair 300 mg every 28 days  3. Can add benadryl as needed

## 2022-01-24 RX ORDER — HYDROCHLOROTHIAZIDE 25 MG/1
25 TABLET ORAL DAILY
Qty: 90 TABLET | Refills: 3 | OUTPATIENT
Start: 2022-01-24 | End: 2023-01-24

## 2022-01-24 NOTE — TELEPHONE ENCOUNTER
No new care gaps identified.  Powered by [employer]. Reference number: [id number].   1/24/2022 5:28:50 PM CST

## 2022-01-24 NOTE — TELEPHONE ENCOUNTER
----- Message from Leila Hartmann sent at 1/24/2022  3:41 PM CST -----  Contact: Daryl  Type:  Pharmacy Calling to Clarify an RX    Name of Caller: Daryl  Pharmacy Name:   [facility] - [place] - [address] AT [address] [place] [id number]  [facility] [address]  Phone: [phone] Fax: [phone]  Prescription Name: Hydrochlorothiazide  What do they need to clarify?: Refill is needed  Best Call Back Number: [phone] With ref# [id number]  Additional Information:

## 2022-01-26 ENCOUNTER — PATIENT MESSAGE (OUTPATIENT)
Dept: FAMILY MEDICINE | Facility: CLINIC | Age: 67
End: 2022-01-26

## 2022-01-26 ENCOUNTER — CLINICAL SUPPORT (OUTPATIENT)
Dept: FAMILY MEDICINE | Facility: CLINIC | Age: 67
End: 2022-01-26
Payer: COMMERCIAL

## 2022-01-26 DIAGNOSIS — R50.9 FEVER: ICD-10-CM

## 2022-01-26 DIAGNOSIS — R05.9 COUGH: ICD-10-CM

## 2022-01-26 DIAGNOSIS — R05.9 COUGH: Primary | ICD-10-CM

## 2022-01-26 LAB
SARS-COV-2 RNA RESP QL NAA+PROBE: DETECTED
SARS-COV-2- CYCLE NUMBER: 22

## 2022-01-26 PROCEDURE — 99999 PR PBB SHADOW E&M-EST. PATIENT-LVL I: CPT | Mod: PBBFAC,,,

## 2022-01-26 PROCEDURE — U0003 INFECTIOUS AGENT DETECTION BY NUCLEIC ACID (DNA OR RNA); SEVERE ACUTE RESPIRATORY SYNDROME CORONAVIRUS 2 (SARS-COV-2) (CORONAVIRUS DISEASE [COVID-19]), AMPLIFIED PROBE TECHNIQUE, MAKING USE OF HIGH THROUGHPUT TECHNOLOGIES AS DESCRIBED BY CMS-2020-01-R: HCPCS | Performed by: FAMILY MEDICINE

## 2022-01-26 PROCEDURE — 99999 PR PBB SHADOW E&M-EST. PATIENT-LVL I: ICD-10-PCS | Mod: PBBFAC,,,

## 2022-01-26 PROCEDURE — U0005 INFEC AGEN DETEC AMPLI PROBE: HCPCS | Performed by: FAMILY MEDICINE

## 2022-01-26 RX ORDER — BENZONATATE 200 MG/1
200 CAPSULE ORAL 3 TIMES DAILY PRN
Qty: 30 CAPSULE | Refills: 0 | Status: SHIPPED | OUTPATIENT
Start: 2022-01-26 | End: 2022-02-05

## 2022-01-26 NOTE — PROGRESS NOTES
Patient presented to the office today for COVID-19 testing. Patient was swabbed and tolerated testing well. Advised to please allow 72 hours for results.

## 2022-01-26 NOTE — TELEPHONE ENCOUNTER
No new care gaps identified.  Powered by [employer]. Reference number: [id number].   1/26/2022 12:03:52 AM CST

## 2022-01-26 NOTE — TELEPHONE ENCOUNTER
Spoke with patient.  Sx start 1/25. 2 family members with covid at home  Fever ST cough. Neg covid test at home yesterday.  Advised that I would assume that she probably does have COVID.  Prescription sent Tessalon Perles.  Please contact patient to come in and get PCR COVID test today.

## 2022-01-27 ENCOUNTER — PATIENT MESSAGE (OUTPATIENT)
Dept: FAMILY MEDICINE | Facility: CLINIC | Age: 67
End: 2022-01-27
Payer: COMMERCIAL

## 2022-01-27 DIAGNOSIS — U07.1 COVID-19 VIRUS INFECTION: Primary | ICD-10-CM

## 2022-01-28 ENCOUNTER — PATIENT MESSAGE (OUTPATIENT)
Dept: ALLERGY | Facility: CLINIC | Age: 67
End: 2022-01-28
Payer: COMMERCIAL

## 2022-02-03 RX ORDER — OLMESARTAN MEDOXOMIL AND HYDROCHLOROTHIAZIDE 20/12.5 20; 12.5 MG/1; MG/1
TABLET ORAL
Qty: 90 TABLET | Refills: 3 | Status: SHIPPED | OUTPATIENT
Start: 2022-02-03 | End: 2022-05-27 | Stop reason: SDUPTHER

## 2022-02-03 NOTE — TELEPHONE ENCOUNTER
Refill Routing Note   Medication(s) are not appropriate for processing by [facility] for the following reason(s):      - Medication is a new start (<3 months)    [facility] action(s):  Defer       Medication Therapy Plan: New start 12/07/21; defer  --->Care Gap information included in message below if applicable.   Medication reconciliation completed: No   Automatic Epic Generated Protocol Data:        Requested Prescriptions   Pending Prescriptions Disp Refills    olmesartan-hydrochlorothiazide (BENICAR HCT) 20-12.5 mg per tablet [Pharmacy Med Name: OLMESARTAN-HCTZ 20-12.5 MG TAB] 90 tablet 3     Sig: TAKE 1 TABLET BY MOUTH EVERY DAY       Cardiovascular: ARB + Diuretic Combos Passed - 1/26/2022 12:03 AM        Passed - Patient is at least 18 years old        Passed - Last BP in normal range within 360 days     BP Readings from Last 1 Encounters:   01/04/22 112/73               Passed - Valid encounter within last 15 months     Recent Visits  Date Type Provider Dept   01/04/22 Office Visit Juan Buck MD [facility]   12/07/21 Office Visit Juan Buck MD [facility]   02/23/21 Office Visit Juan Buck MD [facility]   07/30/20 Office Visit Juan Buck MD [facility]   Showing recent visits within past 720 days and meeting all other requirements  Future Appointments  No visits were found meeting these conditions.  Showing future appointments within next 150 days and meeting all other requirements      Future Appointments              In 4 weeks [facility]    In 4 weeks [facility] MAMMO[facility] - [facility]                Passed - K in normal range and within 360 days     Potassium   Date Value Ref Range Status   01/04/2022 4.4 3.5 - 5.1 mmol/L Final   02/23/2021 3.7 3.5 - 5.1 mmol/L Final   12/04/2019 3.9 3.5 - 5.1 mmol/L Final              Passed - Na is between 130 and 148 and within 360 days     Sodium    Date Value Ref Range Status   01/04/2022 139 136 - 145 mmol/L Final   02/23/2021 143 136 - 145 mmol/L Final   12/04/2019 142 136 - 145 mmol/L Final              Passed - Cr is 1.39 or below and within 360 days     Lab Results   Component Value Date    CREATININE 0.9 01/04/2022    CREATININE 0.8 02/23/2021    CREATININE 0.8 12/04/2019              Passed - eGFR within 360 days     Lab Results   Component Value Date    EGFRNONAA >60.0 01/04/2022    EGFRNONAA >60.0 02/23/2021    EGFRNONAA >60.0 12/04/2019                      Appointments  past 12m or future 3m with PCP    Date Provider   Last Visit   1/4/2022 Juan Buck MD   Next Visit   Visit date not found Juan Buck MD   ED visits in past 90 days: 0        Note composed:8:38 AM 02/03/2022

## 2022-03-04 ENCOUNTER — HOSPITAL ENCOUNTER (OUTPATIENT)
Dept: RADIOLOGY | Facility: HOSPITAL | Age: 67
Discharge: HOME | End: 2022-03-04
Attending: FAMILY MEDICINE
Payer: COMMERCIAL

## 2022-03-04 ENCOUNTER — CLINICAL SUPPORT (OUTPATIENT)
Dept: FAMILY MEDICINE | Facility: CLINIC | Age: 67
End: 2022-03-04
Payer: COMMERCIAL

## 2022-03-04 VITALS — HEIGHT: 59 IN | WEIGHT: 160.25 LBS | BODY MASS INDEX: 32.31 KG/M2

## 2022-03-04 DIAGNOSIS — Z12.31 ENCOUNTER FOR SCREENING MAMMOGRAM FOR MALIGNANT NEOPLASM OF BREAST: ICD-10-CM

## 2022-03-04 DIAGNOSIS — Z00.00 ROUTINE HISTORY AND PHYSICAL EXAMINATION OF ADULT: ICD-10-CM

## 2022-03-04 PROCEDURE — 77067 SCR MAMMO BI INCL CAD: CPT | Mod: TC,PO

## 2022-03-04 PROCEDURE — 90750 ZOSTER RECOMBINANT VACCINE: ICD-10-PCS | Mod: S$GLB,,, | Performed by: FAMILY MEDICINE

## 2022-03-04 PROCEDURE — 90471 IMMUNIZATION ADMIN: CPT | Mod: S$GLB,,, | Performed by: FAMILY MEDICINE

## 2022-03-04 PROCEDURE — 77067 MAMMO DIGITAL SCREENING BILAT WITH TOMO: ICD-10-PCS | Mod: 26,,, | Performed by: RADIOLOGY

## 2022-03-04 PROCEDURE — 90750 HZV VACC RECOMBINANT IM: CPT | Mod: S$GLB,,, | Performed by: FAMILY MEDICINE

## 2022-03-04 PROCEDURE — 77063 MAMMO DIGITAL SCREENING BILAT WITH TOMO: ICD-10-PCS | Mod: 26,,, | Performed by: RADIOLOGY

## 2022-03-04 PROCEDURE — 90471 ZOSTER RECOMBINANT VACCINE: ICD-10-PCS | Mod: S$GLB,,, | Performed by: FAMILY MEDICINE

## 2022-03-04 PROCEDURE — 77063 BREAST TOMOSYNTHESIS BI: CPT | Mod: 26,,, | Performed by: RADIOLOGY

## 2022-03-04 PROCEDURE — 77063 BREAST TOMOSYNTHESIS BI: CPT | Mod: TC,PO

## 2022-03-04 PROCEDURE — 99999 PR PBB SHADOW E&M-EST. PATIENT-LVL III: ICD-10-PCS | Mod: PBBFAC,,,

## 2022-03-04 PROCEDURE — 77067 SCR MAMMO BI INCL CAD: CPT | Mod: 26,,, | Performed by: RADIOLOGY

## 2022-03-04 PROCEDURE — 99999 PR PBB SHADOW E&M-EST. PATIENT-LVL III: CPT | Mod: PBBFAC,,,

## 2022-03-09 ENCOUNTER — PATIENT MESSAGE (OUTPATIENT)
Dept: FAMILY MEDICINE | Facility: CLINIC | Age: 67
End: 2022-03-09
Payer: COMMERCIAL

## 2022-03-09 ENCOUNTER — PATIENT MESSAGE (OUTPATIENT)
Dept: ALLERGY | Facility: CLINIC | Age: 67
End: 2022-03-09
Payer: COMMERCIAL

## 2022-03-11 ENCOUNTER — PATIENT MESSAGE (OUTPATIENT)
Dept: ORTHOPEDICS | Facility: CLINIC | Age: 67
End: 2022-03-11
Payer: COMMERCIAL

## 2022-03-17 ENCOUNTER — CLINICAL SUPPORT (OUTPATIENT)
Dept: ALLERGY | Facility: CLINIC | Age: 67
End: 2022-03-17
Payer: COMMERCIAL

## 2022-03-17 DIAGNOSIS — L50.1 CHRONIC IDIOPATHIC URTICARIA: Primary | ICD-10-CM

## 2022-03-17 PROCEDURE — 96372 THER/PROPH/DIAG INJ SC/IM: CPT | Mod: S$GLB,,, | Performed by: ALLERGY & IMMUNOLOGY

## 2022-03-17 PROCEDURE — 96372 PR INJECTION,THERAP/PROPH/DIAG2ST, IM OR SUBCUT: ICD-10-PCS | Mod: S$GLB,,, | Performed by: ALLERGY & IMMUNOLOGY

## 2022-03-17 PROCEDURE — 99499 NO LOS: ICD-10-PCS | Mod: S$GLB,,, | Performed by: ALLERGY & IMMUNOLOGY

## 2022-03-17 PROCEDURE — 99999 PR PBB SHADOW E&M-EST. PATIENT-LVL I: ICD-10-PCS | Mod: PBBFAC,,,

## 2022-03-17 PROCEDURE — 99499 UNLISTED E&M SERVICE: CPT | Mod: S$GLB,,, | Performed by: ALLERGY & IMMUNOLOGY

## 2022-03-17 PROCEDURE — 99999 PR PBB SHADOW E&M-EST. PATIENT-LVL I: CPT | Mod: PBBFAC,,,

## 2022-03-17 NOTE — PROGRESS NOTES
Pt identifiers verified. 2 person medication verification performed         Xolair 150mg given per order subcutaneous injection to upper left arm  Xolair 150mg given per order subcutaneous injection to upper right arm     Aseptic technique utilized.  Pt tolerated well.        Pt instructed to remain in clinic for 30 minutes.  Verbalized understanding/

## 2022-03-21 ENCOUNTER — OFFICE VISIT (OUTPATIENT)
Dept: ORTHOPEDICS | Facility: CLINIC | Age: 67
End: 2022-03-21
Payer: COMMERCIAL

## 2022-03-21 VITALS — HEIGHT: 59 IN | WEIGHT: 160 LBS | BODY MASS INDEX: 32.25 KG/M2

## 2022-03-21 DIAGNOSIS — M25.562 ACUTE PAIN OF LEFT KNEE: Primary | ICD-10-CM

## 2022-03-21 DIAGNOSIS — M17.12 PRIMARY OSTEOARTHRITIS OF LEFT KNEE: Primary | ICD-10-CM

## 2022-03-21 DIAGNOSIS — M25.562 ACUTE PAIN OF LEFT KNEE: ICD-10-CM

## 2022-03-21 PROCEDURE — 1159F PR MEDICATION LIST DOCUMENTED IN MEDICAL RECORD: ICD-10-PCS | Mod: CPTII,S$GLB,, | Performed by: ORTHOPAEDIC SURGERY

## 2022-03-21 PROCEDURE — 99213 OFFICE O/P EST LOW 20 MIN: CPT | Mod: S$GLB,,, | Performed by: ORTHOPAEDIC SURGERY

## 2022-03-21 PROCEDURE — 3008F BODY MASS INDEX DOCD: CPT | Mod: CPTII,S$GLB,, | Performed by: ORTHOPAEDIC SURGERY

## 2022-03-21 PROCEDURE — 99213 PR OFFICE/OUTPT VISIT, EST, LEVL III, 20-29 MIN: ICD-10-PCS | Mod: S$GLB,,, | Performed by: ORTHOPAEDIC SURGERY

## 2022-03-21 PROCEDURE — 3044F PR MOST RECENT HEMOGLOBIN A1C LEVEL <7.0%: ICD-10-PCS | Mod: CPTII,S$GLB,, | Performed by: ORTHOPAEDIC SURGERY

## 2022-03-21 PROCEDURE — 1159F MED LIST DOCD IN RCRD: CPT | Mod: CPTII,S$GLB,, | Performed by: ORTHOPAEDIC SURGERY

## 2022-03-21 PROCEDURE — 3008F PR BODY MASS INDEX (BMI) DOCUMENTED: ICD-10-PCS | Mod: CPTII,S$GLB,, | Performed by: ORTHOPAEDIC SURGERY

## 2022-03-21 PROCEDURE — 1160F RVW MEDS BY RX/DR IN RCRD: CPT | Mod: CPTII,S$GLB,, | Performed by: ORTHOPAEDIC SURGERY

## 2022-03-21 PROCEDURE — 1160F PR REVIEW ALL MEDS BY PRESCRIBER/CLIN PHARMACIST DOCUMENTED: ICD-10-PCS | Mod: CPTII,S$GLB,, | Performed by: ORTHOPAEDIC SURGERY

## 2022-03-21 PROCEDURE — 3044F HG A1C LEVEL LT 7.0%: CPT | Mod: CPTII,S$GLB,, | Performed by: ORTHOPAEDIC SURGERY

## 2022-03-21 PROCEDURE — 99999 PR PBB SHADOW E&M-EST. PATIENT-LVL IV: ICD-10-PCS | Mod: PBBFAC,,, | Performed by: ORTHOPAEDIC SURGERY

## 2022-03-21 PROCEDURE — 99999 PR PBB SHADOW E&M-EST. PATIENT-LVL IV: CPT | Mod: PBBFAC,,, | Performed by: ORTHOPAEDIC SURGERY

## 2022-03-21 NOTE — PROGRESS NOTES
66 years old follow-up of left knee pain.  We had given her injection with cortisone about 4 months ago with good temporary relief symptoms have recurred she is concerned there may be something more than arthritis in her knee and she came appear good her  shoulder to go up and get an MRI of her knee    Exam shows no signs infection does have tenderness the joint line    X-rays show knee arthrosis    Assessment:  Left knee arthrosis    Plan:  MRI of the knee, follow-up after that to discuss different treatment options

## 2022-03-25 ENCOUNTER — PATIENT MESSAGE (OUTPATIENT)
Dept: ORTHOPEDICS | Facility: CLINIC | Age: 67
End: 2022-03-25
Payer: COMMERCIAL

## 2022-04-01 ENCOUNTER — PATIENT MESSAGE (OUTPATIENT)
Dept: ALLERGY | Facility: CLINIC | Age: 67
End: 2022-04-01
Payer: COMMERCIAL

## 2022-04-01 ENCOUNTER — OFFICE VISIT (OUTPATIENT)
Dept: ORTHOPEDICS | Facility: CLINIC | Age: 67
End: 2022-04-01
Payer: COMMERCIAL

## 2022-04-01 VITALS — BODY MASS INDEX: 32.25 KG/M2 | WEIGHT: 160 LBS | HEIGHT: 59 IN

## 2022-04-01 DIAGNOSIS — M17.12 PRIMARY OSTEOARTHRITIS OF LEFT KNEE: Primary | ICD-10-CM

## 2022-04-01 PROCEDURE — 99214 OFFICE O/P EST MOD 30 MIN: CPT | Mod: 57,S$GLB,, | Performed by: ORTHOPAEDIC SURGERY

## 2022-04-01 PROCEDURE — 1160F PR REVIEW ALL MEDS BY PRESCRIBER/CLIN PHARMACIST DOCUMENTED: ICD-10-PCS | Mod: CPTII,S$GLB,, | Performed by: ORTHOPAEDIC SURGERY

## 2022-04-01 PROCEDURE — 99214 PR OFFICE/OUTPT VISIT, EST, LEVL IV, 30-39 MIN: ICD-10-PCS | Mod: 57,S$GLB,, | Performed by: ORTHOPAEDIC SURGERY

## 2022-04-01 PROCEDURE — 99999 PR PBB SHADOW E&M-EST. PATIENT-LVL IV: ICD-10-PCS | Mod: PBBFAC,,, | Performed by: ORTHOPAEDIC SURGERY

## 2022-04-01 PROCEDURE — 3008F BODY MASS INDEX DOCD: CPT | Mod: CPTII,S$GLB,, | Performed by: ORTHOPAEDIC SURGERY

## 2022-04-01 PROCEDURE — 3044F HG A1C LEVEL LT 7.0%: CPT | Mod: CPTII,S$GLB,, | Performed by: ORTHOPAEDIC SURGERY

## 2022-04-01 PROCEDURE — 1101F PR PT FALLS ASSESS DOC 0-1 FALLS W/OUT INJ PAST YR: ICD-10-PCS | Mod: CPTII,S$GLB,, | Performed by: ORTHOPAEDIC SURGERY

## 2022-04-01 PROCEDURE — 3044F PR MOST RECENT HEMOGLOBIN A1C LEVEL <7.0%: ICD-10-PCS | Mod: CPTII,S$GLB,, | Performed by: ORTHOPAEDIC SURGERY

## 2022-04-01 PROCEDURE — 1160F RVW MEDS BY RX/DR IN RCRD: CPT | Mod: CPTII,S$GLB,, | Performed by: ORTHOPAEDIC SURGERY

## 2022-04-01 PROCEDURE — 3288F PR FALLS RISK ASSESSMENT DOCUMENTED: ICD-10-PCS | Mod: CPTII,S$GLB,, | Performed by: ORTHOPAEDIC SURGERY

## 2022-04-01 PROCEDURE — 1101F PT FALLS ASSESS-DOCD LE1/YR: CPT | Mod: CPTII,S$GLB,, | Performed by: ORTHOPAEDIC SURGERY

## 2022-04-01 PROCEDURE — 3288F FALL RISK ASSESSMENT DOCD: CPT | Mod: CPTII,S$GLB,, | Performed by: ORTHOPAEDIC SURGERY

## 2022-04-01 PROCEDURE — 1159F PR MEDICATION LIST DOCUMENTED IN MEDICAL RECORD: ICD-10-PCS | Mod: CPTII,S$GLB,, | Performed by: ORTHOPAEDIC SURGERY

## 2022-04-01 PROCEDURE — 1159F MED LIST DOCD IN RCRD: CPT | Mod: CPTII,S$GLB,, | Performed by: ORTHOPAEDIC SURGERY

## 2022-04-01 PROCEDURE — 99999 PR PBB SHADOW E&M-EST. PATIENT-LVL IV: CPT | Mod: PBBFAC,,, | Performed by: ORTHOPAEDIC SURGERY

## 2022-04-01 PROCEDURE — 3008F PR BODY MASS INDEX (BMI) DOCUMENTED: ICD-10-PCS | Mod: CPTII,S$GLB,, | Performed by: ORTHOPAEDIC SURGERY

## 2022-04-01 NOTE — H&P (VIEW-ONLY)
67 years old follow-up persistent knee pain has failed nonoperative care including injections anti-inflammatories modalities therapy.  Pain swelling and stiffness the knee with locking catching giving way    MRI shows degenerative changes including degenerative complex tear of the posterior horn the medial meniscus    Assessment:  Degenerative disease of knee left knee    Plan:  We had a lengthy discussion with the patient about different options.  At this point we opted for arthroscopic treatment.  Patient is aware the risks and particularly limitations of the surgery    Imaging studies ordered and reviewed by me    Further History  Aching pain  Worse with activity  Relieved with rest  No other associated symptoms  No other radiation    Further Exam  Alert and oriented  Pleasant  Contralateral limb has appropriate range of motion for age and condition  Contralateral limb has appropriate strength for age and condition  Contralateral limb has appropriate stability  for age and condition  No adenopathy  Pulses are appropriate for current condition  Skin is intact        Chief Complaint    Chief Complaint   Patient presents with    Left Knee - Pain, Swelling, Injury       HPI  Cheyenne West is a 67 y.o.  female who presents with       Past Medical History  Past Medical History:   Diagnosis Date    Carpal tunnel syndrome of right wrist     Chronic gastritis     Dissociative fugue     Diverticulosis     Duodenitis     Dyspepsia     Generalized anxiety disorder     Hearing loss on left     Hearing loss on right     Helicobacter pylori (H. pylori)     History of blood transfusion     Hyperlipidemia     Hypertension     Hypothyroidism     IBS (irritable bowel syndrome)     Iron deficiency anemia     Mild mitral regurgitation by prior echocardiogram     Mild obesity     Mild vitamin D deficiency     Paraesophageal hiatal hernia     PONV (postoperative nausea and vomiting)     Reflux  gastritis     RLS (restless legs syndrome) 2021    Sleep apnea     mild improved with wt. loss    Thyroid goiter     Urticaria, chronic        Past Surgical History  Past Surgical History:   Procedure Laterality Date    BLADDER SUSPENSION      pubovaginal sling     SECTION, CLASSIC      CHOLECYSTECTOMY      COLONOSCOPY  2011    Dr. Goode, in legacy:diverticulosis, hemorrhoids, melanosis coli-repeat 10 years    COLONOSCOPY N/A 2018    Procedure: COLONOSCOPY;  Surgeon: Rusty Vogt Jr., MD;  Location: [facility];  Service: Endoscopy;  Laterality: N/A; repeat in 10 years for screening    ESOPHAGEAL DILATION      ESOPHAGOGASTRODUODENOSCOPY  2016    ESOPHAGOGASTRODUODENOSCOPY N/A 2018    Procedure: EGD (ESOPHAGOGASTRODUODENOSCOPY);  Surgeon: Rusty Vogt Jr., MD;  Location: [facility];  Service: Endoscopy;  Laterality: N/A;    HERNIA REPAIR      INTRALUMINAL GASTROINTESTINAL TRACT IMAGING VIA CAPSULE N/A 2018    Procedure: IMAGING PROCEDURE, GI TRACT, INTRALUMINAL, VIA CAPSULE;  Surgeon: Loraine Velazquez MD;  Location: [facility];  Service: Endoscopy;  Laterality: N/A;    LAPAROSCOPIC NISSEN FUNDOPLICATION N/A 2018    Procedure: FUNDOPLICATION, NISSEN, LAPAROSCOPIC;  Surgeon: Frank Akins MD;  Location: [facility] OR;  Service: General;  Laterality: N/A;    THYROIDECTOMY, PARTIAL       partial for benign nodule    UPPER GASTROINTESTINAL ENDOSCOPY  2016    Dr. Whalen, in care everywhere       Medications  Current Outpatient Medications   Medication Sig    ALPRAZolam (XANAX) 0.25 MG tablet Take 1 tablet (0.25 mg total) by mouth 3 (three) times daily as needed for Anxiety.    atorvastatin (LIPITOR) 10 MG tablet Take 1 tablet (10 mg total) by mouth once daily.    buPROPion (WELLBUTRIN XL) 300 MG 24 hr tablet Take 1 tablet (300 mg total) by mouth once daily.    CALCIUM CARBONATE/VITAMIN D3 (CALTRATE 600 + D ORAL) Take by mouth 2 (two) times daily.     cetirizine (ZYRTEC) 10 MG tablet Take 10 mg by mouth 2 (two) times a day.    diphenhydrAMINE (BENADRYL) 25 mg capsule Take 25 mg by mouth as needed for Itching.    EScitalopram oxalate (LEXAPRO) 20 MG tablet Take 1 tablet (20 mg total) by mouth once daily.    estradiol (VAGIFEM) 10 mcg Tab Place 10 mcg vaginally.    famotidine (PEPCID AC) 20 MG tablet Take 1 tablet (20 mg total) by mouth 2 (two) times a day.    felodipine (PLENDIL) 5 MG 24 hr tablet Take 1 tablet (5 mg total) by mouth once daily.    gabapentin (NEURONTIN) 300 MG capsule Take 1 capsule (300 mg total) by mouth every evening.    levothyroxine (SYNTHROID) 50 MCG tablet Take 1 tablet (50 mcg total) by mouth once daily.    olmesartan-hydrochlorothiazide (BENICAR HCT) 20-12.5 mg per tablet TAKE 1 TABLET BY MOUTH EVERY DAY    omalizumab (XOLAIR) 150 mg/mL injection Inject 2 mLs (300 mg total) into the skin every 28 days.    OMEGA-3 FATTY ACIDS-EPA ORAL Take by mouth once daily.    pantoprazole (PROTONIX) 40 MG tablet TAKE 1 TABLET BY MOUTH EVERY DAY    polyethylene glycol (GLYCOLAX) 17 gram PwPk Take by mouth as needed.     potassium chloride (MICRO-K) 10 MEQ CpSR Take 1 capsule (10 mEq total) by mouth once daily.    predniSONE (DELTASONE) 10 MG tablet TAKE 1/2 TABLET EVERY OTHER DAY    VIT A/VIT C/VIT E/ZINC/COPPER (PRESERVISION AREDS ORAL) Take by mouth once daily.     Current Facility-Administered Medications   Medication    omalizumab injection 300 mg       Allergies  Review of patient's allergies indicates:   Allergen Reactions    Penicillins        Family History  Family History   Problem Relation Age of Onset    Hypertension Mother     Heart disease Mother     Ulcers Mother     GI problems Mother         colitis    Hypertension Father     Breast cancer Paternal Grandmother     Breast cancer Cousin     Colon cancer Neg Hx     Crohn's disease Neg Hx     Colon polyps Neg Hx     Ulcerative colitis Neg Hx     Stomach  cancer Neg Hx     Esophageal cancer Neg Hx     Allergic rhinitis Neg Hx     Allergies Neg Hx     Angioedema Neg Hx     Atopy Neg Hx     Eczema Neg Hx     Immunodeficiency Neg Hx     Rhinitis Neg Hx     Urticaria Neg Hx     Asthma Neg Hx        Social History  Social History     Socioeconomic History    Marital status:    Tobacco Use    Smoking status: Never Smoker    Smokeless tobacco: Never Used   Substance and Sexual Activity    Alcohol use: Yes     Comment: seldom    Drug use: No    Sexual activity: Yes     Social Determinants of Health     Financial Resource Strain: Low Risk     Difficulty of Paying Living Expenses: Not hard at all   Food Insecurity: Unknown    Worried About Running Out of Food in the Last Year: Never true   Transportation Needs: No Transportation Needs    Lack of Transportation (Medical): No    Lack of Transportation (Non-Medical): No   Physical Activity: Unknown    Days of Exercise per Week: Patient refused   Stress: No Stress Concern Present    Feeling of Stress : Only a little   Social Connections: Unknown    Frequency of Communication with Friends and Family: Three times a week    Frequency of Social Gatherings with Friends and Family: Once a week    Active Member of Clubs or Organizations: No    Attends Club or Organization Meetings: Patient refused    Marital Status:    Housing Stability: Low Risk     Unable to Pay for Housing in the Last Year: No    Number of Places Lived in the Last Year: 1    Unstable Housing in the Last Year: No               Review of Systems     Constitutional: Negative    HENT: Negative  Eyes: Negative  Respiratory: Negative  Cardiovascular: Negative  Musculoskeletal: HPI  Skin: Negative  Neurological: Negative  Hematological: Negative  Endocrine: Negative                 Physical Exam    There were no vitals filed for this visit.  Body mass index is 32.32 kg/m².  Physical Examination:     General appearance -  well  appearing, and in no distress  Mental status - awake  Neck - supple  Chest -  symmetric air entry  Heart - normal rate   Abdomen - soft      Assessment     1. Primary osteoarthritis of left knee          Plan

## 2022-04-01 NOTE — PROGRESS NOTES
67 years old follow-up persistent knee pain has failed nonoperative care including injections anti-inflammatories modalities therapy.  Pain swelling and stiffness the knee with locking catching giving way    MRI shows degenerative changes including degenerative complex tear of the posterior horn the medial meniscus    Assessment:  Degenerative disease of knee left knee    Plan:  We had a lengthy discussion with the patient about different options.  At this point we opted for arthroscopic treatment.  Patient is aware the risks and particularly limitations of the surgery    Imaging studies ordered and reviewed by me    Further History  Aching pain  Worse with activity  Relieved with rest  No other associated symptoms  No other radiation    Further Exam  Alert and oriented  Pleasant  Contralateral limb has appropriate range of motion for age and condition  Contralateral limb has appropriate strength for age and condition  Contralateral limb has appropriate stability  for age and condition  No adenopathy  Pulses are appropriate for current condition  Skin is intact        Chief Complaint    Chief Complaint   Patient presents with    Left Knee - Pain, Swelling, Injury       HPI  Cheyenne West is a 67 y.o.  female who presents with       Past Medical History  Past Medical History:   Diagnosis Date    Carpal tunnel syndrome of right wrist     Chronic gastritis     Dissociative fugue     Diverticulosis     Duodenitis     Dyspepsia     Generalized anxiety disorder     Hearing loss on left     Hearing loss on right     Helicobacter pylori (H. pylori)     History of blood transfusion     Hyperlipidemia     Hypertension     Hypothyroidism     IBS (irritable bowel syndrome)     Iron deficiency anemia     Mild mitral regurgitation by prior echocardiogram     Mild obesity     Mild vitamin D deficiency     Paraesophageal hiatal hernia     PONV (postoperative nausea and vomiting)     Reflux  gastritis     RLS (restless legs syndrome) 2021    Sleep apnea     mild improved with wt. loss    Thyroid goiter     Urticaria, chronic        Past Surgical History  Past Surgical History:   Procedure Laterality Date    BLADDER SUSPENSION      pubovaginal sling     SECTION, CLASSIC      CHOLECYSTECTOMY      COLONOSCOPY  2011    Dr. Goode, in legacy:diverticulosis, hemorrhoids, melanosis coli-repeat 10 years    COLONOSCOPY N/A 2018    Procedure: COLONOSCOPY;  Surgeon: Rusty Vogt Jr., MD;  Location: [facility];  Service: Endoscopy;  Laterality: N/A; repeat in 10 years for screening    ESOPHAGEAL DILATION      ESOPHAGOGASTRODUODENOSCOPY  2016    ESOPHAGOGASTRODUODENOSCOPY N/A 2018    Procedure: EGD (ESOPHAGOGASTRODUODENOSCOPY);  Surgeon: Rusty Vogt Jr., MD;  Location: [facility];  Service: Endoscopy;  Laterality: N/A;    HERNIA REPAIR      INTRALUMINAL GASTROINTESTINAL TRACT IMAGING VIA CAPSULE N/A 2018    Procedure: IMAGING PROCEDURE, GI TRACT, INTRALUMINAL, VIA CAPSULE;  Surgeon: Loraine Velazquez MD;  Location: [facility];  Service: Endoscopy;  Laterality: N/A;    LAPAROSCOPIC NISSEN FUNDOPLICATION N/A 2018    Procedure: FUNDOPLICATION, NISSEN, LAPAROSCOPIC;  Surgeon: Frank Akins MD;  Location: [facility] OR;  Service: General;  Laterality: N/A;    THYROIDECTOMY, PARTIAL       partial for benign nodule    UPPER GASTROINTESTINAL ENDOSCOPY  2016    Dr. Whalen, in care everywhere       Medications  Current Outpatient Medications   Medication Sig    ALPRAZolam (XANAX) 0.25 MG tablet Take 1 tablet (0.25 mg total) by mouth 3 (three) times daily as needed for Anxiety.    atorvastatin (LIPITOR) 10 MG tablet Take 1 tablet (10 mg total) by mouth once daily.    buPROPion (WELLBUTRIN XL) 300 MG 24 hr tablet Take 1 tablet (300 mg total) by mouth once daily.    CALCIUM CARBONATE/VITAMIN D3 (CALTRATE 600 + D ORAL) Take by mouth 2 (two) times daily.     cetirizine (ZYRTEC) 10 MG tablet Take 10 mg by mouth 2 (two) times a day.    diphenhydrAMINE (BENADRYL) 25 mg capsule Take 25 mg by mouth as needed for Itching.    EScitalopram oxalate (LEXAPRO) 20 MG tablet Take 1 tablet (20 mg total) by mouth once daily.    estradiol (VAGIFEM) 10 mcg Tab Place 10 mcg vaginally.    famotidine (PEPCID AC) 20 MG tablet Take 1 tablet (20 mg total) by mouth 2 (two) times a day.    felodipine (PLENDIL) 5 MG 24 hr tablet Take 1 tablet (5 mg total) by mouth once daily.    gabapentin (NEURONTIN) 300 MG capsule Take 1 capsule (300 mg total) by mouth every evening.    levothyroxine (SYNTHROID) 50 MCG tablet Take 1 tablet (50 mcg total) by mouth once daily.    olmesartan-hydrochlorothiazide (BENICAR HCT) 20-12.5 mg per tablet TAKE 1 TABLET BY MOUTH EVERY DAY    omalizumab (XOLAIR) 150 mg/mL injection Inject 2 mLs (300 mg total) into the skin every 28 days.    OMEGA-3 FATTY ACIDS-EPA ORAL Take by mouth once daily.    pantoprazole (PROTONIX) 40 MG tablet TAKE 1 TABLET BY MOUTH EVERY DAY    polyethylene glycol (GLYCOLAX) 17 gram PwPk Take by mouth as needed.     potassium chloride (MICRO-K) 10 MEQ CpSR Take 1 capsule (10 mEq total) by mouth once daily.    predniSONE (DELTASONE) 10 MG tablet TAKE 1/2 TABLET EVERY OTHER DAY    VIT A/VIT C/VIT E/ZINC/COPPER (PRESERVISION AREDS ORAL) Take by mouth once daily.     Current Facility-Administered Medications   Medication    omalizumab injection 300 mg       Allergies  Review of patient's allergies indicates:   Allergen Reactions    Penicillins        Family History  Family History   Problem Relation Age of Onset    Hypertension Mother     Heart disease Mother     Ulcers Mother     GI problems Mother         colitis    Hypertension Father     Breast cancer Paternal Grandmother     Breast cancer Cousin     Colon cancer Neg Hx     Crohn's disease Neg Hx     Colon polyps Neg Hx     Ulcerative colitis Neg Hx     Stomach  cancer Neg Hx     Esophageal cancer Neg Hx     Allergic rhinitis Neg Hx     Allergies Neg Hx     Angioedema Neg Hx     Atopy Neg Hx     Eczema Neg Hx     Immunodeficiency Neg Hx     Rhinitis Neg Hx     Urticaria Neg Hx     Asthma Neg Hx        Social History  Social History     Socioeconomic History    Marital status:    Tobacco Use    Smoking status: Never Smoker    Smokeless tobacco: Never Used   Substance and Sexual Activity    Alcohol use: Yes     Comment: seldom    Drug use: No    Sexual activity: Yes     Social Determinants of Health     Financial Resource Strain: Low Risk     Difficulty of Paying Living Expenses: Not hard at all   Food Insecurity: Unknown    Worried About Running Out of Food in the Last Year: Never true   Transportation Needs: No Transportation Needs    Lack of Transportation (Medical): No    Lack of Transportation (Non-Medical): No   Physical Activity: Unknown    Days of Exercise per Week: Patient refused   Stress: No Stress Concern Present    Feeling of Stress : Only a little   Social Connections: Unknown    Frequency of Communication with Friends and Family: Three times a week    Frequency of Social Gatherings with Friends and Family: Once a week    Active Member of Clubs or Organizations: No    Attends Club or Organization Meetings: Patient refused    Marital Status:    Housing Stability: Low Risk     Unable to Pay for Housing in the Last Year: No    Number of Places Lived in the Last Year: 1    Unstable Housing in the Last Year: No               Review of Systems     Constitutional: Negative    HENT: Negative  Eyes: Negative  Respiratory: Negative  Cardiovascular: Negative  Musculoskeletal: HPI  Skin: Negative  Neurological: Negative  Hematological: Negative  Endocrine: Negative                 Physical Exam    There were no vitals filed for this visit.  Body mass index is 32.32 kg/m².  Physical Examination:     General appearance -  well  appearing, and in no distress  Mental status - awake  Neck - supple  Chest -  symmetric air entry  Heart - normal rate   Abdomen - soft      Assessment     1. Primary osteoarthritis of left knee          Plan

## 2022-04-03 ENCOUNTER — PATIENT MESSAGE (OUTPATIENT)
Dept: ORTHOPEDICS | Facility: CLINIC | Age: 67
End: 2022-04-03
Payer: COMMERCIAL

## 2022-04-04 ENCOUNTER — PATIENT MESSAGE (OUTPATIENT)
Dept: ALLERGY | Facility: CLINIC | Age: 67
End: 2022-04-04
Payer: COMMERCIAL

## 2022-04-05 DIAGNOSIS — M23.229: ICD-10-CM

## 2022-04-05 DIAGNOSIS — M23.322: ICD-10-CM

## 2022-04-05 DIAGNOSIS — M17.12 PRIMARY OSTEOARTHRITIS OF LEFT KNEE: Primary | ICD-10-CM

## 2022-04-05 RX ORDER — MUPIROCIN 20 MG/G
OINTMENT TOPICAL
Status: CANCELLED | OUTPATIENT
Start: 2022-04-05

## 2022-04-05 RX ORDER — OXYCODONE AND ACETAMINOPHEN 5; 325 MG/1; MG/1
1 TABLET ORAL
Qty: 42 TABLET | Refills: 0 | Status: CANCELLED | OUTPATIENT
Start: 2022-04-05

## 2022-04-06 ENCOUNTER — ANESTHESIA EVENT (OUTPATIENT)
Dept: SURGERY | Facility: HOSPITAL | Age: 67
End: 2022-04-06
Payer: COMMERCIAL

## 2022-04-06 DIAGNOSIS — M17.12 PRIMARY OSTEOARTHRITIS OF LEFT KNEE: Primary | ICD-10-CM

## 2022-04-06 DIAGNOSIS — M23.322: ICD-10-CM

## 2022-04-06 RX ORDER — OXYCODONE AND ACETAMINOPHEN 5; 325 MG/1; MG/1
1 TABLET ORAL
Qty: 42 TABLET | Refills: 0 | Status: SHIPPED | OUTPATIENT
Start: 2022-04-06 | End: 2022-06-29

## 2022-04-07 ENCOUNTER — HOSPITAL ENCOUNTER (OUTPATIENT)
Facility: HOSPITAL | Age: 67
Discharge: HOME | End: 2022-04-07
Attending: ORTHOPAEDIC SURGERY | Admitting: ORTHOPAEDIC SURGERY
Payer: COMMERCIAL

## 2022-04-07 ENCOUNTER — ANESTHESIA (OUTPATIENT)
Dept: SURGERY | Facility: HOSPITAL | Age: 67
End: 2022-04-07
Payer: COMMERCIAL

## 2022-04-07 DIAGNOSIS — M23.229: ICD-10-CM

## 2022-04-07 DIAGNOSIS — M17.12 PRIMARY OSTEOARTHRITIS OF LEFT KNEE: ICD-10-CM

## 2022-04-07 DIAGNOSIS — M23.322: ICD-10-CM

## 2022-04-07 PROCEDURE — 37000009 HC ANESTHESIA EA ADD 15 MINS: Mod: PO | Performed by: ORTHOPAEDIC SURGERY

## 2022-04-07 PROCEDURE — 37000008 HC ANESTHESIA 1ST 15 MINUTES: Mod: PO | Performed by: ORTHOPAEDIC SURGERY

## 2022-04-07 PROCEDURE — 25000003 PHARM REV CODE 250: Mod: PO | Performed by: ORTHOPAEDIC SURGERY

## 2022-04-07 PROCEDURE — 27201423 OPTIME MED/SURG SUP & DEVICES STERILE SUPPLY: Mod: PO | Performed by: ORTHOPAEDIC SURGERY

## 2022-04-07 PROCEDURE — 25000003 PHARM REV CODE 250: Mod: PO | Performed by: NURSE ANESTHETIST, CERTIFIED REGISTERED

## 2022-04-07 PROCEDURE — 29881 ARTHRS KNE SRG MNISECTMY M/L: CPT | Mod: LT,,, | Performed by: ORTHOPAEDIC SURGERY

## 2022-04-07 PROCEDURE — D9220A PRA ANESTHESIA: ICD-10-PCS | Mod: CRNA,,, | Performed by: NURSE ANESTHETIST, CERTIFIED REGISTERED

## 2022-04-07 PROCEDURE — 63600175 PHARM REV CODE 636 W HCPCS: Mod: PO | Performed by: ANESTHESIOLOGY

## 2022-04-07 PROCEDURE — D9220A PRA ANESTHESIA: ICD-10-PCS | Mod: ANES,,, | Performed by: ANESTHESIOLOGY

## 2022-04-07 PROCEDURE — 36000710: Mod: PO | Performed by: ORTHOPAEDIC SURGERY

## 2022-04-07 PROCEDURE — 63600175 PHARM REV CODE 636 W HCPCS: Mod: PO | Performed by: ORTHOPAEDIC SURGERY

## 2022-04-07 PROCEDURE — 71000015 HC POSTOP RECOV 1ST HR: Mod: PO | Performed by: ORTHOPAEDIC SURGERY

## 2022-04-07 PROCEDURE — 71000033 HC RECOVERY, INTIAL HOUR: Mod: PO | Performed by: ORTHOPAEDIC SURGERY

## 2022-04-07 PROCEDURE — D9220A PRA ANESTHESIA: Mod: ANES,,, | Performed by: ANESTHESIOLOGY

## 2022-04-07 PROCEDURE — 36000711: Mod: PO | Performed by: ORTHOPAEDIC SURGERY

## 2022-04-07 PROCEDURE — D9220A PRA ANESTHESIA: Mod: CRNA,,, | Performed by: NURSE ANESTHETIST, CERTIFIED REGISTERED

## 2022-04-07 PROCEDURE — 29881 PR KNEE SCOPE SINGLE MENISECECTOMY: ICD-10-PCS | Mod: LT,,, | Performed by: ORTHOPAEDIC SURGERY

## 2022-04-07 PROCEDURE — 27200651 HC AIRWAY, LMA: Mod: PO | Performed by: ANESTHESIOLOGY

## 2022-04-07 PROCEDURE — 63600175 PHARM REV CODE 636 W HCPCS: Mod: PO | Performed by: NURSE ANESTHETIST, CERTIFIED REGISTERED

## 2022-04-07 RX ORDER — FENTANYL CITRATE 50 UG/ML
25 INJECTION, SOLUTION INTRAMUSCULAR; INTRAVENOUS EVERY 5 MIN PRN
Status: DISCONTINUED | OUTPATIENT
Start: 2022-04-07 | End: 2022-06-29

## 2022-04-07 RX ORDER — SODIUM CHLORIDE, SODIUM LACTATE, POTASSIUM CHLORIDE, CALCIUM CHLORIDE 600; 310; 30; 20 MG/100ML; MG/100ML; MG/100ML; MG/100ML
INJECTION, SOLUTION INTRAVENOUS CONTINUOUS
Status: DISCONTINUED | OUTPATIENT
Start: 2022-04-07 | End: 2022-06-29

## 2022-04-07 RX ORDER — OXYCODONE AND ACETAMINOPHEN 5; 325 MG/1; MG/1
1 TABLET ORAL
Qty: 42 TABLET | Refills: 0 | Status: SHIPPED | OUTPATIENT
Start: 2022-04-07 | End: 2022-06-29

## 2022-04-07 RX ORDER — PROPOFOL 10 MG/ML
VIAL (ML) INTRAVENOUS
Status: DISCONTINUED | OUTPATIENT
Start: 2022-04-07 | End: 2022-04-07

## 2022-04-07 RX ORDER — DEXAMETHASONE SODIUM PHOSPHATE 4 MG/ML
INJECTION, SOLUTION INTRA-ARTICULAR; INTRALESIONAL; INTRAMUSCULAR; INTRAVENOUS; SOFT TISSUE
Status: DISCONTINUED | OUTPATIENT
Start: 2022-04-07 | End: 2022-04-07

## 2022-04-07 RX ORDER — SODIUM CHLORIDE 0.9 % (FLUSH) 0.9 %
3 SYRINGE (ML) INJECTION
Status: DISCONTINUED | OUTPATIENT
Start: 2022-04-07 | End: 2022-06-29

## 2022-04-07 RX ORDER — BUPIVACAINE HYDROCHLORIDE 2.5 MG/ML
INJECTION, SOLUTION EPIDURAL; INFILTRATION; INTRACAUDAL
Status: DISCONTINUED | OUTPATIENT
Start: 2022-04-07 | End: 2022-04-07 | Stop reason: HOSPADM

## 2022-04-07 RX ORDER — KETOROLAC TROMETHAMINE 30 MG/ML
INJECTION, SOLUTION INTRAMUSCULAR; INTRAVENOUS
Status: DISCONTINUED | OUTPATIENT
Start: 2022-04-07 | End: 2022-04-07

## 2022-04-07 RX ORDER — ONDANSETRON 2 MG/ML
INJECTION INTRAMUSCULAR; INTRAVENOUS
Status: DISCONTINUED | OUTPATIENT
Start: 2022-04-07 | End: 2022-04-07

## 2022-04-07 RX ORDER — FENTANYL CITRATE 50 UG/ML
INJECTION, SOLUTION INTRAMUSCULAR; INTRAVENOUS
Status: DISCONTINUED | OUTPATIENT
Start: 2022-04-07 | End: 2022-04-07

## 2022-04-07 RX ORDER — LIDOCAINE HYDROCHLORIDE 10 MG/ML
1 INJECTION, SOLUTION EPIDURAL; INFILTRATION; INTRACAUDAL; PERINEURAL ONCE
Status: DISCONTINUED | OUTPATIENT
Start: 2022-04-07 | End: 2022-06-29

## 2022-04-07 RX ORDER — OXYCODONE HYDROCHLORIDE 5 MG/1
5 TABLET ORAL ONCE AS NEEDED
Status: DISCONTINUED | OUTPATIENT
Start: 2022-04-07 | End: 2022-06-29

## 2022-04-07 RX ORDER — MUPIROCIN 20 MG/G
OINTMENT TOPICAL
Status: DISCONTINUED | OUTPATIENT
Start: 2022-04-07 | End: 2022-04-07 | Stop reason: HOSPADM

## 2022-04-07 RX ORDER — LIDOCAINE HYDROCHLORIDE 20 MG/ML
INJECTION INTRAVENOUS
Status: DISCONTINUED | OUTPATIENT
Start: 2022-04-07 | End: 2022-04-07

## 2022-04-07 RX ORDER — ACETAMINOPHEN 10 MG/ML
INJECTION, SOLUTION INTRAVENOUS
Status: DISCONTINUED | OUTPATIENT
Start: 2022-04-07 | End: 2022-04-07

## 2022-04-07 RX ORDER — DIPHENHYDRAMINE HYDROCHLORIDE 50 MG/ML
INJECTION INTRAMUSCULAR; INTRAVENOUS
Status: DISCONTINUED | OUTPATIENT
Start: 2022-04-07 | End: 2022-04-07

## 2022-04-07 RX ORDER — MIDAZOLAM HYDROCHLORIDE 1 MG/ML
INJECTION, SOLUTION INTRAMUSCULAR; INTRAVENOUS
Status: DISCONTINUED | OUTPATIENT
Start: 2022-04-07 | End: 2022-04-07

## 2022-04-07 RX ADMIN — MUPIROCIN: 20 OINTMENT TOPICAL at 07:04

## 2022-04-07 RX ADMIN — FENTANYL CITRATE 25 MCG: 50 INJECTION, SOLUTION INTRAMUSCULAR; INTRAVENOUS at 08:04

## 2022-04-07 RX ADMIN — MIDAZOLAM HYDROCHLORIDE 2 MG: 1 INJECTION, SOLUTION INTRAMUSCULAR; INTRAVENOUS at 07:04

## 2022-04-07 RX ADMIN — ACETAMINOPHEN 1000 MG: 10 INJECTION, SOLUTION INTRAVENOUS at 07:04

## 2022-04-07 RX ADMIN — SODIUM CHLORIDE, SODIUM LACTATE, POTASSIUM CHLORIDE, AND CALCIUM CHLORIDE: .6; .31; .03; .02 INJECTION, SOLUTION INTRAVENOUS at 07:04

## 2022-04-07 RX ADMIN — DEXAMETHASONE SODIUM PHOSPHATE 8 MG: 4 INJECTION, SOLUTION INTRAMUSCULAR; INTRAVENOUS at 07:04

## 2022-04-07 RX ADMIN — KETOROLAC TROMETHAMINE 30 MG: 30 INJECTION, SOLUTION INTRAMUSCULAR at 07:04

## 2022-04-07 RX ADMIN — DEXTROSE 2 G: 50 INJECTION, SOLUTION INTRAVENOUS at 07:04

## 2022-04-07 RX ADMIN — ONDANSETRON 4 MG: 2 INJECTION INTRAMUSCULAR; INTRAVENOUS at 07:04

## 2022-04-07 RX ADMIN — DIPHENHYDRAMINE HYDROCHLORIDE 6.25 MG: 50 INJECTION INTRAMUSCULAR; INTRAVENOUS at 07:04

## 2022-04-07 RX ADMIN — LIDOCAINE HYDROCHLORIDE 75 MG: 20 INJECTION, SOLUTION INTRAVENOUS at 07:04

## 2022-04-07 RX ADMIN — PROPOFOL 150 MG: 10 INJECTION, EMULSION INTRAVENOUS at 07:04

## 2022-04-07 RX ADMIN — FENTANYL CITRATE 50 MCG: 50 INJECTION, SOLUTION INTRAMUSCULAR; INTRAVENOUS at 07:04

## 2022-04-07 NOTE — DISCHARGE INSTRUCTIONS
KNEE ARTHROSCOPY    DO'S   Keep leg elevated while at rest until return appointment.   Apply ice to knee next 2 days for 20 min intervals.  Remove ace 2 days after surgery, then shower , pat dry and place band aids    and  rewrap  knee with ace bandage.   Check circulation frequently in toes by pressing down on nail.  Nail should turn white then pink. NOT bluish gray  DON'T   DO Not soak in tub.   DO NOT TAKE ADDITIONAL TYLENOL/ACETAMINOPHEN WHILE TAKING   NARCOTIC PAIN MEDICATION.    CALL PHYSICIAN FOR:   Bleeding or signs of infection (redness, swelling, draining pus or warm to touch)   Fever greater than 101.   Persistent pain not relieved by pain medication.    RETURN APPOINTMENT AS INSTRUCTED  CALL [phone] for doctor.

## 2022-04-07 NOTE — OP NOTE
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  The left lower extremity was prepped and draped in normal sterile fashion.  After 10 minutes of elevation the pneumatic tourniquet was inflated to 300 mm Hg.  Standard inferior lateral portal status and the arthroscope was introduced into the knee.  Patellofemoral compartment showed areas of grade 2 and 3 chondrosis.  Went down to the lateral gutter no loose bodies noted.  Went down the medial compartment localize a medial portal spinal needle established a medial portal inspected medial compartment which showed large areas of grade 4 chondrosis throughout the medial femoral condyle exposed bone throughout.  There was a frayed degenerative tear of the posterior horn medial meniscus that we debrided down the shaver to smooth stable rim.  In the notch the ACL is intact.  Lateral compartment healthy with no meniscal or chondral pathology.  Went back to the patellofemoral compartment performed a limited chondroplasty here.  We then spent some time irrigating saline solution thinning.  Evacuated saline solution closed portal sites with 4-0 nylon, injected knee with 0.25% Marcaine plain sterile dressing applied tourniquet deflated this concluded the operative  procedure    Anesthesia: General    Complications: No    Estimated Blood Loss (EBL): * No values recorded between 4/7/2022  7:58 AM and 4/7/2022  8:21 AM *           Implants: * No implants in log *    Specimens:   Specimen (24h ago, onward)            None

## 2022-04-07 NOTE — DISCHARGE SUMMARY
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     Disposition: Home or Self Care    Patient Instructions:   Current Discharge Medication List      CONTINUE these medications which have NOT CHANGED    Details   atorvastatin (LIPITOR) 10 MG tablet Take 1 tablet (10 mg total) by mouth once daily.  Qty: 90 tablet, Refills: 3      buPROPion (WELLBUTRIN XL) 300 MG 24 hr tablet Take 1 tablet (300 mg total) by mouth once daily.  Qty: 90 tablet, Refills: 3      CALCIUM CARBONATE/VITAMIN D3 (CALTRATE 600 + D ORAL) Take by mouth 2 (two) times daily.      cetirizine (ZYRTEC) 10 MG tablet Take 10 mg by mouth 2 (two) times a day.      diphenhydrAMINE (BENADRYL) 25 mg capsule Take 25 mg by mouth as needed for Itching.      EScitalopram oxalate (LEXAPRO) 20 MG tablet Take 1 tablet (20 mg total) by mouth once daily.  Qty: 90 tablet, Refills: 3      famotidine (PEPCID AC) 20 MG tablet Take 1 tablet (20 mg total) by mouth 2 (two) times a day.  Qty: 60 tablet, Refills: 11      felodipine (PLENDIL) 5 MG 24 hr tablet Take 1 tablet (5 mg total) by mouth once daily.  Qty: 90 tablet, Refills: 3    Comments: .      gabapentin (NEURONTIN) 300 MG capsule Take 1 capsule (300 mg total) by mouth every evening.  Qty: 90 capsule, Refills: 3      levothyroxine (SYNTHROID) 50 MCG tablet Take 1 tablet (50 mcg total) by mouth once daily.  Qty: 90 tablet, Refills: 3      olmesartan-hydrochlorothiazide (BENICAR HCT) 20-12.5 mg per tablet TAKE 1 TABLET BY MOUTH EVERY DAY  Qty: 90 tablet, Refills: 3      omalizumab (XOLAIR) 150 mg/mL injection Inject 2 mLs (300 mg total) into the skin every 28 days.  Qty: 2 Syringe,  Refills: 12      OMEGA-3 FATTY ACIDS-EPA ORAL Take by mouth once daily.      pantoprazole (PROTONIX) 40 MG tablet TAKE 1 TABLET BY MOUTH EVERY DAY  Qty: 90 tablet, Refills: 1      potassium chloride (MICRO-K) 10 MEQ CpSR Take 1 capsule (10 mEq total) by mouth once daily.  Qty: 90 capsule, Refills: 3      predniSONE (DELTASONE) 10 MG tablet TAKE 1/2 TABLET EVERY OTHER DAY  Qty: 20 tablet, Refills: 1      VIT A/VIT C/VIT E/ZINC/COPPER (PRESERVISION AREDS ORAL) Take by mouth once daily.      ALPRAZolam (XANAX) 0.25 MG tablet Take 1 tablet (0.25 mg total) by mouth 3 (three) times daily as needed for Anxiety.  Qty: 21 tablet, Refills: 0      estradiol (VAGIFEM) 10 mcg Tab Place 10 mcg vaginally.      oxyCODONE-acetaminophen (PERCOCET) 5-325 mg per tablet Take 1 tablet by mouth every 4 to 6 hours as needed for Pain.  Qty: 42 tablet, Refills: 0    Comments: Quantity prescribed more than 7 day supply? 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              Discharge Procedure Orders (must include Diet, Follow-up, Activity):   Discharge Procedure Orders (must include Diet, Follow-up, Activity)   Remove dressing in 48 hours     Keep surgical extremity elevated     Ice to affected area     Activity as tolerated   Order Comments: Walker for comfort        Follow Up:  Follow up as scheduled.  Resume routine diet.  Activity as tolerated.    No driving day of procedure.

## 2022-04-08 VITALS
WEIGHT: 160 LBS | HEIGHT: 59 IN | TEMPERATURE: 97 F | SYSTOLIC BLOOD PRESSURE: 123 MMHG | BODY MASS INDEX: 32.25 KG/M2 | DIASTOLIC BLOOD PRESSURE: 69 MMHG | HEART RATE: 62 BPM | OXYGEN SATURATION: 96 % | RESPIRATION RATE: 16 BRPM

## 2022-04-11 NOTE — ANESTHESIA POSTPROCEDURE EVALUATION
Anesthesia Post Evaluation    Patient: Cheyenne West    Procedure(s) Performed: Procedure(s) (LRB):  ARTHROSCOPY, KNEE (Left)    Final Anesthesia Type: general      Patient location during evaluation: PACU  Patient participation: Yes- Able to Participate  Level of consciousness: awake and alert, oriented and awake  Post-procedure vital signs: reviewed and stable  Pain management: adequate  Airway patency: patent    PONV status at discharge: No PONV  Anesthetic complications: no      Cardiovascular status: blood pressure returned to baseline and hemodynamically stable  Respiratory status: unassisted, spontaneous ventilation and room air  Hydration status: euvolemic  Follow-up not needed.          Vitals Value Taken Time   /69 04/07/22 0853   Temp 36.3 °C (97.4 °F) 04/07/22 0823   Pulse 62 04/07/22 0853   Resp 16 04/07/22 0853   SpO2 96 % 04/07/22 0853         Event Time   Out of Recovery 08:37:00         Pain/Veronica Score: No data recorded

## 2022-04-11 NOTE — ANESTHESIA PREPROCEDURE EVALUATION
04/11/2022  Cheyenne West is a 67 y.o., female.    Pre-op Assessment    I have reviewed the Patient Summary Reports.    I have reviewed the Nursing Notes.    I have reviewed the Medications.     Review of Systems  Anesthesia Hx:  Hx of Anesthetic complications (PONV)    Social:  Non-Smoker    Cardiovascular:   Hypertension, well controlled Valvular problems/Murmurs (mild), MR CAD asymptomatic  hyperlipidemia    Pulmonary:   Sleep Apnea    Renal/:  Renal/ Normal     Hepatic/GI:   Hiatal Hernia, GERD IBS   Neurological:   Neuromuscular Disease,    Endocrine:   Hypothyroidism    Psych:   Psychiatric History anxiety          Physical Exam  General:  Well nourished      Airway/Jaw/Neck:  Airway Findings: Mouth Opening: Normal   Tongue: Normal   General Airway Assessment: Adult Oropharynx Findings:  Mallampati: II  Improves to II with phonation.  TM Distance: Normal, at least 6 cm   Jaw/Neck Findings:  Neck ROM: Normal ROM      Eyes/Ears/Nose:  Eyes/Ears/Nose Findings:    Dental:  Dental Findings: In tact     Chest/Lungs:  Chest/Lungs Findings: Normal Respiratory Rate      Heart/Vascular:  Heart Findings: Rate: Normal  Rhythm: Regular Rhythm        Mental Status:  Mental Status Findings:  Cooperative, Alert and Oriented         Anesthesia Plan  Type of Anesthesia, risks & benefits discussed:  Anesthesia Type:  general, Gen Supraglottic Airway    Patient's Preference:   Plan Factors:          Intra-op Monitoring Plan: standard ASA monitors  Intra-op Monitoring Plan Comments:   Post Op Pain Control Plan: multimodal analgesia  Post Op Pain Control Plan Comments:     Induction:   IV  Beta Blocker:  Patient is not currently on a Beta-Blocker (No further documentation required).       Informed Consent: Informed consent signed with the Patient and all parties understand the risks and agree with anesthesia  plan.  All questions answered.  Anesthesia consent signed with patient.  ASA Score: 2     Day of Surgery Review of History & Physical:  There are no significant changes.  H&P Update referred to the surgeon/provider.          Ready For Surgery From Anesthesia Perspective.           Physical Exam  General: Well nourished    Airway:  Mallampati: II / II  Mouth Opening: Normal  TM Distance: Normal, at least 6 cm  Tongue: Normal  Neck ROM: Normal ROM    Dental:  In tact    Chest/Lungs:  Normal Respiratory Rate    Heart:  Rate: Normal  Rhythm: Regular Rhythm          Anesthesia Plan  Type of Anesthesia, risks & benefits discussed:    Anesthesia Type: general, Gen Supraglottic Airway  Intra-op Monitoring Plan: standard ASA monitors  Post Op Pain Control Plan: multimodal analgesia  Induction:  IV  Informed Consent: Informed consent signed with the Patient and all parties understand the risks and agree with anesthesia plan.  All questions answered.   ASA Score: 2  Day of Surgery Review of History & Physical: H&P Update referred to the surgeon/provider.    Ready For Surgery From Anesthesia Perspective.       .

## 2022-04-18 RX ORDER — PANTOPRAZOLE SODIUM 40 MG/1
TABLET, DELAYED RELEASE ORAL
Qty: 90 TABLET | Refills: 2 | Status: SHIPPED | OUTPATIENT
Start: 2022-04-18 | End: 2022-05-27 | Stop reason: SDUPTHER

## 2022-04-18 NOTE — TELEPHONE ENCOUNTER
Refill Routing Note   Medication(s) are not appropriate for processing by [facility] for the following reason(s):      - Patient has been seen in the ED/Hospital since the last PCP visit    [facility] action(s):  Defer          Medication reconciliation completed: No     Appointments  past 12m or future 3m with PCP    Date Provider   Last Visit   1/4/2022 Juan Buck MD   Next Visit   Visit date not found Juan Buck MD   ED visits in past 90 days: 0        Note composed:6:05 PM 04/18/2022

## 2022-04-18 NOTE — TELEPHONE ENCOUNTER
No new care gaps identified.  Powered by [employer]. Reference number: [id number].   4/18/2022 3:46:53 PM CDT

## 2022-04-22 ENCOUNTER — OFFICE VISIT (OUTPATIENT)
Dept: ORTHOPEDICS | Facility: CLINIC | Age: 67
End: 2022-04-22
Payer: COMMERCIAL

## 2022-04-22 VITALS — BODY MASS INDEX: 32.27 KG/M2 | HEIGHT: 59 IN | WEIGHT: 160.06 LBS

## 2022-04-22 DIAGNOSIS — G89.18 POST-OP PAIN: ICD-10-CM

## 2022-04-22 DIAGNOSIS — M25.562 ACUTE PAIN OF LEFT KNEE: Primary | ICD-10-CM

## 2022-04-22 PROCEDURE — 3044F PR MOST RECENT HEMOGLOBIN A1C LEVEL <7.0%: ICD-10-PCS | Mod: CPTII,S$GLB,, | Performed by: ORTHOPAEDIC SURGERY

## 2022-04-22 PROCEDURE — 99999 PR PBB SHADOW E&M-EST. PATIENT-LVL IV: CPT | Mod: PBBFAC,,, | Performed by: ORTHOPAEDIC SURGERY

## 2022-04-22 PROCEDURE — 99999 PR PBB SHADOW E&M-EST. PATIENT-LVL IV: ICD-10-PCS | Mod: PBBFAC,,, | Performed by: ORTHOPAEDIC SURGERY

## 2022-04-22 PROCEDURE — 1159F MED LIST DOCD IN RCRD: CPT | Mod: CPTII,S$GLB,, | Performed by: ORTHOPAEDIC SURGERY

## 2022-04-22 PROCEDURE — 99024 POSTOP FOLLOW-UP VISIT: CPT | Mod: S$GLB,,, | Performed by: ORTHOPAEDIC SURGERY

## 2022-04-22 PROCEDURE — 3008F PR BODY MASS INDEX (BMI) DOCUMENTED: ICD-10-PCS | Mod: CPTII,S$GLB,, | Performed by: ORTHOPAEDIC SURGERY

## 2022-04-22 PROCEDURE — 3044F HG A1C LEVEL LT 7.0%: CPT | Mod: CPTII,S$GLB,, | Performed by: ORTHOPAEDIC SURGERY

## 2022-04-22 PROCEDURE — 1159F PR MEDICATION LIST DOCUMENTED IN MEDICAL RECORD: ICD-10-PCS | Mod: CPTII,S$GLB,, | Performed by: ORTHOPAEDIC SURGERY

## 2022-04-22 PROCEDURE — 99024 PR POST-OP FOLLOW-UP VISIT: ICD-10-PCS | Mod: S$GLB,,, | Performed by: ORTHOPAEDIC SURGERY

## 2022-04-22 PROCEDURE — 3008F BODY MASS INDEX DOCD: CPT | Mod: CPTII,S$GLB,, | Performed by: ORTHOPAEDIC SURGERY

## 2022-04-22 NOTE — PROGRESS NOTES
Grade 4 medial compartment arthrosis, potential candidate for partial knee replacement        2 weeks post arthroscopy.  Doing well - no complaints.  Wounds look good without signs of infection.  Sutures removed.  Patient was instructed to gradually return to activities to tolerance and encouraged to work to progressively strengthen the extremity over time.  Physical therapy  Follow up as needed.

## 2022-05-26 RX ORDER — BUPROPION HYDROCHLORIDE 300 MG/1
TABLET ORAL
Qty: 90 TABLET | Refills: 0 | OUTPATIENT
Start: 2022-05-26

## 2022-05-26 RX ORDER — FELODIPINE 5 MG/1
TABLET, EXTENDED RELEASE ORAL
Qty: 90 TABLET | Refills: 0 | OUTPATIENT
Start: 2022-05-26

## 2022-05-26 RX ORDER — ATORVASTATIN CALCIUM 10 MG/1
TABLET, FILM COATED ORAL
Qty: 90 TABLET | Refills: 0 | OUTPATIENT
Start: 2022-05-26

## 2022-05-26 RX ORDER — LEVOTHYROXINE SODIUM 50 UG/1
TABLET ORAL
Qty: 90 TABLET | Refills: 0 | OUTPATIENT
Start: 2022-05-26

## 2022-05-26 RX ORDER — PANTOPRAZOLE SODIUM 40 MG/1
TABLET, DELAYED RELEASE ORAL
Qty: 90 TABLET | Refills: 0 | OUTPATIENT
Start: 2022-05-26

## 2022-05-26 RX ORDER — ESCITALOPRAM OXALATE 20 MG/1
TABLET ORAL
Qty: 90 TABLET | Refills: 0 | OUTPATIENT
Start: 2022-05-26

## 2022-05-26 NOTE — TELEPHONE ENCOUNTER
No new care gaps identified.  [facility] Embedded Care Gaps. Reference number: [id number]. 5/26/2022   1:33:24 PM CDT

## 2022-05-26 NOTE — TELEPHONE ENCOUNTER
No new care gaps identified.  [facility] Embedded Care Gaps. Reference number: [id number]. 5/26/2022   1:37:48 PM CDT

## 2022-05-26 NOTE — TELEPHONE ENCOUNTER
[facility] Note  Quick DC. Inappropriate Request   Refill request requires further review by MD: NO   Medication Therapy Plan: Pharmacy is requesting new script(s) for the following medications without required information, (sig/ frequency/qty/etc)     [facility] action(s):  Quick Discontinue      Duplicate Pended Encounter(s)/ Last Prescribed Details:    Pharmacies have been requesting medications for patients without required information, (sig, frequency, qty, etc.). In addition, requests are sent for medication(s) pt. are currently not taking, and medications patients have never taken.    We have spoken to the pharmacies about these request types and advised their teams previously that we are unable to assess these New Script requests and require all details for these requests. This is a known issue and has been reported.        Medication related problems are not assessed for QDC.   Medication Reconciliation Completed? NO Were there pending details that required adjustment? NO     Automatic Epic Generated Protocol Data Below:   Requested Prescriptions   Pending Prescriptions Disp Refills    pantoprazole (PROTONIX) 40 MG tablet [Pharmacy Med Name: PANTOPRAZOLE DR 40MG TAB] 90 tablet 0              Appointments      Date Provider   Last Visit   1/4/2022 Juan Buck MD   Next Visit   5/26/2022 Juan Buck MD        Note composed:3:26 PM 05/26/2022

## 2022-05-26 NOTE — TELEPHONE ENCOUNTER
[facility] Note  Quick DC. Inappropriate Request   Refill request requires further review by MD: NO   Medication Therapy Plan: Pharmacy is requesting new script(s) for the following medications without required information, (sig/ frequency/qty/etc)     [facility] action(s):  Quick Discontinue      Duplicate Pended Encounter(s)/ Last Prescribed Details:    Pharmacies have been requesting medications for patients without required information, (sig, frequency, qty, etc.). In addition, requests are sent for medication(s) pt. are currently not taking, and medications patients have never taken.    We have spoken to the pharmacies about these request types and advised their teams previously that we are unable to assess these New Script requests and require all details for these requests. This is a known issue and has been reported.        Medication related problems are not assessed for QDC.   Medication Reconciliation Completed? NO Were there pending details that required adjustment? NO     Automatic Epic Generated Protocol Data Below:   Requested Prescriptions   Pending Prescriptions Disp Refills    felodipine (PLENDIL) 5 MG 24 hr tablet [Pharmacy Med Name: FELODIPINE ER  5MG TAB] 90 tablet 0              Appointments      Date Provider   Last Visit   1/4/2022 Juan Buck MD   Next Visit   Visit date not found Juan Buck MD        Note composed:3:26 PM 05/26/2022

## 2022-05-26 NOTE — TELEPHONE ENCOUNTER
[facility] Note  Quick DC. Inappropriate Request   Refill request requires further review by MD: NO   Medication Therapy Plan: Pharmacy is requesting new script(s) for the following medications without required information, (sig/ frequency/qty/etc)     [facility] action(s):  Quick Discontinue      Duplicate Pended Encounter(s)/ Last Prescribed Details:    Pharmacies have been requesting medications for patients without required information, (sig, frequency, qty, etc.). In addition, requests are sent for medication(s) pt. are currently not taking, and medications patients have never taken.    We have spoken to the pharmacies about these request types and advised their teams previously that we are unable to assess these New Script requests and require all details for these requests. This is a known issue and has been reported.        Medication related problems are not assessed for QDC.   Medication Reconciliation Completed? NO Were there pending details that required adjustment? NO     Automatic Epic Generated Protocol Data Below:   Requested Prescriptions   Pending Prescriptions Disp Refills    EScitalopram oxalate (LEXAPRO) 20 MG tablet [Pharmacy Med Name: ESCITALOPRAM 20MG TAB] 90 tablet 0              Appointments      Date Provider   Last Visit   1/4/2022 Juan Buck MD   Next Visit   5/26/2022 Juan uBck MD        Note composed:3:26 PM 05/26/2022

## 2022-05-26 NOTE — TELEPHONE ENCOUNTER
No new care gaps identified.  [facility] Embedded Care Gaps. Reference number: [id number]. 5/26/2022   1:33:58 PM CDT

## 2022-05-26 NOTE — TELEPHONE ENCOUNTER
No new care gaps identified.  [facility] Embedded Care Gaps. Reference number: [id number]. 5/26/2022   1:42:08 PM CDT

## 2022-05-26 NOTE — TELEPHONE ENCOUNTER
No new care gaps identified.  [facility] Embedded Care Gaps. Reference number: [id number]. 5/26/2022   1:35:34 PM CDT

## 2022-05-26 NOTE — TELEPHONE ENCOUNTER
[facility] Note  Quick DC. Inappropriate Request   Refill request requires further review by MD: NO   Medication Therapy Plan: Pharmacy is requesting new script(s) for the following medications without required information, (sig/ frequency/qty/etc)     [facility] action(s):  Quick Discontinue      Duplicate Pended Encounter(s)/ Last Prescribed Details:    Pharmacies have been requesting medications for patients without required information, (sig, frequency, qty, etc.). In addition, requests are sent for medication(s) pt. are currently not taking, and medications patients have never taken.    We have spoken to the pharmacies about these request types and advised their teams previously that we are unable to assess these New Script requests and require all details for these requests. This is a known issue and has been reported.        Medication related problems are not assessed for QDC.   Medication Reconciliation Completed? NO Were there pending details that required adjustment? NO     Automatic Epic Generated Protocol Data Below:   Requested Prescriptions   Pending Prescriptions Disp Refills    atorvastatin (LIPITOR) 10 MG tablet [Pharmacy Med Name: ATORVASTATIN 10MG TAB] 90 tablet 0              Appointments      Date Provider   Last Visit   1/4/2022 Juan Buck MD   Next Visit   5/26/2022 Juan Buck MD        Note composed:3:26 PM 05/26/2022

## 2022-05-26 NOTE — TELEPHONE ENCOUNTER
No new care gaps identified.  [facility] Embedded Care Gaps. Reference number: [id number]. 5/26/2022   1:34:31 PM CDT

## 2022-05-26 NOTE — TELEPHONE ENCOUNTER
[facility] Note  Quick DC. Inappropriate Request   Refill request requires further review by MD: NO   Medication Therapy Plan: Pharmacy is requesting new script(s) for the following medications without required information, (sig/ frequency/qty/etc)     [facility] action(s):  Quick Discontinue      Duplicate Pended Encounter(s)/ Last Prescribed Details:    Pharmacies have been requesting medications for patients without required information, (sig, frequency, qty, etc.). In addition, requests are sent for medication(s) pt. are currently not taking, and medications patients have never taken.    We have spoken to the pharmacies about these request types and advised their teams previously that we are unable to assess these New Script requests and require all details for these requests. This is a known issue and has been reported.        Medication related problems are not assessed for QDC.   Medication Reconciliation Completed? NO Were there pending details that required adjustment? NO     Automatic Epic Generated Protocol Data Below:   Requested Prescriptions   Pending Prescriptions Disp Refills    buPROPion (WELLBUTRIN XL) 300 MG 24 hr tablet [Pharmacy Med Name: BUPROPION XL 300MG TAB (AB3)] 90 tablet 0              Appointments      Date Provider   Last Visit   1/4/2022 Juan Buck MD   Next Visit   5/26/2022 Juan Buck MD        Note composed:3:26 PM 05/26/2022

## 2022-05-26 NOTE — TELEPHONE ENCOUNTER
[facility] Note  Quick DC. Inappropriate Request   Refill request requires further review by MD: NO   Medication Therapy Plan: Pharmacy is requesting new script(s) for the following medications without required information, (sig/ frequency/qty/etc)     [facility] action(s):  Quick Discontinue      Duplicate Pended Encounter(s)/ Last Prescribed Details:    Pharmacies have been requesting medications for patients without required information, (sig, frequency, qty, etc.). In addition, requests are sent for medication(s) pt. are currently not taking, and medications patients have never taken.    We have spoken to the pharmacies about these request types and advised their teams previously that we are unable to assess these New Script requests and require all details for these requests. This is a known issue and has been reported.        Medication related problems are not assessed for QDC.   Medication Reconciliation Completed? NO Were there pending details that required adjustment? NO     Automatic Epic Generated Protocol Data Below:   Requested Prescriptions   Pending Prescriptions Disp Refills    levothyroxine (SYNTHROID) 50 MCG tablet [Pharmacy Med Name: LEVOTHYROXINE  50 MCG TAB] 90 tablet 0              Appointments      Date Provider   Last Visit   1/4/2022 Juan Buck MD   Next Visit   5/26/2022 Juan Buck MD        Note composed:3:26 PM 05/26/2022

## 2022-05-27 ENCOUNTER — OFFICE VISIT (OUTPATIENT)
Dept: ORTHOPEDICS | Facility: CLINIC | Age: 67
End: 2022-05-27
Payer: COMMERCIAL

## 2022-05-27 VITALS — BODY MASS INDEX: 32.25 KG/M2 | HEIGHT: 59 IN | WEIGHT: 160 LBS

## 2022-05-27 DIAGNOSIS — M17.12 PRIMARY OSTEOARTHRITIS OF LEFT KNEE: Primary | ICD-10-CM

## 2022-05-27 DIAGNOSIS — M17.12 OSTEOARTHRITIS OF LEFT KNEE, UNSPECIFIED OSTEOARTHRITIS TYPE: ICD-10-CM

## 2022-05-27 DIAGNOSIS — M25.562 ACUTE PAIN OF LEFT KNEE: Primary | ICD-10-CM

## 2022-05-27 PROCEDURE — 3008F BODY MASS INDEX DOCD: CPT | Mod: CPTII,S$GLB,, | Performed by: ORTHOPAEDIC SURGERY

## 2022-05-27 PROCEDURE — 99024 POSTOP FOLLOW-UP VISIT: CPT | Mod: S$GLB,,, | Performed by: ORTHOPAEDIC SURGERY

## 2022-05-27 PROCEDURE — 99999 PR PBB SHADOW E&M-EST. PATIENT-LVL IV: CPT | Mod: PBBFAC,,, | Performed by: ORTHOPAEDIC SURGERY

## 2022-05-27 PROCEDURE — 3288F PR FALLS RISK ASSESSMENT DOCUMENTED: ICD-10-PCS | Mod: CPTII,S$GLB,, | Performed by: ORTHOPAEDIC SURGERY

## 2022-05-27 PROCEDURE — 99999 PR PBB SHADOW E&M-EST. PATIENT-LVL IV: ICD-10-PCS | Mod: PBBFAC,,, | Performed by: ORTHOPAEDIC SURGERY

## 2022-05-27 PROCEDURE — 1160F RVW MEDS BY RX/DR IN RCRD: CPT | Mod: CPTII,S$GLB,, | Performed by: ORTHOPAEDIC SURGERY

## 2022-05-27 PROCEDURE — 3044F PR MOST RECENT HEMOGLOBIN A1C LEVEL <7.0%: ICD-10-PCS | Mod: CPTII,S$GLB,, | Performed by: ORTHOPAEDIC SURGERY

## 2022-05-27 PROCEDURE — 3008F PR BODY MASS INDEX (BMI) DOCUMENTED: ICD-10-PCS | Mod: CPTII,S$GLB,, | Performed by: ORTHOPAEDIC SURGERY

## 2022-05-27 PROCEDURE — 20610 LARGE JOINT ASPIRATION/INJECTION: L KNEE: ICD-10-PCS | Mod: 58,LT,S$GLB, | Performed by: ORTHOPAEDIC SURGERY

## 2022-05-27 PROCEDURE — 1160F PR REVIEW ALL MEDS BY PRESCRIBER/CLIN PHARMACIST DOCUMENTED: ICD-10-PCS | Mod: CPTII,S$GLB,, | Performed by: ORTHOPAEDIC SURGERY

## 2022-05-27 PROCEDURE — 1101F PT FALLS ASSESS-DOCD LE1/YR: CPT | Mod: CPTII,S$GLB,, | Performed by: ORTHOPAEDIC SURGERY

## 2022-05-27 PROCEDURE — 20610 DRAIN/INJ JOINT/BURSA W/O US: CPT | Mod: 58,LT,S$GLB, | Performed by: ORTHOPAEDIC SURGERY

## 2022-05-27 PROCEDURE — 99024 PR POST-OP FOLLOW-UP VISIT: ICD-10-PCS | Mod: S$GLB,,, | Performed by: ORTHOPAEDIC SURGERY

## 2022-05-27 PROCEDURE — 3044F HG A1C LEVEL LT 7.0%: CPT | Mod: CPTII,S$GLB,, | Performed by: ORTHOPAEDIC SURGERY

## 2022-05-27 PROCEDURE — 1159F PR MEDICATION LIST DOCUMENTED IN MEDICAL RECORD: ICD-10-PCS | Mod: CPTII,S$GLB,, | Performed by: ORTHOPAEDIC SURGERY

## 2022-05-27 PROCEDURE — 1159F MED LIST DOCD IN RCRD: CPT | Mod: CPTII,S$GLB,, | Performed by: ORTHOPAEDIC SURGERY

## 2022-05-27 PROCEDURE — 1101F PR PT FALLS ASSESS DOC 0-1 FALLS W/OUT INJ PAST YR: ICD-10-PCS | Mod: CPTII,S$GLB,, | Performed by: ORTHOPAEDIC SURGERY

## 2022-05-27 PROCEDURE — 3288F FALL RISK ASSESSMENT DOCD: CPT | Mod: CPTII,S$GLB,, | Performed by: ORTHOPAEDIC SURGERY

## 2022-05-27 RX ORDER — TRIAMCINOLONE ACETONIDE 40 MG/ML
40 INJECTION, SUSPENSION INTRA-ARTICULAR; INTRAMUSCULAR
Status: DISCONTINUED | OUTPATIENT
Start: 2022-05-27 | End: 2022-05-27 | Stop reason: HOSPADM

## 2022-05-27 RX ORDER — ESCITALOPRAM OXALATE 20 MG/1
20 TABLET ORAL DAILY
Qty: 90 TABLET | Refills: 2 | Status: SHIPPED | OUTPATIENT
Start: 2022-05-27 | End: 2022-12-31

## 2022-05-27 RX ORDER — BUPROPION HYDROCHLORIDE 300 MG/1
300 TABLET ORAL DAILY
Qty: 90 TABLET | Refills: 2 | Status: SHIPPED | OUTPATIENT
Start: 2022-05-27 | End: 2022-12-31

## 2022-05-27 RX ORDER — GABAPENTIN 300 MG/1
300 CAPSULE ORAL NIGHTLY
Qty: 90 CAPSULE | Refills: 2 | Status: SHIPPED | OUTPATIENT
Start: 2022-05-27 | End: 2023-05-01

## 2022-05-27 RX ORDER — FELODIPINE 5 MG/1
5 TABLET, EXTENDED RELEASE ORAL DAILY
Qty: 90 TABLET | Refills: 2 | Status: SHIPPED | OUTPATIENT
Start: 2022-05-27 | End: 2022-12-31

## 2022-05-27 RX ORDER — ATORVASTATIN CALCIUM 10 MG/1
10 TABLET, FILM COATED ORAL DAILY
Qty: 90 TABLET | Refills: 2 | Status: SHIPPED | OUTPATIENT
Start: 2022-05-27 | End: 2022-12-31

## 2022-05-27 RX ORDER — PANTOPRAZOLE SODIUM 40 MG/1
40 TABLET, DELAYED RELEASE ORAL DAILY
Qty: 90 TABLET | Refills: 2 | Status: SHIPPED | OUTPATIENT
Start: 2022-05-27 | End: 2022-12-31

## 2022-05-27 RX ORDER — OLMESARTAN MEDOXOMIL AND HYDROCHLOROTHIAZIDE 20/12.5 20; 12.5 MG/1; MG/1
1 TABLET ORAL DAILY
Qty: 90 TABLET | Refills: 2 | Status: SHIPPED | OUTPATIENT
Start: 2022-05-27 | End: 2022-12-31

## 2022-05-27 RX ORDER — LEVOTHYROXINE SODIUM 50 UG/1
50 TABLET ORAL DAILY
Qty: 90 TABLET | Refills: 2 | Status: SHIPPED | OUTPATIENT
Start: 2022-05-27 | End: 2022-12-31

## 2022-05-27 RX ADMIN — TRIAMCINOLONE ACETONIDE 40 MG: 40 INJECTION, SUSPENSION INTRA-ARTICULAR; INTRAMUSCULAR at 10:05

## 2022-05-27 NOTE — TELEPHONE ENCOUNTER
----- Message from Ryanne Heredia sent at 5/27/2022  3:20 PM CDT -----  Contact: self  Pt is requesting call back from the nurse in regards to pt needing all her medication sent to [facility] due to her no longer being with [facility], please call back [phone]       [facility] - [place], [place] - [address] AT [address] to [address]  [facility] [address]  Phone: [phone] Fax: [phone]    [facility] - [place], [place] - [address]   [facility] [address]  Phone: [phone] Fax: [phone]

## 2022-05-27 NOTE — PROCEDURES
Large Joint Aspiration/Injection: L knee    Date/Time: 5/27/2022 10:30 AM  Performed by: Davion Massey MD  Authorized by: Davion Massey MD     Consent Done?:  Yes (Verbal)  Timeout: prior to procedure the correct patient, procedure, and site was verified    Prep: patient was prepped and draped in usual sterile fashion      Details:  Needle Size:  21 G  Approach:  Anterolateral  Location:  Knee  Site:  L knee  Medications:  40 mg triamcinolone acetonide 40 mg/mL  Patient tolerance:  Patient tolerated the procedure well with no immediate complications

## 2022-05-27 NOTE — TELEPHONE ENCOUNTER
No new care gaps identified.  [facility] Embedded Care Gaps. Reference number: [id number]. 5/27/2022   3:50:16 PM CDT

## 2022-05-27 NOTE — PROGRESS NOTES
67 years old 6 weeks out from knee arthroscopy still having pain on the medial side the knee.  Knee arthroscopy revealed grade 4 chondrosis in the medial compartment with an intact ACL healthy lateral compartment felt to be a candidate for partial knee replacement    Exam shows no signs infection tenderness medial joint line    Assessment:  Left knee  Medial compartment arthrosis persistent pain after knee arthroscopy    Plan:  Kenalog injection left knee, we did discuss the possibility of partial knee replacement    Imaging studies ordered and reviewed by me    Further History  Aching pain  Worse with activity  Relieved with rest  No other associated symptoms  No other radiation    Further Exam  Alert and oriented  Pleasant  Contralateral limb has appropriate range of motion for age and condition  Contralateral limb has appropriate strength for age and condition  Contralateral limb has appropriate stability  for age and condition  No adenopathy  Pulses are appropriate for current condition  Skin is intact        Chief Complaint    Chief Complaint   Patient presents with    Left Knee - Pain, Follow-up       HPI  Cheyenne Katherine West is a 67 y.o.  female who presents with       Past Medical History  Past Medical History:   Diagnosis Date    Carpal tunnel syndrome of right wrist     Chronic gastritis     Dissociative fugue     Diverticulosis     Duodenitis     Dyspepsia     Generalized anxiety disorder     Hearing loss on left     Hearing loss on right     Helicobacter pylori (H. pylori)     History of blood transfusion     Hyperlipidemia     Hypertension     Hypothyroidism     IBS (irritable bowel syndrome)     Iron deficiency anemia     Mild mitral regurgitation by prior echocardiogram     Mild obesity     Mild vitamin D deficiency     Paraesophageal hiatal hernia     PONV (postoperative nausea and vomiting)     Reflux gastritis     RLS (restless legs syndrome) 2/23/2021    Sleep apnea      mild improved with wt. loss    Thyroid goiter     Urticaria, chronic        Past Surgical History  Past Surgical History:   Procedure Laterality Date    ARTHROSCOPY OF KNEE Left 2022    Procedure: ARTHROSCOPY, KNEE;  Surgeon: Davion Massey MD;  Location: [facility] OR;  Service: Orthopedics;  Laterality: Left;    BLADDER SUSPENSION      pubovaginal sling     SECTION, CLASSIC      CHOLECYSTECTOMY      COLONOSCOPY  2011    Dr. Goode, in legacy:diverticulosis, hemorrhoids, melanosis coli-repeat 10 years    COLONOSCOPY N/A 2018    Procedure: COLONOSCOPY;  Surgeon: Rusty Vogt Jr., MD;  Location: [facility] ENDO;  Service: Endoscopy;  Laterality: N/A; repeat in 10 years for screening    ESOPHAGEAL DILATION      ESOPHAGOGASTRODUODENOSCOPY  2016    ESOPHAGOGASTRODUODENOSCOPY N/A 2018    Procedure: EGD (ESOPHAGOGASTRODUODENOSCOPY);  Surgeon: Rusty Vogt Jr., MD;  Location: [facility] ENDO;  Service: Endoscopy;  Laterality: N/A;    HERNIA REPAIR      INTRALUMINAL GASTROINTESTINAL TRACT IMAGING VIA CAPSULE N/A 2018    Procedure: IMAGING PROCEDURE, GI TRACT, INTRALUMINAL, VIA CAPSULE;  Surgeon: Loraine Velazquez MD;  Location: [facility] ENDO;  Service: Endoscopy;  Laterality: N/A;    LAPAROSCOPIC NISSEN FUNDOPLICATION N/A 2018    Procedure: FUNDOPLICATION, NISSEN, LAPAROSCOPIC;  Surgeon: Frank Akins MD;  Location: [facility] OR;  Service: General;  Laterality: N/A;    THYROIDECTOMY, PARTIAL       partial for benign nodule    UPPER GASTROINTESTINAL ENDOSCOPY  2016    Dr. Whalen, in care everywhere       Medications  Current Outpatient Medications   Medication Sig    ALPRAZolam (XANAX) 0.25 MG tablet Take 1 tablet (0.25 mg total) by mouth 3 (three) times daily as needed for Anxiety.    atorvastatin (LIPITOR) 10 MG tablet Take 1 tablet (10 mg total) by mouth once daily.    buPROPion (WELLBUTRIN XL) 300 MG 24 hr tablet Take 1 tablet (300 mg total) by mouth once  daily.    CALCIUM CARBONATE/VITAMIN D3 (CALTRATE 600 + D ORAL) Take by mouth 2 (two) times daily.    cetirizine (ZYRTEC) 10 MG tablet Take 10 mg by mouth 2 (two) times a day.    diphenhydrAMINE (BENADRYL) 25 mg capsule Take 25 mg by mouth as needed for Itching.    EScitalopram oxalate (LEXAPRO) 20 MG tablet Take 1 tablet (20 mg total) by mouth once daily.    estradiol (VAGIFEM) 10 mcg Tab Place 10 mcg vaginally.    famotidine (PEPCID AC) 20 MG tablet Take 1 tablet (20 mg total) by mouth 2 (two) times a day.    felodipine (PLENDIL) 5 MG 24 hr tablet Take 1 tablet (5 mg total) by mouth once daily.    gabapentin (NEURONTIN) 300 MG capsule Take 1 capsule (300 mg total) by mouth every evening.    levothyroxine (SYNTHROID) 50 MCG tablet Take 1 tablet (50 mcg total) by mouth once daily.    olmesartan-hydrochlorothiazide (BENICAR HCT) 20-12.5 mg per tablet TAKE 1 TABLET BY MOUTH EVERY DAY    omalizumab (XOLAIR) 150 mg/mL injection Inject 2 mLs (300 mg total) into the skin every 28 days.    OMEGA-3 FATTY ACIDS-EPA ORAL Take by mouth once daily.    oxyCODONE-acetaminophen (PERCOCET) 5-325 mg per tablet Take 1 tablet by mouth every 4 to 6 hours as needed for Pain.    oxyCODONE-acetaminophen (PERCOCET) 5-325 mg per tablet Take 1 tablet by mouth every 4 to 6 hours as needed for Pain.    pantoprazole (PROTONIX) 40 MG tablet TAKE 1 TABLET ONCE DAILY    polyethylene glycol (GLYCOLAX) 17 gram PwPk Take by mouth as needed.     potassium chloride (MICRO-K) 10 MEQ CpSR Take 1 capsule (10 mEq total) by mouth once daily.    predniSONE (DELTASONE) 10 MG tablet TAKE 1/2 TABLET EVERY OTHER DAY    VIT A/VIT C/VIT E/ZINC/COPPER (PRESERVISION AREDS ORAL) Take by mouth once daily.     Current Facility-Administered Medications   Medication    omalizumab injection 300 mg     Facility-Administered Medications Ordered in Other Visits   Medication    electrolyte-S (ISOLYTE)    fentaNYL 50 mcg/mL injection 25 mcg    lactated  ringers infusion    LIDOcaine (PF) 10 mg/ml (1%) injection 10 mg    oxyCODONE immediate release tablet 5 mg    sodium chloride 0.9% flush 3 mL       Allergies  Review of patient's allergies indicates:   Allergen Reactions    Penicillins        Family History  Family History   Problem Relation Age of Onset    Hypertension Mother     Heart disease Mother     Ulcers Mother     GI problems Mother         colitis    Hypertension Father     Breast cancer Paternal Grandmother     Breast cancer Cousin     Colon cancer Neg Hx     Crohn's disease Neg Hx     Colon polyps Neg Hx     Ulcerative colitis Neg Hx     Stomach cancer Neg Hx     Esophageal cancer Neg Hx     Allergic rhinitis Neg Hx     Allergies Neg Hx     Angioedema Neg Hx     Atopy Neg Hx     Eczema Neg Hx     Immunodeficiency Neg Hx     Rhinitis Neg Hx     Urticaria Neg Hx     Asthma Neg Hx        Social History  Social History     Socioeconomic History    Marital status:    Tobacco Use    Smoking status: Never Smoker    Smokeless tobacco: Never Used   Substance and Sexual Activity    Alcohol use: Yes     Comment: seldom    Drug use: No    Sexual activity: Yes     Social Determinants of Health     Financial Resource Strain: Low Risk     Difficulty of Paying Living Expenses: Not hard at all   Food Insecurity: Unknown    Worried About Running Out of Food in the Last Year: Never true   Transportation Needs: No Transportation Needs    Lack of Transportation (Medical): No    Lack of Transportation (Non-Medical): No   Physical Activity: Unknown    Days of Exercise per Week: Patient refused   Stress: No Stress Concern Present    Feeling of Stress : Only a little   Social Connections: Unknown    Frequency of Communication with Friends and Family: Three times a week    Frequency of Social Gatherings with Friends and Family: Once a week    Active Member of Clubs or Organizations: No    Attends Club or Organization Meetings:  Patient refused    Marital Status:    Housing Stability: Low Risk     Unable to Pay for Housing in the Last Year: No    Number of Places Lived in the Last Year: 1    Unstable Housing in the Last Year: No               Review of Systems     Constitutional: Negative    HENT: Negative  Eyes: Negative  Respiratory: Negative  Cardiovascular: Negative  Musculoskeletal: HPI  Skin: Negative  Neurological: Negative  Hematological: Negative  Endocrine: Negative                 Physical Exam    There were no vitals filed for this visit.  Body mass index is 32.32 kg/m².  Physical Examination:     General appearance -  well appearing, and in no distress  Mental status - awake  Neck - supple  Chest -  symmetric air entry  Heart - normal rate   Abdomen - soft      Assessment     1. Acute pain of left knee          Plan

## 2022-06-22 ENCOUNTER — PATIENT MESSAGE (OUTPATIENT)
Dept: ALLERGY | Facility: CLINIC | Age: 67
End: 2022-06-22
Payer: COMMERCIAL

## 2022-06-22 DIAGNOSIS — L50.1 CHRONIC IDIOPATHIC URTICARIA: Primary | ICD-10-CM

## 2022-06-29 ENCOUNTER — HOSPITAL ENCOUNTER (OUTPATIENT)
Dept: RADIOLOGY | Facility: HOSPITAL | Age: 67
Discharge: HOME | End: 2022-06-29
Attending: FAMILY MEDICINE
Payer: COMMERCIAL

## 2022-06-29 ENCOUNTER — OFFICE VISIT (OUTPATIENT)
Dept: FAMILY MEDICINE | Facility: CLINIC | Age: 67
End: 2022-06-29
Payer: COMMERCIAL

## 2022-06-29 VITALS
WEIGHT: 166.81 LBS | DIASTOLIC BLOOD PRESSURE: 70 MMHG | HEIGHT: 59 IN | HEART RATE: 75 BPM | SYSTOLIC BLOOD PRESSURE: 122 MMHG | TEMPERATURE: 98 F | BODY MASS INDEX: 33.63 KG/M2

## 2022-06-29 DIAGNOSIS — F41.1 GENERALIZED ANXIETY DISORDER: Primary | ICD-10-CM

## 2022-06-29 DIAGNOSIS — Z78.0 ASYMPTOMATIC MENOPAUSE: ICD-10-CM

## 2022-06-29 DIAGNOSIS — L50.8 CHRONIC URTICARIA: ICD-10-CM

## 2022-06-29 PROBLEM — U07.1 COVID-19 VIRUS INFECTION: Status: RESOLVED | Noted: 2022-01-27 | Resolved: 2022-06-29

## 2022-06-29 PROCEDURE — 3288F FALL RISK ASSESSMENT DOCD: CPT | Mod: CPTII,S$GLB,, | Performed by: FAMILY MEDICINE

## 2022-06-29 PROCEDURE — 90471 PNEUMOCOCCAL CONJUGATE VACCINE 20-VALENT: ICD-10-PCS | Mod: S$GLB,,, | Performed by: FAMILY MEDICINE

## 2022-06-29 PROCEDURE — 3044F PR MOST RECENT HEMOGLOBIN A1C LEVEL <7.0%: ICD-10-PCS | Mod: CPTII,S$GLB,, | Performed by: FAMILY MEDICINE

## 2022-06-29 PROCEDURE — 77080 DEXA BONE DENSITY SPINE HIP: ICD-10-PCS | Mod: 26,,, | Performed by: RADIOLOGY

## 2022-06-29 PROCEDURE — 3008F BODY MASS INDEX DOCD: CPT | Mod: CPTII,S$GLB,, | Performed by: FAMILY MEDICINE

## 2022-06-29 PROCEDURE — 99214 OFFICE O/P EST MOD 30 MIN: CPT | Mod: 25,S$GLB,, | Performed by: FAMILY MEDICINE

## 2022-06-29 PROCEDURE — 77080 DXA BONE DENSITY AXIAL: CPT | Mod: 26,,, | Performed by: RADIOLOGY

## 2022-06-29 PROCEDURE — 90471 IMMUNIZATION ADMIN: CPT | Mod: S$GLB,,, | Performed by: FAMILY MEDICINE

## 2022-06-29 PROCEDURE — 99999 PR PBB SHADOW E&M-EST. PATIENT-LVL IV: CPT | Mod: PBBFAC,,, | Performed by: FAMILY MEDICINE

## 2022-06-29 PROCEDURE — 3008F PR BODY MASS INDEX (BMI) DOCUMENTED: ICD-10-PCS | Mod: CPTII,S$GLB,, | Performed by: FAMILY MEDICINE

## 2022-06-29 PROCEDURE — 1159F MED LIST DOCD IN RCRD: CPT | Mod: CPTII,S$GLB,, | Performed by: FAMILY MEDICINE

## 2022-06-29 PROCEDURE — 3044F HG A1C LEVEL LT 7.0%: CPT | Mod: CPTII,S$GLB,, | Performed by: FAMILY MEDICINE

## 2022-06-29 PROCEDURE — 1159F PR MEDICATION LIST DOCUMENTED IN MEDICAL RECORD: ICD-10-PCS | Mod: CPTII,S$GLB,, | Performed by: FAMILY MEDICINE

## 2022-06-29 PROCEDURE — 1101F PT FALLS ASSESS-DOCD LE1/YR: CPT | Mod: CPTII,S$GLB,, | Performed by: FAMILY MEDICINE

## 2022-06-29 PROCEDURE — 1101F PR PT FALLS ASSESS DOC 0-1 FALLS W/OUT INJ PAST YR: ICD-10-PCS | Mod: CPTII,S$GLB,, | Performed by: FAMILY MEDICINE

## 2022-06-29 PROCEDURE — 99999 PR PBB SHADOW E&M-EST. PATIENT-LVL IV: ICD-10-PCS | Mod: PBBFAC,,, | Performed by: FAMILY MEDICINE

## 2022-06-29 PROCEDURE — 90677 PNEUMOCOCCAL CONJUGATE VACCINE 20-VALENT: ICD-10-PCS | Mod: S$GLB,,, | Performed by: FAMILY MEDICINE

## 2022-06-29 PROCEDURE — 77080 DXA BONE DENSITY AXIAL: CPT | Mod: TC,PO

## 2022-06-29 PROCEDURE — 99214 PR OFFICE/OUTPT VISIT, EST, LEVL IV, 30-39 MIN: ICD-10-PCS | Mod: 25,S$GLB,, | Performed by: FAMILY MEDICINE

## 2022-06-29 PROCEDURE — 90677 PCV20 VACCINE IM: CPT | Mod: S$GLB,,, | Performed by: FAMILY MEDICINE

## 2022-06-29 PROCEDURE — 3288F PR FALLS RISK ASSESSMENT DOCUMENTED: ICD-10-PCS | Mod: CPTII,S$GLB,, | Performed by: FAMILY MEDICINE

## 2022-06-29 RX ORDER — ALPRAZOLAM 0.25 MG/1
0.25 TABLET ORAL 3 TIMES DAILY PRN
Qty: 30 TABLET | Refills: 0 | Status: SHIPPED | OUTPATIENT
Start: 2022-06-29

## 2022-06-29 NOTE — PROGRESS NOTES
Patient complains of some anxiety.  She is compliant with bupropion Lexapro.  Some increased stress related to adult son, girlfriend and grandchild living with her temporarily.  Previously responded well to p.r.n. Xanax.  Has not used this in several years.  She does have chronic urticaria reviewed, followed by allergist.  Has been using Xolair with good response however having some insurance issues they are trying to work out.  Not currently on prednisone.  She is due for Prevnar 20 immunizations well as DEXA scan.    Cheyenne was seen today for anxiety.    Diagnoses and all orders for this visit:    Generalized anxiety disorder    Chronic urticaria    Asymptomatic menopause  -     DXA Bone Density Spine And Hip; Future    Other orders  -     ALPRAZolam (XANAX) 0.25 MG tablet; Take 1 tablet (0.25 mg total) by mouth 3 (three) times daily as needed for Anxiety.  -     Pneumococcal Conjugate Vaccine (20 Valent) (IM); Future  -     Pneumococcal Conjugate Vaccine (20 Valent) (IM)    Continue other current medication.  Discussed Xanax use and risk of habituation.  She will use this p.r.n. for now and continue her other meds.  Continue follow-up with allergist.  Arrange DEXA scan.              Past Medical History:  Past Medical History:   Diagnosis Date    Carpal tunnel syndrome of right wrist     Chronic gastritis     Chronic urticaria     COVID-19 virus infection 1/27/2022    Dissociative fugue     Diverticulosis     Duodenitis     Dyspepsia     Generalized anxiety disorder     Hearing loss on left     Hearing loss on right     Helicobacter pylori (H. pylori)     History of blood transfusion     Hyperlipidemia     Hypertension     Hypothyroidism     IBS (irritable bowel syndrome)     Iron deficiency anemia     Mild mitral regurgitation by prior echocardiogram     Mild obesity     Mild vitamin D deficiency     Paraesophageal hiatal hernia     PONV (postoperative nausea and vomiting)     Reflux  gastritis     RLS (restless legs syndrome) 2021    Sleep apnea     mild improved with wt. loss    Thyroid goiter     Urticaria, chronic      Past Surgical History:   Procedure Laterality Date    ARTHROSCOPY OF KNEE Left 2022    Procedure: ARTHROSCOPY, KNEE;  Surgeon: Davion Massey MD;  Location: [facility] OR;  Service: Orthopedics;  Laterality: Left;    BLADDER SUSPENSION      pubovaginal sling     SECTION, CLASSIC      CHOLECYSTECTOMY      COLONOSCOPY  2011    Dr. Goode, in legacy:diverticulosis, hemorrhoids, melanosis coli-repeat 10 years    COLONOSCOPY N/A 2018    Procedure: COLONOSCOPY;  Surgeon: Rusty Vogt Jr., MD;  Location: [facility] ENDO;  Service: Endoscopy;  Laterality: N/A; repeat in 10 years for screening    ESOPHAGEAL DILATION      ESOPHAGOGASTRODUODENOSCOPY  2016    ESOPHAGOGASTRODUODENOSCOPY N/A 2018    Procedure: EGD (ESOPHAGOGASTRODUODENOSCOPY);  Surgeon: Rusty Vogt Jr., MD;  Location: [facility];  Service: Endoscopy;  Laterality: N/A;    HERNIA REPAIR      INTRALUMINAL GASTROINTESTINAL TRACT IMAGING VIA CAPSULE N/A 2018    Procedure: IMAGING PROCEDURE, GI TRACT, INTRALUMINAL, VIA CAPSULE;  Surgeon: Loraine Velazquez MD;  Location: [facility];  Service: Endoscopy;  Laterality: N/A;    LAPAROSCOPIC NISSEN FUNDOPLICATION N/A 2018    Procedure: FUNDOPLICATION, NISSEN, LAPAROSCOPIC;  Surgeon: Frank Akins MD;  Location: [facility] OR;  Service: General;  Laterality: N/A;    THYROIDECTOMY, PARTIAL       partial for benign nodule    UPPER GASTROINTESTINAL ENDOSCOPY  2016    Dr. Whalen, in care everywhere     Review of patient's allergies indicates:   Allergen Reactions    Penicillins      Current Outpatient Medications on File Prior to Visit   Medication Sig Dispense Refill    atorvastatin (LIPITOR) 10 MG tablet Take 1 tablet (10 mg total) by mouth once daily. 90 tablet 2    buPROPion (WELLBUTRIN XL) 300 MG 24 hr tablet Take  1 tablet (300 mg total) by mouth once daily. 90 tablet 2    CALCIUM CARBONATE/VITAMIN D3 (CALTRATE 600 + D ORAL) Take by mouth 2 (two) times daily.      cetirizine (ZYRTEC) 10 MG tablet Take 10 mg by mouth 2 (two) times a day.      diphenhydrAMINE (BENADRYL) 25 mg capsule Take 25 mg by mouth as needed for Itching.      EScitalopram oxalate (LEXAPRO) 20 MG tablet Take 1 tablet (20 mg total) by mouth once daily. 90 tablet 2    estradiol (VAGIFEM) 10 mcg Tab Place 10 mcg vaginally.      famotidine (PEPCID AC) 20 MG tablet Take 1 tablet (20 mg total) by mouth 2 (two) times a day. 60 tablet 11    felodipine (PLENDIL) 5 MG 24 hr tablet Take 1 tablet (5 mg total) by mouth once daily. 90 tablet 2    gabapentin (NEURONTIN) 300 MG capsule Take 1 capsule (300 mg total) by mouth every evening. 90 capsule 2    levothyroxine (SYNTHROID) 50 MCG tablet Take 1 tablet (50 mcg total) by mouth once daily. 90 tablet 2    olmesartan-hydrochlorothiazide (BENICAR HCT) 20-12.5 mg per tablet Take 1 tablet by mouth once daily. 90 tablet 2    omalizumab (XOLAIR) 150 mg/mL injection Inject 2 mLs (300 mg total) into the skin every 28 days. for 13 doses 2 mL 12    OMEGA-3 FATTY ACIDS-EPA ORAL Take by mouth once daily.      pantoprazole (PROTONIX) 40 MG tablet Take 1 tablet (40 mg total) by mouth once daily. 90 tablet 2    polyethylene glycol (GLYCOLAX) 17 gram PwPk Take by mouth as needed.       potassium chloride (MICRO-K) 10 MEQ CpSR Take 1 capsule (10 mEq total) by mouth once daily. 90 capsule 3    predniSONE (DELTASONE) 10 MG tablet TAKE 1/2 TABLET EVERY OTHER DAY 20 tablet 1    VIT A/VIT C/VIT E/ZINC/COPPER (PRESERVISION AREDS ORAL) Take by mouth once daily.      [DISCONTINUED] ALPRAZolam (XANAX) 0.25 MG tablet Take 1 tablet (0.25 mg total) by mouth 3 (three) times daily as needed for Anxiety. 21 tablet 0    [DISCONTINUED] oxyCODONE-acetaminophen (PERCOCET) 5-325 mg per tablet Take 1 tablet by mouth every 4 to 6 hours as  needed for Pain. 42 tablet 0    [DISCONTINUED] oxyCODONE-acetaminophen (PERCOCET) 5-325 mg per tablet Take 1 tablet by mouth every 4 to 6 hours as needed for Pain. 42 tablet 0     Current Facility-Administered Medications on File Prior to Visit   Medication Dose Route Frequency Provider Last Rate Last Admin    omalizumab injection 300 mg  300 mg Subcutaneous Q28 Days Sydney Romero MD   300 mg at 03/17/22 0906    [DISCONTINUED] electrolyte-S (ISOLYTE)   Intravenous Continuous Bebeto Mukherjee MD        [DISCONTINUED] fentaNYL 50 mcg/mL injection 25 mcg  25 mcg Intravenous Q5 Min PRN Bebeto Mukherjee MD        [DISCONTINUED] lactated ringers infusion   Intravenous Continuous Bebeto Mukherjee MD 10 mL/hr at 04/07/22 0725 New Bag at 04/07/22 0725    [DISCONTINUED] LIDOcaine (PF) 10 mg/ml (1%) injection 10 mg  1 mL Intradermal Once Bebeto Mukherjee MD        [DISCONTINUED] oxyCODONE immediate release tablet 5 mg  5 mg Oral Once PRN Bebeto Mukherjee MD        [DISCONTINUED] sodium chloride 0.9% flush 3 mL  3 mL Intravenous PRN Bebeto Mukherjee MD         Social History     Socioeconomic History    Marital status:    Tobacco Use    Smoking status: Never Smoker    Smokeless tobacco: Never Used   Substance and Sexual Activity    Alcohol use: Yes     Comment: seldom    Drug use: No    Sexual activity: Yes     Social Determinants of Health     Financial Resource Strain: Low Risk     Difficulty of Paying Living Expenses: Not hard at all   Food Insecurity: No Food Insecurity    Worried About Running Out of Food in the Last Year: Never true    Ran Out of Food in the Last Year: Never true   Transportation Needs: No Transportation Needs    Lack of Transportation (Medical): No    Lack of Transportation (Non-Medical): No   Physical Activity: Unknown    Days of Exercise per Week: Patient refused   Stress: No Stress Concern Present    Feeling of Stress : Only a little   Social Connections:  "Unknown    Frequency of Communication with Friends and Family: Patient refused    Frequency of Social Gatherings with Friends and Family: Patient refused    Active Member of Clubs or Organizations: Patient refused    Attends Club or Organization Meetings: Patient refused    Marital Status:    Housing Stability: Low Risk     Unable to Pay for Housing in the Last Year: No    Number of Places Lived in the Last Year: 1    Unstable Housing in the Last Year: No     Family History   Problem Relation Age of Onset    Hypertension Mother     Heart disease Mother     Ulcers Mother     GI problems Mother         colitis    Hypertension Father     Breast cancer Paternal Grandmother     Breast cancer Cousin     Colon cancer Neg Hx     Crohn's disease Neg Hx     Colon polyps Neg Hx     Ulcerative colitis Neg Hx     Stomach cancer Neg Hx     Esophageal cancer Neg Hx     Allergic rhinitis Neg Hx     Allergies Neg Hx     Angioedema Neg Hx     Atopy Neg Hx     Eczema Neg Hx     Immunodeficiency Neg Hx     Rhinitis Neg Hx     Urticaria Neg Hx     Asthma Neg Hx      Answers for HPI/ROS submitted by the patient on 6/28/2022  activity change: No  unexpected weight change: No  neck pain: No  hearing loss: Yes  rhinorrhea: No  trouble swallowing: No  eye discharge: No  visual disturbance: No  chest tightness: No  wheezing: No  chest pain: No  palpitations: Yes  blood in stool: No  constipation: No  vomiting: No  diarrhea: No  polydipsia: No  polyuria: No  difficulty urinating: No  hematuria: No  menstrual problem: No  dysuria: No  joint swelling: Yes  arthralgias: Yes  headaches: No  weakness: No  confusion: No  dysphoric mood: No      Vitals:    06/29/22 1043   BP: 122/70   Pulse: 75   Temp: 97.5 °F (36.4 °C)   TempSrc: Temporal   Weight: 75.7 kg (166 lb 12.8 oz)   Height: 4' 11" (1.499 m)       Wt Readings from Last 3 Encounters:   06/29/22 75.7 kg (166 lb 12.8 oz)   05/27/22 72.6 kg (160 lb) "   04/22/22 72.6 kg (160 lb 0.9 oz)       APPEARANCE: Well nourished, well developed, in no acute distress.    HEAD: Normocephalic.  Atraumatic.  EYES:   Right eye: Pupil reactive.  Conjunctiva clear.    Left eye: Pupil reactive.  Conjunctiva clear.    NECK: Supple. No bruits.  No JVD.  No cervical lymphadenopathy.  No thyromegaly.    CHEST: Breath sounds clear bilaterally.  Normal respiratory effort  CARDIOVASCULAR: Normal rate.  Regular rhythm.  No murmurs.  No rub.  No gallops.   No edema.  MENTAL STATUS: Alert.  Oriented x 3.

## 2022-07-01 ENCOUNTER — SPECIALTY PHARMACY (OUTPATIENT)
Dept: PHARMACY | Facility: CLINIC | Age: 67
End: 2022-07-01
Payer: COMMERCIAL

## 2022-07-01 NOTE — TELEPHONE ENCOUNTER
Patient has 2 insurances. Xolair copay $975.45 under her Medicare and there is no FA available. Patient is locked into [facility] per her commerical plan. InBasket sent to have provider place Buy & Bill orders for medical authorization.

## 2022-07-29 ENCOUNTER — PATIENT MESSAGE (OUTPATIENT)
Dept: ORTHOPEDICS | Facility: CLINIC | Age: 67
End: 2022-07-29
Payer: COMMERCIAL

## 2022-08-17 ENCOUNTER — PATIENT MESSAGE (OUTPATIENT)
Dept: ORTHOPEDICS | Facility: CLINIC | Age: 67
End: 2022-08-17
Payer: COMMERCIAL

## 2022-08-17 DIAGNOSIS — M25.562 ACUTE PAIN OF LEFT KNEE: ICD-10-CM

## 2022-08-17 DIAGNOSIS — M17.12 PRIMARY OSTEOARTHRITIS OF LEFT KNEE: Primary | ICD-10-CM

## 2022-08-19 ENCOUNTER — OFFICE VISIT (OUTPATIENT)
Dept: ORTHOPEDICS | Facility: CLINIC | Age: 67
End: 2022-08-19
Payer: COMMERCIAL

## 2022-08-19 VITALS — BODY MASS INDEX: 33.47 KG/M2 | WEIGHT: 166 LBS | HEIGHT: 59 IN

## 2022-08-19 DIAGNOSIS — M25.562 LEFT KNEE PAIN: Primary | ICD-10-CM

## 2022-08-19 DIAGNOSIS — M17.12 OSTEOARTHRITIS OF LEFT KNEE: ICD-10-CM

## 2022-08-19 PROCEDURE — 3044F HG A1C LEVEL LT 7.0%: CPT | Mod: CPTII,S$GLB,, | Performed by: ORTHOPAEDIC SURGERY

## 2022-08-19 PROCEDURE — 3044F PR MOST RECENT HEMOGLOBIN A1C LEVEL <7.0%: ICD-10-PCS | Mod: CPTII,S$GLB,, | Performed by: ORTHOPAEDIC SURGERY

## 2022-08-19 PROCEDURE — 20610 DRAIN/INJ JOINT/BURSA W/O US: CPT | Mod: LT,S$GLB,, | Performed by: ORTHOPAEDIC SURGERY

## 2022-08-19 PROCEDURE — 99214 OFFICE O/P EST MOD 30 MIN: CPT | Mod: 25,S$GLB,, | Performed by: ORTHOPAEDIC SURGERY

## 2022-08-19 PROCEDURE — 1160F RVW MEDS BY RX/DR IN RCRD: CPT | Mod: CPTII,S$GLB,, | Performed by: ORTHOPAEDIC SURGERY

## 2022-08-19 PROCEDURE — 20610 LARGE JOINT ASPIRATION/INJECTION: L KNEE: ICD-10-PCS | Mod: LT,S$GLB,, | Performed by: ORTHOPAEDIC SURGERY

## 2022-08-19 PROCEDURE — 1160F PR REVIEW ALL MEDS BY PRESCRIBER/CLIN PHARMACIST DOCUMENTED: ICD-10-PCS | Mod: CPTII,S$GLB,, | Performed by: ORTHOPAEDIC SURGERY

## 2022-08-19 PROCEDURE — 3288F PR FALLS RISK ASSESSMENT DOCUMENTED: ICD-10-PCS | Mod: CPTII,S$GLB,, | Performed by: ORTHOPAEDIC SURGERY

## 2022-08-19 PROCEDURE — 99999 PR PBB SHADOW E&M-EST. PATIENT-LVL IV: CPT | Mod: PBBFAC,,, | Performed by: ORTHOPAEDIC SURGERY

## 2022-08-19 PROCEDURE — 3008F PR BODY MASS INDEX (BMI) DOCUMENTED: ICD-10-PCS | Mod: CPTII,S$GLB,, | Performed by: ORTHOPAEDIC SURGERY

## 2022-08-19 PROCEDURE — 99999 PR PBB SHADOW E&M-EST. PATIENT-LVL IV: ICD-10-PCS | Mod: PBBFAC,,, | Performed by: ORTHOPAEDIC SURGERY

## 2022-08-19 PROCEDURE — 3008F BODY MASS INDEX DOCD: CPT | Mod: CPTII,S$GLB,, | Performed by: ORTHOPAEDIC SURGERY

## 2022-08-19 PROCEDURE — 1159F PR MEDICATION LIST DOCUMENTED IN MEDICAL RECORD: ICD-10-PCS | Mod: CPTII,S$GLB,, | Performed by: ORTHOPAEDIC SURGERY

## 2022-08-19 PROCEDURE — 1101F PR PT FALLS ASSESS DOC 0-1 FALLS W/OUT INJ PAST YR: ICD-10-PCS | Mod: CPTII,S$GLB,, | Performed by: ORTHOPAEDIC SURGERY

## 2022-08-19 PROCEDURE — 3288F FALL RISK ASSESSMENT DOCD: CPT | Mod: CPTII,S$GLB,, | Performed by: ORTHOPAEDIC SURGERY

## 2022-08-19 PROCEDURE — 1101F PT FALLS ASSESS-DOCD LE1/YR: CPT | Mod: CPTII,S$GLB,, | Performed by: ORTHOPAEDIC SURGERY

## 2022-08-19 PROCEDURE — 99214 PR OFFICE/OUTPT VISIT, EST, LEVL IV, 30-39 MIN: ICD-10-PCS | Mod: 25,S$GLB,, | Performed by: ORTHOPAEDIC SURGERY

## 2022-08-19 PROCEDURE — 1159F MED LIST DOCD IN RCRD: CPT | Mod: CPTII,S$GLB,, | Performed by: ORTHOPAEDIC SURGERY

## 2022-08-19 RX ORDER — TRIAMCINOLONE ACETONIDE 40 MG/ML
40 INJECTION, SUSPENSION INTRA-ARTICULAR; INTRAMUSCULAR
Status: DISCONTINUED | OUTPATIENT
Start: 2022-08-19 | End: 2022-08-19 | Stop reason: HOSPADM

## 2022-08-19 RX ADMIN — TRIAMCINOLONE ACETONIDE 40 MG: 40 INJECTION, SUSPENSION INTRA-ARTICULAR; INTRAMUSCULAR at 10:08

## 2022-08-19 NOTE — PROCEDURES
Large Joint Aspiration/Injection: L knee    Date/Time: 8/19/2022 10:30 AM  Performed by: Davion Massey MD  Authorized by: Davion Massey MD     Consent Done?:  Yes (Verbal)  Timeout: prior to procedure the correct patient, procedure, and site was verified    Prep: patient was prepped and draped in usual sterile fashion      Details:  Needle Size:  21 G  Approach:  Anterolateral  Location:  Knee  Site:  L knee  Medications:  40 mg triamcinolone acetonide 40 mg/mL  Patient tolerance:  Patient tolerated the procedure well with no immediate complications

## 2022-08-19 NOTE — PROGRESS NOTES
67 y.o. year old presents to the clinic today with recurring pain in the left knee.  Patient has had Kenlaog injections in the past and responded well.  Last injection was 3 months ago. Patient is requesting injection today into left knee    Exam shows tenderness at the joint line without signs of infection or instability    X-rays show arthritic changes    Assessment:  left knee arthrosis    Plan:  Kenlaog into the left knee.  Encourage strengthening over time.  Followup as needed.    Imaging studies ordered and reviewed by me    Further History  Aching pain  Worse with activity  Relieved with rest  No other associated symptoms  No other radiation    Further Exam  Alert and oriented  Pleasant  Contralateral limb has appropriate range of motion for age and condition  Contralateral limb has appropriate strength for age and condition  Contralateral limb has appropriate stability  for age and condition  No adenopathy  Pulses are appropriate for current condition  Skin is intact        Chief Complaint    Chief Complaint   Patient presents with    Left Knee - Pain       HPI  Cheyenne West is a 67 y.o.  female who presents with       Past Medical History  Past Medical History:   Diagnosis Date    Carpal tunnel syndrome of right wrist     Chronic gastritis     Chronic urticaria     COVID-19 virus infection 1/27/2022    Dissociative fugue     Diverticulosis     Duodenitis     Dyspepsia     Generalized anxiety disorder     Hearing loss on left     Hearing loss on right     Helicobacter pylori (H. pylori)     History of blood transfusion     Hyperlipidemia     Hypertension     Hypothyroidism     IBS (irritable bowel syndrome)     Iron deficiency anemia     Mild mitral regurgitation by prior echocardiogram     Mild obesity     Mild vitamin D deficiency     Paraesophageal hiatal hernia     PONV (postoperative nausea and vomiting)     Reflux gastritis     RLS (restless legs syndrome)  2021    Sleep apnea     mild improved with wt. loss    Thyroid goiter     Urticaria, chronic        Past Surgical History  Past Surgical History:   Procedure Laterality Date    ARTHROSCOPY OF KNEE Left 2022    Procedure: ARTHROSCOPY, KNEE;  Surgeon: Davion Massey MD;  Location: [facility] OR;  Service: Orthopedics;  Laterality: Left;    BLADDER SUSPENSION      pubovaginal sling     SECTION, CLASSIC      CHOLECYSTECTOMY      COLONOSCOPY  2011    Dr. Goode, in legacy:diverticulosis, hemorrhoids, melanosis coli-repeat 10 years    COLONOSCOPY N/A 2018    Procedure: COLONOSCOPY;  Surgeon: Rusty Vogt Jr., MD;  Location: [facility] ENDO;  Service: Endoscopy;  Laterality: N/A; repeat in 10 years for screening    ESOPHAGEAL DILATION      ESOPHAGOGASTRODUODENOSCOPY  2016    ESOPHAGOGASTRODUODENOSCOPY N/A 2018    Procedure: EGD (ESOPHAGOGASTRODUODENOSCOPY);  Surgeon: Rusty Vogt Jr., MD;  Location: [facility] ENDO;  Service: Endoscopy;  Laterality: N/A;    HERNIA REPAIR      INTRALUMINAL GASTROINTESTINAL TRACT IMAGING VIA CAPSULE N/A 2018    Procedure: IMAGING PROCEDURE, GI TRACT, INTRALUMINAL, VIA CAPSULE;  Surgeon: Loraine Velazquez MD;  Location: [facility] ENDO;  Service: Endoscopy;  Laterality: N/A;    LAPAROSCOPIC NISSEN FUNDOPLICATION N/A 2018    Procedure: FUNDOPLICATION, NISSEN, LAPAROSCOPIC;  Surgeon: Frank Akins MD;  Location: [facility] OR;  Service: General;  Laterality: N/A;    THYROIDECTOMY, PARTIAL       partial for benign nodule    UPPER GASTROINTESTINAL ENDOSCOPY  2016    Dr. Whalen, in care everywhere       Medications  Current Outpatient Medications   Medication Sig    ALPRAZolam (XANAX) 0.25 MG tablet Take 1 tablet (0.25 mg total) by mouth 3 (three) times daily as needed for Anxiety.    atorvastatin (LIPITOR) 10 MG tablet Take 1 tablet (10 mg total) by mouth once daily.    buPROPion (WELLBUTRIN XL) 300 MG 24 hr tablet Take 1 tablet (300  mg total) by mouth once daily.    CALCIUM CARBONATE/VITAMIN D3 (CALTRATE 600 + D ORAL) Take by mouth 2 (two) times daily.    cetirizine (ZYRTEC) 10 MG tablet Take 10 mg by mouth 2 (two) times a day.    diphenhydrAMINE (BENADRYL) 25 mg capsule Take 25 mg by mouth as needed for Itching.    EScitalopram oxalate (LEXAPRO) 20 MG tablet Take 1 tablet (20 mg total) by mouth once daily.    estradiol (VAGIFEM) 10 mcg Tab Place 10 mcg vaginally.    famotidine (PEPCID AC) 20 MG tablet Take 1 tablet (20 mg total) by mouth 2 (two) times a day.    felodipine (PLENDIL) 5 MG 24 hr tablet Take 1 tablet (5 mg total) by mouth once daily.    gabapentin (NEURONTIN) 300 MG capsule Take 1 capsule (300 mg total) by mouth every evening.    levothyroxine (SYNTHROID) 50 MCG tablet Take 1 tablet (50 mcg total) by mouth once daily.    olmesartan-hydrochlorothiazide (BENICAR HCT) 20-12.5 mg per tablet Take 1 tablet by mouth once daily.    omalizumab (XOLAIR) 150 mg/mL injection Inject 2 mLs (300 mg total) into the skin every 28 days. for 13 doses    OMEGA-3 FATTY ACIDS-EPA ORAL Take by mouth once daily.    pantoprazole (PROTONIX) 40 MG tablet Take 1 tablet (40 mg total) by mouth once daily.    polyethylene glycol (GLYCOLAX) 17 gram PwPk Take by mouth as needed.     potassium chloride (MICRO-K) 10 MEQ CpSR Take 1 capsule (10 mEq total) by mouth once daily.    predniSONE (DELTASONE) 10 MG tablet TAKE 1/2 TABLET EVERY OTHER DAY    VIT A/VIT C/VIT E/ZINC/COPPER (PRESERVISION AREDS ORAL) Take by mouth once daily.     Current Facility-Administered Medications   Medication    omalizumab injection 300 mg       Allergies  Review of patient's allergies indicates:   Allergen Reactions    Penicillins        Family History  Family History   Problem Relation Age of Onset    Hypertension Mother     Heart disease Mother     Ulcers Mother     GI problems Mother         colitis    Hypertension Father     Breast cancer Paternal Grandmother      Breast cancer Cousin     Colon cancer Neg Hx     Crohn's disease Neg Hx     Colon polyps Neg Hx     Ulcerative colitis Neg Hx     Stomach cancer Neg Hx     Esophageal cancer Neg Hx     Allergic rhinitis Neg Hx     Allergies Neg Hx     Angioedema Neg Hx     Atopy Neg Hx     Eczema Neg Hx     Immunodeficiency Neg Hx     Rhinitis Neg Hx     Urticaria Neg Hx     Asthma Neg Hx        Social History  Social History     Socioeconomic History    Marital status:    Tobacco Use    Smoking status: Never Smoker    Smokeless tobacco: Never Used   Substance and Sexual Activity    Alcohol use: Yes     Comment: seldom    Drug use: No    Sexual activity: Yes     Social Determinants of Health     Financial Resource Strain: Low Risk     Difficulty of Paying Living Expenses: Not hard at all   Food Insecurity: No Food Insecurity    Worried About Running Out of Food in the Last Year: Never true    Ran Out of Food in the Last Year: Never true   Transportation Needs: No Transportation Needs    Lack of Transportation (Medical): No    Lack of Transportation (Non-Medical): No   Physical Activity: Unknown    Days of Exercise per Week: Patient refused   Stress: No Stress Concern Present    Feeling of Stress : Only a little   Social Connections: Unknown    Frequency of Communication with Friends and Family: Patient refused    Frequency of Social Gatherings with Friends and Family: Patient refused    Active Member of Clubs or Organizations: Patient refused    Attends Club or Organization Meetings: Patient refused    Marital Status:    Housing Stability: Low Risk     Unable to Pay for Housing in the Last Year: No    Number of Places Lived in the Last Year: 1    Unstable Housing in the Last Year: No               Review of Systems     Constitutional: Negative    HENT: Negative  Eyes: Negative  Respiratory: Negative  Cardiovascular: Negative  Musculoskeletal: HPI  Skin: Negative  Neurological:  Negative  Hematological: Negative  Endocrine: Negative                 Physical Exam    There were no vitals filed for this visit.  Body mass index is 33.53 kg/m².  Physical Examination:     General appearance -  well appearing, and in no distress  Mental status - awake  Neck - supple  Chest -  symmetric air entry  Heart - normal rate   Abdomen - soft      Assessment     1. Left knee pain    2. Osteoarthritis of left knee          Plan

## 2022-11-02 DIAGNOSIS — L50.1 CHRONIC IDIOPATHIC URTICARIA: Primary | ICD-10-CM

## 2023-01-20 ENCOUNTER — PATIENT OUTREACH (OUTPATIENT)
Dept: ADMINISTRATIVE | Facility: HOSPITAL | Age: 68
End: 2023-01-20
Payer: MEDICARE

## 2023-01-20 NOTE — PROGRESS NOTES
[facility] Rec-Post Hospital Discharge.    Pt was not seen post hosp 11/04/22-12/04/22.  No med rec completed.

## 2023-01-30 RX ORDER — FAMOTIDINE 20 MG/1
TABLET, FILM COATED ORAL
Qty: 180 TABLET | Refills: 0 | Status: SHIPPED | OUTPATIENT
Start: 2023-01-30

## 2023-02-21 ENCOUNTER — PATIENT MESSAGE (OUTPATIENT)
Dept: FAMILY MEDICINE | Facility: CLINIC | Age: 68
End: 2023-02-21
Payer: MEDICARE

## 2023-02-28 ENCOUNTER — PATIENT MESSAGE (OUTPATIENT)
Dept: FAMILY MEDICINE | Facility: CLINIC | Age: 68
End: 2023-02-28

## 2023-02-28 ENCOUNTER — OFFICE VISIT (OUTPATIENT)
Dept: FAMILY MEDICINE | Facility: CLINIC | Age: 68
End: 2023-02-28
Payer: MEDICARE

## 2023-02-28 DIAGNOSIS — K52.9 GASTROENTERITIS: Primary | ICD-10-CM

## 2023-02-28 PROCEDURE — 99442 PR PHYSICIAN TELEPHONE EVALUATION 11-20 MIN: CPT | Mod: 95,,, | Performed by: FAMILY MEDICINE

## 2023-02-28 PROCEDURE — 99442 PR PHYSICIAN TELEPHONE EVALUATION 11-20 MIN: ICD-10-PCS | Mod: 95,,, | Performed by: FAMILY MEDICINE

## 2023-02-28 RX ORDER — ONDANSETRON 4 MG/1
4 TABLET, ORALLY DISINTEGRATING ORAL EVERY 8 HOURS PRN
Qty: 12 TABLET | Refills: 0 | Status: SHIPPED | OUTPATIENT
Start: 2023-02-28 | End: 2023-06-02

## 2023-02-28 RX ORDER — DIPHENOXYLATE HYDROCHLORIDE AND ATROPINE SULFATE 2.5; .025 MG/1; MG/1
1 TABLET ORAL 4 TIMES DAILY PRN
Qty: 20 TABLET | Refills: 0 | Status: SHIPPED | OUTPATIENT
Start: 2023-02-28 | End: 2023-03-10

## 2023-02-28 NOTE — PROGRESS NOTES
Primary Care Telemedicine Note    Established Patient - Audio Only Telehealth Visit     The patient location is:  [place]  The chief complaint leading to consultation is:  Per below note  Visit type: Virtual visit with audio only (telephone)   Total time spent with patient:  12 minutes    Patient complains of nausea and vomiting multiple episodes starting since last night.  Seem to improve but 1 more episode this morning.  She is had watery diarrhea since last night.  Positive sick contacts with similar symptoms.  She denies any recent antibiotics.  She had some abdominal cramps but this resolved.  No recent antibiotics.  No rectal bleeding.  Some headache.  No chest pain or shortness of breath.  Denies any upper respiratory symptoms.       Diagnoses and all orders for this visit:    Gastroenteritis    Other orders  -     diphenoxylate-atropine 2.5-0.025 mg (LOMOTIL) 2.5-0.025 mg per tablet; Take 1 tablet by mouth 4 (four) times daily as needed for Diarrhea.  -     ondansetron (ZOFRAN-ODT) 4 MG TbDL; Take 1 tablet (4 mg total) by mouth every 8 (eight) hours as needed (nausea/vomiting).     ER evaluation if emesis does not resolve with above or if she is not able take p.o. fluids to maintain hydration.  Make sure to push fluids.  Follow-up if new symptoms develop or not improving with above.           Past Medical History:  Past Medical History:   Diagnosis Date    Carpal tunnel syndrome of right wrist     Chronic gastritis     Chronic urticaria     COVID-19 virus infection 1/27/2022    Dissociative fugue     Diverticulosis     Duodenitis     Dyspepsia     Generalized anxiety disorder     Hearing loss on left     Hearing loss on right     Helicobacter pylori (H. pylori)     History of blood transfusion     Hyperlipidemia     Hypertension     Hypothyroidism     IBS (irritable bowel syndrome)     Iron deficiency anemia     Mild mitral regurgitation by prior echocardiogram     Mild obesity     Mild vitamin D  deficiency     Paraesophageal hiatal hernia     PONV (postoperative nausea and vomiting)     Reflux gastritis     RLS (restless legs syndrome) 2021    Sleep apnea     mild improved with wt. loss    Thyroid goiter     Urticaria, chronic      Past Surgical History:   Procedure Laterality Date    ARTHROSCOPY OF KNEE Left 2022    Procedure: ARTHROSCOPY, KNEE;  Surgeon: Davion Massey MD;  Location: [facility];  Service: Orthopedics;  Laterality: Left;    BLADDER SUSPENSION      pubovaginal sling     SECTION, CLASSIC      CHOLECYSTECTOMY      COLONOSCOPY  2011    Dr. Goode, in legacy:diverticulosis, hemorrhoids, melanosis coli-repeat 10 years    COLONOSCOPY N/A 2018    Procedure: COLONOSCOPY;  Surgeon: Rusty Vogt Jr., MD;  Location: [facility];  Service: Endoscopy;  Laterality: N/A; repeat in 10 years for screening    ESOPHAGEAL DILATION      ESOPHAGOGASTRODUODENOSCOPY  2016    ESOPHAGOGASTRODUODENOSCOPY N/A 2018    Procedure: EGD (ESOPHAGOGASTRODUODENOSCOPY);  Surgeon: Rusty Vogt Jr., MD;  Location: [facility];  Service: Endoscopy;  Laterality: N/A;    HERNIA REPAIR      INTRALUMINAL GASTROINTESTINAL TRACT IMAGING VIA CAPSULE N/A 2018    Procedure: IMAGING PROCEDURE, GI TRACT, INTRALUMINAL, VIA CAPSULE;  Surgeon: Loraine Velazquez MD;  Location: [facility];  Service: Endoscopy;  Laterality: N/A;    LAPAROSCOPIC NISSEN FUNDOPLICATION N/A 2018    Procedure: FUNDOPLICATION, NISSEN, LAPAROSCOPIC;  Surgeon: Frank Akins MD;  Location: [facility];  Service: General;  Laterality: N/A;    THYROIDECTOMY, PARTIAL       partial for benign nodule    UPPER GASTROINTESTINAL ENDOSCOPY  2016    Dr. Whalen, in care everywhere     Social History     Socioeconomic History    Marital status:    Tobacco Use    Smoking status: Never    Smokeless tobacco: Never   Substance and Sexual Activity    Alcohol use: Yes     Comment: seldom    Drug use: No    Sexual activity:  Yes     Social Determinants of Health     Financial Resource Strain: Low Risk     Difficulty of Paying Living Expenses: Not hard at all   Food Insecurity: No Food Insecurity    Worried About Running Out of Food in the Last Year: Never true    Ran Out of Food in the Last Year: Never true   Transportation Needs: No Transportation Needs    Lack of Transportation (Medical): No    Lack of Transportation (Non-Medical): No   Physical Activity: Unknown    Days of Exercise per Week: Patient refused   Stress: No Stress Concern Present    Feeling of Stress : Only a little   Social Connections: Unknown    Frequency of Communication with Friends and Family: Patient refused    Frequency of Social Gatherings with Friends and Family: Patient refused    Active Member of Clubs or Organizations: Patient refused    Attends Club or Organization Meetings: Patient refused    Marital Status:    Housing Stability: Unknown    Unable to Pay for Housing in the Last Year: No    Unstable Housing in the Last Year: No     Family History   Problem Relation Age of Onset    Hypertension Mother     Heart disease Mother     Ulcers Mother     GI problems Mother         colitis    Hypertension Father     Breast cancer Paternal Grandmother     Breast cancer Cousin     Colon cancer Neg Hx     Crohn's disease Neg Hx     Colon polyps Neg Hx     Ulcerative colitis Neg Hx     Stomach cancer Neg Hx     Esophageal cancer Neg Hx     Allergic rhinitis Neg Hx     Allergies Neg Hx     Angioedema Neg Hx     Atopy Neg Hx     Eczema Neg Hx     Immunodeficiency Neg Hx     Rhinitis Neg Hx     Urticaria Neg Hx     Asthma Neg Hx      Review of patient's allergies indicates:   Allergen Reactions    Penicillins      Current Outpatient Medications on File Prior to Visit   Medication Sig Dispense Refill    ALPRAZolam (XANAX) 0.25 MG tablet Take 1 tablet (0.25 mg total) by mouth 3 (three) times daily as needed for Anxiety. 30 tablet 0    atorvastatin (LIPITOR) 10 MG  tablet TAKE 1 TABLET (10 MG TOTAL) BY MOUTH ONCE DAILY. 90 tablet 1    buPROPion (WELLBUTRIN XL) 300 MG 24 hr tablet TAKE 1 TABLET (300 MG TOTAL) BY MOUTH ONCE DAILY. 90 tablet 1    CALCIUM CARBONATE/VITAMIN D3 (CALTRATE 600 + D ORAL) Take by mouth 2 (two) times daily.      cetirizine (ZYRTEC) 10 MG tablet Take 10 mg by mouth 2 (two) times a day.      diphenhydrAMINE (BENADRYL) 25 mg capsule Take 25 mg by mouth as needed for Itching.      EScitalopram oxalate (LEXAPRO) 20 MG tablet TAKE 1 TABLET (20 MG TOTAL) BY MOUTH ONCE DAILY. 90 tablet 1    estradiol (VAGIFEM) 10 mcg Tab Place 10 mcg vaginally.      famotidine (PEPCID) 20 MG tablet TAKE 1 TABLET BY MOUTH TWICE A  tablet 0    felodipine (PLENDIL) 5 MG 24 hr tablet TAKE 1 TABLET (5 MG TOTAL) BY MOUTH ONCE DAILY. 90 tablet 1    gabapentin (NEURONTIN) 300 MG capsule Take 1 capsule (300 mg total) by mouth every evening. 90 capsule 2    levothyroxine (SYNTHROID) 50 MCG tablet TAKE 1 TABLET (50 MCG TOTAL) BY MOUTH ONCE DAILY. 90 tablet 1    olmesartan-hydrochlorothiazide (BENICAR HCT) 20-12.5 mg per tablet TAKE 1 TABLET EVERY DAY 90 tablet 0    omalizumab (XOLAIR) 150 mg/mL injection Inject 2 mLs (300 mg total) into the skin every 28 days. for 13 doses 2 mL 12    OMEGA-3 FATTY ACIDS-EPA ORAL Take by mouth once daily.      pantoprazole (PROTONIX) 40 MG tablet TAKE 1 TABLET (40 MG TOTAL) BY MOUTH ONCE DAILY. 90 tablet 1    polyethylene glycol (GLYCOLAX) 17 gram PwPk Take by mouth as needed.       potassium chloride (MICRO-K) 10 MEQ CpSR Take 1 capsule (10 mEq total) by mouth once daily. 90 capsule 3    predniSONE (DELTASONE) 10 MG tablet TAKE 1/2 TABLET EVERY OTHER DAY 20 tablet 1    VIT A/VIT C/VIT E/ZINC/COPPER (PRESERVISION AREDS ORAL) Take by mouth once daily.       Current Facility-Administered Medications on File Prior to Visit   Medication Dose Route Frequency Provider Last Rate Last Admin    omalizumab injection 300 mg  300 mg Subcutaneous Q28 Days Sydney  BRANDON Romero MD   300 mg at 03/17/22 0906         ROS:  GENERAL: No fever, chills.     There were no vitals filed for this visit.    Wt Readings from Last 3 Encounters:   08/19/22 75.3 kg (166 lb)   06/29/22 75.7 kg (166 lb 12.8 oz)   05/27/22 72.6 kg (160 lb)       The reason for the audio only service rather than synchronous audio and video virtual visit was related to technical difficulties or patient preference/necessity.     Each patient to whom I provide medical services by telemedicine is:  (1) informed of the relationship between the physician and patient and the respective role of any other health care provider with respect to management of the patient; and (2) notified that they may decline to receive medical services by telemedicine and may withdraw from such care at any time. Patient verbally consented to receive this service via voice-only telephone call.    This service was not originating from a related E/M service provided within the previous 7 days nor will  to an E/M service or procedure within the next 24 hours or my soonest available appointment.  Prevailing standard of care was able to be met in this audio-only visit.

## 2023-04-30 NOTE — TELEPHONE ENCOUNTER
Care Due:                  Date            Visit Type   Department     Provider  --------------------------------------------------------------------------------                                VIRTUAL      [facility] FAMILY  Last Visit: 02-      AUDIO ONLY   MEDICINE       Juan Buck  Next Visit: None Scheduled  None         None Found                                                            Last  Test          Frequency    Reason                     Performed    Due Date  --------------------------------------------------------------------------------    CMP.........  12 months..  atorvastatin,              01- 12-                             olmesartan-hydrochlorothi                             azide, potassium.........    Lipid Panel.  12 months..  atorvastatin.............  01- 12-    TSH.........  12 months..  levothyroxine............  01- 12-    [facility] Embedded Care Due Messages. Reference number: [id number].   4/30/2023 11:13:56 AM CDT

## 2023-05-01 RX ORDER — GABAPENTIN 300 MG/1
300 CAPSULE ORAL NIGHTLY
Qty: 90 CAPSULE | Refills: 3 | Status: SHIPPED | OUTPATIENT
Start: 2023-05-01 | End: 2023-09-26 | Stop reason: SDUPTHER

## 2023-05-01 NOTE — TELEPHONE ENCOUNTER
Refill Routing Note   Medication(s) are not appropriate for processing by [facility] for the following reason(s):      Medication outside of protocol    [facility] action(s):  Route Labs due          Appointments  past 12m or future 3m with PCP    Date Provider   Last Visit   2/28/2023 Juan Buck MD   Next Visit   Visit date not found Juan Buck MD   ED visits in past 90 days: 0        Note composed:8:40 AM 05/01/2023

## 2023-05-03 ENCOUNTER — TELEPHONE (OUTPATIENT)
Dept: FAMILY MEDICINE | Facility: CLINIC | Age: 68
End: 2023-05-03
Payer: MEDICARE

## 2023-05-03 DIAGNOSIS — Z12.31 SCREENING MAMMOGRAM FOR BREAST CANCER: Primary | ICD-10-CM

## 2023-05-11 ENCOUNTER — HOSPITAL ENCOUNTER (OUTPATIENT)
Dept: RADIOLOGY | Facility: HOSPITAL | Age: 68
Discharge: HOME | End: 2023-05-11
Attending: FAMILY MEDICINE
Payer: MEDICARE

## 2023-05-11 DIAGNOSIS — Z12.31 SCREENING MAMMOGRAM FOR BREAST CANCER: ICD-10-CM

## 2023-05-11 PROCEDURE — 77067 SCR MAMMO BI INCL CAD: CPT | Mod: TC,PO

## 2023-05-11 PROCEDURE — 77063 BREAST TOMOSYNTHESIS BI: CPT | Mod: 26,,, | Performed by: RADIOLOGY

## 2023-05-11 PROCEDURE — 77067 SCR MAMMO BI INCL CAD: CPT | Mod: 26,,, | Performed by: RADIOLOGY

## 2023-05-11 PROCEDURE — 77067 MAMMO DIGITAL SCREENING BILAT WITH TOMO: ICD-10-PCS | Mod: 26,,, | Performed by: RADIOLOGY

## 2023-05-11 PROCEDURE — 77063 MAMMO DIGITAL SCREENING BILAT WITH TOMO: ICD-10-PCS | Mod: 26,,, | Performed by: RADIOLOGY

## 2023-05-29 RX ORDER — OLMESARTAN MEDOXOMIL AND HYDROCHLOROTHIAZIDE 20/12.5 20; 12.5 MG/1; MG/1
TABLET ORAL
Qty: 90 TABLET | Refills: 0 | Status: SHIPPED | OUTPATIENT
Start: 2023-05-29 | End: 2023-10-23

## 2023-05-29 NOTE — TELEPHONE ENCOUNTER
No care due was identified.  [facility] Embedded Care Due Messages. Reference number: [id number].   5/29/2023 2:01:40 AM CDT

## 2023-05-29 NOTE — TELEPHONE ENCOUNTER
Refill Routing Note   Medication(s) are not appropriate for processing by [facility] for the following reason(s):      Required labs outdated    [facility] action(s):  Defer None identified            Appointments  past 12m or future 3m with PCP    Date Provider   Last Visit   2/28/2023 Juan Buck MD   Next Visit   Visit date not found Juan Buck MD   ED visits in past 90 days: 0        Note composed:4:07 PM 05/29/2023

## 2023-06-02 ENCOUNTER — LAB VISIT (OUTPATIENT)
Dept: LAB | Facility: HOSPITAL | Age: 68
End: 2023-06-02
Attending: FAMILY MEDICINE
Payer: MEDICARE

## 2023-06-02 ENCOUNTER — OFFICE VISIT (OUTPATIENT)
Dept: FAMILY MEDICINE | Facility: CLINIC | Age: 68
End: 2023-06-02
Payer: MEDICARE

## 2023-06-02 VITALS
SYSTOLIC BLOOD PRESSURE: 122 MMHG | HEART RATE: 73 BPM | WEIGHT: 164.88 LBS | TEMPERATURE: 97 F | HEIGHT: 59 IN | DIASTOLIC BLOOD PRESSURE: 76 MMHG | BODY MASS INDEX: 33.24 KG/M2

## 2023-06-02 DIAGNOSIS — K21.00 GASTROESOPHAGEAL REFLUX DISEASE WITH ESOPHAGITIS, UNSPECIFIED WHETHER HEMORRHAGE: ICD-10-CM

## 2023-06-02 DIAGNOSIS — E66.9 CLASS 1 OBESITY WITH SERIOUS COMORBIDITY AND BODY MASS INDEX (BMI) OF 33.0 TO 33.9 IN ADULT, UNSPECIFIED OBESITY TYPE: ICD-10-CM

## 2023-06-02 DIAGNOSIS — E03.9 HYPOTHYROIDISM, UNSPECIFIED TYPE: ICD-10-CM

## 2023-06-02 DIAGNOSIS — Z00.00 ROUTINE HISTORY AND PHYSICAL EXAMINATION OF ADULT: ICD-10-CM

## 2023-06-02 DIAGNOSIS — Z00.00 ROUTINE HISTORY AND PHYSICAL EXAMINATION OF ADULT: Primary | ICD-10-CM

## 2023-06-02 DIAGNOSIS — E78.5 HYPERLIPIDEMIA, UNSPECIFIED HYPERLIPIDEMIA TYPE: ICD-10-CM

## 2023-06-02 DIAGNOSIS — M85.80 OSTEOPENIA, UNSPECIFIED LOCATION: ICD-10-CM

## 2023-06-02 DIAGNOSIS — I34.0 MILD MITRAL REGURGITATION BY PRIOR ECHOCARDIOGRAM: ICD-10-CM

## 2023-06-02 DIAGNOSIS — L50.8 CHRONIC URTICARIA: ICD-10-CM

## 2023-06-02 DIAGNOSIS — G47.30 SLEEP APNEA, UNSPECIFIED TYPE: ICD-10-CM

## 2023-06-02 DIAGNOSIS — I10 ESSENTIAL HYPERTENSION: ICD-10-CM

## 2023-06-02 DIAGNOSIS — F41.1 GENERALIZED ANXIETY DISORDER: ICD-10-CM

## 2023-06-02 PROBLEM — E61.1 IRON DEFICIENCY: Status: RESOLVED | Noted: 2019-03-21 | Resolved: 2023-06-02

## 2023-06-02 LAB
ALBUMIN SERPL BCP-MCNC: 4 G/DL (ref 3.5–5.2)
ALP SERPL-CCNC: 111 U/L (ref 55–135)
ALT SERPL W/O P-5'-P-CCNC: 30 U/L (ref 10–44)
ANION GAP SERPL CALC-SCNC: 9 MMOL/L (ref 8–16)
AST SERPL-CCNC: 28 U/L (ref 10–40)
BASOPHILS # BLD AUTO: 0 K/UL (ref 0–0.2)
BASOPHILS NFR BLD: 0 % (ref 0–1.9)
BILIRUB SERPL-MCNC: 0.4 MG/DL (ref 0.1–1)
BUN SERPL-MCNC: 17 MG/DL (ref 8–23)
CALCIUM SERPL-MCNC: 9.4 MG/DL (ref 8.7–10.5)
CHLORIDE SERPL-SCNC: 106 MMOL/L (ref 95–110)
CHOLEST SERPL-MCNC: 196 MG/DL (ref 120–199)
CHOLEST/HDLC SERPL: 2.6 {RATIO} (ref 2–5)
CO2 SERPL-SCNC: 27 MMOL/L (ref 23–29)
CREAT SERPL-MCNC: 0.7 MG/DL (ref 0.5–1.4)
DIFFERENTIAL METHOD: ABNORMAL
EOSINOPHIL # BLD AUTO: 0 K/UL (ref 0–0.5)
EOSINOPHIL NFR BLD: 0 % (ref 0–8)
ERYTHROCYTE [DISTWIDTH] IN BLOOD BY AUTOMATED COUNT: 13.3 % (ref 11.5–14.5)
EST. GFR  (NO RACE VARIABLE): >60 ML/MIN/1.73 M^2
GLUCOSE SERPL-MCNC: 108 MG/DL (ref 70–110)
HCT VFR BLD AUTO: 42.1 % (ref 37–48.5)
HDLC SERPL-MCNC: 76 MG/DL (ref 40–75)
HDLC SERPL: 38.8 % (ref 20–50)
HGB BLD-MCNC: 13 G/DL (ref 12–16)
IMM GRANULOCYTES # BLD AUTO: 0.03 K/UL (ref 0–0.04)
IMM GRANULOCYTES NFR BLD AUTO: 0.7 % (ref 0–0.5)
LDLC SERPL CALC-MCNC: 104.8 MG/DL (ref 63–159)
LYMPHOCYTES # BLD AUTO: 1.1 K/UL (ref 1–4.8)
LYMPHOCYTES NFR BLD: 23.7 % (ref 18–48)
MCH RBC QN AUTO: 29.5 PG (ref 27–31)
MCHC RBC AUTO-ENTMCNC: 30.9 G/DL (ref 32–36)
MCV RBC AUTO: 96 FL (ref 82–98)
MONOCYTES # BLD AUTO: 0.4 K/UL (ref 0.3–1)
MONOCYTES NFR BLD: 8.4 % (ref 4–15)
NEUTROPHILS # BLD AUTO: 3 K/UL (ref 1.8–7.7)
NEUTROPHILS NFR BLD: 67.2 % (ref 38–73)
NONHDLC SERPL-MCNC: 120 MG/DL
NRBC BLD-RTO: 0 /100 WBC
PLATELET # BLD AUTO: 199 K/UL (ref 150–450)
PMV BLD AUTO: 10.3 FL (ref 9.2–12.9)
POTASSIUM SERPL-SCNC: 4.1 MMOL/L (ref 3.5–5.1)
PROT SERPL-MCNC: 6.6 G/DL (ref 6–8.4)
RBC # BLD AUTO: 4.41 M/UL (ref 4–5.4)
SODIUM SERPL-SCNC: 142 MMOL/L (ref 136–145)
TRIGL SERPL-MCNC: 76 MG/DL (ref 30–150)
TSH SERPL DL<=0.005 MIU/L-ACNC: 1.97 UIU/ML (ref 0.4–4)
WBC # BLD AUTO: 4.51 K/UL (ref 3.9–12.7)

## 2023-06-02 PROCEDURE — 99397 PER PM REEVAL EST PAT 65+ YR: CPT | Mod: S$GLB,,, | Performed by: FAMILY MEDICINE

## 2023-06-02 PROCEDURE — 1159F MED LIST DOCD IN RCRD: CPT | Mod: CPTII,S$GLB,, | Performed by: FAMILY MEDICINE

## 2023-06-02 PROCEDURE — 99999 PR PBB SHADOW E&M-EST. PATIENT-LVL IV: ICD-10-PCS | Mod: PBBFAC,,, | Performed by: FAMILY MEDICINE

## 2023-06-02 PROCEDURE — 80061 LIPID PANEL: CPT | Performed by: FAMILY MEDICINE

## 2023-06-02 PROCEDURE — 99999 PR PBB SHADOW E&M-EST. PATIENT-LVL IV: CPT | Mod: PBBFAC,,, | Performed by: FAMILY MEDICINE

## 2023-06-02 PROCEDURE — 1159F PR MEDICATION LIST DOCUMENTED IN MEDICAL RECORD: ICD-10-PCS | Mod: CPTII,S$GLB,, | Performed by: FAMILY MEDICINE

## 2023-06-02 PROCEDURE — 99397 PR PREVENTIVE VISIT,EST,65 & OVER: ICD-10-PCS | Mod: S$GLB,,, | Performed by: FAMILY MEDICINE

## 2023-06-02 PROCEDURE — 3008F BODY MASS INDEX DOCD: CPT | Mod: CPTII,S$GLB,, | Performed by: FAMILY MEDICINE

## 2023-06-02 PROCEDURE — 3078F DIAST BP <80 MM HG: CPT | Mod: CPTII,S$GLB,, | Performed by: FAMILY MEDICINE

## 2023-06-02 PROCEDURE — 3008F PR BODY MASS INDEX (BMI) DOCUMENTED: ICD-10-PCS | Mod: CPTII,S$GLB,, | Performed by: FAMILY MEDICINE

## 2023-06-02 PROCEDURE — 3074F PR MOST RECENT SYSTOLIC BLOOD PRESSURE < 130 MM HG: ICD-10-PCS | Mod: CPTII,S$GLB,, | Performed by: FAMILY MEDICINE

## 2023-06-02 PROCEDURE — 36415 COLL VENOUS BLD VENIPUNCTURE: CPT | Mod: PO | Performed by: FAMILY MEDICINE

## 2023-06-02 PROCEDURE — 3288F PR FALLS RISK ASSESSMENT DOCUMENTED: ICD-10-PCS | Mod: CPTII,S$GLB,, | Performed by: FAMILY MEDICINE

## 2023-06-02 PROCEDURE — 84443 ASSAY THYROID STIM HORMONE: CPT | Performed by: FAMILY MEDICINE

## 2023-06-02 PROCEDURE — 3288F FALL RISK ASSESSMENT DOCD: CPT | Mod: CPTII,S$GLB,, | Performed by: FAMILY MEDICINE

## 2023-06-02 PROCEDURE — 85025 COMPLETE CBC W/AUTO DIFF WBC: CPT | Performed by: FAMILY MEDICINE

## 2023-06-02 PROCEDURE — 1101F PT FALLS ASSESS-DOCD LE1/YR: CPT | Mod: CPTII,S$GLB,, | Performed by: FAMILY MEDICINE

## 2023-06-02 PROCEDURE — 80053 COMPREHEN METABOLIC PANEL: CPT | Performed by: FAMILY MEDICINE

## 2023-06-02 PROCEDURE — 3074F SYST BP LT 130 MM HG: CPT | Mod: CPTII,S$GLB,, | Performed by: FAMILY MEDICINE

## 2023-06-02 PROCEDURE — 1101F PR PT FALLS ASSESS DOC 0-1 FALLS W/OUT INJ PAST YR: ICD-10-PCS | Mod: CPTII,S$GLB,, | Performed by: FAMILY MEDICINE

## 2023-06-02 PROCEDURE — 3078F PR MOST RECENT DIASTOLIC BLOOD PRESSURE < 80 MM HG: ICD-10-PCS | Mod: CPTII,S$GLB,, | Performed by: FAMILY MEDICINE

## 2023-06-02 RX ORDER — POTASSIUM CHLORIDE 750 MG/1
10 CAPSULE, EXTENDED RELEASE ORAL DAILY
Qty: 90 CAPSULE | Refills: 3 | Status: SHIPPED | OUTPATIENT
Start: 2023-06-02 | End: 2024-03-18

## 2023-06-02 NOTE — PROGRESS NOTES
Presents physical exam.  Blood pressure controlled.  Hyperlipidemia on statin.  Previous mild mitral regurgitation on echocardiogram asymptomatic, due for follow-up.  Hypothyroidism compliant with thyroid medication.  Reflux stable current medications.  Sleep apnea stable.  Anxiety stable with current medication.  No SI/HI.  Osteopenia previous DEXA scan asymptomatic.  Obesity noted.  Mammogram and colon screening up-to-date.  Previous chronic urticaria states no episodes in the past year.    [place] was seen today for annual exam.    Diagnoses and all orders for this visit:    Routine history and physical examination of adult  -     CBC Auto Differential; Future  -     Comprehensive Metabolic Panel; Future  -     Lipid Panel; Future  -     TSH; Future  -     Echo; Future    Essential hypertension  -     CBC Auto Differential; Future    Hyperlipidemia, unspecified hyperlipidemia type  -     Comprehensive Metabolic Panel; Future  -     Lipid Panel; Future    Mild mitral regurgitation by prior echocardiogram  -     Echo; Future    Hypothyroidism, unspecified type  -     TSH; Future    Gastroesophageal reflux disease with esophagitis, unspecified whether hemorrhage    Sleep apnea, unspecified type    Generalized anxiety disorder    Chronic urticaria    Osteopenia, unspecified location    Class 1 obesity with serious comorbidity and body mass index (BMI) of 33.0 to 33.9 in adult, unspecified obesity type    Other orders  -     potassium chloride (MICRO-K) 10 MEQ CpSR; Take 1 capsule (10 mEq total) by mouth once daily.      Continue current medications.  Recommend COVID booster.    Anticipatory guidance: Don't smoke.  Healthy diet and regular exercise recommended.          Past Medical History:  Past Medical History:   Diagnosis Date    Carpal tunnel syndrome of right wrist     Chronic gastritis     Chronic urticaria     COVID-19 virus infection 1/27/2022    Dissociative fugue     Diverticulosis     Duodenitis      Dyspepsia     Generalized anxiety disorder     Hearing loss on left     Hearing loss on right     Helicobacter pylori (H. pylori)     History of blood transfusion     Hyperlipidemia     Hypertension     Hypothyroidism     IBS (irritable bowel syndrome)     Iron deficiency 3/21/2019    Iron deficiency anemia     Mild mitral regurgitation by prior echocardiogram     Mild obesity     Mild vitamin D deficiency     Osteopenia 2023    Paraesophageal hiatal hernia     PONV (postoperative nausea and vomiting)     Reflux gastritis     RLS (restless legs syndrome) 2021    Sleep apnea     mild improved with wt. loss    Thyroid goiter     Urticaria, chronic      Past Surgical History:   Procedure Laterality Date    ARTHROSCOPY OF KNEE Left 2022    Procedure: ARTHROSCOPY, KNEE;  Surgeon: Davion Massey MD;  Location: [facility];  Service: Orthopedics;  Laterality: Left;    BLADDER SUSPENSION      pubovaginal sling     SECTION, CLASSIC      CHOLECYSTECTOMY      COLONOSCOPY  2011    Dr. Goode, in legacy:diverticulosis, hemorrhoids, melanosis coli-repeat 10 years    COLONOSCOPY N/A 2018    Procedure: COLONOSCOPY;  Surgeon: Rusty Vogt Jr., MD;  Location: [facility];  Service: Endoscopy;  Laterality: N/A; repeat in 10 years for screening    ESOPHAGEAL DILATION      ESOPHAGOGASTRODUODENOSCOPY  2016    ESOPHAGOGASTRODUODENOSCOPY N/A 2018    Procedure: EGD (ESOPHAGOGASTRODUODENOSCOPY);  Surgeon: Rusty Vogt Jr., MD;  Location: [facility];  Service: Endoscopy;  Laterality: N/A;    HERNIA REPAIR      INTRALUMINAL GASTROINTESTINAL TRACT IMAGING VIA CAPSULE N/A 2018    Procedure: IMAGING PROCEDURE, GI TRACT, INTRALUMINAL, VIA CAPSULE;  Surgeon: Loraine Velazquez MD;  Location: [facility];  Service: Endoscopy;  Laterality: N/A;    LAPAROSCOPIC NISSEN FUNDOPLICATION N/A 2018    Procedure: FUNDOPLICATION, NISSEN, LAPAROSCOPIC;  Surgeon: Frank Akins MD;  Location: [facility];   Service: General;  Laterality: N/A;    THYROIDECTOMY, PARTIAL       partial for benign nodule    UPPER GASTROINTESTINAL ENDOSCOPY  03/04/2016    Dr. Whalen, in care everywhere     Review of patient's allergies indicates:   Allergen Reactions    Penicillins      Current Outpatient Medications on File Prior to Visit   Medication Sig Dispense Refill    ALPRAZolam (XANAX) 0.25 MG tablet Take 1 tablet (0.25 mg total) by mouth 3 (three) times daily as needed for Anxiety. 30 tablet 0    atorvastatin (LIPITOR) 10 MG tablet TAKE 1 TABLET (10 MG TOTAL) BY MOUTH ONCE DAILY. 90 tablet 1    buPROPion (WELLBUTRIN XL) 300 MG 24 hr tablet TAKE 1 TABLET (300 MG TOTAL) BY MOUTH ONCE DAILY. 90 tablet 1    CALCIUM CARBONATE/VITAMIN D3 (CALTRATE 600 + D ORAL) Take by mouth 2 (two) times daily.      EScitalopram oxalate (LEXAPRO) 20 MG tablet TAKE 1 TABLET (20 MG TOTAL) BY MOUTH ONCE DAILY. 90 tablet 1    estradiol (VAGIFEM) 10 mcg Tab Place 10 mcg vaginally.      famotidine (PEPCID) 20 MG tablet TAKE 1 TABLET BY MOUTH TWICE A  tablet 0    felodipine (PLENDIL) 5 MG 24 hr tablet TAKE 1 TABLET (5 MG TOTAL) BY MOUTH ONCE DAILY. 90 tablet 1    gabapentin (NEURONTIN) 300 MG capsule TAKE 1 CAPSULE (300 MG TOTAL) BY MOUTH EVERY EVENING. 90 capsule 3    levothyroxine (SYNTHROID) 50 MCG tablet TAKE 1 TABLET (50 MCG TOTAL) BY MOUTH ONCE DAILY. 90 tablet 1    olmesartan-hydrochlorothiazide (BENICAR HCT) 20-12.5 mg per tablet TAKE 1 TABLET EVERY DAY 90 tablet 0    OMEGA-3 FATTY ACIDS-EPA ORAL Take by mouth once daily.      pantoprazole (PROTONIX) 40 MG tablet TAKE 1 TABLET (40 MG TOTAL) BY MOUTH ONCE DAILY. 90 tablet 1    VIT A/VIT C/VIT E/ZINC/COPPER (PRESERVISION AREDS ORAL) Take by mouth once daily.      [DISCONTINUED] potassium chloride (MICRO-K) 10 MEQ CpSR Take 1 capsule (10 mEq total) by mouth once daily. 90 capsule 3    [DISCONTINUED] cetirizine (ZYRTEC) 10 MG tablet Take 10 mg by mouth 2 (two) times a day.      [DISCONTINUED]  diphenhydrAMINE (BENADRYL) 25 mg capsule Take 25 mg by mouth as needed for Itching.      [DISCONTINUED] ondansetron (ZOFRAN-ODT) 4 MG TbDL Take 1 tablet (4 mg total) by mouth every 8 (eight) hours as needed (nausea/vomiting). 12 tablet 0    [DISCONTINUED] polyethylene glycol (GLYCOLAX) 17 gram PwPk Take by mouth as needed.       [DISCONTINUED] predniSONE (DELTASONE) 10 MG tablet TAKE 1/2 TABLET EVERY OTHER DAY 20 tablet 1     Current Facility-Administered Medications on File Prior to Visit   Medication Dose Route Frequency Provider Last Rate Last Admin    [DISCONTINUED] omalizumab injection 300 mg  300 mg Subcutaneous Q28 Days Sydney Romero MD   300 mg at 03/17/22 0906     Social History     Socioeconomic History    Marital status:    Tobacco Use    Smoking status: Never    Smokeless tobacco: Never   Substance and Sexual Activity    Alcohol use: Yes     Comment: seldom    Drug use: No    Sexual activity: Yes     Social Determinants of Health     Financial Resource Strain: Low Risk     Difficulty of Paying Living Expenses: Not hard at all   Food Insecurity: No Food Insecurity    Worried About Running Out of Food in the Last Year: Never true    Ran Out of Food in the Last Year: Never true   Transportation Needs: No Transportation Needs    Lack of Transportation (Medical): No    Lack of Transportation (Non-Medical): No   Physical Activity: Sufficiently Active    Days of Exercise per Week: 3 days    Minutes of Exercise per Session: 50 min   Stress: Stress Concern Present    Feeling of Stress : Very much   Social Connections: Unknown    Frequency of Communication with Friends and Family: Three times a week    Frequency of Social Gatherings with Friends and Family: Once a week    Active Member of Clubs or Organizations: Patient refused    Attends Club or Organization Meetings: Patient refused    Marital Status:    Housing Stability: Low Risk     Unable to Pay for Housing in the Last Year: No    Number  "of Places Lived in the Last Year: 1    Unstable Housing in the Last Year: No     Family History   Problem Relation Age of Onset    Hypertension Mother     Heart disease Mother     Ulcers Mother     GI problems Mother         colitis    Hypertension Father     Breast cancer Paternal Grandmother     Breast cancer Cousin     Colon cancer Neg Hx     Crohn's disease Neg Hx     Colon polyps Neg Hx     Ulcerative colitis Neg Hx     Stomach cancer Neg Hx     Esophageal cancer Neg Hx     Allergic rhinitis Neg Hx     Allergies Neg Hx     Angioedema Neg Hx     Atopy Neg Hx     Eczema Neg Hx     Immunodeficiency Neg Hx     Rhinitis Neg Hx     Urticaria Neg Hx     Asthma Neg Hx            Answers submitted by the patient for this visit:  Review of Systems Questionnaire (Submitted on 5/30/2023)  activity change: No  unexpected weight change: No  neck pain: No  hearing loss: Yes  rhinorrhea: No  trouble swallowing: No  eye discharge: No  visual disturbance: No  chest tightness: No  wheezing: No  chest pain: No  palpitations: No  blood in stool: No  constipation: No  vomiting: No  diarrhea: No  polydipsia: No  polyuria: No  difficulty urinating: No  hematuria: No  menstrual problem: No  dysuria: No  joint swelling: No  arthralgias: No  headaches: No  weakness: No  confusion: No  dysphoric mood: No          Vitals:    06/02/23 0837   BP: 122/76   Pulse: 73   Temp: 97 °F (36.1 °C)   TempSrc: Temporal   Weight: 74.8 kg (164 lb 14.4 oz)   Height: 4' 11" (1.499 m)     Wt Readings from Last 3 Encounters:   06/02/23 74.8 kg (164 lb 14.4 oz)   08/19/22 75.3 kg (166 lb)   06/29/22 75.7 kg (166 lb 12.8 oz)       OBJECTIVE:   APPEARANCE: Well nourished, well developed, in no acute distress.    HEAD: Normocephalic.  Atraumatic.  No sinus tenderness.  EYES:   Right eye: Pupil reactive.  Conjunctiva clear.    Left eye: Pupil reactive.  Conjunctiva clear.  EOMI.    EARS: TM's intact. Light reflex normal. No retraction or perforation.    NOSE:  " clear.  MOUTH & THROAT:  No pharyngeal erythema or exudate. No lesions.  NECK: Supple. No bruits.  No JVD.  No cervical lymphadenopathy.  No thyromegaly.    CHEST: Breath sounds clear bilaterally.  Normal respiratory effort  CARDIOVASCULAR: Normal rate.  Regular rhythm.  No murmurs.  No rub.  No gallops.  ABDOMEN: Bowel sounds normal.  Soft.  No tenderness.  No organomegaly.  PERIPHERAL VASCULAR: No cyanosis.  No clubbing.  No edema.  NEUROLOGIC: No focal findings.  MENTAL STATUS: Alert.  Oriented x 3.

## 2023-06-05 ENCOUNTER — HOSPITAL ENCOUNTER (OUTPATIENT)
Dept: CARDIOLOGY | Facility: HOSPITAL | Age: 68
Discharge: HOME | End: 2023-06-05
Attending: FAMILY MEDICINE
Payer: MEDICARE

## 2023-06-05 VITALS
DIASTOLIC BLOOD PRESSURE: 76 MMHG | BODY MASS INDEX: 33.06 KG/M2 | SYSTOLIC BLOOD PRESSURE: 122 MMHG | HEART RATE: 69 BPM | HEIGHT: 59 IN | WEIGHT: 164 LBS

## 2023-06-05 DIAGNOSIS — Z00.00 ROUTINE HISTORY AND PHYSICAL EXAMINATION OF ADULT: ICD-10-CM

## 2023-06-05 DIAGNOSIS — I34.0 MILD MITRAL REGURGITATION BY PRIOR ECHOCARDIOGRAM: ICD-10-CM

## 2023-06-05 LAB
AORTIC ROOT ANNULUS: 2.74 CM
ASCENDING AORTA: 2.22 CM
AV INDEX (PROSTH): 0.82
AV MEAN GRADIENT: 6 MMHG
AV PEAK GRADIENT: 11 MMHG
AV VALVE AREA: 2.36 CM2
AV VELOCITY RATIO: 0.73
BSA FOR ECHO PROCEDURE: 1.76 M2
CV ECHO LV RWT: 0.44 CM
DOP CALC AO PEAK VEL: 1.68 M/S
DOP CALC AO VTI: 35.9 CM
DOP CALC LVOT AREA: 2.9 CM2
DOP CALC LVOT DIAMETER: 1.92 CM
DOP CALC LVOT PEAK VEL: 1.23 M/S
DOP CALC LVOT STROKE VOLUME: 84.79 CM3
DOP CALC RVOT PEAK VEL: 0.79 M/S
DOP CALC RVOT VTI: 19.2 CM
DOP CALCLVOT PEAK VEL VTI: 29.3 CM
E WAVE DECELERATION TIME: 211.76 MSEC
E/A RATIO: 0.72
E/E' RATIO: 10.35 M/S
ECHO LV POSTERIOR WALL: 0.87 CM (ref 0.6–1.1)
EJECTION FRACTION: 60 %
FRACTIONAL SHORTENING: 30 % (ref 28–44)
INTERVENTRICULAR SEPTUM: 0.87 CM (ref 0.6–1.1)
IVC DIAMETER: 1.18 CM
IVRT: 71.36 MSEC
LA MAJOR: 4.71 CM
LA MINOR: 4.35 CM
LA WIDTH: 3.3 CM
LEFT ATRIUM SIZE: 2.9 CM
LEFT ATRIUM VOLUME INDEX MOD: 18.2 ML/M2
LEFT ATRIUM VOLUME INDEX: 21.8 ML/M2
LEFT ATRIUM VOLUME MOD: 30.82 CM3
LEFT ATRIUM VOLUME: 36.79 CM3
LEFT INTERNAL DIMENSION IN SYSTOLE: 2.77 CM (ref 2.1–4)
LEFT VENTRICLE DIASTOLIC VOLUME INDEX: 40.6 ML/M2
LEFT VENTRICLE DIASTOLIC VOLUME: 68.62 ML
LEFT VENTRICLE MASS INDEX: 61 G/M2
LEFT VENTRICLE SYSTOLIC VOLUME INDEX: 17 ML/M2
LEFT VENTRICLE SYSTOLIC VOLUME: 28.72 ML
LEFT VENTRICULAR INTERNAL DIMENSION IN DIASTOLE: 3.97 CM (ref 3.5–6)
LEFT VENTRICULAR MASS: 103.43 G
LV LATERAL E/E' RATIO: 8.8 M/S
LV SEPTAL E/E' RATIO: 12.57 M/S
LVOT MG: 3.26 MMHG
LVOT MV: 0.85 CM/S
MV PEAK A VEL: 1.23 M/S
MV PEAK E VEL: 0.88 M/S
MV STENOSIS PRESSURE HALF TIME: 61.41 MS
MV VALVE AREA P 1/2 METHOD: 3.58 CM2
PISA TR MAX VEL: 2.67 M/S
PULM VEIN S/D RATIO: 1.49
PV MEAN GRADIENT: 1.55 MMHG
PV PEAK D VEL: 0.51 M/S
PV PEAK S VEL: 0.76 M/S
PV PEAK VELOCITY: 1.11 CM/S
RA MAJOR: 3.78 CM
RA PRESSURE: 3 MMHG
RIGHT VENTRICULAR END-DIASTOLIC DIMENSION: 2.31 CM
STJ: 2.45 CM
TDI LATERAL: 0.1 M/S
TDI SEPTAL: 0.07 M/S
TDI: 0.09 M/S
TR MAX PG: 29 MMHG
TRICUSPID ANNULAR PLANE SYSTOLIC EXCURSION: 1.9 CM
TV REST PULMONARY ARTERY PRESSURE: 32 MMHG

## 2023-06-05 PROCEDURE — 93306 TTE W/DOPPLER COMPLETE: CPT | Mod: 26,,, | Performed by: INTERNAL MEDICINE

## 2023-06-05 PROCEDURE — 93306 ECHO (CUPID ONLY): ICD-10-PCS | Mod: 26,,, | Performed by: INTERNAL MEDICINE

## 2023-06-05 PROCEDURE — 93306 TTE W/DOPPLER COMPLETE: CPT | Mod: PO

## 2023-08-17 ENCOUNTER — PATIENT MESSAGE (OUTPATIENT)
Dept: FAMILY MEDICINE | Facility: CLINIC | Age: 68
End: 2023-08-17
Payer: MEDICARE

## 2023-08-21 ENCOUNTER — LAB VISIT (OUTPATIENT)
Dept: LAB | Facility: HOSPITAL | Age: 68
End: 2023-08-21
Attending: FAMILY MEDICINE
Payer: MEDICARE

## 2023-08-21 ENCOUNTER — OFFICE VISIT (OUTPATIENT)
Dept: FAMILY MEDICINE | Facility: CLINIC | Age: 68
End: 2023-08-21
Payer: MEDICARE

## 2023-08-21 VITALS
SYSTOLIC BLOOD PRESSURE: 116 MMHG | HEIGHT: 59 IN | HEART RATE: 66 BPM | BODY MASS INDEX: 32.08 KG/M2 | DIASTOLIC BLOOD PRESSURE: 76 MMHG | TEMPERATURE: 98 F | WEIGHT: 159.13 LBS

## 2023-08-21 DIAGNOSIS — E78.5 HYPERLIPIDEMIA, UNSPECIFIED HYPERLIPIDEMIA TYPE: ICD-10-CM

## 2023-08-21 DIAGNOSIS — E03.9 HYPOTHYROIDISM, UNSPECIFIED TYPE: ICD-10-CM

## 2023-08-21 DIAGNOSIS — Z86.39 PERSONAL HISTORY OF OTHER ENDOCRINE, NUTRITIONAL AND METABOLIC DISEASE: ICD-10-CM

## 2023-08-21 DIAGNOSIS — R20.2 PARESTHESIA OF BOTH HANDS: ICD-10-CM

## 2023-08-21 DIAGNOSIS — R20.2 PARESTHESIA OF BOTH FEET: ICD-10-CM

## 2023-08-21 DIAGNOSIS — R68.89 ABNORMAL ANKLE BRACHIAL INDEX (ABI): Primary | ICD-10-CM

## 2023-08-21 DIAGNOSIS — M54.31 SCIATICA OF RIGHT SIDE: ICD-10-CM

## 2023-08-21 LAB
ESTIMATED AVG GLUCOSE: 111 MG/DL (ref 68–131)
FOLATE SERPL-MCNC: 16.7 NG/ML (ref 4–24)
HBA1C MFR BLD: 5.5 % (ref 4–5.6)
VIT B12 SERPL-MCNC: 573 PG/ML (ref 210–950)

## 2023-08-21 PROCEDURE — 82607 VITAMIN B-12: CPT | Performed by: FAMILY MEDICINE

## 2023-08-21 PROCEDURE — 1101F PR PT FALLS ASSESS DOC 0-1 FALLS W/OUT INJ PAST YR: ICD-10-PCS | Mod: CPTII,S$GLB,, | Performed by: FAMILY MEDICINE

## 2023-08-21 PROCEDURE — 3074F PR MOST RECENT SYSTOLIC BLOOD PRESSURE < 130 MM HG: ICD-10-PCS | Mod: CPTII,S$GLB,, | Performed by: FAMILY MEDICINE

## 2023-08-21 PROCEDURE — 1101F PT FALLS ASSESS-DOCD LE1/YR: CPT | Mod: CPTII,S$GLB,, | Performed by: FAMILY MEDICINE

## 2023-08-21 PROCEDURE — 3288F FALL RISK ASSESSMENT DOCD: CPT | Mod: CPTII,S$GLB,, | Performed by: FAMILY MEDICINE

## 2023-08-21 PROCEDURE — 3074F SYST BP LT 130 MM HG: CPT | Mod: CPTII,S$GLB,, | Performed by: FAMILY MEDICINE

## 2023-08-21 PROCEDURE — 36415 COLL VENOUS BLD VENIPUNCTURE: CPT | Mod: PO | Performed by: FAMILY MEDICINE

## 2023-08-21 PROCEDURE — 1159F PR MEDICATION LIST DOCUMENTED IN MEDICAL RECORD: ICD-10-PCS | Mod: CPTII,S$GLB,, | Performed by: FAMILY MEDICINE

## 2023-08-21 PROCEDURE — 3288F PR FALLS RISK ASSESSMENT DOCUMENTED: ICD-10-PCS | Mod: CPTII,S$GLB,, | Performed by: FAMILY MEDICINE

## 2023-08-21 PROCEDURE — 99999 PR PBB SHADOW E&M-EST. PATIENT-LVL IV: CPT | Mod: PBBFAC,,, | Performed by: FAMILY MEDICINE

## 2023-08-21 PROCEDURE — 99214 PR OFFICE/OUTPT VISIT, EST, LEVL IV, 30-39 MIN: ICD-10-PCS | Mod: S$GLB,,, | Performed by: FAMILY MEDICINE

## 2023-08-21 PROCEDURE — 99214 OFFICE O/P EST MOD 30 MIN: CPT | Mod: S$GLB,,, | Performed by: FAMILY MEDICINE

## 2023-08-21 PROCEDURE — 1126F AMNT PAIN NOTED NONE PRSNT: CPT | Mod: CPTII,S$GLB,, | Performed by: FAMILY MEDICINE

## 2023-08-21 PROCEDURE — 99999 PR PBB SHADOW E&M-EST. PATIENT-LVL IV: ICD-10-PCS | Mod: PBBFAC,,, | Performed by: FAMILY MEDICINE

## 2023-08-21 PROCEDURE — 3008F BODY MASS INDEX DOCD: CPT | Mod: CPTII,S$GLB,, | Performed by: FAMILY MEDICINE

## 2023-08-21 PROCEDURE — 1126F PR PAIN SEVERITY QUANTIFIED, NO PAIN PRESENT: ICD-10-PCS | Mod: CPTII,S$GLB,, | Performed by: FAMILY MEDICINE

## 2023-08-21 PROCEDURE — 3078F PR MOST RECENT DIASTOLIC BLOOD PRESSURE < 80 MM HG: ICD-10-PCS | Mod: CPTII,S$GLB,, | Performed by: FAMILY MEDICINE

## 2023-08-21 PROCEDURE — 3078F DIAST BP <80 MM HG: CPT | Mod: CPTII,S$GLB,, | Performed by: FAMILY MEDICINE

## 2023-08-21 PROCEDURE — 3008F PR BODY MASS INDEX (BMI) DOCUMENTED: ICD-10-PCS | Mod: CPTII,S$GLB,, | Performed by: FAMILY MEDICINE

## 2023-08-21 PROCEDURE — 83036 HEMOGLOBIN GLYCOSYLATED A1C: CPT | Performed by: FAMILY MEDICINE

## 2023-08-21 PROCEDURE — 1159F MED LIST DOCD IN RCRD: CPT | Mod: CPTII,S$GLB,, | Performed by: FAMILY MEDICINE

## 2023-08-21 PROCEDURE — 82746 ASSAY OF FOLIC ACID SERUM: CPT | Performed by: FAMILY MEDICINE

## 2023-08-21 NOTE — PROGRESS NOTES
patient states that she had ADAM performed at home with visiting nurse that was abnormal.  0.84 on the left, 0.87 on the right.  She does not have claudication.  She did have sciatica recently on the right side extending from the thigh wrapping around to the shin and went to an urgent care with some type of injection being given with significant improvement of symptoms.  She does exercise 3 times week 45 minutes in our without issues.  She was concerned about her sugar.  Her  has diabetes she occasionally checks her sugar and states couple times it was 130 fasting.  Recent fasting glucose in the office was normal.  She does note some paresthesias mainly in the hands at night would wake up.  Resolves with changing position after few minutes.  No obvious weakness.  She might notice similar with long driving.  Occasionally gets some mild numbness sensation at the feet during the day.  She does have hypothyroidism compliant with medication recent TSH normal.  Hyperlipidemia on statin controlled.    Cheyenne was seen today for follow-up.    Diagnoses and all orders for this visit:    Abnormal ankle brachial index (ADAM)    Sciatica of right side    Paresthesia of both hands  -     Hemoglobin A1C; Future  -     Vitamin B12; Future  -     Folate; Future  -     EMG W/ ULTRASOUND AND NERVE CONDUCTION TEST 2 Extremities; Future    Paresthesia of both feet  -     Hemoglobin A1C; Future  -     Vitamin B12; Future  -     Folate; Future  -     EMG W/ ULTRASOUND AND NERVE CONDUCTION TEST 2 Extremities; Future    Personal history of other endocrine, nutritional and metabolic disease  -     Hemoglobin A1C; Future    Hypothyroidism, unspecified type    Hyperlipidemia, unspecified hyperlipidemia type      Recommend continue regular exercise program.  No further evaluation recommended for mildly abnormal ADAM as she is asymptomatic.  Further evaluation as above regarding hand paresthesia.  Follow-up as needed with sciatic if  recurrence.            Past Medical History:  Past Medical History:   Diagnosis Date    Carpal tunnel syndrome of right wrist     Chronic gastritis     Chronic urticaria     COVID-19 virus infection 2022    Dissociative fugue     Diverticulosis     Duodenitis     Dyspepsia     Generalized anxiety disorder     Hearing loss on left     Hearing loss on right     Helicobacter pylori (H. pylori)     History of blood transfusion     Hyperlipidemia     Hypertension     Hypothyroidism     IBS (irritable bowel syndrome)     Iron deficiency 3/21/2019    Iron deficiency anemia     Mild mitral regurgitation by prior echocardiogram     Mild obesity     Mild vitamin D deficiency     Osteopenia 2023    Paraesophageal hiatal hernia     PONV (postoperative nausea and vomiting)     Reflux gastritis     RLS (restless legs syndrome) 2021    Sleep apnea     mild improved with wt. loss    Thyroid goiter     Urticaria, chronic      Past Surgical History:   Procedure Laterality Date    ARTHROSCOPY OF KNEE Left 2022    Procedure: ARTHROSCOPY, KNEE;  Surgeon: Davion Massey MD;  Location: [facility] OR;  Service: Orthopedics;  Laterality: Left;    BLADDER SUSPENSION      pubovaginal sling     SECTION, CLASSIC      CHOLECYSTECTOMY      COLONOSCOPY  2011    Dr. Goode, in legacy:diverticulosis, hemorrhoids, melanosis coli-repeat 10 years    COLONOSCOPY N/A 2018    Procedure: COLONOSCOPY;  Surgeon: Rusty Vogt Jr., MD;  Location: [facility] ENDO;  Service: Endoscopy;  Laterality: N/A; repeat in 10 years for screening    ESOPHAGEAL DILATION      ESOPHAGOGASTRODUODENOSCOPY  2016    ESOPHAGOGASTRODUODENOSCOPY N/A 2018    Procedure: EGD (ESOPHAGOGASTRODUODENOSCOPY);  Surgeon: Rusty Vogt Jr., MD;  Location: [facility] ENDO;  Service: Endoscopy;  Laterality: N/A;    HERNIA REPAIR      INTRALUMINAL GASTROINTESTINAL TRACT IMAGING VIA CAPSULE N/A 2018    Procedure: IMAGING PROCEDURE, GI TRACT,  INTRALUMINAL, VIA CAPSULE;  Surgeon: Loraine Velazquez MD;  Location: [facility] ENDO;  Service: Endoscopy;  Laterality: N/A;    LAPAROSCOPIC NISSEN FUNDOPLICATION N/A 11/29/2018    Procedure: FUNDOPLICATION, NISSEN, LAPAROSCOPIC;  Surgeon: Frank Akins MD;  Location: [facility] OR;  Service: General;  Laterality: N/A;    THYROIDECTOMY, PARTIAL       partial for benign nodule    UPPER GASTROINTESTINAL ENDOSCOPY  03/04/2016    Dr. Whalen, in care everywhere     Review of patient's allergies indicates:   Allergen Reactions    Penicillins      Current Outpatient Medications on File Prior to Visit   Medication Sig Dispense Refill    ALPRAZolam (XANAX) 0.25 MG tablet Take 1 tablet (0.25 mg total) by mouth 3 (three) times daily as needed for Anxiety. 30 tablet 0    atorvastatin (LIPITOR) 10 MG tablet TAKE 1 TABLET ONE TIME DAILY 90 tablet 3    buPROPion (WELLBUTRIN XL) 300 MG 24 hr tablet TAKE 1 TABLET ONE TIME DAILY 90 tablet 3    CALCIUM CARBONATE/VITAMIN D3 (CALTRATE 600 + D ORAL) Take by mouth 2 (two) times daily.      EScitalopram oxalate (LEXAPRO) 20 MG tablet TAKE 1 TABLET ONE TIME DAILY 90 tablet 3    estradiol (VAGIFEM) 10 mcg Tab Place 10 mcg vaginally.      famotidine (PEPCID) 20 MG tablet TAKE 1 TABLET BY MOUTH TWICE A  tablet 0    felodipine (PLENDIL) 5 MG 24 hr tablet TAKE 1 TABLET ONE TIME DAILY 90 tablet 3    gabapentin (NEURONTIN) 300 MG capsule TAKE 1 CAPSULE (300 MG TOTAL) BY MOUTH EVERY EVENING. 90 capsule 3    levothyroxine (SYNTHROID) 50 MCG tablet TAKE 1 TABLET ONE TIME DAILY 90 tablet 3    olmesartan-hydrochlorothiazide (BENICAR HCT) 20-12.5 mg per tablet TAKE 1 TABLET EVERY DAY 90 tablet 0    OMEGA-3 FATTY ACIDS-EPA ORAL Take by mouth once daily.      pantoprazole (PROTONIX) 40 MG tablet TAKE 1 TABLET ONE TIME DAILY 90 tablet 3    potassium chloride (MICRO-K) 10 MEQ CpSR Take 1 capsule (10 mEq total) by mouth once daily. 90 capsule 3    VIT A/VIT C/VIT E/ZINC/COPPER (PRESERVISION AREDS ORAL) Take  by mouth once daily.       No current facility-administered medications on file prior to visit.     Social History     Socioeconomic History    Marital status:    Tobacco Use    Smoking status: Never    Smokeless tobacco: Never   Substance and Sexual Activity    Alcohol use: Yes     Comment: seldom    Drug use: No    Sexual activity: Yes     Social Determinants of Health     Financial Resource Strain: Low Risk  (8/21/2023)    Overall Financial Resource Strain (CARDIA)     Difficulty of Paying Living Expenses: Not hard at all   Food Insecurity: No Food Insecurity (8/21/2023)    Hunger Vital Sign     Worried About Running Out of Food in the Last Year: Never true     Ran Out of Food in the Last Year: Never true   Transportation Needs: No Transportation Needs (8/21/2023)    PRAPARE - Transportation     Lack of Transportation (Medical): No     Lack of Transportation (Non-Medical): No   Physical Activity: Insufficiently Active (8/21/2023)    Exercise Vital Sign     Days of Exercise per Week: 3 days     Minutes of Exercise per Session: 30 min   Stress: Stress Concern Present (8/21/2023)    [place] [employer] - Occupational Stress Questionnaire     Feeling of Stress : To some extent   Social Connections: Unknown (8/21/2023)    Social Connection and Isolation Panel [NHANES]     Frequency of Communication with Friends and Family: Three times a week     Frequency of Social Gatherings with Friends and Family: Once a week     Active Member of Clubs or Organizations: Patient refused     Attends Club or Organization Meetings: Patient refused     Marital Status:    Housing Stability: Low Risk  (8/21/2023)    Housing Stability Vital Sign     Unable to Pay for Housing in the Last Year: No     Number of Places Lived in the Last Year: 1     Unstable Housing in the Last Year: No     Family History   Problem Relation Age of Onset    Hypertension Mother     Heart disease Mother     Ulcers Mother     GI  "problems Mother         colitis    Hypertension Father     Breast cancer Paternal Grandmother     Breast cancer Cousin     Colon cancer Neg Hx     Crohn's disease Neg Hx     Colon polyps Neg Hx     Ulcerative colitis Neg Hx     Stomach cancer Neg Hx     Esophageal cancer Neg Hx     Allergic rhinitis Neg Hx     Allergies Neg Hx     Angioedema Neg Hx     Atopy Neg Hx     Eczema Neg Hx     Immunodeficiency Neg Hx     Rhinitis Neg Hx     Urticaria Neg Hx     Asthma Neg Hx        Answers submitted by the patient for this visit:  Review of Systems Questionnaire (Submitted on 8/21/2023)  activity change: No  unexpected weight change: No  neck pain: No  hearing loss: Yes  rhinorrhea: No  trouble swallowing: No  eye discharge: No  visual disturbance: No  chest tightness: No  wheezing: No  chest pain: No  palpitations: No  blood in stool: No  constipation: No  vomiting: No  diarrhea: Yes  polydipsia: No  polyuria: No  difficulty urinating: No  hematuria: No  menstrual problem: No  dysuria: No  joint swelling: No  arthralgias: No  headaches: No  weakness: No  confusion: No  dysphoric mood: No      Vitals:    08/21/23 0852   BP: 116/76   Pulse: 66   Temp: 97.7 °F (36.5 °C)   TempSrc: Temporal   Weight: 72.2 kg (159 lb 1.6 oz)   Height: 4' 11" (1.499 m)     Wt Readings from Last 3 Encounters:   08/21/23 72.2 kg (159 lb 1.6 oz)   06/05/23 74.4 kg (164 lb)   06/02/23 74.8 kg (164 lb 14.4 oz)       OBJECTIVE:   APPEARANCE: Well nourished, well developed, in no acute distress.    HEAD: Normocephalic.  Atraumatic.  No sinus tenderness.  EYES:   Right eye: Pupil reactive.  Conjunctiva clear.    Left eye: Pupil reactive.  Conjunctiva clear.  EOMI.    NECK: Supple.  PERIPHERAL VASCULAR: No cyanosis.  No clubbing.  No edema.  Pedal pulses difficult to palpate.  NEUROLOGIC: No focal weakness.  Both hands and wrists with full range motion.  There is is no atrophy.  Normal motor strength.  Sensation intact.  Negative Tinel's.  MENTAL " STATUS: Alert.  Oriented x 3.

## 2023-09-26 RX ORDER — GABAPENTIN 300 MG/1
300 CAPSULE ORAL NIGHTLY
Qty: 90 CAPSULE | Refills: 3 | Status: SHIPPED | OUTPATIENT
Start: 2023-09-26

## 2023-09-26 NOTE — TELEPHONE ENCOUNTER
No care due was identified.  Health [facility] Embedded Care Due Messages. Reference number: [id number].   9/26/2023 9:53:22 AM CDT

## 2023-10-22 NOTE — TELEPHONE ENCOUNTER
No care due was identified.  Health [facility] Embedded Care Due Messages. Reference number: [id number].   10/22/2023 1:36:52 AM CDT

## 2023-10-23 RX ORDER — OLMESARTAN MEDOXOMIL AND HYDROCHLOROTHIAZIDE 20/12.5 20; 12.5 MG/1; MG/1
TABLET ORAL
Qty: 90 TABLET | Refills: 2 | Status: SHIPPED | OUTPATIENT
Start: 2023-10-23

## 2023-10-23 NOTE — TELEPHONE ENCOUNTER
Refill Decision Note   Cheyenne West  is requesting a refill authorization.  Brief Assessment and Rationale for Refill:  Approve     Medication Therapy Plan:         Comments:     Note composed:11:23 AM 10/23/2023

## 2023-10-30 ENCOUNTER — PATIENT MESSAGE (OUTPATIENT)
Dept: FAMILY MEDICINE | Facility: CLINIC | Age: 68
End: 2023-10-30
Payer: MEDICARE

## 2023-10-30 ENCOUNTER — TELEPHONE (OUTPATIENT)
Dept: FAMILY MEDICINE | Facility: CLINIC | Age: 68
End: 2023-10-30
Payer: MEDICARE

## 2023-10-30 ENCOUNTER — OFFICE VISIT (OUTPATIENT)
Dept: PHYSICAL MEDICINE AND REHAB | Facility: CLINIC | Age: 68
End: 2023-10-30
Payer: MEDICARE

## 2023-10-30 VITALS — WEIGHT: 159.19 LBS | HEIGHT: 59 IN | BODY MASS INDEX: 32.09 KG/M2

## 2023-10-30 DIAGNOSIS — G56.03 BILATERAL CARPAL TUNNEL SYNDROME: ICD-10-CM

## 2023-10-30 DIAGNOSIS — R20.2 PARESTHESIA OF BOTH FEET: ICD-10-CM

## 2023-10-30 PROCEDURE — 99999 PR PBB SHADOW E&M-EST. PATIENT-LVL II: CPT | Mod: PBBFAC,,, | Performed by: PHYSICAL MEDICINE & REHABILITATION

## 2023-10-30 PROCEDURE — 99999 PR PBB SHADOW E&M-EST. PATIENT-LVL II: ICD-10-PCS | Mod: PBBFAC,,, | Performed by: PHYSICAL MEDICINE & REHABILITATION

## 2023-10-30 PROCEDURE — 95912 NRV CNDJ TEST 11-12 STUDIES: CPT | Mod: S$GLB,,, | Performed by: PHYSICAL MEDICINE & REHABILITATION

## 2023-10-30 PROCEDURE — 95912 PR NERVE CONDUCTION STUDY; 11 -12 STUDIES: ICD-10-PCS | Mod: S$GLB,,, | Performed by: PHYSICAL MEDICINE & REHABILITATION

## 2023-10-30 PROCEDURE — 99499 UNLISTED E&M SERVICE: CPT | Mod: S$GLB,,, | Performed by: PHYSICAL MEDICINE & REHABILITATION

## 2023-10-30 PROCEDURE — 99499 NO LOS: ICD-10-PCS | Mod: S$GLB,,, | Performed by: PHYSICAL MEDICINE & REHABILITATION

## 2023-10-30 PROCEDURE — 95886 PR EMG COMPLETE, W/ NERVE CONDUCTION STUDIES, 5+ MUSCLES: ICD-10-PCS | Mod: S$GLB,,, | Performed by: PHYSICAL MEDICINE & REHABILITATION

## 2023-10-30 PROCEDURE — 95886 MUSC TEST DONE W/N TEST COMP: CPT | Mod: S$GLB,,, | Performed by: PHYSICAL MEDICINE & REHABILITATION

## 2023-10-30 NOTE — TELEPHONE ENCOUNTER
Nerve test shows carpal tunnel syndrome on both sides.  Recommend use wrist splints at night for carpal tunnel.  She can get these over-the-counter.  If no improvement with this in a month or 2 I would recommend that she see orthopedics so that they could consider injections or other treatment.

## 2023-10-30 NOTE — PROGRESS NOTES
[facility]  [address]  [place] [address]             Full Name: Cheyenne West YOB: 1955  MRN: [id number]  History: Bilateral hand and foot pain and numbness. Chronic neck pain, no back pain. No hx of neck or back surgery.       Visit Date: 10/30/2023 9:13 AM  Age: 68 Years  Examining Physician: Brina Wakefield DO  Referring Physician: Juan Buck      Sensory NCS      Nerve / Sites Rec. Site Onset Lat Peak Lat NP Amp PP Amp Segments Distance Velocity     ms ms µV µV  cm m/s   R Median - Digit III (Antidromic)      Wrist Dig III 3.25 3.90 16.0 36.8 Wrist - Dig III 14 43   L Median - Digit III (Antidromic)      Wrist Dig III 3.08 3.67 14.0 35.3 Wrist - Dig III 14 45   R Ulnar - Digit V (Antidromic)      Wrist Dig V 2.67 3.29 18.8 45.4 Wrist - Dig V 14 53   L Ulnar - Digit V (Antidromic)      Wrist Dig V 2.69 3.29 15.7 31.5 Wrist - Dig V 14 52   L Radial - Anatomical snuff box (Forearm)      Forearm Wrist 1.75 2.21 15.3 26.7 Forearm - Wrist 10 57   R Radial - Anatomical snuff box (Forearm)      Forearm Wrist 1.83 2.38 16.5 26.9 Forearm - Wrist 10 55   R Sural - Ankle (Calf)      Calf Ankle 3.04 3.98 15.3 7.8 Calf - Ankle 14 46       Motor NCS      Nerve / Sites Muscle Latency Amplitude Amp % Duration Segments Distance Lat Diff Velocity     ms mV % ms  cm ms m/s   L Median - APB      Wrist APB 3.96 8.4 100 6.02 Wrist - APB 8        Elbow APB 6.85 7.9 94.9 5.98 Elbow - Wrist 18 2.90 62   R Median - APB      Wrist APB 3.83 9.1 100 6.48 Wrist - APB 8        Elbow APB 7.06 8.9 97.4 6.54 Elbow - Wrist 18 3.23 56   L Ulnar - ADM      Wrist ADM 2.58 8.3 100 5.88 Wrist - ADM 8        B.Elbow ADM 5.79 9.3 112 5.75 B.Elbow - Wrist 21 3.21 65      A.Elbow ADM 7.54 9.2 110 5.88 A.Elbow - B.Elbow 10 1.75 57   R Ulnar - ADM      Wrist ADM 2.60 8.7 100 5.52 Wrist - ADM 8        B.Elbow ADM 5.85 9.0 104 5.54 B.Elbow - Wrist 19 3.25 58      A.Elbow ADM 7.69 6.6 76.3 5.79 A.Elbow - B.Elbow 10 1.83  55   R Peroneal - EDB      Ankle EDB 5.58 2.6 100 5.90 Ankle - EDB 8        Fib head EDB 11.85 1.5 57.3 5.29 Fib head - Ankle 27 6.27 43      Pop fossa EDB 13.31 1.9 75.4 5.37 Pop fossa - Fib head 10 1.46 69       EMG Summary Table     Spontaneous MUAP Recruitment   Muscle IA Fib PSW Fasc CRD Amp Dur. Poly Pattern   L. Deltoid N None None None None N N None N   L. Biceps brachii N None None None None N N None N   L. Triceps brachii N None None None None N N None N   L. Pronator teres N None None None None N N None N   L. Abductor pollicis brevis N None None None None N N None N   R. Deltoid N None None None None N N None N   R. Biceps brachii N None None None None N N None N   R. Triceps brachii N None None None None N N None N   R. Pronator teres N None None None None N N None N   R. Abductor pollicis brevis N None None None None N N None N   R. Quadriceps N None None None None N N None N   L. Quadriceps N None None None None N N None N   L. Tibialis anterior N None None None None N N None N   L. Peroneus longus N None None None None N N None N   L. Gastrocnemius (Medial head) N None None None None N N None N   L. Gluteus medius N None None None None N N None N   R. Tibialis anterior N None None None None N N None N   R. Peroneus longus N None None None None N N None N   R. Gastrocnemius (Medial head) N None None None None N N None N   R. Gluteus medius N None None None None N N None N       Summary    The motor conduction test was normal in all 5 of the tested nerves: L Median - APB, R Median - APB, L Ulnar - ADM, R Ulnar - ADM, R Peroneal - EDB.    The sensory conduction test was performed on 7 nerve(s). The results were normal in 5 nerve(s): R Ulnar - Digit V (Antidromic), L Ulnar - Digit V (Antidromic), L Radial - Anatomical snuff box (Forearm), R Radial - Anatomical snuff box (Forearm), R Sural - Ankle (Calf). Results outside the specified normal range were found in 2 nerve(s), as follows:  In the R Median -  Digit III (Antidromic) study  the peak latency result was increased for Wrist stimulation  In the L Median - Digit III (Antidromic) study  the peak latency result was increased for Wrist stimulation  the peak amplitude result was reduced for Wrist stimulation    The needle EMG study was normal in all 20 tested muscles: L. Deltoid, L. Biceps brachii, L. Triceps brachii, L. Pronator teres, L. Abductor pollicis brevis, R. Deltoid, R. Biceps brachii, R. Triceps brachii, R. Pronator teres, R. Abductor pollicis brevis, R. Quadriceps, L. Quadriceps, L. Tibialis anterior, L. Peroneus longus, L. Gastrocnemius (Medial head), L. Gluteus medius, R. Tibialis anterior, R. Peroneus longus, R. Gastrocnemius (Medial head), R. Gluteus medius.       Impression:  Abnormal study.   Mild bilateral carpal tunnel syndrome, without active denervation.   No electrophysiologic evidence of bilateral cervical or lumbosacral radiculopathy/plexopathy, bilateral ulnar mononeuropathy, or peripheral neuropathy affecting the bilateral upper or right lower extremities.     ------------------------------  Brina Wakefield, DO

## 2023-11-29 ENCOUNTER — CLINICAL SUPPORT (OUTPATIENT)
Dept: FAMILY MEDICINE | Facility: CLINIC | Age: 68
End: 2023-11-29
Payer: MEDICARE

## 2023-11-29 ENCOUNTER — TELEPHONE (OUTPATIENT)
Dept: FAMILY MEDICINE | Facility: CLINIC | Age: 68
End: 2023-11-29

## 2023-11-29 DIAGNOSIS — R09.89 SINUS COMPLAINT: Primary | ICD-10-CM

## 2023-11-29 LAB
CTP QC/QA: YES
FLUAV AG NPH QL: NEGATIVE
FLUBV AG NPH QL: NEGATIVE

## 2023-11-29 PROCEDURE — 87804 INFLUENZA ASSAY W/OPTIC: CPT | Mod: 59,QW,S$GLB, | Performed by: FAMILY MEDICINE

## 2023-11-29 PROCEDURE — 87804 POCT INFLUENZA A/B: ICD-10-PCS | Mod: 59,QW,S$GLB, | Performed by: FAMILY MEDICINE

## 2023-11-29 NOTE — PROGRESS NOTES
In office with spouse, who is seeing Dr Buck for illness, c/o sinus issues, Dr Buck aware, flu poct ordered, done, see results

## 2023-11-30 NOTE — TELEPHONE ENCOUNTER
"[facility] - NEUROLOGY [address]TH [address] [facility], [place]    Date: 11/30/23  Patient Name: Debbie Bro   MRN: [id number]   PCP: Hazel Rosales  Referring Provider: Hazel Rosales MD    Chief Complaint: Numbness and Tingling   Subjective:       HPI:   Ms. Debbie Bro is a 78 y.o. female with HTN, HLD, and CKD presenting for evaluation of numbness and tingling.     She notes that her symptoms have since resolved. They began last Wednesday and when she was getting up out of her recliner she reports that her L hand suddenly "went to sleep" and was "numb". She reports that she felt as if she was slightly weaker but she was not found to be weak when she was in clinic. She did have some slipping of objects out of her hand at the time of her symptoms but this has not persisted. She reports that in the past she had some intermittent numbness/tingling in the tips of her fingers during long drives. This lasts about 1 hour but then resolves. She does report that when she sits in her recliner she does tend to lean onto her L elbow.     She notes the symptoms lasted all day but have not reoccurred. She reports that since this happened she began to experience dizziness. She describes the dizziness as more of a lightheaded/off balance sensation. She reports exacerbation of these symptoms when she is changing position from seated to standing. She notest hat this lasts for a few seconds and then resolves. She denies any N/V, vision changes, or weakness associated with this sensation.       Denies any falls or LOC.     PAST MEDICAL HISTORY:  Past Medical History:   Diagnosis Date    CKD (chronic kidney disease) stage 3, GFR 30-59 ml/min     GERD (gastroesophageal reflux disease)     HTN (hypertension)     Hyperlipidemia     OP (osteoporosis)     Postmenopausal symptoms     Vitamin D deficiency disease        PAST SURGICAL HISTORY:  Past Surgical History:   Procedure Laterality Date    " COVID-19 test positive.  Please check to see how patient is doing.  Order placed for home monitoring program.  Patients Covid risk score is  3  on risk scale ranging from 0-16.  Higher numbers indicate more risk of developing severe disease.  If Covid risk score is 6 or greater then is eligible for monoclonal antibody infusion, therefore not eligible.  Recommend Tylenol, over-the-counter medications.  Follow-up if not improving.  Recommend home isolation per current CDC guidelines.  Let us know if symptoms worsening instead of improving.     HYSTERECTOMY      PARTIAL HYSTERECTOMY         CURRENT MEDS:  Current Outpatient Medications   Medication Sig Dispense Refill    amitriptyline-chlordiazepoxide (LIMBITROL) 12.5-5 mg per tablet Take 1 tablet by mouth once daily. 90 tablet 1    amitriptyline-chlordiazepoxide (LIMBITROL) 12.5-5 mg per tablet Take 1 tablet by mouth once daily. 90 tablet 1    atorvastatin (LIPITOR) 40 MG tablet Take 1 tablet (40 mg total) by mouth once daily. 90 tablet 3    atorvastatin (LIPITOR) 40 MG tablet Take 1 tablet (40 mg total) by mouth once daily. 90 tablet 3    calcium carbonate (CALCIUM 300 ORAL) Take by mouth.      estrogens, conjugated, (PREMARIN) 0.625 MG tablet Take 1 tablet (0.625 mg total) by mouth once daily. 90 tablet 3    estrogens, conjugated, (PREMARIN) 0.625 MG tablet Take 1 tablet (0.625 mg total) by mouth once daily. 90 tablet 3    fluconazole (DIFLUCAN) 150 MG Tab Take 1 tablet (150 mg total) by mouth once daily. 1 tablet 2    FLUZONE HIGH-DOSE 2019-20, PF, 180 mcg/0.5 mL Syrg       melatonin 5 mg Tab Take 1 tablet by mouth once daily.      methen-m.blue-s.phos-phsal-hyo (URO-MP) 118-10-40.8-36 mg Cap TAKE ONE CAPSULE BY MOUTH AS NEEDED FOR URINARY SYMPTOMS 30 capsule 12    methen-m.blue-s.phos-phsal-hyo (URO-MP) 118-10-40.8-36 mg Cap Take 1 capsule by mouth daily as needed for urinary symptoms. 30 capsule 12    mv-mn/iron/folic acid/herb 190 (VITAMIN D3 COMPLETE ORAL) Take by mouth. Pt state taking 2 tabs per day      omeprazole (PRILOSEC) 40 MG capsule Take 1 capsule (40 mg total) by mouth every morning. 90 capsule 3    spironolactone (ALDACTONE) 25 MG tablet Take 1 tablet (25 mg total) by mouth once daily. 90 tablet 3    spironolactone (ALDACTONE) 25 MG tablet Take 1 tablet (25 mg total) by mouth once daily. 90 tablet 3     No current facility-administered medications for this visit.       ALLERGIES:  Review of patient's allergies indicates:   Allergen Reactions    Bactrim [sulfamethoxazole-trimethoprim]  "Rash       FAMILY HISTORY:  Family History   Problem Relation Age of Onset    Coronary artery disease Father 62        cabg    Hypertension Father     Hypertension Mother     Breast cancer Neg Hx     Colon cancer Neg Hx     Ovarian cancer Neg Hx        SOCIAL HISTORY:  Social History     Tobacco Use    Smoking status: Never    Smokeless tobacco: Never   Substance Use Topics    Alcohol use: Yes     Comment: rarely    Drug use: No       Review of Systems:  Review of Systems   Constitutional:  Negative for chills, fever and weight loss.   HENT:  Negative for ear discharge, ear pain, hearing loss, sinus pain, sore throat and tinnitus.    Eyes:  Negative for blurred vision, double vision and photophobia.   Respiratory:  Negative for cough, shortness of breath and wheezing.    Cardiovascular:  Negative for chest pain, palpitations and orthopnea.   Gastrointestinal:  Negative for constipation, diarrhea, nausea and vomiting.   Genitourinary:  Negative for dysuria, frequency and urgency.   Musculoskeletal:  Positive for neck pain (Occasionally when turning to the R side, described as muscle stiffness). Negative for back pain and myalgias.   Neurological:  Positive for tingling. Negative for dizziness (Lightheadedness), seizures, loss of consciousness, weakness and headaches.            Objective:     Vitals:    11/30/23 0928   Weight: 57 kg (125 lb 10.6 oz)   Height: 5' 3" (1.6 m)         Lab Results   Component Value Date    WBC 6.17 11/15/2023    HGB 12.3 11/15/2023    HCT 39.7 11/15/2023     11/15/2023    CHOL 189 11/15/2023    TRIG 76 11/15/2023    HDL 95 (H) 11/15/2023    ALT 11 11/15/2023    AST 18 11/15/2023     11/15/2023    K 4.7 11/15/2023     11/15/2023    CREATININE 1.1 11/15/2023    BUN 22 11/15/2023    CO2 27 11/15/2023    TSH 1.123 05/25/2023    HGBA1C 5.5 05/25/2023    IHNSUHGH49 284 11/26/2019       NEUROLOGICAL EXAMINATION:     MENTAL STATUS   Oriented to person, place, and time.   Level " of consciousness: alert    CRANIAL NERVES     CN III, IV, VI   Pupils are equal, round, and reactive to light.  Extraocular motions are normal.     CN V   Facial sensation intact.     CN VII   Facial expression full, symmetric.     CN VIII   CN VIII normal.     CN IX, X   CN IX normal.   CN X normal.     CN XI   CN XI normal.     CN XII   CN XII normal.     ? ?    MUSCLE STRENGTH:     Fascics Atrophy RIGHT    LEFT Atrophy Fascics     5 Neck Ext. 5       5 Neck Flex 5       5 Deltoids 5       5 Sh.Ext.Rot. 5       5 Sh.Int.Rot. 5       5 Biceps 5       5 Triceps 5       5 Forearm.Pr. 5       5 Wrist Ext. 5       5 Wrist Flex 5       5 Finger Ext. 5       5 Finger Flex 5       5 FPL 5       5 Inteross. 5                         5 Iliopsoas 5       5 Hip Abduct 5       5 Hip Adduct 5       5 Quads 5       5 Hams 5       5 Dorsiflex 5       5 Plantar Flex 5       5 Ankle Shar 5       5 Ankle Invert 5       5 Toe Ext. 5       5 Toe Flex 5                         REFLEXES:    RIGHT Reflex   LEFT   2+ Biceps 2+   2+ Brachiorad. 2+   2+ Triceps 2+    Pectoralis     Jaw Jerk     Marina's         2+ Patellar 2+   trace Ankle trace    Suprapatellar              Down PLANTAR Down       Assessment:   Debbie Bro is a 78 y.o. female presenting for evaluation of hand numbness/tingling.     Plan:   Discussion of not positioning herself in her recliner to prevent compression of ulnar nerve and discussed ways to prevent irritation of that nerve    Will do lab work to evaluate if there is a metabolic contributing factor to the patients reports of lightheadedness    Discussion that if she has any red flag symptoms to present to the ED     If symptoms persist patient can consider getting EMG/NCS however given neuro exam WNL and patient resolution of symptoms will pursue this at this time.       RTC PRN     Problem List Items Addressed This Visit          Cardiac/Vascular    HTN (hypertension) - Primary (Chronic)    Current  Assessment & Plan     Discussed with patient importance of management of hypertension due to secondary stroke risk. Patient is on appropriate therapy currently managed by PCP.            Hyperlipidemia (Chronic)    Current Assessment & Plan     Patients hyperlipidemia is currently stable and well managed by the PCP. Discussed with the patient the associated stroke risk.               Renal/    CKD (chronic kidney disease) stage 3, GFR 30-59 ml/min    Current Assessment & Plan     38.81 mL/min based on labs taken on 11/15/23     Max dose gabapentin- 900mg/day           Other Visit Diagnoses       Hand numbness        Relevant Orders    Vitamin B1    Vitamin B12 Deficiency Panel    Vitamin B2    Vitamin B6    RPR    Hepatitis C RNA, Quantitative, PCR    Heavy Metals Screen, Blood (Quantitative)    Phosphatidylethanol (PETH)    Methylmalonic Acid, Serum    Hereditary and idiopathic neuropathy, unspecified        Relevant Orders    Vitamin B6              I spent a total of 45 minutes on the day of the visit. This includes face to face time and non-face to face time preparing to see the patient (eg, review of tests), obtaining and/or reviewing separately obtained history, documenting clinical information in the electronic or other health record, independently interpreting results and communicating results to the patient/family/caregiver, or care coordinator.    Syl Wray PA-C  Supervising physician Ronnell Soria MD was available for all questions during this exam.  [facility]

## 2023-12-05 ENCOUNTER — OFFICE VISIT (OUTPATIENT)
Dept: FAMILY MEDICINE | Facility: CLINIC | Age: 68
End: 2023-12-05
Payer: MEDICARE

## 2023-12-05 ENCOUNTER — HOSPITAL ENCOUNTER (OUTPATIENT)
Dept: RADIOLOGY | Facility: HOSPITAL | Age: 68
Discharge: HOME | End: 2023-12-05
Attending: NURSE PRACTITIONER
Payer: MEDICARE

## 2023-12-05 VITALS
SYSTOLIC BLOOD PRESSURE: 134 MMHG | OXYGEN SATURATION: 97 % | BODY MASS INDEX: 32.64 KG/M2 | TEMPERATURE: 98 F | HEART RATE: 77 BPM | DIASTOLIC BLOOD PRESSURE: 89 MMHG | WEIGHT: 161.63 LBS

## 2023-12-05 DIAGNOSIS — R50.9 FEVER, UNSPECIFIED FEVER CAUSE: ICD-10-CM

## 2023-12-05 DIAGNOSIS — R05.9 COUGH, UNSPECIFIED TYPE: ICD-10-CM

## 2023-12-05 DIAGNOSIS — J06.9 UPPER RESPIRATORY TRACT INFECTION, UNSPECIFIED TYPE: Primary | ICD-10-CM

## 2023-12-05 LAB
CTP QC/QA: YES
CTP QC/QA: YES
POC MOLECULAR INFLUENZA A AGN: NEGATIVE
POC MOLECULAR INFLUENZA B AGN: NEGATIVE
SARS-COV-2 RDRP RESP QL NAA+PROBE: NEGATIVE

## 2023-12-05 PROCEDURE — 1126F AMNT PAIN NOTED NONE PRSNT: CPT | Mod: CPTII,S$GLB,, | Performed by: NURSE PRACTITIONER

## 2023-12-05 PROCEDURE — 1160F RVW MEDS BY RX/DR IN RCRD: CPT | Mod: CPTII,S$GLB,, | Performed by: NURSE PRACTITIONER

## 2023-12-05 PROCEDURE — 99213 PR OFFICE/OUTPT VISIT, EST, LEVL III, 20-29 MIN: ICD-10-PCS | Mod: S$GLB,,, | Performed by: NURSE PRACTITIONER

## 2023-12-05 PROCEDURE — 3075F SYST BP GE 130 - 139MM HG: CPT | Mod: CPTII,S$GLB,, | Performed by: NURSE PRACTITIONER

## 2023-12-05 PROCEDURE — 1160F PR REVIEW ALL MEDS BY PRESCRIBER/CLIN PHARMACIST DOCUMENTED: ICD-10-PCS | Mod: CPTII,S$GLB,, | Performed by: NURSE PRACTITIONER

## 2023-12-05 PROCEDURE — 1159F PR MEDICATION LIST DOCUMENTED IN MEDICAL RECORD: ICD-10-PCS | Mod: CPTII,S$GLB,, | Performed by: NURSE PRACTITIONER

## 2023-12-05 PROCEDURE — 3288F PR FALLS RISK ASSESSMENT DOCUMENTED: ICD-10-PCS | Mod: CPTII,S$GLB,, | Performed by: NURSE PRACTITIONER

## 2023-12-05 PROCEDURE — 99999 PR PBB SHADOW E&M-EST. PATIENT-LVL V: ICD-10-PCS | Mod: PBBFAC,,, | Performed by: NURSE PRACTITIONER

## 2023-12-05 PROCEDURE — 99999 PR PBB SHADOW E&M-EST. PATIENT-LVL V: CPT | Mod: PBBFAC,,, | Performed by: NURSE PRACTITIONER

## 2023-12-05 PROCEDURE — 3079F DIAST BP 80-89 MM HG: CPT | Mod: CPTII,S$GLB,, | Performed by: NURSE PRACTITIONER

## 2023-12-05 PROCEDURE — 87502 POCT INFLUENZA A/B MOLECULAR: ICD-10-PCS | Mod: QW,S$GLB,, | Performed by: NURSE PRACTITIONER

## 2023-12-05 PROCEDURE — 1159F MED LIST DOCD IN RCRD: CPT | Mod: CPTII,S$GLB,, | Performed by: NURSE PRACTITIONER

## 2023-12-05 PROCEDURE — 87635: ICD-10-PCS | Mod: QW,S$GLB,, | Performed by: NURSE PRACTITIONER

## 2023-12-05 PROCEDURE — 99213 OFFICE O/P EST LOW 20 MIN: CPT | Mod: S$GLB,,, | Performed by: NURSE PRACTITIONER

## 2023-12-05 PROCEDURE — 71046 X-RAY EXAM CHEST 2 VIEWS: CPT | Mod: 26,,, | Performed by: RADIOLOGY

## 2023-12-05 PROCEDURE — 71046 XR CHEST PA AND LATERAL: ICD-10-PCS | Mod: 26,,, | Performed by: RADIOLOGY

## 2023-12-05 PROCEDURE — 3044F HG A1C LEVEL LT 7.0%: CPT | Mod: CPTII,S$GLB,, | Performed by: NURSE PRACTITIONER

## 2023-12-05 PROCEDURE — 87635 SARS-COV-2 COVID-19 AMP PRB: CPT | Mod: QW,S$GLB,, | Performed by: NURSE PRACTITIONER

## 2023-12-05 PROCEDURE — 3044F PR MOST RECENT HEMOGLOBIN A1C LEVEL <7.0%: ICD-10-PCS | Mod: CPTII,S$GLB,, | Performed by: NURSE PRACTITIONER

## 2023-12-05 PROCEDURE — 87502 INFLUENZA DNA AMP PROBE: CPT | Mod: QW,S$GLB,, | Performed by: NURSE PRACTITIONER

## 2023-12-05 PROCEDURE — 3008F PR BODY MASS INDEX (BMI) DOCUMENTED: ICD-10-PCS | Mod: CPTII,S$GLB,, | Performed by: NURSE PRACTITIONER

## 2023-12-05 PROCEDURE — 71046 X-RAY EXAM CHEST 2 VIEWS: CPT | Mod: TC,PO

## 2023-12-05 PROCEDURE — 3079F PR MOST RECENT DIASTOLIC BLOOD PRESSURE 80-89 MM HG: ICD-10-PCS | Mod: CPTII,S$GLB,, | Performed by: NURSE PRACTITIONER

## 2023-12-05 PROCEDURE — 1101F PR PT FALLS ASSESS DOC 0-1 FALLS W/OUT INJ PAST YR: ICD-10-PCS | Mod: CPTII,S$GLB,, | Performed by: NURSE PRACTITIONER

## 2023-12-05 PROCEDURE — 3075F PR MOST RECENT SYSTOLIC BLOOD PRESS GE 130-139MM HG: ICD-10-PCS | Mod: CPTII,S$GLB,, | Performed by: NURSE PRACTITIONER

## 2023-12-05 PROCEDURE — 1126F PR PAIN SEVERITY QUANTIFIED, NO PAIN PRESENT: ICD-10-PCS | Mod: CPTII,S$GLB,, | Performed by: NURSE PRACTITIONER

## 2023-12-05 PROCEDURE — 3288F FALL RISK ASSESSMENT DOCD: CPT | Mod: CPTII,S$GLB,, | Performed by: NURSE PRACTITIONER

## 2023-12-05 PROCEDURE — 3008F BODY MASS INDEX DOCD: CPT | Mod: CPTII,S$GLB,, | Performed by: NURSE PRACTITIONER

## 2023-12-05 PROCEDURE — 1101F PT FALLS ASSESS-DOCD LE1/YR: CPT | Mod: CPTII,S$GLB,, | Performed by: NURSE PRACTITIONER

## 2023-12-05 RX ORDER — PROMETHAZINE HYDROCHLORIDE AND DEXTROMETHORPHAN HYDROBROMIDE 6.25; 15 MG/5ML; MG/5ML
5 SYRUP ORAL 2 TIMES DAILY PRN
Qty: 118 ML | Refills: 0 | Status: SHIPPED | OUTPATIENT
Start: 2023-12-05 | End: 2023-12-15

## 2023-12-05 RX ORDER — AZITHROMYCIN 250 MG/1
TABLET, FILM COATED ORAL
Qty: 6 TABLET | Refills: 0 | Status: SHIPPED | OUTPATIENT
Start: 2023-12-05 | End: 2023-12-10

## 2023-12-05 NOTE — PATIENT INSTRUCTIONS
Hydrate well  Rest  Cough medication causes drowsiness  Tylenol OTC as directed  Report to ER immediately if symptoms worsen or persist    Travis Quinn,     If you are due for any health screening(s) below please notify me so we can arrange them to be ordered and scheduled. Most healthy patients at your age complete them, but you are free to accept or refuse.     If you can't do it, I'll definitely understand. If you can, I'd certainly appreciate it!    All of your core healthy metrics are met.

## 2023-12-05 NOTE — PROGRESS NOTES
Subjective     Patient ID: Cheyenne West is a 68 y.o. female.    Chief Complaint: Cough and Sore Throat    Cough  This is a new problem. The current episode started 1 to 4 weeks ago. The problem has been unchanged. The problem occurs every few minutes. The cough is Productive of sputum. Associated symptoms include a fever, nasal congestion, postnasal drip, rhinorrhea and a sore throat. Pertinent negatives include no chest pain, chills, ear congestion, ear pain, headaches, heartburn, hemoptysis, myalgias, rash, shortness of breath, sweats, weight loss or wheezing. Nothing aggravates the symptoms. Treatments tried: Dayquil; advised not to take due to HTN. The treatment provided mild relief. There is no history of asthma, bronchiectasis, bronchitis, COPD, emphysema, environmental allergies or pneumonia.     Past Medical History:   Diagnosis Date    Bilateral carpal tunnel syndrome 10/30/2023    Carpal tunnel syndrome of right wrist     Chronic gastritis     Chronic urticaria     COVID-19 virus infection 1/27/2022    Dissociative fugue     Diverticulosis     Duodenitis     Dyspepsia     Generalized anxiety disorder     Hearing loss on left     Hearing loss on right     Helicobacter pylori (H. pylori)     History of blood transfusion     Hyperlipidemia     Hypertension     Hypothyroidism     IBS (irritable bowel syndrome)     Iron deficiency 3/21/2019    Iron deficiency anemia     Mild mitral regurgitation by prior echocardiogram     Mild obesity     Mild vitamin D deficiency     Osteopenia 6/2/2023    Paraesophageal hiatal hernia     PONV (postoperative nausea and vomiting)     Reflux gastritis     RLS (restless legs syndrome) 2/23/2021    Sleep apnea     mild improved with wt. loss    Thyroid goiter     Urticaria, chronic      Social History     Socioeconomic History    Marital status:    Tobacco Use    Smoking status: Never    Smokeless tobacco: Never   Substance and Sexual Activity    Alcohol use:  Yes     Comment: seldom    Drug use: No    Sexual activity: Yes     Social Determinants of Health     Financial Resource Strain: Low Risk  (2023)    Overall Financial Resource Strain (CARDIA)     Difficulty of Paying Living Expenses: Not hard at all   Food Insecurity: No Food Insecurity (2023)    Hunger Vital Sign     Worried About Running Out of Food in the Last Year: Never true     Ran Out of Food in the Last Year: Never true   Transportation Needs: No Transportation Needs (2023)    PRAPARE - Transportation     Lack of Transportation (Medical): No     Lack of Transportation (Non-Medical): No   Physical Activity: Insufficiently Active (2023)    Exercise Vital Sign     Days of Exercise per Week: 3 days     Minutes of Exercise per Session: 30 min   Stress: Stress Concern Present (2023)    [employer] - Occupational Stress Questionnaire     Feeling of Stress : To some extent   Social Connections: Unknown (2023)    Social Connection and Isolation Panel [NHANES]     Frequency of Communication with Friends and Family: Three times a week     Frequency of Social Gatherings with Friends and Family: Once a week     Active Member of Clubs or Organizations: Patient refused     Attends Club or Organization Meetings: Patient refused     Marital Status:    Housing Stability: Low Risk  (2023)    Housing Stability Vital Sign     Unable to Pay for Housing in the Last Year: No     Number of Places Lived in the Last Year: 1     Unstable Housing in the Last Year: No     Past Surgical History:   Procedure Laterality Date    ARTHROSCOPY OF KNEE Left 2022    Procedure: ARTHROSCOPY, KNEE;  Surgeon: Davion Massey MD;  Location: [facility];  Service: Orthopedics;  Laterality: Left;    BLADDER SUSPENSION      pubovaginal sling     SECTION, CLASSIC      CHOLECYSTECTOMY      COLONOSCOPY  2011    Dr. Goode, in legacy:diverticulosis, hemorrhoids,  melanosis coli-repeat 10 years    COLONOSCOPY N/A 9/6/2018    Procedure: COLONOSCOPY;  Surgeon: Rusty Vogt Jr., MD;  Location: [facility];  Service: Endoscopy;  Laterality: N/A; repeat in 10 years for screening    ESOPHAGEAL DILATION      ESOPHAGOGASTRODUODENOSCOPY  03/04/2016    ESOPHAGOGASTRODUODENOSCOPY N/A 9/6/2018    Procedure: EGD (ESOPHAGOGASTRODUODENOSCOPY);  Surgeon: Rusty Vogt Jr., MD;  Location: [facility] ENDO;  Service: Endoscopy;  Laterality: N/A;    HERNIA REPAIR      INTRALUMINAL GASTROINTESTINAL TRACT IMAGING VIA CAPSULE N/A 9/26/2018    Procedure: IMAGING PROCEDURE, GI TRACT, INTRALUMINAL, VIA CAPSULE;  Surgeon: Loraine Velazquez MD;  Location: [facility] ENDO;  Service: Endoscopy;  Laterality: N/A;    LAPAROSCOPIC NISSEN FUNDOPLICATION N/A 11/29/2018    Procedure: FUNDOPLICATION, NISSEN, LAPAROSCOPIC;  Surgeon: Frank Akins MD;  Location: [facility];  Service: General;  Laterality: N/A;    THYROIDECTOMY, PARTIAL       partial for benign nodule    UPPER GASTROINTESTINAL ENDOSCOPY  03/04/2016    Dr. Whalen, in care everywhere       Review of Systems   Constitutional:  Positive for fever. Negative for chills and weight loss.   HENT:  Positive for postnasal drip, rhinorrhea, sinus pressure/congestion and sore throat. Negative for ear pain.    Eyes: Negative.    Respiratory:  Negative for hemoptysis, shortness of breath and wheezing.    Cardiovascular: Negative.  Negative for chest pain.   Gastrointestinal: Negative.  Negative for heartburn.   Endocrine: Negative.    Musculoskeletal: Negative.  Negative for myalgias.   Integumentary:  Negative for rash. Negative.   Allergic/Immunologic: Negative.  Negative for environmental allergies.   Neurological: Negative.  Negative for headaches.   Psychiatric/Behavioral: Negative.            Objective     Physical Exam  Vitals and nursing note reviewed.   Constitutional:       Appearance: Normal appearance.   HENT:      Head: Normocephalic.      Right Ear:  Tympanic membrane, ear canal and external ear normal.      Left Ear: Tympanic membrane, ear canal and external ear normal.      Nose: Congestion and rhinorrhea present.      Mouth/Throat:      Mouth: Mucous membranes are moist.      Pharynx: Posterior oropharyngeal erythema (mild) present.   Eyes:      Conjunctiva/sclera: Conjunctivae normal.      Pupils: Pupils are equal, round, and reactive to light.   Cardiovascular:      Rate and Rhythm: Normal rate and regular rhythm.      Pulses: Normal pulses.      Heart sounds: Normal heart sounds.   Pulmonary:      Effort: Pulmonary effort is normal.      Breath sounds: Normal breath sounds.   Abdominal:      General: Bowel sounds are normal.      Palpations: Abdomen is soft.   Musculoskeletal:         General: Normal range of motion.      Cervical back: Normal range of motion and neck supple.   Skin:     General: Skin is warm and dry.      Capillary Refill: Capillary refill takes 2 to 3 seconds.   Neurological:      Mental Status: She is alert and oriented to person, place, and time.   Psychiatric:         Mood and Affect: Mood normal.         Behavior: Behavior normal.         Thought Content: Thought content normal.         Judgment: Judgment normal.            Assessment and Plan     1. Upper respiratory tract infection, unspecified type  2. Cough, unspecified type  3. Fever, unspecified fever cause  -     POCT COVID-19 Rapid Screening  -     POCT Influenza A/B Molecular  -     X-Ray Chest PA And Lateral; Future; Expected date: 12/05/2023        -     promethazine-dextromethorphan (PROMETHAZINE-DM) 6.25-15 mg/5 mL Syrp; Take 5 mLs by mouth 2 (two) times daily as needed.  Dispense: 118 mL; Refill: 0  -     (Magic mouthwash) 1:1:1 diphenhydrAMINE(Benadryl) 12.5mg/5ml liq, aluminum & magnesium hydroxide-simethicone (Maalox), LIDOcaine viscous 2%; Swish and spit 5 mLs every 8 (eight) hours as needed. Gargle and spit. DO NOT SWALLOW  Dispense: 360 mL; Refill: 0  -      azithromycin (Z-ROBERT) 250 MG tablet; Take 2 tablets by mouth on day 1; Take 1 tablet by mouth on days 2-5  Dispense: 6 tablet; Refill: 0  Hydrate well  Rest  Tylenol OTC as directed  Report to ER immediately if symptoms worsen or persist                 No follow-ups on file.

## 2024-01-22 ENCOUNTER — PATIENT MESSAGE (OUTPATIENT)
Dept: FAMILY MEDICINE | Facility: CLINIC | Age: 69
End: 2024-01-22
Payer: MEDICARE

## 2024-01-23 ENCOUNTER — LAB VISIT (OUTPATIENT)
Dept: LAB | Facility: HOSPITAL | Age: 69
End: 2024-01-23
Attending: FAMILY MEDICINE
Payer: MEDICARE

## 2024-01-23 ENCOUNTER — PATIENT MESSAGE (OUTPATIENT)
Dept: FAMILY MEDICINE | Facility: CLINIC | Age: 69
End: 2024-01-23

## 2024-01-23 ENCOUNTER — E-CONSULT (OUTPATIENT)
Dept: ENDOCRINOLOGY | Facility: CLINIC | Age: 69
End: 2024-01-23
Payer: MEDICARE

## 2024-01-23 ENCOUNTER — OFFICE VISIT (OUTPATIENT)
Dept: FAMILY MEDICINE | Facility: CLINIC | Age: 69
End: 2024-01-23
Payer: MEDICARE

## 2024-01-23 VITALS
HEART RATE: 73 BPM | BODY MASS INDEX: 33.04 KG/M2 | TEMPERATURE: 97 F | DIASTOLIC BLOOD PRESSURE: 87 MMHG | HEIGHT: 59 IN | WEIGHT: 163.88 LBS | SYSTOLIC BLOOD PRESSURE: 125 MMHG

## 2024-01-23 DIAGNOSIS — E16.2 HYPOGLYCEMIA: ICD-10-CM

## 2024-01-23 DIAGNOSIS — E16.2 HYPOGLYCEMIA: Primary | ICD-10-CM

## 2024-01-23 DIAGNOSIS — Z98.890 STATUS POST LAPAROSCOPIC NISSEN FUNDOPLICATION: ICD-10-CM

## 2024-01-23 DIAGNOSIS — I10 ESSENTIAL HYPERTENSION: ICD-10-CM

## 2024-01-23 DIAGNOSIS — E03.9 HYPOTHYROIDISM, UNSPECIFIED TYPE: ICD-10-CM

## 2024-01-23 DIAGNOSIS — K21.00 GASTROESOPHAGEAL REFLUX DISEASE WITH ESOPHAGITIS, UNSPECIFIED WHETHER HEMORRHAGE: ICD-10-CM

## 2024-01-23 LAB
ALBUMIN SERPL BCP-MCNC: 4.1 G/DL (ref 3.5–5.2)
ALP SERPL-CCNC: 101 U/L (ref 55–135)
ALT SERPL W/O P-5'-P-CCNC: 32 U/L (ref 10–44)
ANION GAP SERPL CALC-SCNC: 13 MMOL/L (ref 8–16)
AST SERPL-CCNC: 32 U/L (ref 10–40)
BASOPHILS # BLD AUTO: 0.01 K/UL (ref 0–0.2)
BASOPHILS NFR BLD: 0.2 % (ref 0–1.9)
BILIRUB SERPL-MCNC: 0.4 MG/DL (ref 0.1–1)
BUN SERPL-MCNC: 10 MG/DL (ref 8–23)
CALCIUM SERPL-MCNC: 9.9 MG/DL (ref 8.7–10.5)
CHLORIDE SERPL-SCNC: 104 MMOL/L (ref 95–110)
CO2 SERPL-SCNC: 24 MMOL/L (ref 23–29)
CORTIS SERPL-MCNC: 8.6 UG/DL
CREAT SERPL-MCNC: 0.7 MG/DL (ref 0.5–1.4)
DIFFERENTIAL METHOD BLD: ABNORMAL
EOSINOPHIL # BLD AUTO: 0 K/UL (ref 0–0.5)
EOSINOPHIL NFR BLD: 0 % (ref 0–8)
ERYTHROCYTE [DISTWIDTH] IN BLOOD BY AUTOMATED COUNT: 13.6 % (ref 11.5–14.5)
EST. GFR  (NO RACE VARIABLE): >60 ML/MIN/1.73 M^2
GLUCOSE SERPL-MCNC: 94 MG/DL (ref 70–110)
HCT VFR BLD AUTO: 39.4 % (ref 37–48.5)
HGB BLD-MCNC: 13.1 G/DL (ref 12–16)
IMM GRANULOCYTES # BLD AUTO: 0.03 K/UL (ref 0–0.04)
IMM GRANULOCYTES NFR BLD AUTO: 0.6 % (ref 0–0.5)
LYMPHOCYTES # BLD AUTO: 1.3 K/UL (ref 1–4.8)
LYMPHOCYTES NFR BLD: 25.3 % (ref 18–48)
MCH RBC QN AUTO: 29.9 PG (ref 27–31)
MCHC RBC AUTO-ENTMCNC: 33.2 G/DL (ref 32–36)
MCV RBC AUTO: 90 FL (ref 82–98)
MONOCYTES # BLD AUTO: 0.4 K/UL (ref 0.3–1)
MONOCYTES NFR BLD: 7.5 % (ref 4–15)
NEUTROPHILS # BLD AUTO: 3.5 K/UL (ref 1.8–7.7)
NEUTROPHILS NFR BLD: 66.4 % (ref 38–73)
NRBC BLD-RTO: 0 /100 WBC
PLATELET # BLD AUTO: 246 K/UL (ref 150–450)
PMV BLD AUTO: 10.7 FL (ref 9.2–12.9)
POTASSIUM SERPL-SCNC: 4 MMOL/L (ref 3.5–5.1)
PROT SERPL-MCNC: 7.2 G/DL (ref 6–8.4)
RBC # BLD AUTO: 4.38 M/UL (ref 4–5.4)
SODIUM SERPL-SCNC: 141 MMOL/L (ref 136–145)
TSH SERPL DL<=0.005 MIU/L-ACNC: 1.75 UIU/ML (ref 0.4–4)
WBC # BLD AUTO: 5.22 K/UL (ref 3.9–12.7)

## 2024-01-23 PROCEDURE — 99451 NTRPROF PH1/NTRNET/EHR 5/>: CPT | Mod: S$GLB,,, | Performed by: INTERNAL MEDICINE

## 2024-01-23 PROCEDURE — 99452 NTRPROF PH1/NTRNET/EHR RFRL: CPT | Mod: S$GLB,,, | Performed by: FAMILY MEDICINE

## 2024-01-23 PROCEDURE — 1126F AMNT PAIN NOTED NONE PRSNT: CPT | Mod: CPTII,S$GLB,, | Performed by: FAMILY MEDICINE

## 2024-01-23 PROCEDURE — 99999 PR PBB SHADOW E&M-EST. PATIENT-LVL IV: CPT | Mod: PBBFAC,,, | Performed by: FAMILY MEDICINE

## 2024-01-23 PROCEDURE — 3079F DIAST BP 80-89 MM HG: CPT | Mod: CPTII,S$GLB,, | Performed by: FAMILY MEDICINE

## 2024-01-23 PROCEDURE — 80053 COMPREHEN METABOLIC PANEL: CPT | Performed by: FAMILY MEDICINE

## 2024-01-23 PROCEDURE — 3288F FALL RISK ASSESSMENT DOCD: CPT | Mod: CPTII,S$GLB,, | Performed by: FAMILY MEDICINE

## 2024-01-23 PROCEDURE — 36415 COLL VENOUS BLD VENIPUNCTURE: CPT | Mod: PO | Performed by: FAMILY MEDICINE

## 2024-01-23 PROCEDURE — 84443 ASSAY THYROID STIM HORMONE: CPT | Performed by: FAMILY MEDICINE

## 2024-01-23 PROCEDURE — 82533 TOTAL CORTISOL: CPT | Performed by: FAMILY MEDICINE

## 2024-01-23 PROCEDURE — G0008 ADMIN INFLUENZA VIRUS VAC: HCPCS | Mod: S$GLB,,, | Performed by: FAMILY MEDICINE

## 2024-01-23 PROCEDURE — 85025 COMPLETE CBC W/AUTO DIFF WBC: CPT | Performed by: FAMILY MEDICINE

## 2024-01-23 PROCEDURE — 99214 OFFICE O/P EST MOD 30 MIN: CPT | Mod: 25,S$GLB,, | Performed by: FAMILY MEDICINE

## 2024-01-23 PROCEDURE — 1101F PT FALLS ASSESS-DOCD LE1/YR: CPT | Mod: CPTII,S$GLB,, | Performed by: FAMILY MEDICINE

## 2024-01-23 PROCEDURE — 3008F BODY MASS INDEX DOCD: CPT | Mod: CPTII,S$GLB,, | Performed by: FAMILY MEDICINE

## 2024-01-23 PROCEDURE — 1159F MED LIST DOCD IN RCRD: CPT | Mod: CPTII,S$GLB,, | Performed by: FAMILY MEDICINE

## 2024-01-23 PROCEDURE — 90694 VACC AIIV4 NO PRSRV 0.5ML IM: CPT | Mod: S$GLB,,, | Performed by: FAMILY MEDICINE

## 2024-01-23 PROCEDURE — 3074F SYST BP LT 130 MM HG: CPT | Mod: CPTII,S$GLB,, | Performed by: FAMILY MEDICINE

## 2024-01-23 NOTE — CONSULTS
[facility] - [facility] Diabetes 6th Fl  Response for E-Consult     Patient Name: Cheyenne West  MRN: [id number]  Primary Care Provider: Juan Buck MD   Requesting Provider: Juan Buck MD  E-Consult to Endocrinology  Consult performed by: Kathi Jerry MD  Consult ordered by: Juan Buck MD          After evaluation of your eConsult clinical questions, I believe the patient should be scheduled for an office visit in our specialty due to hypoglycemia.  Hypoglycemia has not been documented.  Self monitor blood glucose levels do not count as they are not accurate.  She does not satisfy whipples triad.  If you are concerned that this is true hypoglycemia please refer to endocrinology      Hypoglycemia is a common concern of many patients.  Rarely is it due to something pathologic in healthy individuals.      In order to evaluate for hypoglycemia, Whipple's triad must be met:  Symptoms consistent with hypoglycemia  A low plasma glucose concentration < 55 mg/dl measured by the lab when symptoms are present (SMBG is not enough)   Resolution of the hypoglycemic symptoms after the plasma glucose is raised      If the triad is met, they will need to be referred to endocrinology for history (as context and timing are important) and confirmatory testing which would include labs drawn while hypoglycemic (plasma glucose, insulin, c-peptide, pro insulin WILLIAM screen).    In patients with symptom of hypoglycemia but normal glucose concentrations no further evaluation is needed.    Please note that insulin, C-peptide, proinsulin levels are not valuable in the context of euglycemia or hyperglycemia.  They are only meaningful at the time of documented hypoglycemia.    If there is concern for adrenal insufficiency, 8 AM cortisol should be checked (normal is >13-15).  If low, refer to endocrine      If there is excessive alcohol consumption please urge the patient to stop and reassess symptoms.     If they have  a history of bariatric surgery, true hypoglycemia has been well described. Please refer to endocrine for full evaluation. In the interim would recommend keeping simple carb intake to a minium.       If  hypoglycemia is not documented but symptoms are concerning (classic symptoms with resolution after carb intake), please refer to endocrine and we can do an observed fast in clinic.             .    Total time of Consultation: 10 minute    *This eConsult is based on the clinical data available to me and is furnished without benefit of a physician examination.  The eConsult will need to be interpreted in light of any clinical issues of changes in patient status not available to me at the time rime of filing this eConsults.  Significant changes in patient condition of level of acuity should result in a referral for in person consultation and reevaluation.  Please alert me if you have any furth questions.     Thank you for this eConsult referral.     MD Edin Biswas - [facility] Diabetes 6th Fl

## 2024-01-23 NOTE — PROGRESS NOTES
Patient states that she had an episode this past weekend which she started feeling confused sweaty and shaky when she was playing a game.   She states that she had eaten kingcake for breakfast a couple hours prior to this, but had not eaten lunch yet. She had to stop playing the game.  She checked her glucose with 2 separate glucometer with readings of 61 and 56.  She ate something and symptoms resolved after about 15 minutes.  She does state that she had a similar episode of symptoms about month ago and possibly 2 or 3 previous episodes in the past, but had never checked her sugar with any of these.  No history of diabetes.  Not taking any medications which would obviously cause hypoglycemia.  She has had a Nissen fundoplication in the past, but no bariatric surgery.  She has minimal alcohol use, none in the past month.  No significant weight changes.  Occasionally feels fatigued but otherwise has been feeling well.  No fever chills.  Previous screening hemoglobin A1c and glucose readings have been normal.  She has hypertension currently controlled.  Hypothyroidism has been clinically euthyroid on supplementation.      Cheyenne was seen today for follow-up and diabetes.    Diagnoses and all orders for this visit:    Hypoglycemia  -     Comprehensive Metabolic Panel; Future  -     TSH; Future  -     CORTISOL, RANDOM; Future  -     CBC Auto Differential; Future  -     E-Consult to Endocrinology    Essential hypertension  -     CBC Auto Differential; Future    Status post laparoscopic Nissen fundoplication  -     CBC Auto Differential; Future    Gastroesophageal reflux disease with esophagitis, unspecified whether hemorrhage    Hypothyroidism, unspecified type  -     TSH; Future    Other orders  -     Influenza - Quadrivalent (Adjuvanted)      Laboratory evaluation as above with endocrinology E consult.  Advise not to skip meals.            Past Medical History:  Past Medical History:   Diagnosis Date    Bilateral  carpal tunnel syndrome 10/30/2023    Carpal tunnel syndrome of right wrist     Chronic gastritis     Chronic urticaria     COVID-19 virus infection 2022    Dissociative fugue     Diverticulosis     Duodenitis     Dyspepsia     Generalized anxiety disorder     Hearing loss on left     Hearing loss on right     Helicobacter pylori (H. pylori)     History of blood transfusion     Hyperlipidemia     Hypertension     Hypothyroidism     IBS (irritable bowel syndrome)     Iron deficiency 3/21/2019    Iron deficiency anemia     Mild mitral regurgitation by prior echocardiogram     Mild obesity     Mild vitamin D deficiency     Osteopenia 2023    Paraesophageal hiatal hernia     PONV (postoperative nausea and vomiting)     Reflux gastritis     RLS (restless legs syndrome) 2021    Sleep apnea     mild improved with wt. loss    Thyroid goiter     Urticaria, chronic      Past Surgical History:   Procedure Laterality Date    ARTHROSCOPY OF KNEE Left 2022    Procedure: ARTHROSCOPY, KNEE;  Surgeon: Davion Massey MD;  Location: [facility] OR;  Service: Orthopedics;  Laterality: Left;    BLADDER SUSPENSION      pubovaginal sling     SECTION, CLASSIC      CHOLECYSTECTOMY      COLONOSCOPY  2011    Dr. Goode, in legacy:diverticulosis, hemorrhoids, melanosis coli-repeat 10 years    COLONOSCOPY N/A 2018    Procedure: COLONOSCOPY;  Surgeon: Rusty Vogt Jr., MD;  Location: [facility] ENDO;  Service: Endoscopy;  Laterality: N/A; repeat in 10 years for screening    ESOPHAGEAL DILATION      ESOPHAGOGASTRODUODENOSCOPY  2016    ESOPHAGOGASTRODUODENOSCOPY N/A 2018    Procedure: EGD (ESOPHAGOGASTRODUODENOSCOPY);  Surgeon: Rusty Vogt Jr., MD;  Location: [facility] ENDO;  Service: Endoscopy;  Laterality: N/A;    HERNIA REPAIR      INTRALUMINAL GASTROINTESTINAL TRACT IMAGING VIA CAPSULE N/A 2018    Procedure: IMAGING PROCEDURE, GI TRACT, INTRALUMINAL, VIA CAPSULE;  Surgeon: Loraine Velazquez MD;   Location: [facility] ENDO;  Service: Endoscopy;  Laterality: N/A;    LAPAROSCOPIC NISSEN FUNDOPLICATION N/A 11/29/2018    Procedure: FUNDOPLICATION, NISSEN, LAPAROSCOPIC;  Surgeon: Frank Akins MD;  Location: [facility] OR;  Service: General;  Laterality: N/A;    THYROIDECTOMY, PARTIAL       partial for benign nodule    UPPER GASTROINTESTINAL ENDOSCOPY  03/04/2016    Dr. Whalen, in care everywhere     Review of patient's allergies indicates:   Allergen Reactions    Penicillins      Current Outpatient Medications on File Prior to Visit   Medication Sig Dispense Refill    ALPRAZolam (XANAX) 0.25 MG tablet Take 1 tablet (0.25 mg total) by mouth 3 (three) times daily as needed for Anxiety. 30 tablet 0    atorvastatin (LIPITOR) 10 MG tablet TAKE 1 TABLET ONE TIME DAILY 90 tablet 3    buPROPion (WELLBUTRIN XL) 300 MG 24 hr tablet TAKE 1 TABLET ONE TIME DAILY 90 tablet 3    CALCIUM CARBONATE/VITAMIN D3 (CALTRATE 600 + D ORAL) Take by mouth 2 (two) times daily.      EScitalopram oxalate (LEXAPRO) 20 MG tablet TAKE 1 TABLET ONE TIME DAILY 90 tablet 3    estradiol (VAGIFEM) 10 mcg Tab Place 10 mcg vaginally.      famotidine (PEPCID) 20 MG tablet TAKE 1 TABLET BY MOUTH TWICE A  tablet 0    felodipine (PLENDIL) 5 MG 24 hr tablet TAKE 1 TABLET ONE TIME DAILY 90 tablet 3    gabapentin (NEURONTIN) 300 MG capsule Take 1 capsule (300 mg total) by mouth every evening. 90 capsule 3    levothyroxine (SYNTHROID) 50 MCG tablet TAKE 1 TABLET ONE TIME DAILY 90 tablet 3    olmesartan-hydrochlorothiazide (BENICAR HCT) 20-12.5 mg per tablet TAKE 1 TABLET EVERY DAY 90 tablet 2    OMEGA-3 FATTY ACIDS-EPA ORAL Take by mouth once daily.      pantoprazole (PROTONIX) 40 MG tablet TAKE 1 TABLET ONE TIME DAILY 90 tablet 3    potassium chloride (MICRO-K) 10 MEQ CpSR Take 1 capsule (10 mEq total) by mouth once daily. 90 capsule 3    VIT A/VIT C/VIT E/ZINC/COPPER (PRESERVISION AREDS ORAL) Take by mouth once daily.       No current facility-administered  medications on file prior to visit.     Social History     Socioeconomic History    Marital status:    Tobacco Use    Smoking status: Never    Smokeless tobacco: Never   Substance and Sexual Activity    Alcohol use: Yes     Comment: seldom    Drug use: No    Sexual activity: Yes     Social Determinants of Health     Financial Resource Strain: Low Risk  (1/23/2024)    Overall Financial Resource Strain (CARDIA)     Difficulty of Paying Living Expenses: Not hard at all   Food Insecurity: No Food Insecurity (1/23/2024)    Hunger Vital Sign     Worried About Running Out of Food in the Last Year: Never true     Ran Out of Food in the Last Year: Never true   Transportation Needs: No Transportation Needs (1/23/2024)    PRAPARE - Transportation     Lack of Transportation (Medical): No     Lack of Transportation (Non-Medical): No   Physical Activity: Insufficiently Active (1/23/2024)    Exercise Vital Sign     Days of Exercise per Week: 2 days     Minutes of Exercise per Session: 30 min   Stress: Stress Concern Present (1/23/2024)    [employer] - Occupational Stress Questionnaire     Feeling of Stress : Rather much   Social Connections: Unknown (1/23/2024)    Social Connection and Isolation Panel [NHANES]     Frequency of Communication with Friends and Family: Once a week     Frequency of Social Gatherings with Friends and Family: Once a week     Active Member of Clubs or Organizations: Patient declined     Attends Club or Organization Meetings: Patient declined     Marital Status:    Housing Stability: Low Risk  (1/23/2024)    Housing Stability Vital Sign     Unable to Pay for Housing in the Last Year: No     Number of Places Lived in the Last Year: 1     Unstable Housing in the Last Year: No     Family History   Problem Relation Age of Onset    Hypertension Mother     Heart disease Mother     Ulcers Mother     GI problems Mother         colitis    Hypertension Father     Breast  "cancer Paternal Grandmother     Breast cancer Cousin     Colon cancer Neg Hx     Crohn's disease Neg Hx     Colon polyps Neg Hx     Ulcerative colitis Neg Hx     Stomach cancer Neg Hx     Esophageal cancer Neg Hx     Allergic rhinitis Neg Hx     Allergies Neg Hx     Angioedema Neg Hx     Atopy Neg Hx     Eczema Neg Hx     Immunodeficiency Neg Hx     Rhinitis Neg Hx     Urticaria Neg Hx     Asthma Neg Hx        Answers submitted by the patient for this visit:  Review of Systems Questionnaire (Submitted on 1/23/2024)  activity change: No  unexpected weight change: No  neck pain: Yes  hearing loss: Yes  rhinorrhea: No  trouble swallowing: No  eye discharge: No  visual disturbance: No  chest tightness: No  wheezing: No  chest pain: No  palpitations: No  blood in stool: No  constipation: No  vomiting: No  diarrhea: Yes  polydipsia: No  polyuria: No  difficulty urinating: No  hematuria: No  menstrual problem: No  dysuria: No  joint swelling: No  arthralgias: Yes  headaches: No  weakness: No  confusion: Yes  dysphoric mood: No      Vitals:    01/23/24 0859   BP: 125/87   Pulse: 73   Temp: 97.2 °F (36.2 °C)   TempSrc: Temporal   Weight: 74.3 kg (163 lb 14.4 oz)   Height: 4' 11" (1.499 m)     Wt Readings from Last 3 Encounters:   01/23/24 74.3 kg (163 lb 14.4 oz)   12/05/23 73.3 kg (161 lb 9.6 oz)   10/30/23 72.2 kg (159 lb 2.8 oz)       OBJECTIVE:   APPEARANCE: Well nourished, well developed, in no acute distress.    HEAD: Normocephalic.  Atraumatic.  No sinus tenderness.  EYES:   Right eye: Pupil reactive.  Conjunctiva clear.    Left eye: Pupil reactive.  Conjunctiva clear.  EOMI.    EARS: TM's intact. Light reflex normal. No retraction or perforation.    NOSE:  clear.  MOUTH & THROAT:  No pharyngeal erythema or exudate. No lesions.  NECK: Supple. No bruits.  No JVD.  No cervical lymphadenopathy.  No thyromegaly.    CHEST: Breath sounds clear bilaterally.  Normal respiratory effort  CARDIOVASCULAR: Normal rate.  Regular " rhythm.  No murmurs.  No rub.  No gallops.  ABDOMEN: Bowel sounds normal.  Soft.  No tenderness.  No organomegaly.  PERIPHERAL VASCULAR: No cyanosis.  No clubbing.  No edema.  NEUROLOGIC: No focal findings.  MENTAL STATUS: Alert.  Oriented x 3.

## 2024-01-23 NOTE — PROGRESS NOTES
The patient has had an E-Consult completed. Please refer to that note as needed. Please communicate the information below to the patient. Based on the recommendations of the specialist, I recommend that the patient do the following:  Refer to Endocrinology for office visit.  Received below from endocrinologist.    After evaluation of your eConsult clinical questions, I believe the patient should be scheduled for an office visit in our specialty due to hypoglycemia.  Hypoglycemia has not been documented.  Self monitor blood glucose levels do not count as they are not accurate.  She does not satisfy whipples triad.  If you are concerned that this is true hypoglycemia please refer to endocrinology

## 2024-02-25 ENCOUNTER — PATIENT MESSAGE (OUTPATIENT)
Dept: FAMILY MEDICINE | Facility: CLINIC | Age: 69
End: 2024-02-25
Payer: MEDICARE

## 2024-02-29 ENCOUNTER — TELEPHONE (OUTPATIENT)
Dept: FAMILY MEDICINE | Facility: CLINIC | Age: 69
End: 2024-02-29
Payer: MEDICARE

## 2024-03-01 ENCOUNTER — PATIENT MESSAGE (OUTPATIENT)
Dept: FAMILY MEDICINE | Facility: CLINIC | Age: 69
End: 2024-03-01
Payer: MEDICARE

## 2024-03-01 NOTE — TELEPHONE ENCOUNTER
----- Message from Issa Mann MA sent at 2/29/2024  4:38 PM CST -----  Contact: Cheyenne  Please advise  ----- Message -----  From: Annabel Morel  Sent: 2/29/2024   3:54 PM CST  To: Cielo BARNARD Staff    Cheyenne would like a call back at [phone] in regards to having multiple episodes of low blood sugar starting in January and she had one today 2/29. Patient is at home alone most of the time and would like to know if she needs to go to the ER every time it happens or what she needs to do.  She has schedule endocrinologist but she can't be seen until May and would like to know what to do.  Thanks   Am

## 2024-03-01 NOTE — TELEPHONE ENCOUNTER
Spoke with patient  Nondiabetic patient patient states checking sugar 1st thing in the morning and has been normal Fasting 110-125 with no symptoms.   She has had about 4 episodes in the past week or 2 possibly occurring 1-2 hours after breakfast (cereal twice, grits once) 53,51,59 on home glucometer. Weak, shaky, sweaty with symptoms.   She has eaten candy or drank juice afterwards and sugar comes back up  Better after sugar comes back up, but fatigued.  Symptoms seem to be occurring more frequently.  States has outside endocrinology appointment with Dr. Fox, but not till mid May.  Advised that I will see if we can get our endocrinology department to assist in earlier appointment.  In the meantime make sure she has some hard candy or juice available in case she has symptoms.  I would probably try to avoid eating the cereal and grits

## 2024-03-05 ENCOUNTER — PATIENT MESSAGE (OUTPATIENT)
Dept: FAMILY MEDICINE | Facility: CLINIC | Age: 69
End: 2024-03-05
Payer: MEDICARE

## 2024-03-15 ENCOUNTER — OFFICE VISIT (OUTPATIENT)
Dept: ENDOCRINOLOGY | Facility: CLINIC | Age: 69
End: 2024-03-15
Payer: MEDICARE

## 2024-03-15 VITALS — WEIGHT: 164.56 LBS | BODY MASS INDEX: 33.17 KG/M2 | HEIGHT: 59 IN

## 2024-03-15 DIAGNOSIS — E03.9 HYPOTHYROIDISM, UNSPECIFIED TYPE: ICD-10-CM

## 2024-03-15 DIAGNOSIS — E16.2 HYPOGLYCEMIA: Primary | ICD-10-CM

## 2024-03-15 PROCEDURE — 1126F AMNT PAIN NOTED NONE PRSNT: CPT | Mod: CPTII,GC,S$GLB, | Performed by: STUDENT IN AN ORGANIZED HEALTH CARE EDUCATION/TRAINING PROGRAM

## 2024-03-15 PROCEDURE — G2211 COMPLEX E/M VISIT ADD ON: HCPCS | Mod: GC,S$GLB,, | Performed by: STUDENT IN AN ORGANIZED HEALTH CARE EDUCATION/TRAINING PROGRAM

## 2024-03-15 PROCEDURE — 1160F RVW MEDS BY RX/DR IN RCRD: CPT | Mod: CPTII,GC,S$GLB, | Performed by: STUDENT IN AN ORGANIZED HEALTH CARE EDUCATION/TRAINING PROGRAM

## 2024-03-15 PROCEDURE — 1159F MED LIST DOCD IN RCRD: CPT | Mod: CPTII,GC,S$GLB, | Performed by: STUDENT IN AN ORGANIZED HEALTH CARE EDUCATION/TRAINING PROGRAM

## 2024-03-15 PROCEDURE — 99204 OFFICE O/P NEW MOD 45 MIN: CPT | Mod: GC,S$GLB,, | Performed by: STUDENT IN AN ORGANIZED HEALTH CARE EDUCATION/TRAINING PROGRAM

## 2024-03-15 PROCEDURE — 99999 PR PBB SHADOW E&M-EST. PATIENT-LVL III: CPT | Mod: PBBFAC,GC,, | Performed by: STUDENT IN AN ORGANIZED HEALTH CARE EDUCATION/TRAINING PROGRAM

## 2024-03-15 PROCEDURE — 3008F BODY MASS INDEX DOCD: CPT | Mod: CPTII,GC,S$GLB, | Performed by: STUDENT IN AN ORGANIZED HEALTH CARE EDUCATION/TRAINING PROGRAM

## 2024-03-15 NOTE — ASSESSMENT & PLAN NOTE
Biochemically euthyroid on 50 mcg Levothyroxine  Uncomplicated, can continue monitoring with her PCP

## 2024-03-15 NOTE — PATIENT INSTRUCTIONS
We will plan to start a continuous glucose monitor.    Please write down the following information the next time you feel symptoms:  - What symptoms you are feeling  - The time you feel the symptoms  - What you ate before and the time you ate

## 2024-03-15 NOTE — ASSESSMENT & PLAN NOTE
Patient reporting symptoms of hypoglycemia associated with blood glucose levels in the 50s at home. Symptoms tend to occur 2-3 hours after meals and at times of high stress. Symptoms do not occur at night or after periods of prolonged fast or physical exertion. Her  is a diabetic but she denies taking his medication even on accident. No history of gastric bypass surgery.    - Her history does not suggest endogenous overproduction of insulin as you would expect her to have fasting hypoglycemia and hypoglycemia at night.     - Will plan to trial of blinded CGM with symptoms diary. Discussed importance of documenting timing of symptoms to match to to CGM data.

## 2024-03-15 NOTE — Clinical Note
Hi, I put in an order for the continuous glucose monitor. Please let me know if I can help with that.

## 2024-03-15 NOTE — PROGRESS NOTES
NEW PATIENT VISIT    Subjective:      Chief Complaint: Diabetes      HPI: Cheyenne West is a 68 y.o. female who is here for  hypoglycemia  PMH significant for HTN, HLD, hypothyroidism s/p partial thyroidectomy and chronic hives  She does not have diabetes.  She had a kidney stone last week and during that episode she had a hives break out. In the past she was on steroids for two years consecutively but started xolai    Regarding Hypoglycemia:    - Symptoms of hypoglycemia first started: On and off for the past year  - Glucose was checked and was in the 50s at least 4 times since January 20th  - Episode frequency: 4 episodes since January 20th  - Symptoms include shakiness, weakness, sweating, vision changes and hunger denies syncope. She also reports that during these episodes she was in a stressful situation.    - Pertinent factors:  No   Yes  []    [x]   Prior hypoglycemic episodes  [x]    []   Currently taking diabetes medications  []    [x]   Occur within 2-3 hours of consuming high carb meal  [x]    []   Occur overnight  [x]    []   Occur after prolonged fasting  [x]    []   Occur after physical exertion  []    [x]   Symptoms resolved after consuming carbohydrate    - Personal history of hepatic, renal or cardiac failure: no  - History of adrenal insufficiency:  - History of gastric bypass surgery or fundoplication: denies    Max weight:  - Alcohol use: yes, occasional    - Patient denies family history of hypoglycemia    - Whipple triad: (1) Symptoms of hypoglycemia (2) Hypoglycemia (blood glucose level <50 mg/dL) (3) Relief of symptoms following ingestion of carbohydrate  - 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    With regards to hypothyroidism  - Had partial thyroidectomy in her 30's she isnt sure which side, it was outside of [facility]  - She takes 50 mcg of levothyroxine daily  - She takes it correctly    ROS: See HPI    Past Medical History:   Diagnosis  Date    Bilateral carpal tunnel syndrome 10/30/2023    Carpal tunnel syndrome of right wrist     Chronic gastritis     Chronic urticaria     COVID-19 virus infection 2022    Dissociative fugue     Diverticulosis     Duodenitis     Dyspepsia     Generalized anxiety disorder     Hearing loss on left     Hearing loss on right     Helicobacter pylori (H. pylori)     History of blood transfusion     Hyperlipidemia     Hypertension     Hypothyroidism     IBS (irritable bowel syndrome)     Iron deficiency 3/21/2019    Iron deficiency anemia     Mild mitral regurgitation by prior echocardiogram     Mild obesity     Mild vitamin D deficiency     Osteopenia 2023    Paraesophageal hiatal hernia     PONV (postoperative nausea and vomiting)     Reflux gastritis     RLS (restless legs syndrome) 2021    Sleep apnea     mild improved with wt. loss    Thyroid goiter     Urticaria, chronic        Past Surgical History:   Procedure Laterality Date    ARTHROSCOPY OF KNEE Left 2022    Procedure: ARTHROSCOPY, KNEE;  Surgeon: Davion Massey MD;  Location: [facility] OR;  Service: Orthopedics;  Laterality: Left;    BLADDER SUSPENSION      pubovaginal sling     SECTION, CLASSIC      CHOLECYSTECTOMY      COLONOSCOPY  2011    Dr. Goode, in legacy:diverticulosis, hemorrhoids, melanosis coli-repeat 10 years    COLONOSCOPY N/A 2018    Procedure: COLONOSCOPY;  Surgeon: Rusty Vogt Jr., MD;  Location: [facility] ENDO;  Service: Endoscopy;  Laterality: N/A; repeat in 10 years for screening    ESOPHAGEAL DILATION      ESOPHAGOGASTRODUODENOSCOPY  2016    ESOPHAGOGASTRODUODENOSCOPY N/A 2018    Procedure: EGD (ESOPHAGOGASTRODUODENOSCOPY);  Surgeon: Rusty Vogt Jr., MD;  Location: [facility] ENDO;  Service: Endoscopy;  Laterality: N/A;    HERNIA REPAIR      INTRALUMINAL GASTROINTESTINAL TRACT IMAGING VIA CAPSULE N/A 2018    Procedure: IMAGING PROCEDURE, GI TRACT, INTRALUMINAL, VIA CAPSULE;  Surgeon:  "Loraine Velazquez MD;  Location: [facility] ENDO;  Service: Endoscopy;  Laterality: N/A;    LAPAROSCOPIC NISSEN FUNDOPLICATION N/A 11/29/2018    Procedure: FUNDOPLICATION, NISSEN, LAPAROSCOPIC;  Surgeon: Frank Akins MD;  Location: [facility] OR;  Service: General;  Laterality: N/A;    THYROIDECTOMY, PARTIAL       partial for benign nodule    UPPER GASTROINTESTINAL ENDOSCOPY  03/04/2016    Dr. Whalen, in care everywhere       Reviewed past medical, family, social history and updated as appropriate.    Objective:     Ht 4' 11" (1.499 m)   Wt 74.6 kg (164 lb 9.2 oz)   BMI 33.24 kg/m²     BP Readings from Last 5 Encounters:   03/05/24 (!) 167/91   01/23/24 125/87   12/05/23 134/89   08/21/23 116/76   06/05/23 122/76       Body mass index is 33.24 kg/m².    Wt Readings from Last 3 Encounters:   03/15/24 0812 74.6 kg (164 lb 9.2 oz)   03/05/24 1011 73.9 kg (163 lb)   01/23/24 0859 74.3 kg (163 lb 14.4 oz)       Physical Exam  Constitutional:       General: She is not in acute distress.  Neck:      Comments: No thyromegaly, no goiter  Musculoskeletal:      Cervical back: Neck supple.   Lymphadenopathy:      Cervical: No cervical adenopathy.   Neurological:      Mental Status: She is alert. Mental status is at baseline.   Psychiatric:         Mood and Affect: Mood normal.         Behavior: Behavior normal.         Lab Review:   Lab Results   Component Value Date    HGBA1C 5.5 08/21/2023     Lab Results   Component Value Date    CHOL 196 06/02/2023    HDL 76 (H) 06/02/2023    LDLCALC 104.8 06/02/2023    TRIG 76 06/02/2023    CHOLHDL 38.8 06/02/2023     Lab Results   Component Value Date     03/05/2024    K 4.0 03/05/2024     03/05/2024    CO2 25 03/05/2024     (H) 03/05/2024    BUN 13 03/05/2024    CREATININE 0.73 03/05/2024    CALCIUM 9.6 03/05/2024    PROT 7.6 03/05/2024    ALBUMIN 4.6 03/05/2024    BILITOT 0.5 03/05/2024    ALKPHOS 102 03/05/2024    AST 49 (H) 03/05/2024    ALT 43 (H) 03/05/2024    " "ANIONGAP 8 03/05/2024    ESTGFRAFRICA >60.0 01/04/2022    EGFRNONAA >60.0 01/04/2022    TSH 1.752 01/23/2024     No results found for: "[id number]"  Lab Results   Component Value Date    WBC 4.97 03/05/2024    HGB 13.6 03/05/2024    HCT 40.4 03/05/2024    MCV 89 03/05/2024     03/05/2024         Assessment/Plan:     Problem List Items Addressed This Visit          Endocrine    Hypothyroidism     Biochemically euthyroid on 50 mcg Levothyroxine  Uncomplicated, can continue monitoring with her PCP         Hypoglycemia - Primary     Patient reporting symptoms of hypoglycemia associated with blood glucose levels in the 50s at home. Symptoms tend to occur 2-3 hours after meals and at times of high stress. Symptoms do not occur at night or after periods of prolonged fast or physical exertion. Her  is a diabetic but she denies taking his medication even on accident. No history of gastric bypass surgery.    - Her history does not suggest endogenous overproduction of insulin as you would expect her to have fasting hypoglycemia and hypoglycemia at night.     - Will plan to trial of blinded CGM with symptoms diary. Discussed importance of documenting timing of symptoms to match to to CGM data.         Relevant Orders    GLUCOSE MONITORING CONTINUOUS MIN 72 HOURS       Follow up if symptoms worsen or fail to improve.    Lonnie Davis DO  [facility] Department of Endocrinology    Office: [phone]  Fax: [phone]    The above history labs imaging impression and plan were discussed with attending physician who is in agreement and also took part in this patient's care.  I personally reviewed all of the patients available medications, labs, imaging, vitals, allergies, medical history.    "

## 2024-03-18 RX ORDER — POTASSIUM CHLORIDE 750 MG/1
10 CAPSULE, EXTENDED RELEASE ORAL
Qty: 90 CAPSULE | Refills: 3 | Status: SHIPPED | OUTPATIENT
Start: 2024-03-18

## 2024-03-18 NOTE — TELEPHONE ENCOUNTER
Care Due:                  Date            Visit Type   Department     Provider  --------------------------------------------------------------------------------                                MYCHART                              FOLLOWUP/OF  [facility] FAMILY  Last Visit: 01-      FICE VISIT   MEDICINE       Juan Buck  Next Visit: None Scheduled  None         None Found                                                            Last  Test          Frequency    Reason                     Performed    Due Date  --------------------------------------------------------------------------------    Lipid Panel.  12 months..  atorvastatin.............  06- 05-    [facility] Embedded Care Due Messages. Reference number: [id number].   3/17/2024 7:18:21 PM CDT

## 2024-03-20 ENCOUNTER — PATIENT MESSAGE (OUTPATIENT)
Dept: ENDOCRINOLOGY | Facility: CLINIC | Age: 69
End: 2024-03-20
Payer: MEDICARE

## 2024-03-26 ENCOUNTER — CLINICAL SUPPORT (OUTPATIENT)
Dept: ENDOCRINOLOGY | Facility: CLINIC | Age: 69
End: 2024-03-26
Payer: MEDICARE

## 2024-03-26 DIAGNOSIS — E16.2 HYPOGLYCEMIA: ICD-10-CM

## 2024-03-26 NOTE — PROGRESS NOTES
"PLACEMENT OF DEXCOM G6 PRO SENSOR  CONTINOUS GLUCOSE MONITORING SYSTEM (CGMS)     Patient is here in clinic today for placement of continuous glucose monitoring sensor.                Each patient verified that they were here for CGMS procedure ordered by their provider and that they have a working glucose meter and supplies at home.   Patient will be provided with a Dexcom G6 Pro sensor, transmitter, and a copy of the Continuous Glucose Monitoring Patient Log to fill out during the study.              A detailed  explanation of Continuous Glucose Monitoring was  provided. Patient informed that this is a blind procedure and that they will not actually see the blood sugar tracing in real time.    Instructed patient to check blood sugar using home glucometer and to record the following on provided patient log sheets:Blood sugar taken at home, Meals and snacks, Activity, and Diabetes medications taken and dosage               Patient was brought to a private location.  Site selected and prepared and allowed to dry. Glucose Transmitter Serial Number [id number]  was inserted to patient's abdomen.               The following forms  were given and reviewed in detail with patient and all questions answered.   Continuous Glucose Monitoring Patient Log   Dexcom G6 PRO Patient Handout "Blinded CGM Patient Handout"                 Instructions: Time: 15 min   Insertion of sensor:  Time: 5 minutes     "

## 2024-04-03 ENCOUNTER — CLINICAL SUPPORT (OUTPATIENT)
Dept: ENDOCRINOLOGY | Facility: CLINIC | Age: 69
End: 2024-04-03
Payer: MEDICARE

## 2024-04-03 ENCOUNTER — PATIENT MESSAGE (OUTPATIENT)
Dept: ENDOCRINOLOGY | Facility: CLINIC | Age: 69
End: 2024-04-03

## 2024-04-03 DIAGNOSIS — E16.2 HYPOGLYCEMIA: Primary | ICD-10-CM

## 2024-04-17 ENCOUNTER — PATIENT MESSAGE (OUTPATIENT)
Dept: ENDOCRINOLOGY | Facility: CLINIC | Age: 69
End: 2024-04-17
Payer: MEDICARE

## 2024-04-18 ENCOUNTER — TELEPHONE (OUTPATIENT)
Dept: ENDOCRINOLOGY | Facility: CLINIC | Age: 69
End: 2024-04-18
Payer: MEDICARE

## 2024-04-18 NOTE — PROGRESS NOTES
"  Spoke with patient over the phone to report results of proCGM. During the one recorded symptomatic episode patient was hyperglycemic >250. Her symptoms are likely independent of her blood glucose. Notably blood glucose on her home glucometer was in the 70s when the CGM was > 250. But as this is her husbands glucometer I am unable to replace it.   She recounted to me further medical history which includes a "leaky heart valve" and "a positive bubble study 10 years ago". We recommended she follow up with her cardiologist to see if her symptoms could be related.  "

## 2024-04-29 ENCOUNTER — PATIENT MESSAGE (OUTPATIENT)
Dept: FAMILY MEDICINE | Facility: CLINIC | Age: 69
End: 2024-04-29
Payer: MEDICARE

## 2024-04-29 DIAGNOSIS — I34.0 MILD MITRAL REGURGITATION BY PRIOR ECHOCARDIOGRAM: Primary | ICD-10-CM

## 2024-04-29 NOTE — TELEPHONE ENCOUNTER
"I found below in her records from Endocrine regarding evaluation of possible hypoglycemia.  Referral placed for Cardiology.    "Spoke with patient over the phone to report results of proCGM. During the one recorded symptomatic episode patient was hyperglycemic >250. Her symptoms are likely independent of her blood glucose. Notably blood glucose on her home glucometer was in the 70s when the CGM was > 250. But as this is her husbands glucometer I am unable to replace it.   She recounted to me further medical history which includes a "leaky heart valve" and "a positive bubble study 10 years ago". We recommended she follow up with her cardiologist to see if her symptoms could be related."  "

## 2024-06-04 ENCOUNTER — PATIENT MESSAGE (OUTPATIENT)
Dept: ALLERGY | Facility: CLINIC | Age: 69
End: 2024-06-04
Payer: MEDICARE

## 2024-06-12 ENCOUNTER — OFFICE VISIT (OUTPATIENT)
Dept: ALLERGY | Facility: CLINIC | Age: 69
End: 2024-06-12
Payer: MEDICARE

## 2024-06-12 VITALS — HEIGHT: 59 IN | WEIGHT: 165.56 LBS | BODY MASS INDEX: 33.38 KG/M2

## 2024-06-12 DIAGNOSIS — L50.8 CHRONIC URTICARIA: Primary | ICD-10-CM

## 2024-06-12 PROCEDURE — 99999 PR PBB SHADOW E&M-EST. PATIENT-LVL IV: CPT | Mod: PBBFAC,,, | Performed by: ALLERGY & IMMUNOLOGY

## 2024-06-12 PROCEDURE — 99215 OFFICE O/P EST HI 40 MIN: CPT | Mod: S$GLB,,, | Performed by: ALLERGY & IMMUNOLOGY

## 2024-06-12 PROCEDURE — 1126F AMNT PAIN NOTED NONE PRSNT: CPT | Mod: CPTII,S$GLB,, | Performed by: ALLERGY & IMMUNOLOGY

## 2024-06-12 PROCEDURE — 3008F BODY MASS INDEX DOCD: CPT | Mod: CPTII,S$GLB,, | Performed by: ALLERGY & IMMUNOLOGY

## 2024-06-12 PROCEDURE — 1159F MED LIST DOCD IN RCRD: CPT | Mod: CPTII,S$GLB,, | Performed by: ALLERGY & IMMUNOLOGY

## 2024-06-12 PROCEDURE — 1160F RVW MEDS BY RX/DR IN RCRD: CPT | Mod: CPTII,S$GLB,, | Performed by: ALLERGY & IMMUNOLOGY

## 2024-06-12 RX ORDER — PREDNISONE 10 MG/1
TABLET ORAL
Qty: 50 TABLET | Refills: 1 | Status: SHIPPED | OUTPATIENT
Start: 2024-06-12

## 2024-06-12 RX ORDER — FAMOTIDINE 20 MG/1
20 TABLET, FILM COATED ORAL 2 TIMES DAILY
Qty: 60 TABLET | Refills: 11 | Status: SHIPPED | OUTPATIENT
Start: 2024-06-12 | End: 2025-06-12

## 2024-06-12 NOTE — PATIENT INSTRUCTIONS
Take zyrtec 2 pills in morning and around dinner time  Take Pepcid 1 pill in morning and dinner time  Take benadryl between doses and at bedtime as needed    Prednisone taper and if hives not gone then will start Xolair

## 2024-06-12 NOTE — PROGRESS NOTES
Subjective:       Patient ID: Cheyenne West is a 69 y.o. female.    Chief Complaint:  Follow-up and Urticaria (Hives returning)      68 yo woman presents for continued evaluation of chronic urticaria. She was last seen 1/19/2022. She has 30+ year history of hives off and on. 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 She was on cetirizine 20 mg BID, famotidine 20 mg BID and not controlled so started Xolair 300 mg every 28 days. She was on this 1/30/2020-3/2022. She stopped when had knee surgery and was well so did not resume. She was hive free until 1 month ago. Then had few hives here and there, 1 week ago woke covered in hives. Has had all day every day since then, move around. No angioedema, no SOB but is fatigued. She is taking zyrtec and Claritin 1 each in day when is bad, taking Pepcid BID. Taking 1-2 benadryl as needed, took 2 in middle of night. She did go to  6/4 and was given steroid shot, gave mild relief for 1 day only  .    Prior History taken 12/4/2019: consult from Dr Juan Buck for chronic hives.  she states she has had them on and off for over 30 years. She has seen allergist in past and was on prednisone for while as was only thing that helps. Recently saw rheum and ruled out other autoimmune issues. She does have hypothyroid controlled on meds. Her hives are red raised and itch and burn. Will last entire day then fade and come back occ leaves a bruise after. No swelling. No SOB. Rheum prescribed sulfasalazine and plaquenil but she has not started. She takes benadryl prn but help off last few days. It does help. Claritin, zyrtec not as good but only took once a day in past. Never been on H 2 blocker. She had allergy test in past and told allergic to chocolate, almond, chicken and weed pollen but  none seems to be trigger. She has no rhinitis, asthma or eczema. No insect or latex allergy.         Environmental History:  see history section for home environment  Review of Systems   Constitutional:  Positive for fatigue.   HENT:  Negative for congestion, facial swelling, postnasal drip, rhinorrhea and sinus pressure.    Respiratory:  Negative for cough, chest tightness, shortness of breath and wheezing.    Skin:  Positive for color change and rash.        Objective:      Physical Exam  Vitals and nursing note reviewed.   Constitutional:       General: She is in acute distress (scratching through visit).      Appearance: Normal appearance. She is not ill-appearing.   HENT:      Nose: No rhinorrhea.   Eyes:      General:         Right eye: No discharge.         Left eye: No discharge.      Conjunctiva/sclera: Conjunctivae normal.   Pulmonary:      Effort: Pulmonary effort is normal. No respiratory distress.   Abdominal:      General: There is no distension.   Skin:     General: Skin is warm and dry.      Findings: Erythema and rash (scattered urticaria over amrs, legs, chest) present.   Neurological:      Mental Status: She is alert and oriented to person, place, and time.   Psychiatric:         Mood and Affect: Mood normal.         Behavior: Behavior normal.         Laboratory:   none performed   Assessment:       1. Chronic urticaria         Plan:       Pt with flare of chronic urticaria, will treat with prednisone taper. Change cetirizine 20 mg BID and continue famotidine 20 mg BID. Can take benadryl 25-50 mg every 4-6 hours as needed  If not controlled will start Xolair 300 mg every 28 days  Phone review 1 week to see how doing    I spent a total of 40 minutes on the day of the visit.  This includes face to face time and non-face to face time preparing to see the patient (eg, review of tests), obtaining and/or reviewing separately obtained history, documenting clinical information in the electronic or other health record, independently interpreting results and communicating results to the patient/family/caregiver, or care  coordinator.

## 2024-06-20 ENCOUNTER — PATIENT MESSAGE (OUTPATIENT)
Dept: ALLERGY | Facility: CLINIC | Age: 69
End: 2024-06-20
Payer: MEDICARE

## 2024-06-27 ENCOUNTER — HOSPITAL ENCOUNTER (OUTPATIENT)
Dept: RADIOLOGY | Facility: HOSPITAL | Age: 69
Discharge: HOME | End: 2024-06-27
Attending: FAMILY MEDICINE
Payer: MEDICARE

## 2024-06-27 DIAGNOSIS — Z12.31 ENCOUNTER FOR SCREENING MAMMOGRAM FOR BREAST CANCER: ICD-10-CM

## 2024-06-27 PROCEDURE — 77067 SCR MAMMO BI INCL CAD: CPT | Mod: TC,PO

## 2024-06-27 PROCEDURE — 77067 SCR MAMMO BI INCL CAD: CPT | Mod: 26,,, | Performed by: RADIOLOGY

## 2024-06-27 PROCEDURE — 77063 BREAST TOMOSYNTHESIS BI: CPT | Mod: 26,,, | Performed by: RADIOLOGY

## 2024-07-09 ENCOUNTER — PATIENT MESSAGE (OUTPATIENT)
Dept: ALLERGY | Facility: CLINIC | Age: 69
End: 2024-07-09
Payer: MEDICARE

## 2024-07-12 ENCOUNTER — PATIENT MESSAGE (OUTPATIENT)
Dept: ALLERGY | Facility: CLINIC | Age: 69
End: 2024-07-12
Payer: MEDICARE

## 2024-07-22 DIAGNOSIS — R06.02 SOB (SHORTNESS OF BREATH): Primary | ICD-10-CM

## 2024-07-23 ENCOUNTER — HOSPITAL ENCOUNTER (OUTPATIENT)
Dept: CARDIOLOGY | Facility: HOSPITAL | Age: 69
Discharge: HOME | End: 2024-07-23
Attending: INTERNAL MEDICINE
Payer: MEDICARE

## 2024-07-23 ENCOUNTER — OFFICE VISIT (OUTPATIENT)
Dept: CARDIOLOGY | Facility: CLINIC | Age: 69
End: 2024-07-23
Payer: MEDICARE

## 2024-07-23 VITALS
HEART RATE: 70 BPM | HEIGHT: 59 IN | WEIGHT: 165 LBS | BODY MASS INDEX: 33.26 KG/M2 | SYSTOLIC BLOOD PRESSURE: 140 MMHG | DIASTOLIC BLOOD PRESSURE: 68 MMHG

## 2024-07-23 DIAGNOSIS — R06.02 SOB (SHORTNESS OF BREATH): ICD-10-CM

## 2024-07-23 DIAGNOSIS — F41.1 GENERALIZED ANXIETY DISORDER: ICD-10-CM

## 2024-07-23 DIAGNOSIS — I10 ESSENTIAL HYPERTENSION: Primary | ICD-10-CM

## 2024-07-23 DIAGNOSIS — K21.00 GASTROESOPHAGEAL REFLUX DISEASE WITH ESOPHAGITIS WITHOUT HEMORRHAGE: ICD-10-CM

## 2024-07-23 DIAGNOSIS — I73.9 PERIPHERAL VASCULAR DISEASE, UNSPECIFIED: ICD-10-CM

## 2024-07-23 DIAGNOSIS — E78.00 PURE HYPERCHOLESTEROLEMIA: ICD-10-CM

## 2024-07-23 DIAGNOSIS — I34.0 MILD MITRAL REGURGITATION BY PRIOR ECHOCARDIOGRAM: ICD-10-CM

## 2024-07-23 PROCEDURE — 3077F SYST BP >= 140 MM HG: CPT | Mod: CPTII,S$GLB,, | Performed by: INTERNAL MEDICINE

## 2024-07-23 PROCEDURE — 99999 PR PBB SHADOW E&M-EST. PATIENT-LVL IV: CPT | Mod: PBBFAC,,, | Performed by: INTERNAL MEDICINE

## 2024-07-23 PROCEDURE — 99205 OFFICE O/P NEW HI 60 MIN: CPT | Mod: S$GLB,,, | Performed by: INTERNAL MEDICINE

## 2024-07-23 PROCEDURE — 1159F MED LIST DOCD IN RCRD: CPT | Mod: CPTII,S$GLB,, | Performed by: INTERNAL MEDICINE

## 2024-07-23 PROCEDURE — 3008F BODY MASS INDEX DOCD: CPT | Mod: CPTII,S$GLB,, | Performed by: INTERNAL MEDICINE

## 2024-07-23 PROCEDURE — 93010 ELECTROCARDIOGRAM REPORT: CPT | Mod: ,,, | Performed by: INTERNAL MEDICINE

## 2024-07-23 PROCEDURE — 1101F PT FALLS ASSESS-DOCD LE1/YR: CPT | Mod: CPTII,S$GLB,, | Performed by: INTERNAL MEDICINE

## 2024-07-23 PROCEDURE — 3288F FALL RISK ASSESSMENT DOCD: CPT | Mod: CPTII,S$GLB,, | Performed by: INTERNAL MEDICINE

## 2024-07-23 PROCEDURE — 93005 ELECTROCARDIOGRAM TRACING: CPT | Mod: PO

## 2024-07-23 PROCEDURE — 3078F DIAST BP <80 MM HG: CPT | Mod: CPTII,S$GLB,, | Performed by: INTERNAL MEDICINE

## 2024-07-23 PROCEDURE — 1126F AMNT PAIN NOTED NONE PRSNT: CPT | Mod: CPTII,S$GLB,, | Performed by: INTERNAL MEDICINE

## 2024-07-23 NOTE — PROGRESS NOTES
Subjective:   Patient ID:  Cheyenne West is a 69 y.o. female who presents for follow-up of Establish Care (SOB)  Pt presents for CV evaluation. Pt with chronic SOB x few years.No CP. Some dizziness  EKG normal. Pt with some palpitations 2x week, lasting 1 minute    CRF-HTN, HLP, age/female, family hx of CAD    Shortness of Breath  This is a chronic problem. The current episode started more than 1 year ago. The problem occurs intermittently. The problem has been waxing and waning. Pertinent negatives include no chest pain or leg swelling. Nothing aggravates the symptoms. She has tried rest for the symptoms. The treatment provided mild relief.   Palpitations   This is a new problem. The current episode started 1 to 4 weeks ago. The problem occurs intermittently. The problem has been waxing and waning. Nothing aggravates the symptoms. Associated symptoms include shortness of breath. Pertinent negatives include no chest pain or dizziness. She has tried nothing for the symptoms. Risk factors include post menopause and dyslipidemia.       Review of Systems   Constitutional: Negative. Negative for weight gain.   HENT: Negative.     Eyes: Negative.    Cardiovascular:  Positive for palpitations. Negative for chest pain and leg swelling.   Respiratory:  Positive for shortness of breath.    Endocrine: Negative.    Hematologic/Lymphatic: Negative.    Skin: Negative.    Musculoskeletal:  Negative for muscle weakness.   Gastrointestinal: Negative.    Genitourinary: Negative.    Neurological: Negative.  Negative for dizziness.   Psychiatric/Behavioral: Negative.     Allergic/Immunologic: Negative.    All other systems reviewed and are negative.    Family History   Problem Relation Name Age of Onset    Hypertension Mother      Heart disease Mother      Ulcers Mother      GI problems Mother          colitis    Hypertension Father      Breast cancer Paternal Grandmother      Breast cancer Cousin      Colon cancer Neg Hx       Crohn's disease Neg Hx      Colon polyps Neg Hx      Ulcerative colitis Neg Hx      Stomach cancer Neg Hx      Esophageal cancer Neg Hx      Allergic rhinitis Neg Hx      Allergies Neg Hx      Angioedema Neg Hx      Atopy Neg Hx      Eczema Neg Hx      Immunodeficiency Neg Hx      Rhinitis Neg Hx      Urticaria Neg Hx      Asthma Neg Hx       Past Medical History:   Diagnosis Date    Bilateral carpal tunnel syndrome 10/30/2023    Carpal tunnel syndrome of right wrist     Chronic gastritis     Chronic urticaria     COVID-19 virus infection 1/27/2022    Dissociative fugue     Diverticulosis     Duodenitis     Dyspepsia     Generalized anxiety disorder     Hearing loss on left     Hearing loss on right     Helicobacter pylori (H. pylori)     History of blood transfusion     Hyperlipidemia     Hypertension     Hypothyroidism     IBS (irritable bowel syndrome)     Iron deficiency 3/21/2019    Iron deficiency anemia     Mild mitral regurgitation by prior echocardiogram     Mild obesity     Mild vitamin D deficiency     Osteopenia 6/2/2023    Paraesophageal hiatal hernia     PONV (postoperative nausea and vomiting)     Reflux gastritis     RLS (restless legs syndrome) 2/23/2021    Sleep apnea     mild improved with wt. loss    Thyroid goiter     Urticaria, chronic      Social History     Socioeconomic History    Marital status:    Tobacco Use    Smoking status: Never    Smokeless tobacco: Never   Substance and Sexual Activity    Alcohol use: Yes     Comment: seldom    Drug use: No    Sexual activity: Yes     Social Determinants of Health     Financial Resource Strain: Low Risk  (1/23/2024)    Overall Financial Resource Strain (CARDIA)     Difficulty of Paying Living Expenses: Not hard at all   Food Insecurity: No Food Insecurity (1/23/2024)    Hunger Vital Sign     Worried About Running Out of Food in the Last Year: Never true     Ran Out of Food in the Last Year: Never true   Transportation Needs: No  Transportation Needs (1/23/2024)    PRAPARE - Transportation     Lack of Transportation (Medical): No     Lack of Transportation (Non-Medical): No   Physical Activity: Insufficiently Active (1/23/2024)    Exercise Vital Sign     Days of Exercise per Week: 2 days     Minutes of Exercise per Session: 30 min   Stress: Stress Concern Present (1/23/2024)    [employer] [place] [employer] - Occupational Stress Questionnaire     Feeling of Stress : Rather much   Housing Stability: Low Risk  (1/23/2024)    Housing Stability Vital Sign     Unable to Pay for Housing in the Last Year: No     Number of Places Lived in the Last Year: 1     Unstable Housing in the Last Year: No     Current Outpatient Medications on File Prior to Visit   Medication Sig Dispense Refill    ALPRAZolam (XANAX) 0.25 MG tablet Take 1 tablet (0.25 mg total) by mouth 3 (three) times daily as needed for Anxiety. 30 tablet 0    atorvastatin (LIPITOR) 10 MG tablet TAKE 1 TABLET ONE TIME DAILY 90 tablet 3    buPROPion (WELLBUTRIN XL) 300 MG 24 hr tablet TAKE 1 TABLET ONE TIME DAILY 90 tablet 3    CALCIUM CARBONATE/VITAMIN D3 (CALTRATE 600 + D ORAL) Take by mouth 2 (two) times daily.      cetirizine 10 mg Cap Take by oral route.      EScitalopram oxalate (LEXAPRO) 20 MG tablet TAKE 1 TABLET ONE TIME DAILY 90 tablet 3    estradiol (VAGIFEM) 10 mcg Tab Place 10 mcg vaginally.      famotidine (PEPCID) 20 MG tablet Take 1 tablet (20 mg total) by mouth 2 (two) times daily. 60 tablet 11    felodipine (PLENDIL) 5 MG 24 hr tablet TAKE 1 TABLET ONE TIME DAILY 90 tablet 3    levothyroxine (SYNTHROID) 50 MCG tablet TAKE 1 TABLET ONE TIME DAILY 90 tablet 3    olmesartan-hydrochlorothiazide (BENICAR HCT) 20-12.5 mg per tablet TAKE 1 TABLET EVERY DAY 90 tablet 2    OMEGA-3 FATTY ACIDS-EPA ORAL Take by mouth once daily.      pantoprazole (PROTONIX) 40 MG tablet TAKE 1 TABLET ONE TIME DAILY 90 tablet 3    potassium chloride (MICRO-K) 10 MEQ CpSR TAKE 1 CAPSULE ONE  TIME DAILY 90 capsule 3    predniSONE (DELTASONE) 10 MG tablet 4 tab daily for 2 days then 3 tab daily for 2 days then 2 tab daily for 2 days then 1 tab daily for 2 days then 1/2 tab daily 50 tablet 1    VIT A/VIT C/VIT E/ZINC/COPPER (PRESERVISION AREDS ORAL) Take by mouth once daily.      gabapentin (NEURONTIN) 300 MG capsule Take 1 capsule (300 mg total) by mouth every evening. (Patient not taking: Reported on 6/12/2024) 90 capsule 3    omalizumab (XOLAIR) 150 mg/mL injection Inject 2 mLs (300 mg total) into the skin every 28 days. (Patient not taking: Reported on 7/23/2024) 2 mL 12     No current facility-administered medications on file prior to visit.     Review of patient's allergies indicates:   Allergen Reactions    Penicillins        Objective:     Physical Exam  Vitals and nursing note reviewed.   Constitutional:       Appearance: She is well-developed.   HENT:      Head: Normocephalic and atraumatic.   Eyes:      Conjunctiva/sclera: Conjunctivae normal.      Pupils: Pupils are equal, round, and reactive to light.   Cardiovascular:      Rate and Rhythm: Normal rate and regular rhythm.      Pulses: Intact distal pulses.      Heart sounds: Normal heart sounds.   Pulmonary:      Effort: Pulmonary effort is normal.      Breath sounds: Normal breath sounds.   Abdominal:      General: Bowel sounds are normal.      Palpations: Abdomen is soft.   Musculoskeletal:         General: Normal range of motion.      Cervical back: Normal range of motion and neck supple.   Skin:     General: Skin is warm and dry.   Neurological:      Mental Status: She is alert and oriented to person, place, and time.         Assessment:     1. Essential hypertension    2. Mild mitral regurgitation by prior echocardiogram    3. Pure hypercholesterolemia    4. Gastroesophageal reflux disease with esophagitis without hemorrhage    5. Generalized anxiety disorder    6. Peripheral vascular disease, unspecified        Plan:     Essential  hypertension    Mild mitral regurgitation by prior echocardiogram  -     Ambulatory referral/consult to Cardiology    Pure hypercholesterolemia    Gastroesophageal reflux disease with esophagitis without hemorrhage    Generalized anxiety disorder    Peripheral vascular disease, unspecified      Continue plendil, olemsartan-hctz-HTN    Stress test, holter

## 2024-07-24 LAB
OHS QRS DURATION: 84 MS
OHS QTC CALCULATION: 431 MS

## 2024-08-01 ENCOUNTER — HOSPITAL ENCOUNTER (OUTPATIENT)
Dept: CARDIOLOGY | Facility: HOSPITAL | Age: 69
Discharge: HOME | End: 2024-08-01
Attending: INTERNAL MEDICINE
Payer: MEDICARE

## 2024-08-01 DIAGNOSIS — R06.02 SOB (SHORTNESS OF BREATH): ICD-10-CM

## 2024-08-01 PROCEDURE — 93018 CV STRESS TEST I&R ONLY: CPT | Mod: ,,, | Performed by: INTERNAL MEDICINE

## 2024-08-01 PROCEDURE — 93016 CV STRESS TEST SUPVJ ONLY: CPT | Mod: ,,, | Performed by: INTERNAL MEDICINE

## 2024-08-01 PROCEDURE — 93017 CV STRESS TEST TRACING ONLY: CPT | Mod: PO

## 2024-08-01 PROCEDURE — 93226 XTRNL ECG REC<48 HR SCAN A/R: CPT | Mod: PO

## 2024-08-02 LAB
CV STRESS BASE HR: 83 BPM
DIASTOLIC BLOOD PRESSURE: 95 MMHG
OHS CV CPX 1 MINUTE RECOVERY HEART RATE: 115 BPM
OHS CV CPX 85 PERCENT MAX PREDICTED HEART RATE MALE: 130
OHS CV CPX ESTIMATED METS: 7
OHS CV CPX MAX PREDICTED HEART RATE: 153
OHS CV CPX PATIENT IS FEMALE: 1
OHS CV CPX PATIENT IS MALE: 0
OHS CV CPX PEAK DIASTOLIC BLOOD PRESSURE: 92 MMHG
OHS CV CPX PEAK HEAR RATE: 123 BPM
OHS CV CPX PEAK RATE PRESSURE PRODUCT: NORMAL
OHS CV CPX PEAK SYSTOLIC BLOOD PRESSURE: 183 MMHG
OHS CV CPX PERCENT MAX PREDICTED HEART RATE ACHIEVED: 84
OHS CV CPX RATE PRESSURE PRODUCT PRESENTING: NORMAL
OHS CV EVENT MONITOR DAY: 0
OHS CV HOLTER LENGTH DECIMAL HOURS: 23.98
OHS CV HOLTER LENGTH HOURS: 23
OHS CV HOLTER LENGTH MINUTES: 59
OHS CV HOLTER SINUS AVERAGE HR: 78
OHS CV HOLTER SINUS MAX HR: 108
OHS CV HOLTER SINUS MIN HR: 62
STRESS ECHO POST EXERCISE DUR MIN: 6 MINUTES
STRESS ECHO POST EXERCISE DUR SEC: 59 SECONDS
SYSTOLIC BLOOD PRESSURE: 129 MMHG

## 2024-08-06 ENCOUNTER — TELEPHONE (OUTPATIENT)
Dept: CARDIOLOGY | Facility: CLINIC | Age: 69
End: 2024-08-06
Payer: MEDICARE

## 2024-08-09 ENCOUNTER — OFFICE VISIT (OUTPATIENT)
Dept: ORTHOPEDICS | Facility: CLINIC | Age: 69
End: 2024-08-09
Payer: MEDICARE

## 2024-08-09 ENCOUNTER — TELEPHONE (OUTPATIENT)
Dept: CARDIOLOGY | Facility: CLINIC | Age: 69
End: 2024-08-09
Payer: MEDICARE

## 2024-08-09 ENCOUNTER — HOSPITAL ENCOUNTER (OUTPATIENT)
Dept: RADIOLOGY | Facility: HOSPITAL | Age: 69
Discharge: HOME | End: 2024-08-09
Attending: ORTHOPAEDIC SURGERY
Payer: MEDICARE

## 2024-08-09 DIAGNOSIS — M79.644 PAIN OF FINGER OF RIGHT HAND: ICD-10-CM

## 2024-08-09 DIAGNOSIS — M79.644 PAIN OF FINGER OF RIGHT HAND: Primary | ICD-10-CM

## 2024-08-09 DIAGNOSIS — S63.289A DISLOCATION OF PROXIMAL INTERPHALANGEAL JOINT OF FINGER, INITIAL ENCOUNTER: ICD-10-CM

## 2024-08-09 PROCEDURE — 99999 PR PBB SHADOW E&M-EST. PATIENT-LVL III: CPT | Mod: PBBFAC,,, | Performed by: ORTHOPAEDIC SURGERY

## 2024-08-09 PROCEDURE — 73140 X-RAY EXAM OF FINGER(S): CPT | Mod: TC,PO,RT

## 2024-08-09 PROCEDURE — 73140 X-RAY EXAM OF FINGER(S): CPT | Mod: 26,RT,, | Performed by: RADIOLOGY

## 2024-08-10 DIAGNOSIS — E78.5 HYPERLIPIDEMIA, UNSPECIFIED HYPERLIPIDEMIA TYPE: Primary | ICD-10-CM

## 2024-08-10 DIAGNOSIS — I10 HYPERTENSION, UNSPECIFIED TYPE: ICD-10-CM

## 2024-08-10 NOTE — TELEPHONE ENCOUNTER
Care Due:                  Date            Visit Type   Department     Provider  --------------------------------------------------------------------------------                                MYCHART                              FOLLOWUP/OF  [facility] FAMILY  Last Visit: 01-      FICE VISIT   MEDICINE       Juan Buck  Next Visit: None Scheduled  None         None Found                                                            Last  Test          Frequency    Reason                     Performed    Due Date  --------------------------------------------------------------------------------    Lipid Panel.  12 months..  atorvastatin.............  06- 05-    [facility] Embedded Care Due Messages. Reference number: [id number].   8/10/2024 1:05:41 AM CDT

## 2024-08-11 ENCOUNTER — HOSPITAL ENCOUNTER (OUTPATIENT)
Dept: RADIOLOGY | Facility: HOSPITAL | Age: 69
Discharge: HOME | End: 2024-08-11
Attending: ORTHOPAEDIC SURGERY
Payer: MEDICARE

## 2024-08-11 DIAGNOSIS — S63.289A DISLOCATION OF PROXIMAL INTERPHALANGEAL JOINT OF FINGER, INITIAL ENCOUNTER: ICD-10-CM

## 2024-08-11 PROCEDURE — 73218 MRI UPPER EXTREMITY W/O DYE: CPT | Mod: TC,RT

## 2024-08-11 PROCEDURE — 73218 MRI UPPER EXTREMITY W/O DYE: CPT | Mod: 26,RT,, | Performed by: RADIOLOGY

## 2024-08-12 ENCOUNTER — OFFICE VISIT (OUTPATIENT)
Dept: ORTHOPEDICS | Facility: CLINIC | Age: 69
End: 2024-08-12
Payer: MEDICARE

## 2024-08-12 ENCOUNTER — PATIENT MESSAGE (OUTPATIENT)
Dept: FAMILY MEDICINE | Facility: CLINIC | Age: 69
End: 2024-08-12
Payer: MEDICARE

## 2024-08-12 DIAGNOSIS — S63.289A DISLOCATION OF PROXIMAL INTERPHALANGEAL JOINT OF FINGER, INITIAL ENCOUNTER: Primary | ICD-10-CM

## 2024-08-12 PROCEDURE — 99999 PR PBB SHADOW E&M-EST. PATIENT-LVL III: CPT | Mod: PBBFAC,,, | Performed by: ORTHOPAEDIC SURGERY

## 2024-08-12 PROCEDURE — 3288F FALL RISK ASSESSMENT DOCD: CPT | Mod: CPTII,S$GLB,, | Performed by: ORTHOPAEDIC SURGERY

## 2024-08-12 PROCEDURE — 99213 OFFICE O/P EST LOW 20 MIN: CPT | Mod: S$GLB,,, | Performed by: ORTHOPAEDIC SURGERY

## 2024-08-12 PROCEDURE — 1101F PT FALLS ASSESS-DOCD LE1/YR: CPT | Mod: CPTII,S$GLB,, | Performed by: ORTHOPAEDIC SURGERY

## 2024-08-12 PROCEDURE — 1159F MED LIST DOCD IN RCRD: CPT | Mod: CPTII,S$GLB,, | Performed by: ORTHOPAEDIC SURGERY

## 2024-08-12 PROCEDURE — 1125F AMNT PAIN NOTED PAIN PRSNT: CPT | Mod: CPTII,S$GLB,, | Performed by: ORTHOPAEDIC SURGERY

## 2024-08-12 RX ORDER — OLMESARTAN MEDOXOMIL AND HYDROCHLOROTHIAZIDE 20/12.5 20; 12.5 MG/1; MG/1
TABLET ORAL
Qty: 90 TABLET | Refills: 1 | Status: SHIPPED | OUTPATIENT
Start: 2024-08-12

## 2024-08-12 RX ORDER — CEFAZOLIN SODIUM 2 G/50ML
2 SOLUTION INTRAVENOUS
Status: CANCELLED | OUTPATIENT
Start: 2024-08-12

## 2024-08-12 RX ORDER — FELODIPINE 5 MG/1
5 TABLET, EXTENDED RELEASE ORAL
Qty: 90 TABLET | Refills: 1 | Status: SHIPPED | OUTPATIENT
Start: 2024-08-12

## 2024-08-12 RX ORDER — ESCITALOPRAM OXALATE 20 MG/1
20 TABLET ORAL
Qty: 90 TABLET | Refills: 1 | Status: SHIPPED | OUTPATIENT
Start: 2024-08-12

## 2024-08-12 RX ORDER — BUPROPION HYDROCHLORIDE 300 MG/1
300 TABLET ORAL
Qty: 90 TABLET | Refills: 1 | Status: SHIPPED | OUTPATIENT
Start: 2024-08-12

## 2024-08-12 RX ORDER — ATORVASTATIN CALCIUM 10 MG/1
10 TABLET, FILM COATED ORAL
Qty: 90 TABLET | Refills: 1 | Status: SHIPPED | OUTPATIENT
Start: 2024-08-12

## 2024-08-12 RX ORDER — LEVOTHYROXINE SODIUM 50 UG/1
50 TABLET ORAL
Qty: 90 TABLET | Refills: 1 | Status: SHIPPED | OUTPATIENT
Start: 2024-08-12

## 2024-08-12 RX ORDER — MUPIROCIN 20 MG/G
OINTMENT TOPICAL
Status: CANCELLED | OUTPATIENT
Start: 2024-08-12

## 2024-08-12 RX ORDER — PANTOPRAZOLE SODIUM 40 MG/1
40 TABLET, DELAYED RELEASE ORAL
Qty: 90 TABLET | Refills: 1 | Status: SHIPPED | OUTPATIENT
Start: 2024-08-12

## 2024-08-12 NOTE — PROGRESS NOTES
2024    Chief Complaint:  Chief Complaint   Patient presents with    Right Hand - Pain     MRI results       HPI:  Cheyenne West is a 69 y.o. female, who presents to clinic today she has a history of right small finger PIP joint dislocation.  There was a concern over palmar plate injury versus mallet deformity with swan necking.  He was here today for follow-up after having an MRI performed    PMHX:  Past Medical History:   Diagnosis Date    Bilateral carpal tunnel syndrome 10/30/2023    Carpal tunnel syndrome of right wrist     Chronic gastritis     Chronic urticaria     COVID-19 virus infection 2022    Dissociative fugue     Diverticulosis     Duodenitis     Dyspepsia     Generalized anxiety disorder     Hearing loss on left     Hearing loss on right     Helicobacter pylori (H. pylori)     History of blood transfusion     Hyperlipidemia     Hypertension     Hypothyroidism     IBS (irritable bowel syndrome)     Iron deficiency 3/21/2019    Iron deficiency anemia     Mild mitral regurgitation by prior echocardiogram     Mild obesity     Mild vitamin D deficiency     Osteopenia 2023    Paraesophageal hiatal hernia     PONV (postoperative nausea and vomiting)     Reflux gastritis     RLS (restless legs syndrome) 2021    Sleep apnea     mild improved with wt. loss    Thyroid goiter     Urticaria, chronic        PSHX:  Past Surgical History:   Procedure Laterality Date    ARTHROSCOPY OF KNEE Left 2022    Procedure: ARTHROSCOPY, KNEE;  Surgeon: Davion Massey MD;  Location: [facility] OR;  Service: Orthopedics;  Laterality: Left;    BLADDER SUSPENSION      pubovaginal sling     SECTION, CLASSIC      CHOLECYSTECTOMY      COLONOSCOPY  2011    Dr. Goode, in legacy:diverticulosis, hemorrhoids, melanosis coli-repeat 10 years    COLONOSCOPY N/A 2018    Procedure: COLONOSCOPY;  Surgeon: Rusty Vogt Jr., MD;  Location: [facility] ENDO;  Service: Endoscopy;  Laterality: N/A;  repeat in 10 years for screening    ESOPHAGEAL DILATION      ESOPHAGOGASTRODUODENOSCOPY  03/04/2016    ESOPHAGOGASTRODUODENOSCOPY N/A 9/6/2018    Procedure: EGD (ESOPHAGOGASTRODUODENOSCOPY);  Surgeon: Rusty Vogt Jr., MD;  Location: [facility] ENDO;  Service: Endoscopy;  Laterality: N/A;    HERNIA REPAIR      INTRALUMINAL GASTROINTESTINAL TRACT IMAGING VIA CAPSULE N/A 9/26/2018    Procedure: IMAGING PROCEDURE, GI TRACT, INTRALUMINAL, VIA CAPSULE;  Surgeon: Loraine Velazquez MD;  Location: [facility] ENDO;  Service: Endoscopy;  Laterality: N/A;    LAPAROSCOPIC NISSEN FUNDOPLICATION N/A 11/29/2018    Procedure: FUNDOPLICATION, NISSEN, LAPAROSCOPIC;  Surgeon: Frank Akins MD;  Location: [facility] OR;  Service: General;  Laterality: N/A;    THYROIDECTOMY, PARTIAL       partial for benign nodule    UPPER GASTROINTESTINAL ENDOSCOPY  03/04/2016    Dr. Whalen, in care everywhere       FMHX:  Family History   Problem Relation Name Age of Onset    Hypertension Mother      Heart disease Mother      Ulcers Mother      GI problems Mother          colitis    Hypertension Father      Breast cancer Paternal Grandmother      Breast cancer Cousin      Colon cancer Neg Hx      Crohn's disease Neg Hx      Colon polyps Neg Hx      Ulcerative colitis Neg Hx      Stomach cancer Neg Hx      Esophageal cancer Neg Hx      Allergic rhinitis Neg Hx      Allergies Neg Hx      Angioedema Neg Hx      Atopy Neg Hx      Eczema Neg Hx      Immunodeficiency Neg Hx      Rhinitis Neg Hx      Urticaria Neg Hx      Asthma Neg Hx         SOCHX:  Social History     Tobacco Use    Smoking status: Never    Smokeless tobacco: Never   Substance Use Topics    Alcohol use: Yes     Comment: seldom       ALLERGIES:  Penicillins    CURRENT MEDICATIONS:  Current Outpatient Medications on File Prior to Visit   Medication Sig Dispense Refill    ALPRAZolam (XANAX) 0.25 MG tablet Take 1 tablet (0.25 mg total) by mouth 3 (three) times daily as needed for Anxiety. 30 tablet 0     atorvastatin (LIPITOR) 10 MG tablet TAKE 1 TABLET ONE TIME DAILY 90 tablet 3    buPROPion (WELLBUTRIN XL) 300 MG 24 hr tablet TAKE 1 TABLET ONE TIME DAILY 90 tablet 3    CALCIUM CARBONATE/VITAMIN D3 (CALTRATE 600 + D ORAL) Take by mouth 2 (two) times daily.      cetirizine 10 mg Cap Take by oral route.      EScitalopram oxalate (LEXAPRO) 20 MG tablet TAKE 1 TABLET ONE TIME DAILY 90 tablet 3    estradiol (VAGIFEM) 10 mcg Tab Place 10 mcg vaginally.      famotidine (PEPCID) 20 MG tablet Take 1 tablet (20 mg total) by mouth 2 (two) times daily. 60 tablet 11    felodipine (PLENDIL) 5 MG 24 hr tablet TAKE 1 TABLET ONE TIME DAILY 90 tablet 3    gabapentin (NEURONTIN) 300 MG capsule Take 1 capsule (300 mg total) by mouth every evening. 90 capsule 3    levothyroxine (SYNTHROID) 50 MCG tablet TAKE 1 TABLET ONE TIME DAILY 90 tablet 3    olmesartan-hydrochlorothiazide (BENICAR HCT) 20-12.5 mg per tablet TAKE 1 TABLET EVERY DAY 90 tablet 2    omalizumab (XOLAIR) 150 mg/mL injection Inject 2 mLs (300 mg total) into the skin every 28 days. 2 mL 12    OMEGA-3 FATTY ACIDS-EPA ORAL Take by mouth once daily.      pantoprazole (PROTONIX) 40 MG tablet TAKE 1 TABLET ONE TIME DAILY 90 tablet 3    potassium chloride (MICRO-K) 10 MEQ CpSR TAKE 1 CAPSULE ONE TIME DAILY 90 capsule 3    predniSONE (DELTASONE) 10 MG tablet 4 tab daily for 2 days then 3 tab daily for 2 days then 2 tab daily for 2 days then 1 tab daily for 2 days then 1/2 tab daily 50 tablet 1    VIT A/VIT C/VIT E/ZINC/COPPER (PRESERVISION AREDS ORAL) Take by mouth once daily.       No current facility-administered medications on file prior to visit.       REVIEW OF SYSTEMS:  ROS    GENERAL PHYSICAL EXAM:   There were no vitals taken for this visit.   GEN: well developed, well nourished, no acute distress   HENT: Normocephalic, atraumatic   EYES: No discharge, conjunctiva normal   NECK: Supple, non-tender   PULM: No wheezing, no respiratory distress   CV: RRR   ABD: Soft,  non-tender    ORTHO EXAM:   Examination of the right small finger reveals that there is a hyperextension deformity at the PIP joint.  The D IP joint does have a 10 degree extensor lag.  She was able to actively flex and extend at the distal interphalangeal joint.    RADIOLOGY:   MRI of the right small finger has been reviewed.  This exam is significantly limited due to the number of sequences available.  There is also some motion artifact.  There is hyperextension at the PIP joint.  The visible portions of the extensor mechanism including the terminal tendon appeared to be intact.  Volar plate is not visible on this exam    ASSESSMENT:   Right small finger PIP joint dislocation with volar plate injury    PLAN:  1. I have discussed treatment with the patient.  I have discussed the possibility of splinting and flexion versus repair of the palmar plate.  After discussion of the risks and benefits we have obtained informed consent for repair of the palmar plate surgically.    2. The patient is still mildly undecided about the surgery and therefore we will have all the paperwork done.  If she decides that she does not want to go through with the surgery she will contact our office to cancel that has a     3. We will otherwise proceed with right small finger open treatment of proximal interphalangeal joint dislocation and volar plate repair under general anesthesia    4.  She will follow up with me 2 weeks after the surgery

## 2024-08-12 NOTE — PATIENT INSTRUCTIONS
Surgery Instructions:     Your surgery is scheduled on 8/16/2024 at the surgery center: [address], 1st floor, second entrance.    The pre-op department will be in contact with you prior to your procedure to review medications and instructions.       Nothing to eat or drink after midnight prior to day of surgery.    You should STOP taking any blood thinners 5 days prior to surgery.     The surgery center will contact you the day prior to surgery to advise you of your arrival time for surgery.     Your post op appointment is scheduled on 8/30 at 11:00.

## 2024-08-12 NOTE — H&P (VIEW-ONLY)
2024    Chief Complaint:  Chief Complaint   Patient presents with    Right Hand - Pain     MRI results       HPI:  Cheyenne West is a 69 y.o. female, who presents to clinic today she has a history of right small finger PIP joint dislocation.  There was a concern over palmar plate injury versus mallet deformity with swan necking.  He was here today for follow-up after having an MRI performed    PMHX:  Past Medical History:   Diagnosis Date    Bilateral carpal tunnel syndrome 10/30/2023    Carpal tunnel syndrome of right wrist     Chronic gastritis     Chronic urticaria     COVID-19 virus infection 2022    Dissociative fugue     Diverticulosis     Duodenitis     Dyspepsia     Generalized anxiety disorder     Hearing loss on left     Hearing loss on right     Helicobacter pylori (H. pylori)     History of blood transfusion     Hyperlipidemia     Hypertension     Hypothyroidism     IBS (irritable bowel syndrome)     Iron deficiency 3/21/2019    Iron deficiency anemia     Mild mitral regurgitation by prior echocardiogram     Mild obesity     Mild vitamin D deficiency     Osteopenia 2023    Paraesophageal hiatal hernia     PONV (postoperative nausea and vomiting)     Reflux gastritis     RLS (restless legs syndrome) 2021    Sleep apnea     mild improved with wt. loss    Thyroid goiter     Urticaria, chronic        PSHX:  Past Surgical History:   Procedure Laterality Date    ARTHROSCOPY OF KNEE Left 2022    Procedure: ARTHROSCOPY, KNEE;  Surgeon: Davion Massey MD;  Location: [facility] OR;  Service: Orthopedics;  Laterality: Left;    BLADDER SUSPENSION      pubovaginal sling     SECTION, CLASSIC      CHOLECYSTECTOMY      COLONOSCOPY  2011    Dr. Goode, in legacy:diverticulosis, hemorrhoids, melanosis coli-repeat 10 years    COLONOSCOPY N/A 2018    Procedure: COLONOSCOPY;  Surgeon: Rusty Vogt Jr., MD;  Location: [facility] ENDO;  Service: Endoscopy;  Laterality: N/A;  repeat in 10 years for screening    ESOPHAGEAL DILATION      ESOPHAGOGASTRODUODENOSCOPY  03/04/2016    ESOPHAGOGASTRODUODENOSCOPY N/A 9/6/2018    Procedure: EGD (ESOPHAGOGASTRODUODENOSCOPY);  Surgeon: Rusty Vogt Jr., MD;  Location: [facility] ENDO;  Service: Endoscopy;  Laterality: N/A;    HERNIA REPAIR      INTRALUMINAL GASTROINTESTINAL TRACT IMAGING VIA CAPSULE N/A 9/26/2018    Procedure: IMAGING PROCEDURE, GI TRACT, INTRALUMINAL, VIA CAPSULE;  Surgeon: Loraine Velazquez MD;  Location: [facility] ENDO;  Service: Endoscopy;  Laterality: N/A;    LAPAROSCOPIC NISSEN FUNDOPLICATION N/A 11/29/2018    Procedure: FUNDOPLICATION, NISSEN, LAPAROSCOPIC;  Surgeon: Frank Akins MD;  Location: [facility] OR;  Service: General;  Laterality: N/A;    THYROIDECTOMY, PARTIAL       partial for benign nodule    UPPER GASTROINTESTINAL ENDOSCOPY  03/04/2016    Dr. Whalen, in care everywhere       FMHX:  Family History   Problem Relation Name Age of Onset    Hypertension Mother      Heart disease Mother      Ulcers Mother      GI problems Mother          colitis    Hypertension Father      Breast cancer Paternal Grandmother      Breast cancer Cousin      Colon cancer Neg Hx      Crohn's disease Neg Hx      Colon polyps Neg Hx      Ulcerative colitis Neg Hx      Stomach cancer Neg Hx      Esophageal cancer Neg Hx      Allergic rhinitis Neg Hx      Allergies Neg Hx      Angioedema Neg Hx      Atopy Neg Hx      Eczema Neg Hx      Immunodeficiency Neg Hx      Rhinitis Neg Hx      Urticaria Neg Hx      Asthma Neg Hx         SOCHX:  Social History     Tobacco Use    Smoking status: Never    Smokeless tobacco: Never   Substance Use Topics    Alcohol use: Yes     Comment: seldom       ALLERGIES:  Penicillins    CURRENT MEDICATIONS:  Current Outpatient Medications on File Prior to Visit   Medication Sig Dispense Refill    ALPRAZolam (XANAX) 0.25 MG tablet Take 1 tablet (0.25 mg total) by mouth 3 (three) times daily as needed for Anxiety. 30 tablet 0     atorvastatin (LIPITOR) 10 MG tablet TAKE 1 TABLET ONE TIME DAILY 90 tablet 3    buPROPion (WELLBUTRIN XL) 300 MG 24 hr tablet TAKE 1 TABLET ONE TIME DAILY 90 tablet 3    CALCIUM CARBONATE/VITAMIN D3 (CALTRATE 600 + D ORAL) Take by mouth 2 (two) times daily.      cetirizine 10 mg Cap Take by oral route.      EScitalopram oxalate (LEXAPRO) 20 MG tablet TAKE 1 TABLET ONE TIME DAILY 90 tablet 3    estradiol (VAGIFEM) 10 mcg Tab Place 10 mcg vaginally.      famotidine (PEPCID) 20 MG tablet Take 1 tablet (20 mg total) by mouth 2 (two) times daily. 60 tablet 11    felodipine (PLENDIL) 5 MG 24 hr tablet TAKE 1 TABLET ONE TIME DAILY 90 tablet 3    gabapentin (NEURONTIN) 300 MG capsule Take 1 capsule (300 mg total) by mouth every evening. 90 capsule 3    levothyroxine (SYNTHROID) 50 MCG tablet TAKE 1 TABLET ONE TIME DAILY 90 tablet 3    olmesartan-hydrochlorothiazide (BENICAR HCT) 20-12.5 mg per tablet TAKE 1 TABLET EVERY DAY 90 tablet 2    omalizumab (XOLAIR) 150 mg/mL injection Inject 2 mLs (300 mg total) into the skin every 28 days. 2 mL 12    OMEGA-3 FATTY ACIDS-EPA ORAL Take by mouth once daily.      pantoprazole (PROTONIX) 40 MG tablet TAKE 1 TABLET ONE TIME DAILY 90 tablet 3    potassium chloride (MICRO-K) 10 MEQ CpSR TAKE 1 CAPSULE ONE TIME DAILY 90 capsule 3    predniSONE (DELTASONE) 10 MG tablet 4 tab daily for 2 days then 3 tab daily for 2 days then 2 tab daily for 2 days then 1 tab daily for 2 days then 1/2 tab daily 50 tablet 1    VIT A/VIT C/VIT E/ZINC/COPPER (PRESERVISION AREDS ORAL) Take by mouth once daily.       No current facility-administered medications on file prior to visit.       REVIEW OF SYSTEMS:  ROS    GENERAL PHYSICAL EXAM:   There were no vitals taken for this visit.   GEN: well developed, well nourished, no acute distress   HENT: Normocephalic, atraumatic   EYES: No discharge, conjunctiva normal   NECK: Supple, non-tender   PULM: No wheezing, no respiratory distress   CV: RRR   ABD: Soft,  non-tender    ORTHO EXAM:   Examination of the right small finger reveals that there is a hyperextension deformity at the PIP joint.  The D IP joint does have a 10 degree extensor lag.  She was able to actively flex and extend at the distal interphalangeal joint.    RADIOLOGY:   MRI of the right small finger has been reviewed.  This exam is significantly limited due to the number of sequences available.  There is also some motion artifact.  There is hyperextension at the PIP joint.  The visible portions of the extensor mechanism including the terminal tendon appeared to be intact.  Volar plate is not visible on this exam    ASSESSMENT:   Right small finger PIP joint dislocation with volar plate injury    PLAN:  1. I have discussed treatment with the patient.  I have discussed the possibility of splinting and flexion versus repair of the palmar plate.  After discussion of the risks and benefits we have obtained informed consent for repair of the palmar plate surgically.    2. The patient is still mildly undecided about the surgery and therefore we will have all the paperwork done.  If she decides that she does not want to go through with the surgery she will contact our office to cancel that has a     3. We will otherwise proceed with right small finger open treatment of proximal interphalangeal joint dislocation and volar plate repair under general anesthesia    4.  She will follow up with me 2 weeks after the surgery

## 2024-08-15 ENCOUNTER — ANESTHESIA EVENT (OUTPATIENT)
Dept: SURGERY | Facility: HOSPITAL | Age: 69
End: 2024-08-15
Payer: MEDICARE

## 2024-08-16 ENCOUNTER — HOSPITAL ENCOUNTER (OUTPATIENT)
Dept: RADIOLOGY | Facility: HOSPITAL | Age: 69
Discharge: HOME | End: 2024-08-16
Attending: ORTHOPAEDIC SURGERY | Admitting: ORTHOPAEDIC SURGERY
Payer: MEDICARE

## 2024-08-16 ENCOUNTER — ANESTHESIA (OUTPATIENT)
Dept: SURGERY | Facility: HOSPITAL | Age: 69
End: 2024-08-16
Payer: MEDICARE

## 2024-08-16 ENCOUNTER — HOSPITAL ENCOUNTER (OUTPATIENT)
Facility: HOSPITAL | Age: 69
Discharge: HOME | End: 2024-08-16
Attending: ORTHOPAEDIC SURGERY | Admitting: ORTHOPAEDIC SURGERY
Payer: MEDICARE

## 2024-08-16 DIAGNOSIS — S63.289A DISLOCATION OF PROXIMAL INTERPHALANGEAL JOINT OF FINGER, INITIAL ENCOUNTER: ICD-10-CM

## 2024-08-16 DIAGNOSIS — M79.644 FINGER PAIN, RIGHT: ICD-10-CM

## 2024-08-16 PROCEDURE — 36000709 HC OR TIME LEV III EA ADD 15 MIN: Mod: PO | Performed by: ORTHOPAEDIC SURGERY

## 2024-08-16 PROCEDURE — 25000003 PHARM REV CODE 250: Mod: PO | Performed by: ORTHOPAEDIC SURGERY

## 2024-08-16 PROCEDURE — 63600175 PHARM REV CODE 636 W HCPCS: Mod: PO | Performed by: NURSE ANESTHETIST, CERTIFIED REGISTERED

## 2024-08-16 PROCEDURE — 37000008 HC ANESTHESIA 1ST 15 MINUTES: Mod: PO | Performed by: ORTHOPAEDIC SURGERY

## 2024-08-16 PROCEDURE — 76000 FLUOROSCOPY <1 HR PHYS/QHP: CPT | Mod: TC,PO

## 2024-08-16 PROCEDURE — 71000015 HC POSTOP RECOV 1ST HR: Mod: PO | Performed by: ORTHOPAEDIC SURGERY

## 2024-08-16 PROCEDURE — 36000708 HC OR TIME LEV III 1ST 15 MIN: Mod: PO | Performed by: ORTHOPAEDIC SURGERY

## 2024-08-16 PROCEDURE — 25000003 PHARM REV CODE 250: Mod: PO | Performed by: NURSE ANESTHETIST, CERTIFIED REGISTERED

## 2024-08-16 PROCEDURE — 37000009 HC ANESTHESIA EA ADD 15 MINS: Mod: PO | Performed by: ORTHOPAEDIC SURGERY

## 2024-08-16 PROCEDURE — 27200651 HC AIRWAY, LMA: Mod: PO | Performed by: ANESTHESIOLOGY

## 2024-08-16 PROCEDURE — 63600175 PHARM REV CODE 636 W HCPCS: Mod: PO | Performed by: ANESTHESIOLOGY

## 2024-08-16 PROCEDURE — C1713 ANCHOR/SCREW BN/BN,TIS/BN: HCPCS | Mod: PO | Performed by: ORTHOPAEDIC SURGERY

## 2024-08-16 PROCEDURE — 63600175 PHARM REV CODE 636 W HCPCS: Mod: JZ,JG,PO | Performed by: ORTHOPAEDIC SURGERY

## 2024-08-16 PROCEDURE — 26548 RECONSTRUCT FINGER JOINT: CPT | Mod: F9,,, | Performed by: ORTHOPAEDIC SURGERY

## 2024-08-16 PROCEDURE — 71000033 HC RECOVERY, INTIAL HOUR: Mod: PO | Performed by: ORTHOPAEDIC SURGERY

## 2024-08-16 DEVICE — ANCHOR SUT 3-0 FW KWIRE 1.35MM: Type: IMPLANTABLE DEVICE | Site: FINGER | Status: FUNCTIONAL

## 2024-08-16 RX ORDER — FENTANYL CITRATE 50 UG/ML
INJECTION, SOLUTION INTRAMUSCULAR; INTRAVENOUS
Status: DISCONTINUED | OUTPATIENT
Start: 2024-08-16 | End: 2024-08-16

## 2024-08-16 RX ORDER — SODIUM CHLORIDE, SODIUM LACTATE, POTASSIUM CHLORIDE, CALCIUM CHLORIDE 600; 310; 30; 20 MG/100ML; MG/100ML; MG/100ML; MG/100ML
INJECTION, SOLUTION INTRAVENOUS CONTINUOUS
Status: DISCONTINUED | OUTPATIENT
Start: 2024-08-16 | End: 2024-08-16 | Stop reason: HOSPADM

## 2024-08-16 RX ORDER — CEFAZOLIN SODIUM 1 G/3ML
INJECTION, POWDER, FOR SOLUTION INTRAMUSCULAR; INTRAVENOUS
Status: DISCONTINUED | OUTPATIENT
Start: 2024-08-16 | End: 2024-08-16

## 2024-08-16 RX ORDER — DEXAMETHASONE SODIUM PHOSPHATE 4 MG/ML
INJECTION, SOLUTION INTRA-ARTICULAR; INTRALESIONAL; INTRAMUSCULAR; INTRAVENOUS; SOFT TISSUE
Status: DISCONTINUED | OUTPATIENT
Start: 2024-08-16 | End: 2024-08-16

## 2024-08-16 RX ORDER — ONDANSETRON 8 MG/1
8 TABLET, ORALLY DISINTEGRATING ORAL EVERY 8 HOURS PRN
Qty: 2 TABLET | Refills: 0 | Status: SHIPPED | OUTPATIENT
Start: 2024-08-16

## 2024-08-16 RX ORDER — MIDAZOLAM HYDROCHLORIDE 1 MG/ML
INJECTION INTRAMUSCULAR; INTRAVENOUS
Status: DISCONTINUED | OUTPATIENT
Start: 2024-08-16 | End: 2024-08-16

## 2024-08-16 RX ORDER — CEFAZOLIN SODIUM 2 G/50ML
2 SOLUTION INTRAVENOUS
Status: DISCONTINUED | OUTPATIENT
Start: 2024-08-16 | End: 2024-08-16 | Stop reason: HOSPADM

## 2024-08-16 RX ORDER — LIDOCAINE HYDROCHLORIDE 10 MG/ML
INJECTION, SOLUTION INFILTRATION; PERINEURAL
Status: DISCONTINUED | OUTPATIENT
Start: 2024-08-16 | End: 2024-08-16 | Stop reason: HOSPADM

## 2024-08-16 RX ORDER — BUPIVACAINE HYDROCHLORIDE 2.5 MG/ML
INJECTION, SOLUTION EPIDURAL; INFILTRATION; INTRACAUDAL
Status: DISCONTINUED | OUTPATIENT
Start: 2024-08-16 | End: 2024-08-16 | Stop reason: HOSPADM

## 2024-08-16 RX ORDER — LIDOCAINE HYDROCHLORIDE 20 MG/ML
INJECTION INTRAVENOUS
Status: DISCONTINUED | OUTPATIENT
Start: 2024-08-16 | End: 2024-08-16

## 2024-08-16 RX ORDER — PROPOFOL 10 MG/ML
VIAL (ML) INTRAVENOUS
Status: DISCONTINUED | OUTPATIENT
Start: 2024-08-16 | End: 2024-08-16

## 2024-08-16 RX ORDER — FENTANYL CITRATE 50 UG/ML
25 INJECTION, SOLUTION INTRAMUSCULAR; INTRAVENOUS EVERY 5 MIN PRN
Status: DISCONTINUED | OUTPATIENT
Start: 2024-08-16 | End: 2024-08-16 | Stop reason: HOSPADM

## 2024-08-16 RX ORDER — ONDANSETRON HYDROCHLORIDE 2 MG/ML
INJECTION, SOLUTION INTRAVENOUS
Status: DISCONTINUED | OUTPATIENT
Start: 2024-08-16 | End: 2024-08-16

## 2024-08-16 RX ORDER — ACETAMINOPHEN 10 MG/ML
INJECTION, SOLUTION INTRAVENOUS
Status: DISCONTINUED | OUTPATIENT
Start: 2024-08-16 | End: 2024-08-16

## 2024-08-16 RX ORDER — OXYCODONE HYDROCHLORIDE 5 MG/1
5 TABLET ORAL
Status: DISCONTINUED | OUTPATIENT
Start: 2024-08-16 | End: 2024-08-16 | Stop reason: HOSPADM

## 2024-08-16 RX ORDER — LIDOCAINE HYDROCHLORIDE 10 MG/ML
1 INJECTION, SOLUTION EPIDURAL; INFILTRATION; INTRACAUDAL; PERINEURAL ONCE
Status: DISCONTINUED | OUTPATIENT
Start: 2024-08-16 | End: 2024-08-16 | Stop reason: HOSPADM

## 2024-08-16 RX ORDER — METOCLOPRAMIDE HYDROCHLORIDE 5 MG/ML
10 INJECTION INTRAMUSCULAR; INTRAVENOUS EVERY 10 MIN PRN
Status: DISCONTINUED | OUTPATIENT
Start: 2024-08-16 | End: 2024-08-16 | Stop reason: HOSPADM

## 2024-08-16 RX ORDER — MUPIROCIN 20 MG/G
OINTMENT TOPICAL
Status: DISCONTINUED | OUTPATIENT
Start: 2024-08-16 | End: 2024-08-16 | Stop reason: HOSPADM

## 2024-08-16 RX ORDER — OXYCODONE HYDROCHLORIDE 5 MG/1
5 TABLET ORAL EVERY 4 HOURS PRN
Qty: 8 TABLET | Refills: 0 | Status: SHIPPED | OUTPATIENT
Start: 2024-08-16

## 2024-08-16 RX ADMIN — LIDOCAINE HYDROCHLORIDE 100 MG: 20 INJECTION INTRAVENOUS at 07:08

## 2024-08-16 RX ADMIN — FENTANYL CITRATE 25 MCG: 50 INJECTION, SOLUTION INTRAMUSCULAR; INTRAVENOUS at 07:08

## 2024-08-16 RX ADMIN — MIDAZOLAM HYDROCHLORIDE 2 MG: 2 INJECTION, SOLUTION INTRAMUSCULAR; INTRAVENOUS at 06:08

## 2024-08-16 RX ADMIN — CEFAZOLIN 2 G: 330 INJECTION, POWDER, FOR SOLUTION INTRAMUSCULAR; INTRAVENOUS at 07:08

## 2024-08-16 RX ADMIN — SODIUM CHLORIDE, POTASSIUM CHLORIDE, SODIUM LACTATE AND CALCIUM CHLORIDE: 600; 310; 30; 20 INJECTION, SOLUTION INTRAVENOUS at 06:08

## 2024-08-16 RX ADMIN — ONDANSETRON 4 MG: 2 INJECTION, SOLUTION INTRAMUSCULAR; INTRAVENOUS at 07:08

## 2024-08-16 RX ADMIN — PROPOFOL 160 MG: 10 INJECTION, EMULSION INTRAVENOUS at 07:08

## 2024-08-16 RX ADMIN — ACETAMINOPHEN 1000 MG: 10 INJECTION, SOLUTION INTRAVENOUS at 07:08

## 2024-08-16 RX ADMIN — DEXAMETHASONE SODIUM PHOSPHATE 4 MG: 4 INJECTION, SOLUTION INTRAMUSCULAR; INTRAVENOUS at 07:08

## 2024-08-16 NOTE — DISCHARGE SUMMARY
[facility] - Surgery  Discharge Note  Short Stay    Procedure(s) (LRB):  Right small finger proximal interphalangeal jpint open treatment with palmar plate repair (Right)      OUTCOME: Patient tolerated treatment/procedure well without complication and is now ready for discharge.    DISPOSITION: Home or Self Care    FINAL DIAGNOSIS:  Dislocation of proximal interphalangeal joint of finger    FOLLOWUP: In clinic    DISCHARGE INSTRUCTIONS:    Discharge Procedure Orders   SLING ORTHOPEDIC LARGE FOR HOME USE     Diet general     Activity as tolerated     Keep surgical extremity elevated     Lifting restrictions   Order Comments: Please do not lift or push off with the right arm or hand     Leave dressing on - Keep it clean, dry, and intact until clinic visit     Call MD for:  temperature >100.4     Call MD for:  persistent nausea and vomiting     Call MD for:  severe uncontrolled pain     Call MD for:  difficulty breathing, headache or visual disturbances     Call MD for:  redness, tenderness, or signs of infection (pain, swelling, redness, odor or green/yellow discharge around incision site)     Call MD for:  hives     Call MD for:  persistent dizziness or light-headedness     Call MD for:  extreme fatigue        TIME SPENT ON DISCHARGE: 15 minutes

## 2024-08-16 NOTE — ANESTHESIA PROCEDURE NOTES
Intubation    Date/Time: 8/16/2024 7:08 AM    Performed by: Pattie Kelley CRNA  Authorized by: Jared Lowry MD    Intubation:     Induction:  Intravenous    Intubated:  Postinduction    Mask Ventilation:  Easy mask    Attempts:  1    Attempted By:  CRNA    Difficult Airway Encountered?: No      Complications:  None    Airway Device:  Supraglottic airway/LMA    Airway Device Size:  3.0    Style/Cuff Inflation:  Cuffed (inflated to minimal occlusive pressure)    Secured at:  The lips    Placement Verified By:  Capnometry    Complicating Factors:  None    Findings Post-Intubation:  BS equal bilateral and atraumatic/condition of teeth unchanged

## 2024-08-16 NOTE — DISCHARGE INSTRUCTIONS
Procedure:  Right small finger palmar plate repair    1. Keep the splint clean, dry, and in place until follow-up. Do not take it off and do not get it wet.    2. Please keep the right upper extremity elevated for the 1st 24-48 hours to prevent further swelling    3. Flexion and extension of the exposed fingers is allowed but do not attempt to lift or push off with the right arm or hand    4. Pain medication and nausea medication have been prescribed. Please take them as necessary.    5. If there are any questions or concerns please call Dr. Aguayo's office at [phone]    6. Follow-up with Dr. Aguayo in 2 weeks

## 2024-08-16 NOTE — ANESTHESIA POSTPROCEDURE EVALUATION
Anesthesia Post Evaluation    Patient: Cheyenne West    Procedure(s) Performed: Procedure(s) (LRB):  Right small finger proximal interphalangeal jpint open treatment with palmar plate repair (Right)    Final Anesthesia Type: general      Patient location during evaluation: PACU  Patient participation: Yes- Able to Participate  Level of consciousness: awake and alert  Post-procedure vital signs: reviewed and stable  Pain management: adequate  Airway patency: patent    PONV status at discharge: No PONV  Anesthetic complications: no      Cardiovascular status: blood pressure returned to baseline  Respiratory status: unassisted  Hydration status: euvolemic  Follow-up not needed.              Vitals Value Taken Time   /80 08/16/24 0834   Temp   08/16/24 0845   Pulse 66 08/16/24 0834   Resp 13 08/16/24 0834   SpO2   08/16/24 0845         Event Time   Out of Recovery 08:35:00         Pain/Veronica Score: Veronica Score: 10 (8/16/2024  8:35 AM)

## 2024-08-16 NOTE — ANESTHESIA PREPROCEDURE EVALUATION
08/16/2024  Cheyenne West is a 69 y.o., female.      Pre-op Assessment    I have reviewed the Patient Summary Reports.     I have reviewed the Nursing Notes. I have reviewed the NPO Status.   I have reviewed the Medications.     Review of Systems  Anesthesia Hx:             Denies Family Hx of Anesthesia complications.   Personal Hx of Anesthesia complications, Post-Operative Nausea/Vomiting, in the past, but not with recent anesthetics / prophylaxis                    Cardiovascular:     Hypertension           hyperlipidemia                             Pulmonary:        Sleep Apnea, CPAP                Hepatic/GI:    Hiatal Hernia, GERD, well controlled             Neurological:        Peripheral Neuropathy                          Endocrine:   Hypothyroidism        Obesity / BMI > 30  Psych:  Psychiatric History anxiety                 Physical Exam  General: Cooperative, Alert and Oriented    Airway:  Mallampati: III / II  Mouth Opening: Normal  TM Distance: Normal  Tongue: Normal  Neck ROM: Normal ROM  Neck: Girth Increased    Chest/Lungs:  Normal Respiratory Rate    Heart:  Rate: Normal  Rhythm: Regular Rhythm        Anesthesia Plan  Type of Anesthesia, risks & benefits discussed:    Anesthesia Type: Gen Natural Airway, MAC, Gen Supraglottic Airway  Intra-op Monitoring Plan: Standard ASA Monitors  Post Op Pain Control Plan: multimodal analgesia  Induction:  IV  Informed Consent: Informed consent signed with the Patient and all parties understand the risks and agree with anesthesia plan.  All questions answered.   ASA Score: 2    Ready For Surgery From Anesthesia Perspective.     .

## 2024-08-16 NOTE — OP NOTE
Cheyenne West  1955    DATE OF SURGERY: 8/16/2024     PRE-OPERATIVE DIAGNOSIS:  Right small finger proximal interphalangeal joint dislocation with palmar plate rupture    POST-OPERATIVE DIAGNOSIS:  Right small finger proximal interphalangeal joint dislocation with palmar plate rupture     ANESTHESIA TYPE:  General    BLOOD LOSS:  Less than 10 cc    TOURNIQUET TIME:  Approximately 30 minutes    SURGEON: Dr Aguayo    ASSISTANT:  Jose Roberto Jauregui    PROCEDURE:  Right small finger proximal interphalangeal joint palmar plate repair    IMPLANTS:  Arthrex Charo suture anchor x1     SPECIMENS:  None    INDICATION:     Ms. West has a history of a dislocation of her right small finger.  She had severe instability of the PIP joint with persistent pain and an inability to flex.  At that point an MRI was performed.  There was noted to be a disruption of the palmar plate.  I discussed the risks and benefits of palmar plate repair and informed consent was obtained    PROCEDURE IN DETAIL:     Ms. West was transported to the operating room and was placed supine on the operating room table. All appropriate points were padded. The right arm and hand was prepped and draped in the normal sterile fashion. Time out was called. The correct patient, correct operative site, correct procedure, antibiotic administration which consisted of 2 g of Ancef, and allergies to medications which are to Penicillins  were reviewed. Time in was then called.     Attention was turned to the right small finger.  A Brunner type incision was made directly over the PIP joint.  The incision was carried through the skin.  Subcutaneous tissues were dissected with tenotomy scissors.  The flexor tendons and tendon sheath were identified.  A longitudinal incision through the cruciate pulley over the PIP joint was performed.  The tendons were elevated.  The palmar plate was noted to be completely ruptured and there was no stability to resist dorsal  extension.  At that point the insertion site of the palmar plate on the PIP joint was visualized.  There was no remaining plate to reattach.  There was a small fracture create a.  The site was cleaned with a curette.  Attention was turned to the palmar plate itself.  The palmar plate was freed up and was noted to be able to advanced to the middle phalanx without significant tension.  At that point a Arthrex Charo suture anchor was selected.  It was placed into the middle phalanx in the footprint for the attachment of the palmar plate.  It was noted to be very well fixed.  Alex-Cheikh type suture configuration was used to repair of the palmar plate back to the middle phalanx.  This did restore the palmar plate and did restore stability to the finger.  The wound was then copiously irrigated.  The tourniquet was let down and hemostasis was obtained.  The wound was closed superficially with 4-0 nylon suture.  The wound was dressed with Xeroform, gauze padding, cast padding and a short-arm ulnar gutter splint was placed    The patient was awakened from anesthesia and was transported to the recovery room in stable condition. All lap, needle, sponge, and equipment counts were correct at the end of the case.    POST-OPERATIVE PLAN:     Patient will keep the splint in place for 2 weeks which time she will follow up with me.  Sutures will be removed at that time.  Will place her into a Velcro finger splint and consider the timing of beginning range of motion

## 2024-08-19 VITALS
RESPIRATION RATE: 11 BRPM | WEIGHT: 164.88 LBS | SYSTOLIC BLOOD PRESSURE: 126 MMHG | HEART RATE: 61 BPM | DIASTOLIC BLOOD PRESSURE: 64 MMHG | BODY MASS INDEX: 33.24 KG/M2 | HEIGHT: 59 IN | TEMPERATURE: 98 F | OXYGEN SATURATION: 95 %

## 2024-08-27 DIAGNOSIS — S63.289A DISLOCATION OF PROXIMAL INTERPHALANGEAL JOINT OF FINGER, INITIAL ENCOUNTER: Primary | ICD-10-CM

## 2024-08-29 NOTE — TRANSFER OF CARE
"Anesthesia Transfer of Care Note    Patient: Cheyenne West    Procedure(s) Performed: Procedure(s) (LRB):  ARTHROSCOPY, KNEE (Left)    Patient location: PACU    Anesthesia Type: general    Transport from OR: Transported from OR on room air with adequate spontaneous ventilation    Post pain: adequate analgesia    Post assessment: no apparent anesthetic complications and tolerated procedure well    Post vital signs: stable    Level of consciousness: awake and alert    Nausea/Vomiting: no nausea/vomiting    Complications: none    Transfer of care protocol was followed      Last vitals:   Visit Vitals  /68 (BP Location: Right arm, Patient Position: Sitting)   Pulse 62   Temp 36.5 °C (97.7 °F) (Skin)   Resp 16   Ht 4' 11" (1.499 m)   Wt 72.6 kg (160 lb)   SpO2 96%   Breastfeeding No   BMI 32.32 kg/m²     " Followed protocol

## 2024-08-30 ENCOUNTER — HOSPITAL ENCOUNTER (OUTPATIENT)
Dept: RADIOLOGY | Facility: HOSPITAL | Age: 69
Discharge: HOME | End: 2024-08-30
Attending: ORTHOPAEDIC SURGERY
Payer: MEDICARE

## 2024-08-30 ENCOUNTER — OFFICE VISIT (OUTPATIENT)
Dept: ORTHOPEDICS | Facility: CLINIC | Age: 69
End: 2024-08-30
Payer: MEDICARE

## 2024-08-30 DIAGNOSIS — S63.289A DISLOCATION OF PROXIMAL INTERPHALANGEAL JOINT OF FINGER, INITIAL ENCOUNTER: ICD-10-CM

## 2024-08-30 DIAGNOSIS — S63.289A DISLOCATION OF PROXIMAL INTERPHALANGEAL JOINT OF FINGER, INITIAL ENCOUNTER: Primary | ICD-10-CM

## 2024-08-30 PROCEDURE — 99999 PR PBB SHADOW E&M-EST. PATIENT-LVL III: CPT | Mod: PBBFAC,,, | Performed by: ORTHOPAEDIC SURGERY

## 2024-08-30 PROCEDURE — 73140 X-RAY EXAM OF FINGER(S): CPT | Mod: TC,PO,RT

## 2024-08-30 PROCEDURE — 73140 X-RAY EXAM OF FINGER(S): CPT | Mod: 26,RT,, | Performed by: RADIOLOGY

## 2024-08-30 NOTE — PROGRESS NOTES
Ms West returns to clinic today.  Has a history of right small finger PIP joint dislocation with palmar plate rupture.  She underwent a palmar plate repair approximately 2 weeks ago.  He was overall doing well     Physical exam: Examination of the right hand and small finger reveals that there is moderate edema.  There are sutures in place.  There was no erythema or drainage.  He was neurovascularly intact over the tip     Radiology: X-rays of the right small finger were taken in clinic today.  He was noted have evidence of the repair of the palmar plate.  The finger remains well located in the slightly flexed position in the anchor is well-positioned     Assessment:  Status post right small finger palmar plate repair     Plan:     1.  Sutures were removed today and Steri-Strips are placed     2.  She was placed into a Velcro volar finger splint     3. She will wear this splint full-time except for her therapy    4.  She will follow up in 2 weeks with repeat x-rays of the right small finger

## 2024-09-03 ENCOUNTER — CLINICAL SUPPORT (OUTPATIENT)
Dept: REHABILITATION | Facility: HOSPITAL | Age: 69
End: 2024-09-03
Attending: ORTHOPAEDIC SURGERY
Payer: MEDICARE

## 2024-09-03 DIAGNOSIS — M25.641 STIFFNESS OF FINGER JOINT OF RIGHT HAND: ICD-10-CM

## 2024-09-03 DIAGNOSIS — S63.289A DISLOCATION OF PROXIMAL INTERPHALANGEAL JOINT OF FINGER, INITIAL ENCOUNTER: ICD-10-CM

## 2024-09-03 DIAGNOSIS — M79.644 PAIN OF FINGER OF RIGHT HAND: Primary | ICD-10-CM

## 2024-09-03 PROCEDURE — L3933 FO W/O JOINTS CF: HCPCS | Mod: PO

## 2024-09-03 PROCEDURE — 97110 THERAPEUTIC EXERCISES: CPT | Mod: PO

## 2024-09-03 PROCEDURE — 97166 OT EVAL MOD COMPLEX 45 MIN: CPT | Mod: PO

## 2024-09-04 ENCOUNTER — CLINICAL SUPPORT (OUTPATIENT)
Dept: REHABILITATION | Facility: HOSPITAL | Age: 69
End: 2024-09-04
Payer: MEDICARE

## 2024-09-04 DIAGNOSIS — M79.644 PAIN OF FINGER OF RIGHT HAND: Primary | ICD-10-CM

## 2024-09-04 DIAGNOSIS — M25.641 STIFFNESS OF FINGER JOINT OF RIGHT HAND: ICD-10-CM

## 2024-09-04 PROCEDURE — 97110 THERAPEUTIC EXERCISES: CPT | Mod: PO

## 2024-09-04 PROCEDURE — 97140 MANUAL THERAPY 1/> REGIONS: CPT | Mod: PO

## 2024-09-04 NOTE — PROGRESS NOTES
[facility]  Occupational Therapy Treatment Note     Date: 9/4/2024  Name: Cheyenne CLAUDIO [facility] Number: [id number]    Therapy Diagnosis:   Encounter Diagnoses   Name Primary?    Pain of finger of right hand Yes    Stiffness of finger joint of right hand      Physician: Jose Antonio Aguayo MD     Note:  Please create a custom thermoplastic PIP splint finger.  Please keep the finger flexed at 20-30 degrees at the PIP joint.  Please begin therapy for gentle flexion and edema control.  Do not extend or stress the palmar plate.       Frequency:  2 times per week      Duration:  4 weeks      Diagnosis:  Status post right small finger dislocation with palmar plate repair     Medical Diagnosis: S63.289A (ICD-10-CM) - Dislocation of proximal interphalangeal joint of finger, initial encounter      Surgical Procedure and Date:   PROCEDURE:  Right small finger proximal interphalangeal joint palmar plate repair   IMPLANTS:  Arthrex Charo suture anchor x1   DATE OF SURGERY: 8/16/2024   S/P: 2 weeks and 4 days  Evaluation Date: 9/3/2024  Insurance Authorization Period Expiration:  Calendar year     Plan of Care Certification Period: 09/3/2024 through 12/2/2024  Date of Return to Referring Provider: 9/16/2024  Visit # / Visits authorized: 2 / TBD  FOTO: Eval  / 1  Next Reassessment at 10th visit or by 30 days = 10/03/2024     Time In: 0945  Time Out: 1030  Total Appointment Time: 45 min (timed 38 min & untimed codes N/A)      Precautions:  standard and post op per protocol      Subjective     Pt reports: Doing ok with her splint and home program  She was compliant with home exercise program given last session.   Response to previous treatment:Good  Functional change: None new    Pain 0-10 scale   Functional Pain Scale Rating 0-10:   0/10 on average  0/10 at best  5/10 at worst  Location: R small finger  Description: Sharp  Aggravating Factors:Any pressure at PIP  Easing Factors: Rest        Objective   MEASUREMENTS  Last measurements below are from Eval unless otherwise noted.    CMS Impairment/Limitation/Restriction for FOTO Hand Survey     Therapist reviewed FOTO scores for Cheyenne West on 9/3/2024.         Limitation Score: 51%        Observation/Inspection/Wound/Incision/Scar   Mild to moderate edema, non pitting,  non draining, steri strips intact. Wound appears closed/healed   Pt arrived with Alumofoam, temporary splint and Coban wrap intact     Sensation: Grossly WNL,        AROM Hand: on 9/4/2024  R SF PIP flexion = 75 (+15) degrees  Ext to desired resting position in splint of 30 degrees from 0  Digits 1-5 WNL flex and ext     AROM Wrist/Forearm\Elbow:  Grossly WNL L and R     Manual Muscle Test: NT                                       and Pinch Strength: NT at this time due to post operative status.     Special and/or Standardized Tests:  NT       Treatment     Cheyenne received the following supervised modalities after being cleared for contradictions for 0 minutes:   NT    Cheyenne received the following direct contact modalities after being cleared for contraindications for 0 minutes:  -NT    Cheyenne received the following manual therapy techniques for 8 minutes:   -Gentle retrograde massage and dorsal PIP massage with small finger in flexion.    Cheyenne received therapeutic exercises for 30 minutes including:  With splint off, flexion of R small finger/R hand with controlled ext with back of R hand to L hand for gentle block 3x20  Place and hold composite fist 3x1 min  Gentle PROM R small finger flexion 5x10  Isolated small finger flexion 3x10    Cheyenne participated in dynamic functional therapeutic activities to improve functional performance for 0  minutes, including:  NT    Home Exercises and Education Provided     Education provided:   -  Cont per previous.    Written Home Exercises Provided:  Patient instructed to cont prior HEP.    Exercises were reviewed and Cheyenne  was able to demonstrate them prior to the end of the session.  Cheyenne demonstrated good  understanding of the education provided.     See EMR under Media for exercises provided today and/or prior visit.        Assessment     Pt would continue to benefit from skilled OT. Excellent early AROM gains R hand/small finger flexion. Cont per current POC/protocol     - Progress towards goals:  STG #1 has been met.    Cheyenne is progressing well towards her goals . Pt continues with good rehab potential.     Pt will continue to benefit from skilled outpatient occupational therapy to address the deficits listed in the problem list on initial evaluation in order to maximize pt's level of independence in the home and community.     Anticipated barriers to occupational therapy: None    Pt's spiritual, cultural and educational needs considered and pt agreeable to plan of care and goals.    Goals:  Short Term Goals: (30 days, 10/03/2024, and/or 10th visit) unless otherwise noted below.  1. Pt will be independent with HEP in 2 visits.  2. Pt will report decreased pain to a 5/10 at worst in RUE with ADLs in order to increase function/use of UE.   3. Pt will increase Flexion of R SF PIP by 20 degrees to 80 degrees of AROM to increase function for ADL/IADL.     Long Term Goals: (by discharge)  1. Pt will report decreased pain to 1-2/10 with ADLs to allow for increased function/use of UE.   2. Pt will exhibit grossly WNL AROM of R hand to allow for Independent use of for all ADL/IADL tasks.  3. Pt will exhibit  WFL  strength to allow a firm grasp during ADL/IADL (cooking utensils, tool use, carrying groceries, steering wheel, etc.)  4. Pt will return to [facility] with all ADL/IADL, leisure and job tasks.    Plan   Continue Occupational Therapy 2 times per week through current poc expiration date of 12/2/2024, in order to to decrease pain and edema, and increase A/PROM, strength, and functional use of R upper extremity.    Updates/Grading  for next session:  Progress with OT as tolerated.      ALEXIS Keen, PINAT

## 2024-09-04 NOTE — PLAN OF CARE
[facility] OUTPATIENT THERAPY AND WELLNESS  Occupational Therapy Initial Evaluation       Date: 9/3/2024  Name: Cheyenne West  Clinic Number: [id number]    Therapy Diagnosis:   Encounter Diagnosis   Name Primary?    Dislocation of proximal interphalangeal joint of finger, initial encounter      Physician: Jose Antonio Aguayo MD    Physician Orders: Note:  Please create a custom thermoplastic PIP splint finger.  Please keep the finger flexed at 20-30 degrees at the PIP joint.  Please begin therapy for gentle flexion and edema control.  Do not extend or stress the palmar plate.       Frequency:  2 times per week      Duration:  4 weeks      Diagnosis:  Status post right small finger dislocation with palmar plate repair    Medical Diagnosis: S63.289A (ICD-10-CM) - Dislocation of proximal interphalangeal joint of finger, initial encounter     Surgical Procedure and Date:   PROCEDURE:  Right small finger proximal interphalangeal joint palmar plate repair   IMPLANTS:  Arthrex Charo suture anchor x1   DATE OF SURGERY: 8/16/2024   S/P: 2 weeks and 4 days  Evaluation Date: 9/3/2024  Insurance Authorization Period Expiration:  Calendar year    Plan of Care Certification Period: 09/3/2024 through 12/2/2024  Date of Return to Referring Provider: 9/16/2024  Visit # / Visits authorized: 1/ 1  FOTO: Eval  / 1  Next Reassessment at 10th visit or by 30 days = 10/0/2024    Time In: 1515  Time Out: 1600  Total Appointment Time (timed & untimed codes): 45 minutes      Precautions:  standard and post op per protocol       Subjective     Date of Onset: approx 5 weeks ago    History of Current Condition/Mechanism of Injury: Per HPI and/or pt report  HPI for 1st and 2nd Ortho visits:  HPI:  Cheyenne West is a 69 y.o. female, who presents to clinic today has a history of an injury to her right small finger.  This occurred approximately 2 weeks ago.  She has a jamming type of injury.  She was told she may have a dislocation.   She did present to an outside clinic.  There was an attempt at a reduction.  She states that she was been unable to flex the finger since that time.  She was here today for further evaluation and treatment     Cheyenne West is a 69 y.o. female, who presents to clinic today she has a history of right small finger PIP joint dislocation.  There was a concern over palmar plate injury versus mallet deformity with swan necking.  He was here today for follow-up after having an MRI performed  (Pt opted for surgical repair per above)     Involved Side: Right    Dominant Side:  Right    Prior Surgical Procedure or injuries to:   RUE None reported  LUE  None reported    Imaging:  See Epic    Prior Therapy: None reported    Pain:  Functional Pain Scale Rating 0-10:   0/10 on average  0/10 at best  8/10 at worst  Location: R small finger  Description: Sharp  Aggravating Factors:Any pressure at PIP  Easing Factors: Rest    Occupation:  Retired    Functional Limitations/Social History:    Previous functional status includes: Independent with all ADLs.     Current Functional Status   Home/Living environment: with spouse        Limitation of Functional Status as follows:   ADLs/IADLs: Mod overall limitations reported with involved UE (s)    See  FOTO results for further     Patient's Goals for Therapy: To get R hand/small finger back to normal use    Past Medical History/Physical Systems Review:   Cheyenne West  has a past medical history of Bilateral carpal tunnel syndrome, Carpal tunnel syndrome of right wrist, Chronic gastritis, Chronic urticaria, COVID-19 virus infection, Dissociative fugue, Diverticulosis, Duodenitis, Dyspepsia, Generalized anxiety disorder, Hearing loss on left, Hearing loss on right, Helicobacter pylori (H. pylori), History of blood transfusion, Hyperlipidemia, Hypertension, Hypothyroidism, IBS (irritable bowel syndrome), Iron deficiency, Iron deficiency anemia, Mild mitral regurgitation  by prior echocardiogram, Mild obesity, Mild vitamin D deficiency, Osteopenia, Paraesophageal hiatal hernia, PONV (postoperative nausea and vomiting), Reflux gastritis, RLS (restless legs syndrome), Sleep apnea, Thyroid goiter, and Urticaria, chronic.    Cheyenne West  has a past surgical history that includes Cholecystectomy; Bladder suspension; Thyroidectomy, partial;  section, classic; Esophagogastroduodenoscopy (2016); Esophageal dilation; Upper gastrointestinal endoscopy (2016); Esophagogastroduodenoscopy (N/A, 2018); Intraluminal gastrointestinal tract imaging via capsule (N/A, 2018); Colonoscopy (2011); Colonoscopy (N/A, 2018); Laparoscopic Nissen fundoplication (N/A, 2018); Hernia repair; Arthroscopy of knee (Left, 2022); and Open reduction and internal fixation (ORIF) of injury of finger (Right, 2024).    Cheyenne has a current medication list which includes the following prescription(s): alprazolam, atorvastatin, bupropion, calcium carbonate/vitamin d3, cetirizine, escitalopram oxalate, famotidine, felodipine, levothyroxine, olmesartan-hydrochlorothiazide, omega-3/epa/fish oil, ondansetron, oxycodone, pantoprazole, potassium chloride, prednisone, and vit a/vit c/vit e/zinc/copper.    Review of patient's allergies indicates:   Allergen Reactions    Penicillins       Objective     CMS Impairment/Limitation/Restriction for FOTO Hand Survey    Therapist reviewed FOTO scores for Cheyenne West on 9/3/2024.       Limitation Score: 51%      Observation/Inspection/Wound/Incision/Scar   Mild to moderate edema, non pitting,  non draining, steri strips intact. Wound appears closed/healed   Pt arrived with Alumofoam, temporary splint and Coban wrap intact    Sensation: Grossly WNL,       AROM Hand:  R SF PIP flexion = 60 degrees  Ext to desired restign position in splint of 30 degrees from 0  Digits 1-5 WNL flex and ext    AROM Wrist/Forearm\Elbow:   Grossly WNL L and R    Manual Muscle Test: NT                                       and Pinch Strength: NT at this time due to post operative status.    Special and/or Standardized Tests:  NT      Treatment     Treatment Time In:  1530  Treatment Time Out:  1600  Total Treatment time separate from Evaluation time: 30 minutes.    Cheyenne received therapeutic exercises for 10 minutes including: Initial Home Exercise Program Instruction.  Wrist AROM flex/ext, Thumb flex/ext, and with splint off, flexion of R small finger/R hand with controlled ext with back of R hand to L hand for gentle block 1-2 x 20 each 6-8 times day    Custom orthotic fit/train: Volar PIP splint with PIP flexed at approx 30 degrees to protect volar/palmar plate 20 min L code 3933 (full time wear off gentle wash and perform home program ex only)    Home Exercise Program/Education:  Issued HEP (see patient instructions in EMR and/or media) and educated on modality use for pain management . Exercises were reviewed and Cheyenne was able to demonstrate them prior to the end of the session.   Pt received a written copy of exercises to perform at home. Cheyenne demonstrated Good understanding of the education provided.  Pt was advised to perform these exercises free of pain, and to stop performing them if pain occurs.    Patient/Family Education: role of OT, goals for OT, scheduling/cancellations - pt verbalized understanding.     Additional Education provided: N/A    Assessment     Cheyenne West is a 69 y.o. female referred to outpatient occupational therapy and presents with a medical diagnosis of S/P R small finger palmar plate repair.    Following medical record review it is determined that pt will benefit from occupational therapy services in order to maximize pain free and/or functional use of right UE. The following goals were discussed with the patient and patient is in agreement with them as to be addressed in the treatment plan. The  patient's rehab potential is  good.     Anticipated barriers to occupational therapy:  None.    Plan of care discussed with patient:   Yes  Patient's spiritual, cultural and educational needs considered and patient is agreeable to the plan of care and goals as stated below:     Medical Necessity is demonstrated by the following  Occupational Profile/History  Co-morbidities and personal factors that may impact the plan of care [] LOW: Brief chart review  [x] MODERATE: Expanded chart review   [] HIGH: Extensive chart review    Moderate / High Support Documentation:  per chart review and pt report      Examination  Performance deficits relating to physical, cognitive or psychosocial skills that result in activity limitations and/or participation restrictions  [] LOW: addressing 1-3 Performance deficits  [] MODERATE: 3-5 Performance deficits  [x] HIGH: 5+ Performance deficits (please support below)    Moderate / High Support Documentation:  Per below problem list    Physical:  Joint Mobility  Muscle Power/Strength  Muscle Endurance  Skin Integrity/Scar Formation  Edema   Strength  Pinch Strength  Fine Motor Coordination  Pain    Cognitive:  No Deficits    Psychosocial:    No Deficits     Treatment Options [] LOW: Limited options  [x] MODERATE: Several options  [] HIGH: Multiple options      Decision Making/ Complexity Score: moderate         The following goals were discussed with the patient and patient is in agreement with them as to be addressed in the treatment plan.     Goals:     Short Term Goals: (30 days, 10/03/2024, and/or 10th visit) unless otherwise noted below.  1. Pt will be independent with HEP in 2 visits.  2. Pt will report decreased pain to a 5/10 at worst in RUE with ADLs in order to increase function/use of UE.   3. Pt will increase Flexion of R SF PIP by 20 degrees to 80 degrees of AROM to increase function for ADL/IADL.    Long Term Goals: (by discharge)  1. Pt will report decreased pain to  1-2/10 with ADLs to allow for increased function/use of UE.   2. Pt will exhibit grossly WNL AROM of R hand to allow for Independent use of for all ADL/IADL tasks.  3. Pt will exhibit  WFL  strength to allow a firm grasp during ADL/IADL (cooking utensils, tool use, carrying groceries, steering wheel, etc.)  4. Pt will return to [facility] with all ADL/IADL, leisure and job tasks.    Plan   Certification Period/Plan of care expiration:  9/3/2024 to 12/2/2024 with POC expiring on 12/2/2024    Outpatient Occupational Therapy 2 times weekly through current poc expiration date, to include the following interventions:     Moist heat, cold packs, paraffin, fluidotherapy, US, edema control, scar mobilization/scar massage, manual therapy/joint mobilizations,A/PROM, therapeutic exercises/activities, strengthening, desensitization/sensory re-education, orthotic fitting/fabrication/training per Rx, joint protections/energry conservation/adaptive equipment/activity modification HEP/education as well as any other treatments deemed necessary based on the patient's needs or progress.     Pt may be discharged prior to poc expiration date if all goals/desired outcome met or if max rehabilitation potential has been achieved.    Updates Next Visit: To review HEP understanding and compliance and progress with OT tx as tolerated.    ALEXIS Keen, ESSENCE    I CERTIFY THE NEED FOR THESE SERVICES FURNISHED UNDER THIS PLAN OF TREATMENT AND WHILE UNDER MY CARE    Physician's comments prn:

## 2024-09-04 NOTE — PROGRESS NOTES
[facility]  Occupational Therapy Treatment Note     Date: 9/4/2024  Name: Cheyenne CLAUDIO [facility] Number: [id number]    ***    Subjective     Pt reports: ***  She was *** compliant with home exercise program given last session.   Response to previous treatment:***  Functional change: ***    Pain:  ***      Objective     Objective Measures updated at progress report unless specified.    ***      Treatment     Cheyenne received the treatments listed below:      Supervised Modalities after being cleared for contradictions:       Direct Contact Modalities after being cleared for contraindications:       Manual Therapy Techniques were applied for *** minutes, including:    Therapeutic Exercises to develop  for  ROM, endurance, and/or strength for *** minutes including:  ***    Therapeutic Activities to improve functional performance for ***  minutes, including:  ***    Neuromuscular Re-education activities to improve: {AMB PT PROGRESS NEURO RE-ED:67383} for *** minutes.   ***     Patient Education and Home Exercises     Education provided:   continue same***      Written Home Exercises Provided: Patient instructed to cont prior HEP.  Exercises were reviewed and Cheyenne was able to demonstrate them prior to the end of the session.  Cheyenne demonstrated good***  understanding of the HEP provided. See EMR under Patient Instructions for exercises provided during therapy sessions.       Assessment     Pt would continue to benefit from skilled OT. ***     Cheyenne is progressing well towards her goals and there are no updates to goals at this time.   - Progress towards goals: Good; STG 1 met     Pt prognosis is Good.***    Pt will continue to benefit from skilled outpatient occupational therapy to address the deficits listed in the problem list on initial evaluation provide pt/family education and to maximize pt's level of independence in the home and community environment.     Pt's spiritual,  cultural and educational needs considered and pt agreeable to plan of care and goals.    Anticipated barriers to occupational therapy: none ***    Goals:  ***    Plan     ***  Continue Occupational Therapy *** times per week x *** weeks to decrease pain and edema, and increase A/PROM, strength, and functional use of *** upper extremity.     Updates/Grading for next session: progress per protocol,***  as tolerated***.    ALEXIS Nguyễn, CHT

## 2024-09-09 ENCOUNTER — CLINICAL SUPPORT (OUTPATIENT)
Dept: REHABILITATION | Facility: HOSPITAL | Age: 69
End: 2024-09-09
Attending: ORTHOPAEDIC SURGERY
Payer: MEDICARE

## 2024-09-09 DIAGNOSIS — M79.644 PAIN OF FINGER OF RIGHT HAND: Primary | ICD-10-CM

## 2024-09-09 PROCEDURE — 97110 THERAPEUTIC EXERCISES: CPT | Mod: PO

## 2024-09-09 PROCEDURE — 97140 MANUAL THERAPY 1/> REGIONS: CPT | Mod: PO

## 2024-09-09 NOTE — PROGRESS NOTES
[facility]  Occupational Therapy Treatment Note     Date: 9/9/2024  Name: Cheyenne CLAUDIO [facility] Number: [id number]    Therapy Diagnosis:   Encounter Diagnosis   Name Primary?    Pain of finger of right hand Yes     Physician: Jose Antonio Aguayo MD     Note:  Please create a custom thermoplastic PIP splint finger.  Please keep the finger flexed at 20-30 degrees at the PIP joint.  Please begin therapy for gentle flexion and edema control.  Do not extend or stress the palmar plate.       Frequency:  2 times per week      Duration:  4 weeks      Diagnosis:  Status post right small finger dislocation with palmar plate repair     Medical Diagnosis: S63.289A (ICD-10-CM) - Dislocation of proximal interphalangeal joint of finger, initial encounter      Surgical Procedure and Date:   PROCEDURE:  Right small finger proximal interphalangeal joint palmar plate repair   IMPLANTS:  Arthrex Charo suture anchor x1   DATE OF SURGERY: 8/16/2024   S/P: 2 weeks and 4 days  Evaluation Date: 9/3/2024  Insurance Authorization Period Expiration:  Calendar year     Plan of Care Certification Period: 09/3/2024 through 12/2/2024  Date of Return to Referring Provider: 9/16/2024  Visit # / Visits authorized: 3 / TBD  FOTO: Eval  / 1  Next Reassessment at 10th visit or by 30 days = 10/03/2024     Time In: 1302  Time Out: 1332  Total Appointment Time: 30 min (timed 25 min & untimed codes N/A)      Precautions:  standard and post op per protocol      Subjective     Pt reports: Doing well the steri strips fell off after some hand washing.  She was compliant with home exercise program given last session.   Response to previous treatment:Good  Functional change: None new    Pain 0-10 scale   Functional Pain Scale Rating 0-10:   0/10 on average  0/10 at best  4/10 at worst  Location: R small finger  Description: Sharp, some sensitivity at scar.  Aggravating Factors:Any pressure at PIP  Easing Factors: Rest        Objective   MEASUREMENTS  Last measurements below are from Eval unless otherwise noted.    CMS Impairment/Limitation/Restriction for FOTO Hand Survey     Therapist reviewed FOTO scores for Cheyenne West on 9/3/2024.         Limitation Score: 51%        Observation/Inspection/Wound/Incision/Scar   Mild to moderate edema, non pitting,  non draining, steri strips intact. Wound appears closed/healed   Pt arrived with Alumofoam, temporary splint and Coban wrap intact     Sensation: Grossly WNL,        AROM Hand: on 9/9/2024  R SF PIP flexion = 90 (+15) degrees  Ext to desired resting position in splint of 30 degrees from 0  Digits 1-5 WNL flex and ext     AROM Wrist/Forearm\Elbow:  Grossly WNL L and R     Manual Muscle Test: NT                                       and Pinch Strength: NT at this time due to post operative status.     Special and/or Standardized Tests:  NT       Treatment     Cheyenne received the following supervised modalities after being cleared for contradictions for 0 minutes:   NT    Cheyenne received the following direct contact modalities after being cleared for contraindications for 0 minutes:  -NT    Cheyenne received the following manual therapy techniques for 10 minutes:   -Gentle retrograde massage and dorsal PIP massage with small finger in flexion.  - Very gentle scar masage    Cheyenne received therapeutic exercises for 20 minutes including:  With splint off, flexion of R small finger/R hand with controlled ext with back of R hand to L hand for gentle block 3x20  Place and hold composite fist 3 min while on moist heat  Gentle PROM R small finger flexion 5x10  Isolated small finger flexion 3x10    Cheyenne participated in dynamic functional therapeutic activities to improve functional performance for 0  minutes, including:  NT    Home Exercises and Education Provided     Education provided:   -  Cont per previous.    Written Home Exercises Provided:  Patient instructed to cont prior  HEP.    Exercises were reviewed and Cheyenne was able to demonstrate them prior to the end of the session.  Cheyenne demonstrated good  understanding of the education provided.     See EMR under Media for exercises provided today and/or prior visit.        Assessment     Pt would continue to benefit from skilled OT. Excellent AROM gains since last visit and initial scar massage tolerated well. Cont per current POC/protocol     - Progress towards goals:  STG #1 has been met.    Cheyenne is progressing well towards her goals . Pt continues with good rehab potential.     Pt will continue to benefit from skilled outpatient occupational therapy to address the deficits listed in the problem list on initial evaluation in order to maximize pt's level of independence in the home and community.     Anticipated barriers to occupational therapy: None    Pt's spiritual, cultural and educational needs considered and pt agreeable to plan of care and goals.    Goals:  Short Term Goals: (30 days, 10/03/2024, and/or 10th visit) unless otherwise noted below.  1. Pt will be independent with HEP in 2 visits.  2. Pt will report decreased pain to a 5/10 at worst in RUE with ADLs in order to increase function/use of UE.   3. Pt will increase Flexion of R SF PIP by 20 degrees to 80 degrees of AROM to increase function for ADL/IADL.     Long Term Goals: (by discharge)  1. Pt will report decreased pain to 1-2/10 with ADLs to allow for increased function/use of UE.   2. Pt will exhibit grossly WNL AROM of R hand to allow for Independent use of for all ADL/IADL tasks.  3. Pt will exhibit  WFL  strength to allow a firm grasp during ADL/IADL (cooking utensils, tool use, carrying groceries, steering wheel, etc.)  4. Pt will return to PLOF with all ADL/IADL, leisure and job tasks.    Plan   Continue Occupational Therapy 2 times per week through current poc expiration date of 12/2/2024, in order to to decrease pain and edema, and increase A/PROM,  strength, and functional use of R upper extremity.    Updates/Grading for next session:  Progress with OT as tolerated.      ALEXIS Keen, CHT

## 2024-09-09 NOTE — PROGRESS NOTES
[facility]  Occupational Therapy Treatment Note     Date: 9/13/2024  Name: Cheyenne CLAUDIO [facility] Number: [id number]    Therapy Diagnosis:   Encounter Diagnoses   Name Primary?    Pain of finger of right hand Yes    Stiffness of finger joint of right hand     Status post laparoscopic Nissen fundoplication      Physician: Jose Antonio Aguayo MD     Note:  Please create a custom thermoplastic PIP splint finger.  Please keep the finger flexed at 20-30 degrees at the PIP joint.  Please begin therapy for gentle flexion and edema control.  Do not extend or stress the palmar plate.       Frequency:  2 times per week      Duration:  4 weeks      Diagnosis:  Status post right small finger dislocation with palmar plate repair     Medical Diagnosis: S63.289A (ICD-10-CM) - Dislocation of proximal interphalangeal joint of finger, initial encounter      Surgical Procedure and Date:   PROCEDURE:  Right small finger proximal interphalangeal joint palmar plate repair   IMPLANTS:  Arthrex Charo suture anchor x1   DATE OF SURGERY: 8/16/2024   S/P: 4 weeks on 9/13/2024  Evaluation Date: 9/3/2024  Insurance Authorization Period Expiration:  Calendar year     Plan of Care Certification Period: 09/3/2024 through 12/2/2024  Date of Return to Referring Provider: 9/16/2024  Visit # / Visits authorized: Ignacio / BRIAN  FOTO: Shanna  / Elder  Next Reassessment at 10th visit or by 30 days = 10/03/2024     Time In: 0830  Time Out: 0900  Total Appointment Time: 30 min (timed 25 min & untimed codes N/A)      Precautions:  standard and post op per protocol      Subjective     Pt reports: Doing well; scar is somewhat sensitive to the touch.  She was compliant with home exercise program given last session.   Response to previous treatment:Good  Functional change: None new    Pain 0-10 scale   Functional Pain Scale Rating 0-10:   0/10 on average  0/10 at best  4/10 at worst  Location: R small finger  Description: Sharp, some  sensitivity at scar.  Aggravating Factors:Any pressure at PIP  Easing Factors: Rest       Objective   MEASUREMENTS  Last measurements below are from Eval unless otherwise noted.    CMS Impairment/Limitation/Restriction for FOTO Hand Survey     Therapist reviewed FOTO scores for Cheyenne West on 9/3/2024.         Limitation Score: 51%        Observation/Inspection/Wound/Incision/Scar   Mild edema, mild to moderate hyperthrophy of scar. Has been compliant with splint wear.      Sensation: Grossly WNL,        AROM Hand: on 9/9/2024  R SF PIP flexion = 90 (+15) degrees  Ext to desired resting position in splint of 30 degrees from 0  Digits 1-5 WNL flex and ext     AROM Wrist/Forearm\Elbow:  Grossly WNL L and R     Manual Muscle Test: NT                                       and Pinch Strength: NT at this time due to post operative status.     Special and/or Standardized Tests:  NT       Treatment     Cheyenne received the following supervised modalities after being cleared for contradictions for 0 minutes:   NT    Cheyenne received the following direct contact modalities after being cleared for contraindications for 0 minutes:  -NT    Cheyenne received the following manual therapy techniques for 10 minutes:   -Gentle retrograde massage and dorsal PIP massage with small finger in flexion.  -Gentle scar masage    Cheyenne received therapeutic exercises for 20 minutes including:  With splint off, flexion of R small finger/R hand with controlled ext with back of R hand to L hand for gentle block 3x20  Place and hold composite fist 5 min while on moist heat  Gentle PROM R small finger flexion 5x10  Isolated small finger flexion 3x10    Cheyenne participated in dynamic functional therapeutic activities to improve functional performance for 0  minutes, including:  NT    Home Exercises and Education Provided     Education provided:   -  Cont per previous.    Written Home Exercises Provided:  Patient instructed to cont prior  HEP.    Exercises were reviewed and Cheyenne was able to demonstrate them prior to the end of the session.  Cheyenne demonstrated good  understanding of the education provided.     See EMR under Media for exercises provided today and/or prior visit.        Assessment     Pt would continue to benefit from skilled OT.  Cont per current POC/protocol     - Progress towards goals:  STG #1 has been met.    Cheyenne is progressing well towards her goals . Pt continues with good rehab potential.     Pt will continue to benefit from skilled outpatient occupational therapy to address the deficits listed in the problem list on initial evaluation in order to maximize pt's level of independence in the home and community.     Anticipated barriers to occupational therapy: None    Pt's spiritual, cultural and educational needs considered and pt agreeable to plan of care and goals.    Goals:  Short Term Goals: (30 days, 10/03/2024, and/or 10th visit) unless otherwise noted below.  1. Pt will be independent with HEP in 2 visits.  2. Pt will report decreased pain to a 5/10 at worst in RUE with ADLs in order to increase function/use of UE.   3. Pt will increase Flexion of R SF PIP by 20 degrees to 80 degrees of AROM to increase function for ADL/IADL.     Long Term Goals: (by discharge)  1. Pt will report decreased pain to 1-2/10 with ADLs to allow for increased function/use of UE.   2. Pt will exhibit grossly WNL AROM of R hand to allow for Independent use of for all ADL/IADL tasks.  3. Pt will exhibit  WFL  strength to allow a firm grasp during ADL/IADL (cooking utensils, tool use, carrying groceries, steering wheel, etc.)  4. Pt will return to PLOF with all ADL/IADL, leisure and job tasks.    Plan   Continue Occupational Therapy 2 times per week through current poc expiration date of 12/2/2024, in order to to decrease pain and edema, and increase A/PROM, strength, and functional use of R upper extremity.    Updates/Grading for next  session:  Progress with OT as tolerated.      ALEXIS Keen, PINAT

## 2024-09-12 DIAGNOSIS — M79.644 PAIN OF FINGER OF RIGHT HAND: Primary | ICD-10-CM

## 2024-09-13 ENCOUNTER — CLINICAL SUPPORT (OUTPATIENT)
Dept: REHABILITATION | Facility: HOSPITAL | Age: 69
End: 2024-09-13
Payer: MEDICARE

## 2024-09-13 DIAGNOSIS — M79.644 PAIN OF FINGER OF RIGHT HAND: Primary | ICD-10-CM

## 2024-09-13 DIAGNOSIS — Z98.890 STATUS POST LAPAROSCOPIC NISSEN FUNDOPLICATION: ICD-10-CM

## 2024-09-13 DIAGNOSIS — M25.641 STIFFNESS OF FINGER JOINT OF RIGHT HAND: ICD-10-CM

## 2024-09-13 PROCEDURE — 97140 MANUAL THERAPY 1/> REGIONS: CPT | Mod: PO

## 2024-09-13 PROCEDURE — 97110 THERAPEUTIC EXERCISES: CPT | Mod: PO

## 2024-09-16 ENCOUNTER — HOSPITAL ENCOUNTER (OUTPATIENT)
Dept: RADIOLOGY | Facility: HOSPITAL | Age: 69
Discharge: HOME | End: 2024-09-16
Attending: ORTHOPAEDIC SURGERY
Payer: MEDICARE

## 2024-09-16 ENCOUNTER — OFFICE VISIT (OUTPATIENT)
Dept: ORTHOPEDICS | Facility: CLINIC | Age: 69
End: 2024-09-16
Payer: MEDICARE

## 2024-09-16 DIAGNOSIS — S63.289A DISLOCATION OF PROXIMAL INTERPHALANGEAL JOINT OF FINGER, INITIAL ENCOUNTER: Primary | ICD-10-CM

## 2024-09-16 DIAGNOSIS — M79.644 PAIN OF FINGER OF RIGHT HAND: ICD-10-CM

## 2024-09-16 PROCEDURE — 1126F AMNT PAIN NOTED NONE PRSNT: CPT | Mod: CPTII,S$GLB,, | Performed by: ORTHOPAEDIC SURGERY

## 2024-09-16 PROCEDURE — 99024 POSTOP FOLLOW-UP VISIT: CPT | Mod: S$GLB,,, | Performed by: ORTHOPAEDIC SURGERY

## 2024-09-16 PROCEDURE — 73140 X-RAY EXAM OF FINGER(S): CPT | Mod: 26,RT,, | Performed by: RADIOLOGY

## 2024-09-16 PROCEDURE — 1159F MED LIST DOCD IN RCRD: CPT | Mod: CPTII,S$GLB,, | Performed by: ORTHOPAEDIC SURGERY

## 2024-09-16 PROCEDURE — 73140 X-RAY EXAM OF FINGER(S): CPT | Mod: TC,PO,RT

## 2024-09-16 PROCEDURE — 3288F FALL RISK ASSESSMENT DOCD: CPT | Mod: CPTII,S$GLB,, | Performed by: ORTHOPAEDIC SURGERY

## 2024-09-16 PROCEDURE — 1101F PT FALLS ASSESS-DOCD LE1/YR: CPT | Mod: CPTII,S$GLB,, | Performed by: ORTHOPAEDIC SURGERY

## 2024-09-16 PROCEDURE — 99999 PR PBB SHADOW E&M-EST. PATIENT-LVL III: CPT | Mod: PBBFAC,,, | Performed by: ORTHOPAEDIC SURGERY

## 2024-09-16 NOTE — PROGRESS NOTES
Ms West returns to clinic today.  Has a history of right small finger palmar plate repair.  She was working with therapy and doing well.      Physical exam: Examination of the right small finger reveals that there is still some edema.  There is no erythema.  The wound is healing very well.  She was missing 20° of extension at the PIP joints but it was able to flex to the palm.  She was missing 1 cm from the distal palmar crease.    X-rays of the right small finger were taken in clinic today.  There was evidence of the palmar plate repair.  The suture anchor remains well-positioned.    Assessment: Status post right small finger palmar plate repair     Plan:     1. She will continue her PIP splint     2.  She can continue to work on range of motion and edema control     3. She will follow up in 2-3 weeks for repeat evaluation at which point I will consider weaning the Velcro splint and beginning more aggressive extension activities

## 2024-09-17 ENCOUNTER — CLINICAL SUPPORT (OUTPATIENT)
Dept: REHABILITATION | Facility: HOSPITAL | Age: 69
End: 2024-09-17
Payer: MEDICARE

## 2024-09-17 DIAGNOSIS — M79.644 PAIN OF FINGER OF RIGHT HAND: Primary | ICD-10-CM

## 2024-09-17 PROCEDURE — 97110 THERAPEUTIC EXERCISES: CPT | Mod: PO

## 2024-09-17 NOTE — PROGRESS NOTES
[facility]  Occupational Therapy Treatment Note     Date: 9/17/2024  Name: Cheyenne CLAUDIO [facility] Number: [id number]    Therapy Diagnosis:   Encounter Diagnosis   Name Primary?    Pain of finger of right hand Yes     Physician: Jose Antonio Aguayo MD     Note:  Please create a custom thermoplastic PIP splint finger.  Please keep the finger flexed at 20-30 degrees at the PIP joint.  Please begin therapy for gentle flexion and edema control.  Do not extend or stress the palmar plate.       Frequency:  2 times per week      Duration:  4 weeks      Diagnosis:  Status post right small finger dislocation with palmar plate repair    And per MD note from 9/16/2024  Plan:      1. She will continue her PIP splint      2.  She can continue to work on range of motion and edema control      3. She will follow up in 2-3 weeks for repeat evaluation at which point I will consider weaning the Velcro splint and beginning more aggressive extension activities    Medical Diagnosis: S63.289A (ICD-10-CM) - Dislocation of proximal interphalangeal joint of finger, initial encounter      Surgical Procedure and Date:   PROCEDURE:  Right small finger proximal interphalangeal joint palmar plate repair   IMPLANTS:  Arthrex Charo suture anchor x1   DATE OF SURGERY: 8/16/2024   S/P: 4 weeks and 3 days on 9/16/2024  Evaluation Date: 9/3/2024  Insurance Authorization Period Expiration:  Calendar year     Plan of Care Certification Period: 09/3/2024 through 12/2/2024  Date of Return to Referring Provider: saw on 9/16/2024  Visit # / Visits authorized: 5 / TBD  FOTO: Shanna  / 1  Next Reassessment at 10th visit or by 30 days = 10/03/2024     Time In: 1300  Time Out: 1330  Total Appointment Time: 30 min (timed 30 min & untimed codes N/A)      Precautions:  standard and post op per protocol      Subjective     Pt reports: Doing well; scar is less sensitive to the touch.  She was compliant with home exercise program given  last session.   Response to previous treatment:Good  Functional change: None new    Pain 0-10 scale   Functional Pain Scale Rating 0-10:   0/10 on average  0/10 at best  3/10 at worst  Location: R small finger  Description: Sharp, some sensitivity at scar.  Aggravating Factors:Any pressure at PIP  Easing Factors: Rest       Objective   MEASUREMENTS  Last measurements below are from Eval unless otherwise noted.    CMS Impairment/Limitation/Restriction for FOTO Hand Survey     Therapist reviewed FOTO scores for Cheyenne West on 9/3/2024.         Limitation Score: 51%        Observation/Inspection/Wound/Incision/Scar   Mild edema, decreased hyperthrophy of scar. Has been compliant with splint wear.      Sensation: Grossly WNL,        AROM Hand: on 9/9/2024  R SF PIP flexion = 90 (+15) degrees      9/17/2024 AROM Ext = -15 degrees    AROM Wrist/Forearm\Elbow:  Grossly WNL L and R     Manual Muscle Test: NT                                       and Pinch Strength: NT at this time due to post operative status.     Special and/or Standardized Tests:  NT       Treatment     Cheyenne received the following supervised modalities after being cleared for contradictions for 0 minutes:   NT    Cheyenne received the following direct contact modalities after being cleared for contraindications for 0 minutes:  -NT    Cheyenne received the following manual therapy techniques for 5 minutes:   -Gentle retrograde massage and dorsal PIP massage with small finger in flexion.  -Gentle scar masage    Cheyenne received therapeutic exercises for 25 minutes including:  With splint off, gentle AROM composite fist and ext. R small finger, gentle fingers spreads and gentle straight to hook fist. 2x10 each and added to home program. (Cont with splint wear between ex sessions)  Place and hold composite fist 5 min while on moist heat (NT)  Gentle PROM hook fist R small finger flexion 5x10 sec  Isolated small finger flexion 2x10    Cheyenne  participated in dynamic functional therapeutic activities to improve functional performance for 0  minutes, including:  NT    Home Exercises and Education Provided     Education provided:   -  New program from above and handout today    Written Home Exercises Provided:  Patient instructed to cont prior HEP. New program from above and handout today    Exercises were reviewed and Cheyenne was able to demonstrate them prior to the end of the session.  Cheyenne demonstrated good  understanding of the education provided.     See EMR under Media for exercises provided today and/or prior visit.        Assessment     Pt would continue to benefit from skilled OT.  Cont per current POC/protocol     - Progress towards goals:  STG #1 has been met.    Cheyenne is progressing well towards her goals . Pt continues with good rehab potential.     Pt will continue to benefit from skilled outpatient occupational therapy to address the deficits listed in the problem list on initial evaluation in order to maximize pt's level of independence in the home and community.     Anticipated barriers to occupational therapy: None    Pt's spiritual, cultural and educational needs considered and pt agreeable to plan of care and goals.    Goals:  Short Term Goals: (30 days, 10/03/2024, and/or 10th visit) unless otherwise noted below.  1. Pt will be independent with HEP in 2 visits.  2. Pt will report decreased pain to a 5/10 at worst in RUE with ADLs in order to increase function/use of UE.   3. Pt will increase Flexion of R SF PIP by 20 degrees to 80 degrees of AROM to increase function for ADL/IADL.     Long Term Goals: (by discharge)  1. Pt will report decreased pain to 1-2/10 with ADLs to allow for increased function/use of UE.   2. Pt will exhibit grossly WNL AROM of R hand to allow for Independent use of for all ADL/IADL tasks.  3. Pt will exhibit  WFL  strength to allow a firm grasp during ADL/IADL (cooking utensils, tool use, carrying  groceries, steering wheel, etc.)  4. Pt will return to PLOF with all ADL/IADL, leisure and job tasks.    Plan   Continue Occupational Therapy 2 times per week through current poc expiration date of 12/2/2024, in order to to decrease pain and edema, and increase A/PROM, strength, and functional use of R upper extremity.    Updates/Grading for next session:  Progress with OT as tolerated.      ALEXIS Keen, PINAT

## 2024-09-20 ENCOUNTER — CLINICAL SUPPORT (OUTPATIENT)
Dept: REHABILITATION | Facility: HOSPITAL | Age: 69
End: 2024-09-20
Payer: MEDICARE

## 2024-09-20 DIAGNOSIS — M79.644 PAIN OF FINGER OF RIGHT HAND: Primary | ICD-10-CM

## 2024-09-20 PROCEDURE — 97110 THERAPEUTIC EXERCISES: CPT | Mod: PO

## 2024-09-20 NOTE — PROGRESS NOTES
[facility]  Occupational Therapy Treatment Note     Date: 9/20/2024  Name: Cheyenne CLAUDIO [facility] Number: [id number]    Therapy Diagnosis:   Encounter Diagnosis   Name Primary?    Pain of finger of right hand Yes       Physician: Jose Antonio Aguayo MD     Note:  Please create a custom thermoplastic PIP splint finger.  Please keep the finger flexed at 20-30 degrees at the PIP joint.  Please begin therapy for gentle flexion and edema control.  Do not extend or stress the palmar plate.       Frequency:  2 times per week      Duration:  4 weeks      Diagnosis:  Status post right small finger dislocation with palmar plate repair    And per MD note from 9/16/2024  Plan:      1. She will continue her PIP splint      2.  She can continue to work on range of motion and edema control      3. She will follow up in 2-3 weeks for repeat evaluation at which point I will consider weaning the Velcro splint and beginning more aggressive extension activities    Medical Diagnosis: S63.289A (ICD-10-CM) - Dislocation of proximal interphalangeal joint of finger, initial encounter      Surgical Procedure and Date:   PROCEDURE:  Right small finger proximal interphalangeal joint palmar plate repair   IMPLANTS:  Arthrex Charo suture anchor x1   DATE OF SURGERY: 8/16/2024   S/P: 4 weeks and 3 days on 9/16/2024  Evaluation Date: 9/3/2024  Insurance Authorization Period Expiration:  Calendar year     Plan of Care Certification Period: 09/3/2024 through 12/2/2024  Date of Return to Referring Provider: saw on 9/16/2024  Visit # / Visits authorized: 6 / TBD  FOTO: Shanna  / 1  Next Reassessment at 10th visit or by 30 days = 10/03/2024     Time In: 0915  Time Out: 0940  Total Appointment Time: 25 min (timed 25 min & untimed codes N/A)      Precautions:  standard and post op per protocol      Subjective     Pt reports: Doing well; scar is less sensitive to the touch.  She was compliant with home exercise program  given last session.   Response to previous treatment:Good  Functional change: None new    Pain 0-10 scale   Functional Pain Scale Rating 0-10:   0/10 on average  0/10 at best  3/10 at worst  Location: R small finger  Description: Sharp, some sensitivity at scar.  Aggravating Factors:Any pressure at PIP  Easing Factors: Rest       Objective   MEASUREMENTS  Last measurements below are from Eval unless otherwise noted.    CMS Impairment/Limitation/Restriction for FOTO Hand Survey     Therapist reviewed FOTO scores for Cheyenne West on 9/3/2024.         Limitation Score: 51%        Observation/Inspection/Wound/Incision/Scar   Mild edema, decreased hyperthrophy of scar. Has been compliant with splint wear.      Sensation: Grossly WNL,        AROM Hand: on 9/20/2024  R SF PIP flexion = 92 (+2) degrees      9/20/2024 AROM Ext = -5 degrees    AROM Wrist/Forearm\Elbow:  Grossly WNL L and R     Manual Muscle Test: NT                                       and Pinch Strength: NT at this time due to post operative status.     Special and/or Standardized Tests:  NT       Treatment     Cheyenne received the following supervised modalities after being cleared for contradictions for 0 minutes:   NT    Cheyenne received the following direct contact modalities after being cleared for contraindications for 0 minutes:  -NT    Cheyenne received the following manual therapy techniques for 2 minutes:   -Gentle retrograde massage and dorsal PIP massage with small finger in flexion.  -Gentle scar masage    Cheyenne received therapeutic exercises for 23 minutes including:  Tendon Gliding Exercises: Full sequence 2x10  Place and hold composite fist 5 min and hook fist x 1 min while on moist heat   Gentle PROM hook fist R small finger flexion 5x10 sec  Isolated small finger flexion 2x10    Cheyenne participated in dynamic functional therapeutic activities to improve functional performance for 0  minutes, including:  NT    Home Exercises and  Education Provided     Education provided:   -  Cont previous    Written Home Exercises Provided:  Patient instructed to cont prior HEP.    Exercises were reviewed and Cheyenne was able to demonstrate them prior to the end of the session.  Cheyenne demonstrated good  understanding of the education provided.     See EMR under Media for exercises provided today and/or prior visit.        Assessment     Pt would continue to benefit from skilled OT.  Excellent AROM gains continue. Cont per current POC/protocol     - Progress towards goals:  STG #1 has been met.    Cheyenne is progressing well towards her goals . Pt continues with good rehab potential.     Pt will continue to benefit from skilled outpatient occupational therapy to address the deficits listed in the problem list on initial evaluation in order to maximize pt's level of independence in the home and community.     Anticipated barriers to occupational therapy: None    Pt's spiritual, cultural and educational needs considered and pt agreeable to plan of care and goals.    Goals:  Short Term Goals: (30 days, 10/03/2024, and/or 10th visit) unless otherwise noted below.  1. Pt will be independent with HEP in 2 visits.  2. Pt will report decreased pain to a 5/10 at worst in RUE with ADLs in order to increase function/use of UE.   3. Pt will increase Flexion of R SF PIP by 20 degrees to 80 degrees of AROM to increase function for ADL/IADL.     Long Term Goals: (by discharge)  1. Pt will report decreased pain to 1-2/10 with ADLs to allow for increased function/use of UE.   2. Pt will exhibit grossly WNL AROM of R hand to allow for Independent use of for all ADL/IADL tasks.  3. Pt will exhibit  WFL  strength to allow a firm grasp during ADL/IADL (cooking utensils, tool use, carrying groceries, steering wheel, etc.)  4. Pt will return to PLOF with all ADL/IADL, leisure and job tasks.    Plan   Continue Occupational Therapy 2 times per week through current poc expiration  date of 12/2/2024, in order to to decrease pain and edema, and increase A/PROM, strength, and functional use of R upper extremity.    Updates/Grading for next session:  Progress with OT as tolerated.      ALEXIS Keen, PINAT

## 2024-09-24 ENCOUNTER — CLINICAL SUPPORT (OUTPATIENT)
Dept: REHABILITATION | Facility: HOSPITAL | Age: 69
End: 2024-09-24
Payer: MEDICARE

## 2024-09-24 DIAGNOSIS — M79.644 PAIN OF FINGER OF RIGHT HAND: Primary | ICD-10-CM

## 2024-09-24 PROCEDURE — 97530 THERAPEUTIC ACTIVITIES: CPT | Mod: PO

## 2024-09-24 PROCEDURE — 97110 THERAPEUTIC EXERCISES: CPT | Mod: PO

## 2024-09-24 NOTE — PROGRESS NOTES
[facility] OUTPAT[facility] THERAPY AND WELLNESS  Occupational Therapy Treatment Note     Date: 9/24/2024  Name: Cheyenne CLAUDIO [facility] Number: [id number]    Therapy Diagnosis:   Encounter Diagnosis   Name Primary?    Pain of finger of right hand Yes       Physician: Jose Antonio Aguayo MD     Note:  Please create a custom thermoplastic PIP splint finger.  Please keep the finger flexed at 20-30 degrees at the PIP joint.  Please begin therapy for gentle flexion and edema control.  Do not extend or stress the palmar plate.       Frequency:  2 times per week      Duration:  4 weeks      Diagnosis:  Status post right small finger dislocation with palmar plate repair    And per MD note from 9/16/2024  Plan:      1. She will continue her PIP splint      2.  She can continue to work on range of motion and edema control      3. She will follow up in 2-3 weeks for repeat evaluation at which point I will consider weaning the Velcro splint and beginning more aggressive extension activities    Medical Diagnosis: S63.289A (ICD-10-CM) - Dislocation of proximal interphalangeal joint of finger, initial encounter      Surgical Procedure and Date:   PROCEDURE:  Right small finger proximal interphalangeal joint palmar plate repair   IMPLANTS:  Arthrex Charo suture anchor x1   DATE OF SURGERY: 8/16/2024   S/P: 4 weeks and 3 days on 9/16/2024  Evaluation Date: 9/3/2024  Insurance Authorization Period Expiration:  Calendar year     Plan of Care Certification Period: 09/3/2024 through 12/2/2024  Date of Return to Referring Provider: saw on 9/16/2024; will see again on  10/07/2024  Visit # / Visits authorized:  7 / 20  FOTO: Shanna  / Elder  2  on 9/24/2024  Next Reassessment at 10th visit or by 30 days = 10/03/2024     Time In: 0945  Time Out: 1017  Total Appointment Time: 32 min (timed 30 min & untimed codes N/A)      Precautions:  standard and post op per protocol      Subjective     Pt reports: Finger feels very stiff each am.  She was  compliant with home exercise program given last session.   Response to previous treatment:Good  Functional change: None new    Pain 0-10 scale   Functional Pain Scale Rating 0-10:   0/10 on average  0/10 at best  3/10 at worst  Location: R small finger  Description: Sharp, some sensitivity at scar.  Aggravating Factors:Any pressure at PIP  Easing Factors: Rest       Objective   MEASUREMENTS  Last measurements below are from Eval unless otherwise noted.    CMS Impairment/Limitation/Restriction for FOTO Hand Survey     Therapist reviewed FOTO scores for Cheyenne West on 9/3/2024.         Limitation Score on Eval = 51%    2nd FOTO on 9/24/2024 = 67%        Observation/Inspection/Wound/Incision/Scar   Mild edema, decreased hyperthrophy of scar. Has been compliant with splint wear.      Sensation: Grossly WNL,        AROM Hand: on 9/24/2024  R SF PIP flexion = 93 (+1) degrees      9/20/2024 AROM Ext = -5 degrees    AROM Wrist/Forearm\Elbow:  Grossly WNL L and R     Manual Muscle Test: NT                                       and Pinch Strength: NT at this time due to post operative status.     Special and/or Standardized Tests:  NT       Treatment     Cheyenne received the following supervised modalities after being cleared for contradictions for 0 minutes:   NT    Cheyenne received the following direct contact modalities after being cleared for contraindications for 0 minutes:  -NT    Cheyenne received the following manual therapy techniques for  5 minutes:   -Gentle retrograde massage and dorsal PIP massage with small finger in flexion.  -Gentle scar masage    Cheyenne received therapeutic exercises for 15 minutes including:  Tendon Gliding Exercises: Full sequence 2x10 (NT)  Place and hold composite fist 5 min and hook fist x 1 min while on moist heat   Gentle PROM hook fist R small finger flexion 5x10 sec  Isolated small finger flexion 2x10     Cheyenne participated in dynamic functional therapeutic activities to  improve functional performance for 10  minutes, including:  In hand manipulation with buttons.  9 hole pegs  and placement ulnar digits.  Isospheres x 25.  Tissue scrunch x25    Home Exercises and Education Provided     Education provided:   -  Cont previous    Written Home Exercises Provided:  Patient instructed to cont prior HEP.    Exercises were reviewed and Cheyenne was able to demonstrate them prior to the end of the session.  Cheyenne demonstrated good  understanding of the education provided.     See EMR under Media for exercises provided today and/or prior visit.        Assessment     Pt would continue to benefit from skilled OT.  Excellent AROM gains continue. Cont per current POC/protocol     - Progress towards goals:  STG #1 has been met.    Cheyenne is progressing well towards her goals . Pt continues with good rehab potential.     Pt will continue to benefit from skilled outpatient occupational therapy to address the deficits listed in the problem list on initial evaluation in order to maximize pt's level of independence in the home and community.     Anticipated barriers to occupational therapy: None    Pt's spiritual, cultural and educational needs considered and pt agreeable to plan of care and goals.    Goals:  Short Term Goals: (30 days, 10/03/2024, and/or 10th visit) unless otherwise noted below.  1. Pt will be independent with HEP in 2 visits.  2. Pt will report decreased pain to a 5/10 at worst in RUE with ADLs in order to increase function/use of UE.   3. Pt will increase Flexion of R SF PIP by 20 degrees to 80 degrees of AROM to increase function for ADL/IADL.     Long Term Goals: (by discharge)  1. Pt will report decreased pain to 1-2/10 with ADLs to allow for increased function/use of UE.   2. Pt will exhibit grossly WNL AROM of R hand to allow for Independent use of for all ADL/IADL tasks.  3. Pt will exhibit  WFL  strength to allow a firm grasp during ADL/IADL (cooking utensils, tool  use, carrying groceries, steering wheel, etc.)  4. Pt will return to PLOF with all ADL/IADL, leisure and job tasks.    Plan   Continue Occupational Therapy 2 times per week through current poc expiration date of 12/2/2024, in order to to decrease pain and edema, and increase A/PROM, strength, and functional use of R upper extremity.    Updates/Grading for next session:  Progress with OT as tolerated.      ALEXIS Keen, PINAT

## 2024-09-24 NOTE — PROGRESS NOTES
[facility]  Occupational Therapy Treatment Note     Date: 9/27/2024  Name: Cheyenne CLAUDIO [facility] Number: [id number]    Therapy Diagnosis:   Encounter Diagnosis   Name Primary?    Pain of finger of right hand Yes     Physician: Jose Antonio Aguayo MD    Note:  Please create a custom thermoplastic PIP splint finger.  Please keep the finger flexed at 20-30 degrees at the PIP joint.  Please begin therapy for gentle flexion and edema control.  Do not extend or stress the palmar plate.       Frequency:  2 times per week      Duration:  4 weeks      Diagnosis:  Status post right small finger dislocation with palmar plate repair    And per MD note from 9/16/2024  Plan:      1. She will continue her PIP splint      2.  She can continue to work on range of motion and edema control      3. She will follow up in 2-3 weeks for repeat evaluation at which point I will consider weaning the Velcro splint and beginning more aggressive extension activities    Medical Diagnosis: S63.289A (ICD-10-CM) - Dislocation of proximal interphalangeal joint of finger, initial encounter      Surgical Procedure and Date:   PROCEDURE:  Right small finger proximal interphalangeal joint palmar plate repair   IMPLANTS:  Arthrex Charo suture anchor x1   DATE OF SURGERY: 8/16/2024   S/P: 6 weeks on 9/27/2024  Evaluation Date: 9/3/2024  Insurance Authorization Period Expiration:  Calendar year     Plan of Care Certification Period: 09/3/2024 through 12/2/2024  Date of Return to Referring Provider: saw on 9/16/2024; will see again on  10/07/2024  Visit # / Visits authorized:  8 / 20  FOTO: Shanna  / Elder  2  on 9/24/2024  Next Reassessment at 10th visit or by 30 days = 10/03/2024     Time In: 0905  leanna Out: 0944  Total Appointment Time: 39  (timed 38 min & untimed codes Paraffin)      Precautions:  standard and post op per protocol      Subjective     Pt reports: No new problems or complaints  She was compliant with home  exercise program given last session.   Response to previous treatment:Good  Functional change: None new    Pain 0-10 scale   Functional Pain Scale Rating 0-10:   0/10 on average  0/10 at best  3/10 at worst  Location: R small finger  Description: Sharp, some sensitivity at scar.  Aggravating Factors:Any pressure at PIP  Easing Factors: Rest       Objective   MEASUREMENTS  Last measurements below are from Eval unless otherwise noted.    CMS Impairment/Limitation/Restriction for FOTO Hand Survey     Therapist reviewed FOTO scores for Cheyenne West on 9/3/2024.         Limitation Score on Eval = 51%    2nd FOTO on 9/24/2024 = 67%        Observation/Inspection/Wound/Incision/Scar   Mild edema, decreased hyperthrophy of scar. Has been compliant with splint wear.      Sensation: Grossly WNL,        AROM Hand: on 9/27/2024  R SF PIP flexion = 94 (+1) degrees      9/20/2024 AROM Ext = -5 degrees    AROM Wrist/Forearm\Elbow:  Grossly WNL L and R     Manual Muscle Test: NT                                       and Pinch Strength: NT at this time due to post operative status.     Special and/or Standardized Tests:  NT       Treatment     Cheyenne received the following supervised modalities after being cleared for contradictions  Paraffin wax; with coban wrap into hook fist for PROM. (See time under therex below)    Cheyenne received the following direct contact modalities after being cleared for contraindications for 0 minutes:  -NT    Cheyenne received the following manual therapy techniques for 8 minutes:   -Gentle retrograde massage and dorsal PIP massage with small finger in flexion.  -Gentle scar masage    Cheyenne received therapeutic exercises for 15 minutes including:  Straight to kook fist x 25  Place and hold composite fist with coban wrap while on paraffin x 8 min  Fingerlifts x25 and lifts and spreads x25   Gentle PROM hook fist R small finger flexion 5x10 sec  Isolated small finger flexion 2x10     Cheyenne  participated in dynamic functional therapeutic activities to improve functional performance for 15 minutes, including:  In hand manipulation with macaroni  9 hole pegs  and placement ulnar digits. (NT)  Isospheres 3 min.  Tissue scrunch x25    Home Exercises and Education Provided     Education provided:   -  Cont previous    Written Home Exercises Provided:  Patient instructed to cont prior HEP.    Exercises were reviewed and Cheyenne was able to demonstrate them prior to the end of the session.  Cheyenne demonstrated good  understanding of the education provided.     See EMR under Media for exercises provided today and/or prior visit.        Assessment     Pt would continue to benefit from skilled OT. Excellent AROM gains continue. Cont per current POC/protocol     - Progress towards goals:  STG #1 has been met.    Cheyenne is progressing well towards her goals . Pt continues with good rehab potential.     Pt will continue to benefit from skilled outpatient occupational therapy to address the deficits listed in the problem list on initial evaluation in order to maximize pt's level of independence in the home and community.     Anticipated barriers to occupational therapy: None    Pt's spiritual, cultural and educational needs considered and pt agreeable to plan of care and goals.    Goals:  Short Term Goals: (30 days, 10/03/2024, and/or 10th visit) unless otherwise noted below.  1. Pt will be independent with HEP in 2 visits.  2. Pt will report decreased pain to a 5/10 at worst in RUE with ADLs in order to increase function/use of UE.   3. Pt will increase Flexion of R SF PIP by 20 degrees to 80 degrees of AROM to increase function for ADL/IADL.     Long Term Goals: (by discharge)  1. Pt will report decreased pain to 1-2/10 with ADLs to allow for increased function/use of UE.   2. Pt will exhibit grossly WNL AROM of R hand to allow for Independent use of for all ADL/IADL tasks.  3. Pt will exhibit  WFL   strength to allow a firm grasp during ADL/IADL (cooking utensils, tool use, carrying groceries, steering wheel, etc.)  4. Pt will return to PLOF with all ADL/IADL, leisure and job tasks.    Plan   Continue Occupational Therapy 2 times per week through current poc expiration date of 12/2/2024, in order to to decrease pain and edema, and increase A/PROM, strength, and functional use of R upper extremity.    Updates/Grading for next session:  Progress with OT as tolerated.      ALEXIS Keen, CHT

## 2024-09-27 ENCOUNTER — CLINICAL SUPPORT (OUTPATIENT)
Dept: REHABILITATION | Facility: HOSPITAL | Age: 69
End: 2024-09-27
Payer: MEDICARE

## 2024-09-27 DIAGNOSIS — M79.644 PAIN OF FINGER OF RIGHT HAND: Primary | ICD-10-CM

## 2024-09-27 PROCEDURE — 97140 MANUAL THERAPY 1/> REGIONS: CPT | Mod: KX,PO

## 2024-09-27 PROCEDURE — 97530 THERAPEUTIC ACTIVITIES: CPT | Mod: KX,PO

## 2024-09-27 PROCEDURE — 97110 THERAPEUTIC EXERCISES: CPT | Mod: KX,PO

## 2024-09-30 NOTE — PROGRESS NOTES
[facility]  Occupational Therapy Treatment Note     Date: 10/4/2024  Name: Cheyenne CLAUDIO [facility] Number: [id number]    Therapy Diagnosis:   Encounter Diagnosis   Name Primary?    Pain of finger of right hand Yes     Physician: Jose Antonio Aguayo MD    Note:  Please create a custom thermoplastic PIP splint finger.  Please keep the finger flexed at 20-30 degrees at the PIP joint.  Please begin therapy for gentle flexion and edema control.  Do not extend or stress the palmar plate.       Frequency:  2 times per week      Duration:  4 weeks      Diagnosis:  Status post right small finger dislocation with palmar plate repair    And per MD note from 9/16/2024  Plan:      1. She will continue her PIP splint      2.  She can continue to work on range of motion and edema control      3. She will follow up in 2-3 weeks for repeat evaluation at which point I will consider weaning the Velcro splint and beginning more aggressive extension activities    Medical Diagnosis: S63.289A (ICD-10-CM) - Dislocation of proximal interphalangeal joint of finger, initial encounter      Surgical Procedure and Date:   PROCEDURE:  Right small finger proximal interphalangeal joint palmar plate repair   IMPLANTS:  Arthrex Charo suture anchor x1   DATE OF SURGERY: 8/16/2024   S/P: 6 weeks on 9/27/2024  Evaluation Date: 9/3/2024  Insurance Authorization Period Expiration:  Calendar year     Plan of Care Certification Period: 09/3/2024 through 12/2/2024  Date of Return to Referring Provider: saw on 9/16/2024; will see again on  10/07/2024  Visit # / Visits authorized:  9 / 20  FOTO: Eval  / 1  FOTO 2nd on 9/24/2024  FOTO 3rd TBD    Next Reassessment at 10th visit or by 30 days = 10/03/2024     Time In: 1035  leanna Out: 1115  Total Appointment Time: 40 (timed  38 min & untimed codes: N/A)      Precautions:  standard and post op per protocol      Subjective     Pt reports: No new problems or complaints  She was  compliant with home exercise program given last session.   Response to previous treatment:Good  Functional change: None new    Pain 0-10 scale   Functional Pain Scale Rating 0-10:   0/10 on average  0/10 at best  1-3/10 at worst  Location: R small finger  Description: Sharp, some sensitivity at scar.  Aggravating Factors:Any pressure at PIP  Easing Factors: Rest       Objective   MEASUREMENTS  Last measurements below are from Eval unless otherwise noted.    CMS Impairment/Limitation/Restriction for FOTO Hand Survey     Therapist reviewed FOTO scores for Cheyenne West on 9/3/2024.         Limitation Score on Eval = 51%    2nd FOTO on 9/24/2024 = 67%        Observation/Inspection/Wound/Incision/Scar   Mild edema, decreased hyperthrophy of scar. Has been compliant with splint wear.      Sensation: Grossly WNL,        AROM Hand: on NT on 10/4/2024     R SF PIP flexion = 94 (+1) degrees      9/20/2024 AROM Ext = -5 degrees    AROM Wrist/Forearm\Elbow:  Grossly WNL L and R     Manual Muscle Test: NT                                       and Pinch Strength: NT at this time due to post operative status.     Special and/or Standardized Tests:  NT       Treatment     Cheyenne received the following supervised modalities after being cleared for contradictions  Paraffin wax; with coban wrap into hook fist for PROM. (See time under therex below)    Cheyenne received the following direct contact modalities after being cleared for contraindications for 0 minutes:  -NT    Cheyenne received the following manual therapy techniques for 8 minutes:   -Gentle retrograde massage and dorsal PIP massage with small finger in flexion.  -Gentle scar masage    Cheyenne received therapeutic exercises for 15 minutes including:  Tenodesis pattern with straight to hook fist x 2x15  Place and hold composite fist 4 min and hook fist 6 min while on moist heat.  Fingerlifts x25 and lifts and spreads x25   Gentle PROM hook fist R small finger  flexion 5x10 sec  Isolated small finger flexion 3x10     Cheyenne participated in dynamic functional therapeutic activities to improve functional performance for 15 minutes, including:  In hand manipulation with corn kernels  9 hole pegs  and placement ulnar digits. (NT)  Isospheres 3 min.  Tissue scrunch 2x25.    Home Exercises and Education Provided     Education provided:   -  Cont previous    Written Home Exercises Provided:  Patient instructed to cont prior HEP.    Exercises were reviewed and Cheyenne was able to demonstrate them prior to the end of the session.  Cheyenne demonstrated good  understanding of the education provided.     See EMR under Media for exercises provided today and/or prior visit.        Assessment     Pt would continue to benefit from skilled OT.  Excellent overall AROM continue. Cont per current POC/protocol     - Progress towards goals:  STG #1 has been met.    Cheyenne is progressing well towards her goals . Pt continues with good rehab potential.     Pt will continue to benefit from skilled outpatient occupational therapy to address the deficits listed in the problem list on initial evaluation in order to maximize pt's level of independence in the home and community.     Anticipated barriers to occupational therapy: None    Pt's spiritual, cultural and educational needs considered and pt agreeable to plan of care and goals.    Goals:  Short Term Goals: (30 days, 10/03/2024, and/or 10th visit) unless otherwise noted below.  1. Pt will be independent with HEP in 2 visits.  2. Pt will report decreased pain to a 5/10 at worst in RUE with ADLs in order to increase function/use of UE.   3. Pt will increase Flexion of R SF PIP by 20 degrees to 80 degrees of AROM to increase function for ADL/IADL.     Long Term Goals: (by discharge)  1. Pt will report decreased pain to 1-2/10 with ADLs to allow for increased function/use of UE.   2. Pt will exhibit grossly WNL AROM of R hand to allow for  Independent use of for all ADL/IADL tasks.  3. Pt will exhibit  WFL  strength to allow a firm grasp during ADL/IADL (cooking utensils, tool use, carrying groceries, steering wheel, etc.)  4. Pt will return to PLOF with all ADL/IADL, leisure and job tasks.    Plan   Continue Occupational Therapy 2 times per week through current poc expiration date of 12/2/2024, in order to to decrease pain and edema, and increase A/PROM, strength, and functional use of R upper extremity.    Updates/Grading for next session:  Progress with OT as tolerated.      ALEXIS Keen, CHT

## 2024-10-03 RX ORDER — FAMOTIDINE 20 MG/1
20 TABLET, FILM COATED ORAL 2 TIMES DAILY
Qty: 60 TABLET | Refills: 9 | Status: SHIPPED | OUTPATIENT
Start: 2024-10-03 | End: 2025-10-03

## 2024-10-04 ENCOUNTER — CLINICAL SUPPORT (OUTPATIENT)
Dept: REHABILITATION | Facility: HOSPITAL | Age: 69
End: 2024-10-04
Payer: MEDICARE

## 2024-10-04 DIAGNOSIS — M79.644 PAIN OF FINGER OF RIGHT HAND: Primary | ICD-10-CM

## 2024-10-04 PROCEDURE — 97530 THERAPEUTIC ACTIVITIES: CPT | Mod: PO

## 2024-10-04 PROCEDURE — 97140 MANUAL THERAPY 1/> REGIONS: CPT | Mod: PO

## 2024-10-04 PROCEDURE — 97110 THERAPEUTIC EXERCISES: CPT | Mod: PO

## 2024-10-07 ENCOUNTER — OFFICE VISIT (OUTPATIENT)
Dept: ORTHOPEDICS | Facility: CLINIC | Age: 69
End: 2024-10-07
Payer: MEDICARE

## 2024-10-07 DIAGNOSIS — S63.289A DISLOCATION OF PROXIMAL INTERPHALANGEAL JOINT OF FINGER, INITIAL ENCOUNTER: Primary | ICD-10-CM

## 2024-10-07 PROCEDURE — 3288F FALL RISK ASSESSMENT DOCD: CPT | Mod: CPTII,S$GLB,, | Performed by: ORTHOPAEDIC SURGERY

## 2024-10-07 PROCEDURE — 1101F PT FALLS ASSESS-DOCD LE1/YR: CPT | Mod: CPTII,S$GLB,, | Performed by: ORTHOPAEDIC SURGERY

## 2024-10-07 PROCEDURE — 1126F AMNT PAIN NOTED NONE PRSNT: CPT | Mod: CPTII,S$GLB,, | Performed by: ORTHOPAEDIC SURGERY

## 2024-10-07 PROCEDURE — 99024 POSTOP FOLLOW-UP VISIT: CPT | Mod: S$GLB,,, | Performed by: ORTHOPAEDIC SURGERY

## 2024-10-07 PROCEDURE — 99999 PR PBB SHADOW E&M-EST. PATIENT-LVL III: CPT | Mod: PBBFAC,,, | Performed by: ORTHOPAEDIC SURGERY

## 2024-10-07 PROCEDURE — 1159F MED LIST DOCD IN RCRD: CPT | Mod: CPTII,S$GLB,, | Performed by: ORTHOPAEDIC SURGERY

## 2024-10-07 NOTE — PROGRESS NOTES
[facility]  Occupational Therapy Reassessment and Treatment Note     Date: 10/9/2024  Name: Cheyenne CLAUDIO [facility] Number: [id number]    Therapy Diagnosis:   Encounter Diagnosis   Name Primary?    Pain of finger of right hand Yes     Physician: Jose Antonio Aguayo MD    Note:  Please create a custom thermoplastic PIP splint finger.  Please keep the finger flexed at 20-30 degrees at the PIP joint.  Please begin therapy for gentle flexion and edema control.  Do not extend or stress the palmar plate.       Frequency:  2 times per week      Duration:  4 weeks      Diagnosis:  Status post right small finger dislocation with palmar plate repair    And per MD note from 9/16/2024  Plan:      1. She will continue her PIP splint      2.  She can continue to work on range of motion and edema control      3. She will follow up in 2-3 weeks for repeat evaluation at which point I will consider weaning the Velcro splint and beginning more aggressive extension activities    Per ortho visit/note from 10/07/2024    Plan:      1. She can wean out of the splint      2.  She will continue to work on range of motion and can begin gentle strengthening      3. She will follow up in 4 weeks for repeat evaluation with an x-ray of the right small finger.  Based off of the x-ray and her function she may be discharged at that time     4. We will continue to work with therapy.  Can decrease frequency to once per week.  Would like her to continue therapy until she gets close to the three-month ted which is approximately 3 to 4 weeks    Medical Diagnosis: S63.289A (ICD-10-CM) - Dislocation of proximal interphalangeal joint of finger, initial encounter      Surgical Procedure and Date:   PROCEDURE:  Right small finger proximal interphalangeal joint palmar plate repair   IMPLANTS:  Arthrex Charo suture anchor x1   DATE OF SURGERY: 8/16/2024   S/P: 7 weeks and 5 days on 10/09/2024  Evaluation Date:  9/3/2024  Insurance Authorization Period Expiration:  Calendar year     Plan of Care Certification Period: 09/3/2024 through 12/2/2024  Date of Return to Referring Provider: saw on 10/07/2024; will see again on 11/4/2024     Visit # / Visits authorized:  10 / 20  FOTO: Eval  / 1  FOTO 2nd on 9/24/2024  FOTO 3rd TBD    Next Reassessment at 10th visit or by 30 days = 10/03/2024     Time In: 0900  leanna Out: 0945  Total Appointment Time: 45 (timed 38 min & untimed codes: N/A)      Precautions:  standard and post op per protocol      Subjective     Pt reports: Has been able to wean out of her splint with no problems reported.    She was compliant with home exercise program given last session.   Response to previous treatment:Good  Functional change: None new    Pain 0-10 scale   Functional Pain Scale Rating 0-10:   0/10 on average  0/10 at best  1-3/10 at worst  Location: R small finger  Description: Sharp, some sensitivity at scar.  Aggravating Factors:Any pressure at PIP  Easing Factors: Rest       Objective   MEASUREMENTS  Last measurements below are from Eval unless otherwise noted.    CMS Impairment/Limitation/Restriction for FOTO Hand Survey     Therapist reviewed FOTO scores for Cheyenne West on 9/3/2024.         Limitation Score on Eval = 51%    2nd FOTO on 9/24/2024 = 67%        Observation/Inspection/Wound/Incision/Scar  Mild edema, decreased hyperthrophy of scar. Has been compliant with splint wear.      Sensation: Grossly WNL,        AROM Hand: on NT on 10/4/2024     R SF PIP flexion = 94 (+1) degrees      9/20/2024 AROM Ext = -5 degrees    AROM Wrist/Forearm\Elbow:  Grossly WNL L and R     Manual Muscle Test: NT                                       and Pinch Strength: NT at this time due to post operative status.     Special and/or Standardized Tests:  NT       Treatment     Cheyenne received the following supervised modalities after being cleared for contradictions   Paraffin wax; with coban  wrap into hook fist for PROM. (See time under therex below)   (NT)    Cheyenne received the following direct contact modalities after being cleared for contraindications for 0 minutes:  -NT    Cheyenne received the following manual therapy techniques for 8 minutes:   - retrograde massage and dorsal PIP massage with small finger in flexion.  - scar masage    Cheyenne received therapeutic exercises for 15 minutes including:  MCP joint blocking small finger 3x10  Tenodesis pattern with straight to hook fist x 2x15 (NT)  Place and hold hook 5 min with coban wrap min while on moist heat.  Fingerlifts x25 and lifts and spreads x25 (NT)  Gentle PROM hook fist R small finger flexion 5x10 sec  Isolated small finger flexion 3x10 (NT)    Cheyenne participated in dynamic functional therapeutic activities to improve functional performance for 15 minutes, including:  In hand manipulation with corn kernels (NT)  9 hole pegs  with in hand manipulation tip palm and palm to tip  Isospheres 3 min.  Tissue scrunch 2x25.(NT)    Home Exercises and Education Provided     Education provided:   -  Cont previous    Written Home Exercises Provided:  Patient instructed to cont prior HEP.    Exercises were reviewed and Cheyenne was able to demonstrate them prior to the end of the session.  Cheyenne demonstrated good  understanding of the education provided.     See EMR under Media for exercises provided today and/or prior visit.        Assessment     Pt would continue to benefit from skilled OT.  Pt has met all STGs, will cont to progress towards already established LTGs to be met over the next 3-4 weeks.     Progress towards goals: See below for current goal status / updates.    Cheyenne is progressing well towards her goals . Pt continues with good rehab potential.     Pt will continue to benefit from skilled outpatient occupational therapy to address the deficits listed in the problem list on initial evaluation in order to maximize pt's level of  independence in the home and community.     Anticipated barriers to occupational therapy: None    Pt's spiritual, cultural and educational needs considered and pt agreeable to plan of care and goals.    Goals:  Short Term Goals: (30 days, 10/03/2024, and/or 10th visit) unless otherwise noted below. All m]. Pt will be independent with HEP in 2 visits.  2. Pt will report decreased pain to a 5/10 at worst in RUE with ADLs in order to increase function/use of UE.   3. Pt will increase Flexion of R SF PIP by 20 degrees to 80 degrees of AROM to increase function for ADL/IADL.     Long Term Goals: (by discharge)  1. Pt will report decreased pain to 1-2/10 with ADLs to allow for increased function/use of UE.   2. Pt will exhibit grossly WNL AROM of R hand to allow for Independent use of for all ADL/IADL tasks.  3. Pt will exhibit  WFL  strength to allow a firm grasp during ADL/IADL (cooking utensils, tool use, carrying groceries, steering wheel, etc.)  4. Pt will return to PLOF with all ADL/IADL, leisure and job tasks.    Plan   Continue Occupational Therapy 2 times per week through current poc expiration date of 12/2/2024, in order to to decrease pain and edema, and increase A/PROM, strength, and functional use of R upper extremity.    Updates/Grading for next session:  Progress with OT as tolerated.      ALEXIS Keen, PINAT

## 2024-10-07 NOTE — PROGRESS NOTES
Ms West returns to clinic today.  Has a history of right small finger palmar plate repair.  She is working with therapy and is improving.      Physical exam: Examination of the right small finger reveals that the incision site is well healed.  There is only mild edema.  There was no erythema.  She was neurovascularly intact distally.  She was able to extend the PIP joint to within 10° of full extension.  She can flex to within 10° of full flexion.    Assessment: Right small finger PIP joint volar plate repair     Plan:     1. She can wean out of the splint     2.  She will continue to work on range of motion and can begin gentle strengthening     3. She will follow up in 4 weeks for repeat evaluation with an x-ray of the right small finger.  Based off of the x-ray and her function she may be discharged at that time    4. We will continue to work with therapy.  Can decrease frequency to once per week.  Would like her to continue therapy until she gets close to the three-month ted which is approximately 3 to 4 weeks

## 2024-10-09 ENCOUNTER — CLINICAL SUPPORT (OUTPATIENT)
Dept: REHABILITATION | Facility: HOSPITAL | Age: 69
End: 2024-10-09
Payer: MEDICARE

## 2024-10-09 DIAGNOSIS — M79.644 PAIN OF FINGER OF RIGHT HAND: Primary | ICD-10-CM

## 2024-10-09 PROCEDURE — 97140 MANUAL THERAPY 1/> REGIONS: CPT | Mod: KX,PO

## 2024-10-09 PROCEDURE — 97530 THERAPEUTIC ACTIVITIES: CPT | Mod: KX,PO

## 2024-10-09 PROCEDURE — 97110 THERAPEUTIC EXERCISES: CPT | Mod: PO

## 2024-10-15 ENCOUNTER — CLINICAL SUPPORT (OUTPATIENT)
Dept: REHABILITATION | Facility: HOSPITAL | Age: 69
End: 2024-10-15
Payer: MEDICARE

## 2024-10-15 DIAGNOSIS — M79.644 PAIN OF FINGER OF RIGHT HAND: Primary | ICD-10-CM

## 2024-10-15 PROCEDURE — 97140 MANUAL THERAPY 1/> REGIONS: CPT | Mod: PO

## 2024-10-15 PROCEDURE — 97022 WHIRLPOOL THERAPY: CPT | Mod: PO

## 2024-10-15 PROCEDURE — 97530 THERAPEUTIC ACTIVITIES: CPT | Mod: PO

## 2024-10-15 PROCEDURE — 97110 THERAPEUTIC EXERCISES: CPT | Mod: PO

## 2024-10-15 NOTE — PROGRESS NOTES
[facility]  Occupational Therapy Treatment Note     Date: 10/15/2024  Name: Cheyenne CLAUDIO [facility] Number: [id number]    Therapy Diagnosis:   Encounter Diagnosis   Name Primary?    Pain of finger of right hand Yes     Physician: Jose Antonio Aguayo MD    Note:  Please create a custom thermoplastic PIP splint finger.  Please keep the finger flexed at 20-30 degrees at the PIP joint.  Please begin therapy for gentle flexion and edema control.  Do not extend or stress the palmar plate.       Frequency:  2 times per week      Duration:  4 weeks      Diagnosis:  Status post right small finger dislocation with palmar plate repair    And per MD note from 9/16/2024  Plan:      1. She will continue her PIP splint      2.  She can continue to work on range of motion and edema control      3. She will follow up in 2-3 weeks for repeat evaluation at which point I will consider weaning the Velcro splint and beginning more aggressive extension activities    Per ortho visit/note from 10/07/2024    Plan:      1. She can wean out of the splint      2.  She will continue to work on range of motion and can begin gentle strengthening      3. She will follow up in 4 weeks for repeat evaluation with an x-ray of the right small finger.  Based off of the x-ray and her function she may be discharged at that time     4. We will continue to work with therapy.  Can decrease frequency to once per week.  Would like her to continue therapy until she gets close to the three-month ted which is approximately 3 to 4 weeks    Medical Diagnosis: S63.289A (ICD-10-CM) - Dislocation of proximal interphalangeal joint of finger, initial encounter      Surgical Procedure and Date:   PROCEDURE:  Right small finger proximal interphalangeal joint palmar plate repair   IMPLANTS:  Arthrex Charo suture anchor x1   DATE OF SURGERY: 8/16/2024   S/P: 7 weeks and 5 days on 10/09/2024  Evaluation Date: 9/3/2024  Insurance  Authorization Period Expiration:  Calendar year     Plan of Care Certification Period: 09/3/2024 through 12/2/2024  Date of Return to Referring Provider: saw on 10/07/2024; will see again on 11/4/2024     Visit # / Visits authorized:  11 / 20  FOTO: Eval  / 1  FOTO 2nd on 9/24/2024  FOTO 3rd TBD    Next Reassessment at 10th visit or by 30 days = 10/03/2024     Time In: 0815  leanna Out: 0854  Total Appointment Time: 39 (timed 38 min & untimed codes: Fluido)      Precautions:  standard and post op per protocol      Subjective     Pt reports: Has been able to wean out of her splint with no problems reported.    She was compliant with home exercise program given last session.   Response to previous treatment:Good  Functional change: None new    Pain 0-10 scale   Functional Pain Scale Rating 0-10:   0/10 on average  0/10 at best  1-3/10 at worst  Location: R small finger  Description: Sharp, some sensitivity at scar.  Aggravating Factors:Any pressure at PIP  Easing Factors: Rest       Objective   MEASUREMENTS  Last measurements below are from Eval unless otherwise noted.    CMS Impairment/Limitation/Restriction for FOTO Hand Survey     Therapist reviewed FOTO scores for Cheyenne West on 9/3/2024.         Limitation Score on Eval = 51%    2nd FOTO on 9/24/2024 = 67%        Observation/Inspection/Wound/Incision/Scar  Mild edema, decreased hyperthrophy of scar. Has been compliant with splint wear.      Sensation: Grossly WNL,        AROM Hand: on NT on 10/4/2024     R SF PIP flexion = 94 (+1) degrees      9/20/2024 AROM Ext = -5 degrees    AROM Wrist/Forearm\Elbow:  Grossly WNL L and R     Manual Muscle Test: NT                                       and Pinch Strength: NT at this time due to post operative status.     Special and/or Standardized Tests:  NT       Treatment     Cheyenne received the following supervised modalities after being cleared for contradictions   Paraffin wax; with coban wrap into hook fist  for PROM. (See time under therex below)   (NT)  Fluidotherapy with the below therex.    Cheyenne received the following direct contact modalities after being cleared for contraindications for 0 minutes:  -NT    Cheyenne received the following manual therapy techniques for 8 minutes:   - retrograde massage and dorsal PIP massage with small finger in flexion.  - scar masage    Cheyenne received therapeutic exercises for 15 minutes including:  While in Fluidotherapy: Tendon Gliding Exercises, Wrist AROM with gravity with open hand and closed hand: flex/ext and UD/RD, thumb touches to each digit, composite thumb flexion and extension wrist circles CW and CCW 3x10 each.  MCP joint blocking small finger 3x10  Tenodesis pattern with straight to hook fist x 2x15 (NT)  Place and hold hook 5 min with coban wrap min while on moist heat.  Fingerlifts x25 and lifts and spreads x25  Gentle PROM hook fist R small finger flexion 5x10 sec (NT)  Isolated small finger flexion 3x10 (NT)    Cheyenne participated in dynamic functional therapeutic activities to improve functional performance for 15 minutes, including:  Putty: grasp roll-outs and pinches 3x10 each with tan. Also instructed home program for putty.  In hand manipulation with corn kernels (NT)  9 hole pegs  with in hand manipulation tip palm and palm to tip  Isospheres 3 min. (NT)  Tissue scrunch 2x25.(NT)    Home Exercises and Education Provided     Education provided:   -  Cont previous. Add putty    Written Home Exercises Provided:  Patient instructed to cont prior HEP. Add putty    Exercises were reviewed and Cheyenne was able to demonstrate them prior to the end of the session.  Cheyenne demonstrated good  understanding of the education provided.     See EMR under Media for exercises provided today and/or prior visit.        Assessment     Pt would continue to benefit from skilled OT.  Pt tolerated progression with Tx well; including putty this date. Reports really made her  hand feel better. Cont per current POC.    Progress towards goals: See below for current goal status / updates.    Cheyenne is progressing well towards her goals . Pt continues with good rehab potential.     Pt will continue to benefit from skilled outpatient occupational therapy to address the deficits listed in the problem list on initial evaluation in order to maximize pt's level of independence in the home and community.     Anticipated barriers to occupational therapy: None    Pt's spiritual, cultural and educational needs considered and pt agreeable to plan of care and goals.    Goals:  Short Term Goals: (30 days, 10/03/2024, and/or 10th visit) unless otherwise noted below. All m]. Pt will be independent with HEP in 2 visits.  2. Pt will report decreased pain to a 5/10 at worst in RUE with ADLs in order to increase function/use of UE.   3. Pt will increase Flexion of R SF PIP by 20 degrees to 80 degrees of AROM to increase function for ADL/IADL.     Long Term Goals: (by discharge)  1. Pt will report decreased pain to 1-2/10 with ADLs to allow for increased function/use of UE.   2. Pt will exhibit grossly WNL AROM of R hand to allow for Independent use of for all ADL/IADL tasks.  3. Pt will exhibit  WFL  strength to allow a firm grasp during ADL/IADL (cooking utensils, tool use, carrying groceries, steering wheel, etc.)  4. Pt will return to PLOF with all ADL/IADL, leisure and job tasks.    Plan   Continue Occupational Therapy 2 times per week through current poc expiration date of 12/2/2024, in order to to decrease pain and edema, and increase A/PROM, strength, and functional use of R upper extremity.    Updates/Grading for next session:  Progress with OT as tolerated.      ALEXIS Keen, PINAT

## 2024-10-22 ENCOUNTER — CLINICAL SUPPORT (OUTPATIENT)
Dept: REHABILITATION | Facility: HOSPITAL | Age: 69
End: 2024-10-22
Payer: MEDICARE

## 2024-10-22 DIAGNOSIS — M79.644 PAIN OF FINGER OF RIGHT HAND: Primary | ICD-10-CM

## 2024-10-22 PROCEDURE — 97140 MANUAL THERAPY 1/> REGIONS: CPT | Mod: PO

## 2024-10-22 PROCEDURE — 97530 THERAPEUTIC ACTIVITIES: CPT | Mod: PO

## 2024-10-22 PROCEDURE — 97110 THERAPEUTIC EXERCISES: CPT | Mod: PO

## 2024-10-22 NOTE — PROGRESS NOTES
[facility]  Occupational Therapy D/C and Treatment Note     Date: 10/22/2024  Name: Cheyenne CLAUDIO [facility] Number: [id number]    Therapy Diagnosis:   Encounter Diagnosis   Name Primary?    Pain of finger of right hand Yes     Physician: Jose Antonio Aguayo MD    Note:  Please create a custom thermoplastic PIP splint finger.  Please keep the finger flexed at 20-30 degrees at the PIP joint.  Please begin therapy for gentle flexion and edema control.  Do not extend or stress the palmar plate.       Frequency:  2 times per week      Duration:  4 weeks      Diagnosis:  Status post right small finger dislocation with palmar plate repair    And per MD note from 9/16/2024  Plan:      1. She will continue her PIP splint      2.  She can continue to work on range of motion and edema control      3. She will follow up in 2-3 weeks for repeat evaluation at which point I will consider weaning the Velcro splint and beginning more aggressive extension activities    Per ortho visit/note from 10/07/2024    Plan:      1. She can wean out of the splint      2.  She will continue to work on range of motion and can begin gentle strengthening      3. She will follow up in 4 weeks for repeat evaluation with an x-ray of the right small finger.  Based off of the x-ray and her function she may be discharged at that time     4. We will continue to work with therapy.  Can decrease frequency to once per week.  Would like her to continue therapy until she gets close to the three-month ted which is approximately 3 to 4 weeks    Medical Diagnosis: S63.289A (ICD-10-CM) - Dislocation of proximal interphalangeal joint of finger, initial encounter      Surgical Procedure and Date:   PROCEDURE:  Right small finger proximal interphalangeal joint palmar plate repair   IMPLANTS:  Arthrex Charo suture anchor x1   DATE OF SURGERY: 8/16/2024   S/P: 7 weeks and 5 days on 10/09/2024  Evaluation Date: 9/3/2024  Insurance  Authorization Period Expiration:       Plan of Care Certification Period: 09/3/2024 through 2024  Date of Return to Referring Provider: saw on 10/07/2024; will see again on 2024     Visit # / Visits authorized:    FOTO: Eval  / 1  FOTO 2nd on 2024  FOTO 3rd on 10/22/2024    Next Reassessment at 10th visit or by 30 days = 10/03/2024     Time In: 1115  Time Out: 1155  Total Appointment Time: 40 min (timed 38 min & untimed codes: N/A     Precautions:  standard and post op per protocol      Subjective     Pt reports: No problems, finger feels better, ready for D/C today.      She was compliant with home exercise program given last session.   Response to previous treatment:Good  Functional change: None new    Pain 0-10 scale   Functional Pain Scale Rating 0-10:   0/10 on average  0/10 at best  1/10 at worst  Location: R small finger  Description: mainly feels swollen.  Aggravating Factors:Any pressure at PIP  Easing Factors: Rest       Objective   MEASUREMENTS  Last measurements below are from Eval unless otherwise noted.    CMS Impairment/Limitation/Restriction for FOTO Hand Survey     Therapist reviewed FOTO scores for Cheyenne West on 9/3/2024.         Limitation Score on Eval = 51%    2nd FOTO on 2024 = 67%    3nd FOTO on 10/22/2024 = 79%         Observation/Inspection/Wound/Incision/Scar  Mild edema, decreased hyperthrophy of scar. Has been compliant with splint wear.      Sensation: Grossly WNL,        AROM Hand: on NT on 10/4/2024     R SF PIP flexion = 94 (+1) degrees      2024 AROM Ext = -5 degrees    AROM Wrist/Forearm\Elbow:  Grossly WNL L and R     Manual Muscle Test: NT                                       and Pinch Strength:  Submax testin# with no pain today      Special and/or Standardized Tests:  NT       Treatment     Cheyenne received the following supervised modalities after being cleared for contradictions  MH with place and hold  composite fist and ext (See therex below)    Cheyenne received the following direct contact modalities after being cleared for contraindications for 0 minutes:  -NT    Cheyenne received the following manual therapy techniques for 8 minutes:   - retrograde massage and dorsal PIP massage with small finger in flexion.  - scar masage    Cheyenne received therapeutic exercises for 15 minutes including:  Tenodesis pattern with straight to hook fist 2x25  Progressive wrist and digit ext x 5  Place and hold composite fist and straight fist 3 min each while on moist heat.  Fingerlifts x25 and lifts and spreads x25  Isolated small finger flexion x25    Cheyenne participated in dynamic functional therapeutic activities to improve functional performance for 15 minutes, including:  - Flexbar palm up/palm down, wrist flexion, wrist extension and unilateral pronation 3x10 each with yellow.   - Rolls over yellow FB 3 min      Home Exercises and Education Provided     Education provided:   -  Cont previous.   Written Home Exercises Provided:  Patient instructed to cont prior HEP.       Exercises were reviewed and Cheyenne was able to demonstrate them prior to the end of the session.  Cheyenne demonstrated good  understanding of the education provided.     See EMR under Media for exercises provided today and/or prior visit.        Assessment         Pt has met all goals, is pleased with progress, and is ready for D/C at this time.      Goals:  Short Term Goals: (30 days, 10/03/2024, and/or 10th visit) unless otherwise noted below. All MET  1. Pt will be independent with HEP in 2 visits.  2. Pt will report decreased pain to a 5/10 at worst in RUE with ADLs in order to increase function/use of UE.   3. Pt will increase Flexion of R SF PIP by 20 degrees to 80 degrees of AROM to increase function for ADL/IADL.     Long Term Goals: (by discharge) All MET  1. Pt will report decreased pain to 1-2/10 with ADLs to allow for increased function/use of UE.    2. Pt will exhibit grossly WNL AROM of R hand to allow for Independent use of for all ADL/IADL tasks.  3. Pt will exhibit  WFL  strength to allow a firm grasp during ADL/IADL (cooking utensils, tool use, carrying groceries, steering wheel, etc.)  4. Pt will return to PLOF with all ADL/IADL, leisure and job tasks.    Plan   Pt for D/C from OT services this date. Pt. to cont HEP as tolerated and will follow up with referring provider for any further tx needs.    ALEXIS Keen, PINAT

## 2024-10-28 ENCOUNTER — PATIENT MESSAGE (OUTPATIENT)
Dept: FAMILY MEDICINE | Facility: CLINIC | Age: 69
End: 2024-10-28
Payer: MEDICARE

## 2024-10-31 ENCOUNTER — CLINICAL SUPPORT (OUTPATIENT)
Dept: FAMILY MEDICINE | Facility: CLINIC | Age: 69
End: 2024-10-31
Payer: MEDICARE

## 2024-10-31 ENCOUNTER — LAB VISIT (OUTPATIENT)
Dept: LAB | Facility: HOSPITAL | Age: 69
End: 2024-10-31
Attending: FAMILY MEDICINE
Payer: MEDICARE

## 2024-10-31 VITALS — DIASTOLIC BLOOD PRESSURE: 80 MMHG | SYSTOLIC BLOOD PRESSURE: 130 MMHG

## 2024-10-31 DIAGNOSIS — I10 HYPERTENSION, UNSPECIFIED TYPE: ICD-10-CM

## 2024-10-31 DIAGNOSIS — E78.5 HYPERLIPIDEMIA, UNSPECIFIED HYPERLIPIDEMIA TYPE: ICD-10-CM

## 2024-10-31 DIAGNOSIS — Z23 NEED FOR VACCINATION: Primary | ICD-10-CM

## 2024-10-31 DIAGNOSIS — S63.289A DISLOCATION OF PROXIMAL INTERPHALANGEAL JOINT OF FINGER, INITIAL ENCOUNTER: Primary | ICD-10-CM

## 2024-10-31 LAB
ALBUMIN SERPL BCP-MCNC: 4.1 G/DL (ref 3.5–5.2)
ALP SERPL-CCNC: 103 U/L (ref 40–150)
ALT SERPL W/O P-5'-P-CCNC: 28 U/L (ref 10–44)
ANION GAP SERPL CALC-SCNC: 11 MMOL/L (ref 8–16)
AST SERPL-CCNC: 32 U/L (ref 10–40)
BILIRUB SERPL-MCNC: 0.4 MG/DL (ref 0.1–1)
BUN SERPL-MCNC: 18 MG/DL (ref 8–23)
CALCIUM SERPL-MCNC: 9.8 MG/DL (ref 8.7–10.5)
CHLORIDE SERPL-SCNC: 106 MMOL/L (ref 95–110)
CHOLEST SERPL-MCNC: 190 MG/DL (ref 120–199)
CHOLEST/HDLC SERPL: 2.8 {RATIO} (ref 2–5)
CO2 SERPL-SCNC: 26 MMOL/L (ref 23–29)
CREAT SERPL-MCNC: 0.9 MG/DL (ref 0.5–1.4)
EST. GFR  (NO RACE VARIABLE): >60 ML/MIN/1.73 M^2
GLUCOSE SERPL-MCNC: 103 MG/DL (ref 70–110)
HDLC SERPL-MCNC: 68 MG/DL (ref 40–75)
HDLC SERPL: 35.8 % (ref 20–50)
LDLC SERPL CALC-MCNC: 101 MG/DL (ref 63–159)
NONHDLC SERPL-MCNC: 122 MG/DL
POTASSIUM SERPL-SCNC: 4.2 MMOL/L (ref 3.5–5.1)
PROT SERPL-MCNC: 7.3 G/DL (ref 6–8.4)
SODIUM SERPL-SCNC: 143 MMOL/L (ref 136–145)
TRIGL SERPL-MCNC: 105 MG/DL (ref 30–150)

## 2024-10-31 PROCEDURE — 80061 LIPID PANEL: CPT | Performed by: FAMILY MEDICINE

## 2024-10-31 PROCEDURE — 80053 COMPREHEN METABOLIC PANEL: CPT | Performed by: FAMILY MEDICINE

## 2024-10-31 PROCEDURE — 90653 IIV ADJUVANT VACCINE IM: CPT | Mod: S$GLB,,, | Performed by: FAMILY MEDICINE

## 2024-10-31 PROCEDURE — 36415 COLL VENOUS BLD VENIPUNCTURE: CPT | Mod: PO | Performed by: FAMILY MEDICINE

## 2024-10-31 PROCEDURE — G0008 ADMIN INFLUENZA VIRUS VAC: HCPCS | Mod: S$GLB,,, | Performed by: FAMILY MEDICINE

## 2024-10-31 PROCEDURE — 99999 PR PBB SHADOW E&M-EST. PATIENT-LVL III: CPT | Mod: PBBFAC,,,

## 2024-11-04 ENCOUNTER — OFFICE VISIT (OUTPATIENT)
Dept: ORTHOPEDICS | Facility: CLINIC | Age: 69
End: 2024-11-04
Payer: MEDICARE

## 2024-11-04 ENCOUNTER — HOSPITAL ENCOUNTER (OUTPATIENT)
Dept: RADIOLOGY | Facility: HOSPITAL | Age: 69
Discharge: HOME | End: 2024-11-04
Attending: ORTHOPAEDIC SURGERY
Payer: MEDICARE

## 2024-11-04 VITALS — HEIGHT: 59 IN | WEIGHT: 164.88 LBS | BODY MASS INDEX: 33.24 KG/M2

## 2024-11-04 DIAGNOSIS — S63.289A DISLOCATION OF PROXIMAL INTERPHALANGEAL JOINT OF FINGER, INITIAL ENCOUNTER: Primary | ICD-10-CM

## 2024-11-04 DIAGNOSIS — S63.289A DISLOCATION OF PROXIMAL INTERPHALANGEAL JOINT OF FINGER, INITIAL ENCOUNTER: ICD-10-CM

## 2024-11-04 PROCEDURE — 3288F FALL RISK ASSESSMENT DOCD: CPT | Mod: CPTII,S$GLB,, | Performed by: ORTHOPAEDIC SURGERY

## 2024-11-04 PROCEDURE — 99999 PR PBB SHADOW E&M-EST. PATIENT-LVL III: CPT | Mod: PBBFAC,,, | Performed by: ORTHOPAEDIC SURGERY

## 2024-11-04 PROCEDURE — 1101F PT FALLS ASSESS-DOCD LE1/YR: CPT | Mod: CPTII,S$GLB,, | Performed by: ORTHOPAEDIC SURGERY

## 2024-11-04 PROCEDURE — 73140 X-RAY EXAM OF FINGER(S): CPT | Mod: TC,PO,RT

## 2024-11-04 PROCEDURE — 73140 X-RAY EXAM OF FINGER(S): CPT | Mod: 26,RT,, | Performed by: RADIOLOGY

## 2024-11-04 PROCEDURE — 1159F MED LIST DOCD IN RCRD: CPT | Mod: CPTII,S$GLB,, | Performed by: ORTHOPAEDIC SURGERY

## 2024-11-04 PROCEDURE — 1126F AMNT PAIN NOTED NONE PRSNT: CPT | Mod: CPTII,S$GLB,, | Performed by: ORTHOPAEDIC SURGERY

## 2024-11-04 PROCEDURE — 3008F BODY MASS INDEX DOCD: CPT | Mod: CPTII,S$GLB,, | Performed by: ORTHOPAEDIC SURGERY

## 2024-11-04 PROCEDURE — 99024 POSTOP FOLLOW-UP VISIT: CPT | Mod: S$GLB,,, | Performed by: ORTHOPAEDIC SURGERY

## 2024-11-04 RX ORDER — DIPHENHYDRAMINE HCL 25 MG
25 CAPSULE ORAL
COMMUNITY

## 2024-11-04 NOTE — PROGRESS NOTES
Ms West returns to clinic today.  Has a history of right small finger palmar plate injury.  She underwent a repair.  She was finished with therapy.  She was overall doing well but she still has some stiffness specifically in the mornings.  He was working on a home motion program     Physical exam:  Examination of the right small finger reveals that all wounds are well healed.  There is minimal edema.  She was mild increased size of the PIP joint.  Palpation over the joint does not produce a significant amount of tenderness.  She was missing 10° from full extension of the PIP joint but it was able to flex to the distal palmar crease     Radiology: X-rays of the right small finger were taken in clinic today.  There is evidence of the repair of the palmar plate.  The suture anchor remains well-positioned.  The alignment of the joint is excellent     Assessment: Status post right small finger palmar plate repair     Plan:     1. She can resume activity as tolerated     2.  She can continue a home stretching program     3. If she has any continued problems she will follow up with me at that time

## 2024-11-21 ENCOUNTER — OFFICE VISIT (OUTPATIENT)
Dept: FAMILY MEDICINE | Facility: CLINIC | Age: 69
End: 2024-11-21
Payer: MEDICARE

## 2024-11-21 VITALS
SYSTOLIC BLOOD PRESSURE: 126 MMHG | BODY MASS INDEX: 34.71 KG/M2 | TEMPERATURE: 99 F | WEIGHT: 172.19 LBS | HEART RATE: 75 BPM | HEIGHT: 59 IN | OXYGEN SATURATION: 98 % | RESPIRATION RATE: 17 BRPM | DIASTOLIC BLOOD PRESSURE: 74 MMHG

## 2024-11-21 DIAGNOSIS — R51.9 CHRONIC NONINTRACTABLE HEADACHE, UNSPECIFIED HEADACHE TYPE: Primary | ICD-10-CM

## 2024-11-21 DIAGNOSIS — R26.89 BALANCE PROBLEM: ICD-10-CM

## 2024-11-21 DIAGNOSIS — G89.29 CHRONIC NONINTRACTABLE HEADACHE, UNSPECIFIED HEADACHE TYPE: Primary | ICD-10-CM

## 2024-11-21 DIAGNOSIS — F41.1 GENERALIZED ANXIETY DISORDER: ICD-10-CM

## 2024-11-21 DIAGNOSIS — R42 DIZZINESS: ICD-10-CM

## 2024-11-21 DIAGNOSIS — E16.2 HYPOGLYCEMIA: ICD-10-CM

## 2024-11-21 DIAGNOSIS — J32.9 SINUSITIS, UNSPECIFIED CHRONICITY, UNSPECIFIED LOCATION: ICD-10-CM

## 2024-11-21 DIAGNOSIS — M54.2 NECK PAIN ON RIGHT SIDE: ICD-10-CM

## 2024-11-21 PROCEDURE — 99999 PR PBB SHADOW E&M-EST. PATIENT-LVL V: CPT | Mod: PBBFAC,,, | Performed by: FAMILY MEDICINE

## 2024-11-21 PROCEDURE — 3074F SYST BP LT 130 MM HG: CPT | Mod: CPTII,S$GLB,, | Performed by: FAMILY MEDICINE

## 2024-11-21 PROCEDURE — 1101F PT FALLS ASSESS-DOCD LE1/YR: CPT | Mod: CPTII,S$GLB,, | Performed by: FAMILY MEDICINE

## 2024-11-21 PROCEDURE — 1126F AMNT PAIN NOTED NONE PRSNT: CPT | Mod: CPTII,S$GLB,, | Performed by: FAMILY MEDICINE

## 2024-11-21 PROCEDURE — 3008F BODY MASS INDEX DOCD: CPT | Mod: CPTII,S$GLB,, | Performed by: FAMILY MEDICINE

## 2024-11-21 PROCEDURE — 99214 OFFICE O/P EST MOD 30 MIN: CPT | Mod: S$GLB,,, | Performed by: FAMILY MEDICINE

## 2024-11-21 PROCEDURE — 1159F MED LIST DOCD IN RCRD: CPT | Mod: CPTII,S$GLB,, | Performed by: FAMILY MEDICINE

## 2024-11-21 PROCEDURE — 3288F FALL RISK ASSESSMENT DOCD: CPT | Mod: CPTII,S$GLB,, | Performed by: FAMILY MEDICINE

## 2024-11-21 PROCEDURE — 3078F DIAST BP <80 MM HG: CPT | Mod: CPTII,S$GLB,, | Performed by: FAMILY MEDICINE

## 2024-11-21 PROCEDURE — G2211 COMPLEX E/M VISIT ADD ON: HCPCS | Mod: S$GLB,,, | Performed by: FAMILY MEDICINE

## 2024-11-21 RX ORDER — DOXYCYCLINE 100 MG/1
100 CAPSULE ORAL 2 TIMES DAILY
Qty: 20 CAPSULE | Refills: 0 | Status: SHIPPED | OUTPATIENT
Start: 2024-11-21 | End: 2024-12-01

## 2024-11-21 NOTE — PROGRESS NOTES
History of Present Illness    Ms. West has been ill for approximately 2 weeks, with symptoms progressively worsening. The illness began about a week after a blood pressure check appointment on October 31st. She has a severe headache that initially started with pain in the back of the neck and has now spread to encompass the whole head and face, accompanied by dizziness.    She has coughing spells, particularly upon waking up, with yellow sputum expectoration. Severe episodes cause respiratory distress. A low-grade fever of 99.9°F was recorded 2 days ago, the highest temperature noted.    Ms. West attempted to treat symptoms with Coricidin HBP, chosen due to her high blood pressure, and later switched to a generic DayQuil, but neither provided relief.    She is pending further evaluation with endocrinology regarding possible episodes of hypoglycemia.  She is not diabetic. .    Ms. West mentions a persistent pain in the back of her head on the right side, present for several months. This pain is not associated with nausea or vomiting. She occasionally takes Tylenol for relief, which provides partial alleviation.    She has dizziness and balance issues, describing a sensation of impending fall, without vertigo. Her hearing doctor recently suggested she might need cochlear implants in the future due to worsening hearing, which she wonders might be related to her balance issues.    Ms. West denies having a high fever, runny nose, nausea, or vomiting. She denies any falls or significant balance-related accidents, apart from a finger injury while attempting to prevent a fall.      ROS:  General: +fever  Eyes: -vision changes  ENT: +hearing loss  Respiratory: +cough  Gastrointestinal: -nausea, -vomiting  Neurological: +headache, +dizziness, +tremors  Head: +head pain          Cheyenne was seen today for sinusitis, cough, headache, nasal congestion and fever.    Diagnoses and all orders for this  visit:    Chronic nonintractable headache, unspecified headache type  -     Ambulatory referral/consult to Neurology; Future  -     MRI Cervical Spine Without Contrast; Future  -     MRI Brain W WO Contrast; Future    Hypoglycemia    Neck pain on right side  -     MRI Cervical Spine Without Contrast; Future  -     MRI Brain W WO Contrast; Future    Dizziness  -     Ambulatory referral/consult to Neurology; Future  -     MRI Cervical Spine Without Contrast; Future  -     MRI Brain W WO Contrast; Future    Sinusitis, unspecified chronicity, unspecified location    Balance problem  -     Ambulatory referral/consult to Neurology; Future  -     MRI Cervical Spine Without Contrast; Future  -     MRI Brain W WO Contrast; Future    Generalized anxiety disorder    Other orders  -     doxycycline (MONODOX) 100 MG capsule; Take 1 capsule (100 mg total) by mouth 2 (two) times daily. for 10 days          Assessment & Plan    Assessed potential causes of reported symptoms including headache, neck pain, dizziness, and balance issues  Determined need for MRI of brain and cervical spine to evaluate for possible cervical spinal stenosis and cervicogenic headache  Will treat sinus infection with antibiotics  Noted ongoing endocrinology workup for hypoglycemia symptoms    - Continued Tylenol as needed for headache, not exceeding maximum recommended dose.  - MRI of brain and cervical spine ordered.  - Referred to neurologist for evaluation of headaches, neck pain, and balance issues.      HYPOGLYCEMIA MANAGEMENT:  - Ms. Jorgemier to record food intake when experiencing hypoglycemia symptoms.  - Recommend avoiding elevated carbohydrate foods like cake and cereal.  - Ms. Jorgemier to consume small, frequent meals to manage blood sugar.  Keep appointment scheduled within  ACUTE SINUSITIS:  - Started antibiotics for sinus infection.    FOLLOW-UP APPOINTMENTS:  - Follow up on December 2nd as scheduled for virtual endocrinology appointment.  -  Follow up after MRI results for further management.               Past Medical History:  Past Medical History:   Diagnosis Date    Bilateral carpal tunnel syndrome 10/30/2023    Carpal tunnel syndrome of right wrist     Chronic gastritis     Chronic urticaria     COVID-19 virus infection 2022    Dissociative fugue     Diverticulosis     Duodenitis     Dyspepsia     Generalized anxiety disorder     Hearing loss on left     Hearing loss on right     Helicobacter pylori (H. pylori)     History of blood transfusion     Hyperlipidemia     Hypertension     Hypothyroidism     IBS (irritable bowel syndrome)     Iron deficiency 3/21/2019    Iron deficiency anemia     Mild mitral regurgitation by prior echocardiogram     Mild obesity     Mild vitamin D deficiency     Osteopenia 2023    Paraesophageal hiatal hernia     PONV (postoperative nausea and vomiting)     Reflux gastritis     RLS (restless legs syndrome) 2021    Sleep apnea     mild improved with wt. loss    Thyroid goiter     Urticaria, chronic      Past Surgical History:   Procedure Laterality Date    ARTHROSCOPY OF KNEE Left 2022    Procedure: ARTHROSCOPY, KNEE;  Surgeon: Davion Massey MD;  Location: [facility];  Service: Orthopedics;  Laterality: Left;    BLADDER SUSPENSION      pubovaginal sling     SECTION, CLASSIC      CHOLECYSTECTOMY      COLONOSCOPY  2011    Dr. Goode, in legacy:diverticulosis, hemorrhoids, melanosis coli-repeat 10 years    COLONOSCOPY N/A 2018    Procedure: COLONOSCOPY;  Surgeon: Rusty Vogt Jr., MD;  Location: [facility];  Service: Endoscopy;  Laterality: N/A; repeat in 10 years for screening    ESOPHAGEAL DILATION      ESOPHAGOGASTRODUODENOSCOPY  2016    ESOPHAGOGASTRODUODENOSCOPY N/A 2018    Procedure: EGD (ESOPHAGOGASTRODUODENOSCOPY);  Surgeon: Rusty Vogt Jr., MD;  Location: [facility] ENDO;  Service: Endoscopy;  Laterality: N/A;    HERNIA REPAIR      INTRALUMINAL GASTROINTESTINAL  TRACT IMAGING VIA CAPSULE N/A 9/26/2018    Procedure: IMAGING PROCEDURE, GI TRACT, INTRALUMINAL, VIA CAPSULE;  Surgeon: Loraine Velazquez MD;  Location: [facility] ENDO;  Service: Endoscopy;  Laterality: N/A;    LAPAROSCOPIC NISSEN FUNDOPLICATION N/A 11/29/2018    Procedure: FUNDOPLICATION, NISSEN, LAPAROSCOPIC;  Surgeon: Frank Akins MD;  Location: [facility] OR;  Service: General;  Laterality: N/A;    OPEN REDUCTION AND INTERNAL FIXATION (ORIF) OF INJURY OF FINGER Right 8/16/2024    Procedure: Right small finger proximal interphalangeal jpint open treatment with palmar plate repair;  Surgeon: Jose Antonio Aguayo MD;  Location: [facility] OR;  Service: Orthopedics;  Laterality: Right;    THYROIDECTOMY, PARTIAL       partial for benign nodule    UPPER GASTROINTESTINAL ENDOSCOPY  03/04/2016    Dr. Whalen, in care everywhere     Review of patient's allergies indicates:   Allergen Reactions    Penicillins      Current Outpatient Medications on File Prior to Visit   Medication Sig Dispense Refill    ALPRAZolam (XANAX) 0.25 MG tablet Take 1 tablet (0.25 mg total) by mouth 3 (three) times daily as needed for Anxiety. 30 tablet 0    atorvastatin (LIPITOR) 10 MG tablet TAKE 1 TABLET EVERY DAY 90 tablet 1    buPROPion (WELLBUTRIN XL) 300 MG 24 hr tablet TAKE 1 TABLET EVERY DAY 90 tablet 1    CALCIUM CARBONATE/VITAMIN D3 (CALTRATE 600 + D ORAL) Take by mouth 2 (two) times daily.      cetirizine 10 mg Cap Take by oral route.      diphenhydrAMINE (BENADRYL) 25 mg capsule Take 25 mg by mouth as needed.      EScitalopram oxalate (LEXAPRO) 20 MG tablet TAKE 1 TABLET EVERY DAY 90 tablet 1    famotidine (PEPCID) 20 MG tablet Take 1 tablet (20 mg total) by mouth 2 (two) times daily. 60 tablet 9    felodipine (PLENDIL) 5 MG 24 hr tablet TAKE 1 TABLET EVERY DAY 90 tablet 1    levothyroxine (SYNTHROID) 50 MCG tablet TAKE 1 TABLET EVERY DAY 90 tablet 1    multivitamin with minerals tablet Take 1 tablet by mouth once daily.       olmesartan-hydrochlorothiazide (BENICAR HCT) 20-12.5 mg per tablet TAKE 1 TABLET EVERY DAY 90 tablet 1    OMEGA-3 FATTY ACIDS-EPA ORAL Take by mouth once daily.      pantoprazole (PROTONIX) 40 MG tablet TAKE 1 TABLET EVERY DAY 90 tablet 1    potassium chloride (MICRO-K) 10 MEQ CpSR TAKE 1 CAPSULE ONE TIME DAILY 90 capsule 3    VIT A/VIT C/VIT E/ZINC/COPPER (PRESERVISION AREDS ORAL) Take by mouth once daily.      predniSONE (DELTASONE) 10 MG tablet 4 tab daily for 2 days then 3 tab daily for 2 days then 2 tab daily for 2 days then 1 tab daily for 2 days then 1/2 tab daily (Patient not taking: Reported on 11/21/2024) 50 tablet 1     No current facility-administered medications on file prior to visit.     Social History     Socioeconomic History    Marital status:    Tobacco Use    Smoking status: Never    Smokeless tobacco: Never   Substance and Sexual Activity    Alcohol use: Yes     Comment: seldom    Drug use: No    Sexual activity: Yes     Social Drivers of Health     Financial Resource Strain: Low Risk  (1/23/2024)    Overall Financial Resource Strain (CARDIA)     Difficulty of Paying Living Expenses: Not hard at all   Food Insecurity: No Food Insecurity (1/23/2024)    Hunger Vital Sign     Worried About Running Out of Food in the Last Year: Never true     Ran Out of Food in the Last Year: Never true   Transportation Needs: No Transportation Needs (1/23/2024)    PRAPARE - Transportation     Lack of Transportation (Medical): No     Lack of Transportation (Non-Medical): No   Physical Activity: Insufficiently Active (1/23/2024)    Exercise Vital Sign     Days of Exercise per Week: 2 days     Minutes of Exercise per Session: 30 min   Stress: Stress Concern Present (1/23/2024)    [employer] [employer] - Occupational Stress Questionnaire     Feeling of Stress : Rather much   Housing Stability: Low Risk  (1/23/2024)    Housing Stability Vital Sign     Unable to Pay for Housing in the Last Year:  "No     Number of Places Lived in the Last Year: 1     Unstable Housing in the Last Year: No     Family History   Problem Relation Name Age of Onset    Hypertension Mother      Heart disease Mother      Ulcers Mother      GI problems Mother          colitis    Hypertension Father      Breast cancer Paternal Grandmother      Breast cancer Cousin      Colon cancer Neg Hx      Crohn's disease Neg Hx      Colon polyps Neg Hx      Ulcerative colitis Neg Hx      Stomach cancer Neg Hx      Esophageal cancer Neg Hx      Allergic rhinitis Neg Hx      Allergies Neg Hx      Angioedema Neg Hx      Atopy Neg Hx      Eczema Neg Hx      Immunodeficiency Neg Hx      Rhinitis Neg Hx      Urticaria Neg Hx      Asthma Neg Hx           Vitals:    11/21/24 1011   BP: 126/74   Pulse: 75   Resp: 17   Temp: 98.5 °F (36.9 °C)   TempSrc: Oral   SpO2: 98%   Weight: 78.1 kg (172 lb 3.2 oz)   Height: 4' 11" (1.499 m)     Wt Readings from Last 3 Encounters:   11/21/24 78.1 kg (172 lb 3.2 oz)   11/04/24 74.8 kg (164 lb 14.5 oz)   08/13/24 74.8 kg (164 lb 14.5 oz)       OBJECTIVE:   APPEARANCE: Well nourished, well developed, in no acute distress.    HEAD: Normocephalic.  Atraumatic.  No sinus tenderness.  EYES:   Right eye: Pupil reactive.  Conjunctiva clear.    Left eye: Pupil reactive.  Conjunctiva clear.  EOMI.    EARS: TM's intact. Light reflex normal. No retraction or perforation.    NOSE:  clear.  MOUTH & THROAT:  No pharyngeal erythema or exudate. No lesions.  NECK: Supple.  Tenderness right posterior neck No bruits.  No JVD.  No cervical lymphadenopathy.  No thyromegaly.    CHEST: Breath sounds clear bilaterally.  Normal respiratory effort  CARDIOVASCULAR: Normal rate.  Regular rhythm.  No murmurs.  No rub.  No gallops.  ABDOMEN: Bowel sounds normal.  Soft.  No tenderness.  No organomegaly.  PERIPHERAL VASCULAR: No cyanosis.  No clubbing.  No edema.    NEUROLOGIC: Cranial nerves two through 12 are intact. Motor strength is normal in the " upper and lower extremities proximally and distally.  No pronator drift.  Deep tendon reflexes are hyporeflexic. Sensation intact. Gait is normal. Tandem gait is somewhat difficult to perform. Negative Romberg. Rapid alternating movements and finger-to-nose movements are normal.    MENTAL STATUS: Alert.  Oriented x 3.

## 2024-12-02 ENCOUNTER — OFFICE VISIT (OUTPATIENT)
Facility: CLINIC | Age: 69
End: 2024-12-02
Payer: MEDICARE

## 2024-12-02 DIAGNOSIS — E16.2 HYPOGLYCEMIA: Primary | ICD-10-CM

## 2024-12-02 DIAGNOSIS — R40.0 SOMNOLENCE: ICD-10-CM

## 2024-12-02 DIAGNOSIS — E03.9 HYPOTHYROIDISM, UNSPECIFIED TYPE: ICD-10-CM

## 2024-12-02 DIAGNOSIS — E66.9 OBESITY, UNSPECIFIED CLASS, UNSPECIFIED OBESITY TYPE, UNSPECIFIED WHETHER SERIOUS COMORBIDITY PRESENT: ICD-10-CM

## 2024-12-02 PROCEDURE — 99214 OFFICE O/P EST MOD 30 MIN: CPT | Mod: 95,,, | Performed by: STUDENT IN AN ORGANIZED HEALTH CARE EDUCATION/TRAINING PROGRAM

## 2024-12-02 PROCEDURE — 1126F AMNT PAIN NOTED NONE PRSNT: CPT | Mod: CPTII,95,, | Performed by: STUDENT IN AN ORGANIZED HEALTH CARE EDUCATION/TRAINING PROGRAM

## 2024-12-02 PROCEDURE — 3288F FALL RISK ASSESSMENT DOCD: CPT | Mod: CPTII,95,, | Performed by: STUDENT IN AN ORGANIZED HEALTH CARE EDUCATION/TRAINING PROGRAM

## 2024-12-02 PROCEDURE — 1101F PT FALLS ASSESS-DOCD LE1/YR: CPT | Mod: CPTII,95,, | Performed by: STUDENT IN AN ORGANIZED HEALTH CARE EDUCATION/TRAINING PROGRAM

## 2024-12-02 PROCEDURE — G2211 COMPLEX E/M VISIT ADD ON: HCPCS | Mod: 95,,, | Performed by: STUDENT IN AN ORGANIZED HEALTH CARE EDUCATION/TRAINING PROGRAM

## 2024-12-02 NOTE — PROGRESS NOTES
ENDOCRINOLOGY FOLLOW UP VISIT: 12/02/2024    The patient location is: Home  The chief complaint leading to consultation is: Hypoglycemia    Visit type: audiovisual    Face to Face time with patient: 25  45 minutes of total time spent on the encounter, which includes face to face time and non-face to face time preparing to see the patient (eg, review of tests), Obtaining and/or reviewing separately obtained history, Documenting clinical information in the electronic or other health record, Independently interpreting results (not separately reported) and communicating results to the patient/family/caregiver, or Care coordination (not separately reported).     Each patient to whom he or she provides medical services by telemedicine is:  (1) informed of the relationship between the physician and patient and the respective role of any other health care provider with respect to management of the patient; and (2) notified that he or she may decline to receive medical services by telemedicine and may withdraw from such care at any time.    Subjective:      Patient ID: Cheyenne West is a 69 y.o. female.    Chief Complaint:  Hypoglycemia follow up    History of Present Illness  Cheyenne West presents for follow up evaluation of hypoglycemia.  She was seen previously by Dr. Davis in March 2024.  Today is her first visit with me.    Interval History:  - Pro CGM did not correlate symptoms of lows with CGM  showing lows, instead BG was high  - Has gained 20 lbs in recent months  - Lowest reading on glucometer has been 44 in prior month  - Still having symptoms after eating high carb items (1.5-2 hrs later)  - Symptoms start when BG is in the 45-55 range      Regarding Hypoglycemia:    - Symptoms of hypoglycemia first started:  March 2024 (possibly prior to this)  - Episode frequency:  At least once a week now  - Timing:  Typically noted 1.5-2 hrs after some meals  - Symptoms include: Confusion with vision change,  shakiness, sweating and symptoms of feeling like she will pass out.      - Pertinent factors:  No   Yes  []    [x]   Prior hypoglycemic episodes  [x]    []   Currently taking diabetes medications  []    [x]   Occur within 2-3 hours of consuming meal  [x]    []   Occur overnight  [x]    []   Occur after prolonged fasting  [x]    []   Occur after physical exertion  []    [x]   Symptoms resolved after consuming carbohydrate    Corrects lows with:  Glucose tabs, mint, cake.      - Personal history of hepatic, renal or cardiac failure:  None  - History of adrenal insufficiency:  No, normal cortisol in the past (Jan 2024)  - History of gastric bypass surgery or fundoplication:  No  - Alcohol use:  Rare    - Does have PRN prescription for steroids for hives that she gets but has not used recently and not for long term continuous use.    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     Latest Reference Range & Units 02/23/21 10:15 01/04/22 10:11 06/02/23 09:04 08/21/23 09:58 01/23/24 09:50 03/05/24 10:14 10/31/24 10:31   eGFR >60 mL/min/1.73 m^2   >60.0  >60.0 >60 >60.0   Glucose 70 - 110 mg/dL 101 104 108  94 148 (H) 103   Cortisol ug/dL     8.60     Hemoglobin A1C External 4.0 - 5.6 %  5.5  5.5      (H): Data is abnormally high      Professional CGM Data:        Reviewed of CGM and food log showed the lowest BG event was around 50 within 1-1.5 hours after eating scrambled eggs with trail mix and chocolate (no symptoms reported on log)  Only symptoms reported (dizziness) on log sheet was when blood sugar was around 180 based on CGM data after having previously spiked BG up to the 230 range    ROS:   As above    Objective:     There were no vitals taken for this visit.  BP Readings from Last  3 Encounters:   11/21/24 126/74   10/31/24 130/80   08/16/24 126/64     Wt Readings from Last 1 Encounters:   11/21/24 1011 78.1 kg (172 lb 3.2 oz)     There is no height or weight on file to calculate BMI.    Physical Exam  Constitutional:       Appearance: Normal appearance.   Neurological:      Mental Status: She is alert.   Psychiatric:         Mood and Affect: Mood normal.         Behavior: Behavior normal.        Lab Review:   Lab Results   Component Value Date    HGBA1C 5.5 08/21/2023     Lab Results   Component Value Date    CHOL 190 10/31/2024    HDL 68 10/31/2024    LDLCALC 101.0 10/31/2024    TRIG 105 10/31/2024    CHOLHDL 35.8 10/31/2024     Lab Results   Component Value Date     10/31/2024    K 4.2 10/31/2024     10/31/2024    CO2 26 10/31/2024     10/31/2024    BUN 18 10/31/2024    CREATININE 0.9 10/31/2024    CALCIUM 9.8 10/31/2024    PROT 7.3 10/31/2024    ALBUMIN 4.1 10/31/2024    BILITOT 0.4 10/31/2024    ALKPHOS 103 10/31/2024    AST 32 10/31/2024    ALT 28 10/31/2024    ANIONGAP 11 10/31/2024    ESTGFRAFRICA >60.0 01/04/2022    EGFRNONAA >60.0 01/04/2022    TSH 1.752 01/23/2024     Vit D, 25-Hydroxy   Date Value Ref Range Status   09/20/2012 31 30 - 100 ng/mL Final     Comment:     Vitamin D, 25-Hydroxy:  Vitamin D deficiency <10 ng/mL  Vitamin D insufficiency 10-30 ng/mL  Vitamin D sufficiency >30 ng/mL  Vitamin D potential toxicity >100 ng/mL       Assessment and Plan     Hypoglycemia  Continued reports that symptoms of hypoglycemia are occurring at least once weekly and mainly after meals that contain carbohydrates.  Prior PRO cgm study showed symptoms at that time matched with an elevated blood sugar, not a low blood sugar.  Unclear if glucometer is accurate or not but the cgm did note at least a single episode of glucose around 50 which occurred after trail mix had been eaten.      No other concerning history including no bariatric surgery, daily alcohol use, liver dz,  kidney dz or heart dz.  No family history of low blood sugar either.  Since symptoms have not been while fasting this is unlikely to be a condition such as insulinoma.  Dietary choices appear to play a role in symptoms onset.    Plan:  - Check post-prandial labs after high carb meal soon (BMP, insulin, proinsulin, c-peptide, BHB, SFU screen)  - After blood draw adjust diet to low glycemic index carbs with recommendations given    Hypothyroidism  Can continue current Levothyroxine dosage.  Clinically and chemically euthyroid.      Obesity  Elevated BMI.  Recent weight gain over the prior months.  Possibility for undiagnosed prediabetes given recent blood testing has showed elevated BG values on routine serum blood draws.  Will check A1c level on labs      RTC in 6 months.  Labs soon in postprandial state      **Visit today included increased complexity associated with the care of the problems addressed and managing the longitudinal care of the patient due to the serious and/or complex managed problems      Steve Adkins DO     Disclaimer: This note has been generated using voice-recognition software. There may be typographical errors that have been missed during proof-reading.

## 2024-12-02 NOTE — PATIENT INSTRUCTIONS
Thank you for visiting with me during our virtual appointment.    As reviewed we will need to attempt some labs within 1-2 hours after a meal with carbs to see if we can obtain a blood draw showing the glucose level.  This is important to see a blood draw when sugar is low so that we can also obtain insulin levels among other labs to see if the pancreas is overactive in those settings and causing the blood sugar drops.      It is very unlikely for you to have a condition such as an insulinoma which is a tumor of the pancreas which makes excess insulin.  The reason you likely do not have this is because the lows from that condition occur all day and overnight while sleeping.      I am including some general information about hypoglycemia below and further down I am also including some info on dietary adjustments to help with low blood sugars.  The diet adjustments may end up being our main treatment for your condition so please review those and after we have had you get labs drawn you can attempt those changes to see if there is improvement in the frequency of your symptoms.      Let us know if questions.  I will be in touch when I have lab results.      Patient Information:   Hypoglycemia in Non-Diabetics    What is Hypoglycemia?  Hypoglycemia occurs when your blood sugar levels drop too low, typically below 70 mg/dL. Glucose is your body's main source of energy, and low levels can cause a variety of symptoms. While hypoglycemia is most common in people with diabetes on insulin or sulfonylurea medications, it can also happen in non-diabetics for other reasons.    Whipple's Triad: Confirming Hypoglycemia  To diagnose true hypoglycemia, your healthcare provider will look for Whipples Triad:    Symptoms of low blood sugar: You experience symptoms like shaking, sweating, confusion, or weakness.    Low blood sugar measurement: When your symptoms occur, your blood sugar is confirmed to be low (less than 50-55 mg/dL,  needed for the triad).    Symptoms improve after glucose: Your symptoms should get better once your low blood sugar is treated with glucose (carbs).    If these three criteria are met, more testing may be necessary to figure out the cause of the low blood sugar.    Symptoms of Hypoglycemia  Typical symptoms of hypoglycemia include:  Shaking or trembling  Sweating  Confusion or difficulty thinking clearly  Dizziness or lightheadedness  Blurred vision  Irritability or mood changes  Rapid heart rate  Weakness or fatigue  In severe cases, hypoglycemia can lead to unconsciousness or seizures if not treated.    Pseudohypoglycemia  Sometimes, blood sugar measurements may appear falsely low. This is called pseudohypoglycemia, which can happen if there is poor blood flow to your fingers when using a glucometer (fingerstick test). In these cases, your actual blood sugar may not be dangerously low.  Continuous glucose monitors (CGMs) can also be less accurate in the low blood sugar range, making serum glucose (a blood test) the best method to confirm true hypoglycemia.    Medications That Can Cause Low Blood Sugar (usually only mild, but often rarely seen)  Some medications not typically used for diabetes can cause hypoglycemia. These include:  Quinolone antibiotics (like ciprofloxacin)  Beta-blockers (used for heart conditions)  ACE inhibitors (for high blood pressure)  Pentamidine (used for certain infections)  Disopyramide (used for heart arrhythmias)    Possible Causes of Hypoglycemia  Reactive (Post-Meal) Hypoglycemia:  This type happens within a few hours after eating. For some people, their pancreas may release more insulin than needed after a meal, causing blood sugar to drop.  Can be seen in young and healthy patients who have higher insulin sensitivity or in those with early or mild forms of metabolic disease  Dietary Approach: Eating smaller, balanced meals with fiber, protein, and healthy fats can help prevent  large spikes in blood sugar and subsequent drops.     Fasting Hypoglycemia:  Occurs after prolonged periods without food, such as between meals or overnight.  Possible Causes:  Hormone Imbalance: Conditions affecting hormones like cortisol (adrenal hormone) or growth hormone can disrupt glucose levels.  Liver or Kidney Issues: Problems with these organs can interfere with blood sugar regulation.  Medications: Some medications may impact blood sugar, even if youre not diabetic.      Functional Hypoglycemia:  Episodes of low blood sugar without an underlying medical condition. This may be more related to eating patterns and sensitivity to blood sugar changes.    Other Conditions That Can Cause Hypoglycemia  Bariatric Surgery: Patients who have had weight-loss surgery may experience hypoglycemia due to changes in how the body handles food and insulin.  Chronic Kidney Disease (CKD): With poor kidney function, the body may not clear insulin properly, leading to low blood sugar.  Liver Failure: The liver helps regulate glucose levels, so when it isnt functioning properly, hypoglycemia can occur.  Heart Failure: Severe heart failure can impact glucose regulation, although this is less common.  Adrenal Insufficiency (AI): This occurs when the adrenal glands dont produce enough hormones, leading to low blood sugar. In these cases, other lab results may show low sodium and high potassium.    Further Diagnostic Testing  If we rule out other causes and continue to see low blood sugars, we may need to perform more specialized tests like:  Observed Fast: Youll be monitored while fasting to see if blood sugar drops.  Mixed Meal Test: Youll eat a specific meal to see if blood sugar falls afterward, especially if symptoms happen after eating.    Endogenous Insulin Excess (insulinoma)  If testing points to your body producing too much insulin, this can cause low blood sugar. The following lab results suggest endogenous insulin  excess:  Low blood sugar (glucose)  High insulin levels  High C-peptide levels (indicating insulin is coming from your body, not medications)  No sulfonylurea drugs (medications that raise insulin) detected in your system  Decreased ketones, especially beta-hydroxybutyrate (BHB), indicating your body isnt producing ketones during low blood sugar as it normally would.  If these results point to endogenous insulin excess, further imaging (such as CT or MRI scans) may be required to look for an insulin-producing tumor (insulinoma). Surgery is usually the first line of treatment if a tumor is found.    Treatment Options for Endogenous Insulin Excess  Surgery: If imaging shows an insulinoma, removing the tumor is often curative.  Medications: If no tumor is found or surgery isnt an option, medications that lower insulin production or increase blood sugar levels may be considered.    Summary  Hypoglycemia in non-diabetics can be caused by a variety of factors, including medications, medical conditions, or excess insulin production. Proper diagnosis involves confirming low blood sugar, ruling out other causes, and possibly performing fasting or meal-related testing. If insulin overproduction is found, surgery is usually the first treatment option if a target to operate on is seen. Medical therapies exist as well if not a candidate for surgery.        10-Point Nutrition Plan for Preventing Hypoglycemia in Post-Bariatric Hypoglycemia.    Control portions of carbohydrate - ideally 30 grams/meal, 15 grams/snack.  Choose low-glycemic carbohydrates.  Avoid high-glycemic carbohydrates.  Include (heart-healthy) fats in each meal or snack - 15 grams/meal, 5 grams/snack.  Emphasize optimal protein intake, ideally 60 to 120 grams of protein a day or more.  Space meals/snacks 3-4 hours apart.  Avoid (for 30 mins or more) or limit amount of liquids consumed with meals (to help prevent rapid delivery of carbs to the intestine).  Avoid  alcohol.  Avoid caffeine (best beverages are water or non-carbonated low calorie options).  Maintain post-bariatric vitamin and mineral intake.      The Glycemic Index (GI) measures the impact of carbohydrates on your blood sugar level. The GI relates to how quickly certain foods cause your blood sugar to rise after you eat them. Foods with higher GIs can cause rapid spikes in blood glucose while those with lower GIs have less of an effect       Low Glycemic Index Carbohydrates (CHOOSE)    Steel-cut oats (regular, not quick-cook or instant)  Oat bran cereal  Beans/legumes (e.g., garbanzo, navy, kidney, lima, soliman, black-eyed and pea beans, edamame (soybeans), lentils  Bean products (e.g., hummus, tofu)  Pearled barley, cooked al dente  Yams  Some fruits (e.g., grapefruit, apples, pears, berries, apricots, peaches)  Some pasta (e.g., Barilla Plus pasta), cooked al dente  Some whole grain breads (e.g., Guicho bread, Pauls Flax, Oat Bran & Whole Wheat Ligia/Lavash/Tortillas)  Some whole grain crackers (e.g., RyKrisp, RyVita, Wasa)      High Glycemic Index Carbohydrates (AVOID)    Refined breakfast cereals (e.g., Corn Flakes, Rice Krispies, Cream of Rice, instant oatmeal)  Regular pasta  Most starchy vegetables (e.g., white potatoes, corn, winter (orange) squash)  White rice, rice cakes  Popcorn, pretzels, chips  Some fruits (e.g., ripe bananas, pineapple, david, watermelon, grapes)  All fruit juices and sweetened drinks (e.g., sodas, sweetened iced tea)  Bread, rolls, bagels, English muffins, and crackers made with refined flour  Sweets (e.g., candy, cake, cookies, ice cream, syrup)      Heart-Healthy Fats    Nuts, nut butters  Avocado, guacamole  Olives  Most plant oils (e.g., olive, canola, peanut, soy, sunflower, sesame)  Most seeds (e.g., sunflower, flax, sesame/sesame tahini)  Oily fish (e.g., salmon, bluefish, mackerel, tuna, sardines)      You can find more information online about foods to choose with low  glycemic indexes and ones to avoid with high glycemic index.

## 2024-12-02 NOTE — Clinical Note
Please arrange labs for this patient tomorrow at the lab in [place].    Follow up in 6 months for now  Thanks

## 2024-12-03 ENCOUNTER — LAB VISIT (OUTPATIENT)
Dept: LAB | Facility: HOSPITAL | Age: 69
End: 2024-12-03
Attending: STUDENT IN AN ORGANIZED HEALTH CARE EDUCATION/TRAINING PROGRAM
Payer: MEDICARE

## 2024-12-03 DIAGNOSIS — R40.0 SOMNOLENCE: ICD-10-CM

## 2024-12-03 DIAGNOSIS — E16.2 HYPOGLYCEMIA: ICD-10-CM

## 2024-12-03 LAB
ANION GAP SERPL CALC-SCNC: 7 MMOL/L (ref 8–16)
BUN SERPL-MCNC: 15 MG/DL (ref 8–23)
C PEPTIDE SERPL-MCNC: 2.62 NG/ML (ref 0.78–5.19)
CALCIUM SERPL-MCNC: 9.7 MG/DL (ref 8.7–10.5)
CHLORIDE SERPL-SCNC: 104 MMOL/L (ref 95–110)
CO2 SERPL-SCNC: 28 MMOL/L (ref 23–29)
CREAT SERPL-MCNC: 1 MG/DL (ref 0.5–1.4)
EST. GFR  (NO RACE VARIABLE): >60 ML/MIN/1.73 M^2
ESTIMATED AVG GLUCOSE: 114 MG/DL (ref 68–131)
GLUCOSE SERPL-MCNC: 107 MG/DL (ref 70–110)
HBA1C MFR BLD: 5.6 % (ref 4–5.6)
INSULIN COLLECTION INTERVAL: NORMAL
INSULIN SERPL-ACNC: 6.9 UU/ML
POTASSIUM SERPL-SCNC: 4.2 MMOL/L (ref 3.5–5.1)
SODIUM SERPL-SCNC: 139 MMOL/L (ref 136–145)

## 2024-12-03 PROCEDURE — 80377 DRUG/SUBSTANCE NOS 7/MORE: CPT | Performed by: STUDENT IN AN ORGANIZED HEALTH CARE EDUCATION/TRAINING PROGRAM

## 2024-12-03 PROCEDURE — 84206 ASSAY OF PROINSULIN: CPT | Performed by: STUDENT IN AN ORGANIZED HEALTH CARE EDUCATION/TRAINING PROGRAM

## 2024-12-03 PROCEDURE — 83036 HEMOGLOBIN GLYCOSYLATED A1C: CPT | Performed by: STUDENT IN AN ORGANIZED HEALTH CARE EDUCATION/TRAINING PROGRAM

## 2024-12-03 PROCEDURE — 80048 BASIC METABOLIC PNL TOTAL CA: CPT | Performed by: STUDENT IN AN ORGANIZED HEALTH CARE EDUCATION/TRAINING PROGRAM

## 2024-12-03 PROCEDURE — 83525 ASSAY OF INSULIN: CPT | Performed by: STUDENT IN AN ORGANIZED HEALTH CARE EDUCATION/TRAINING PROGRAM

## 2024-12-03 PROCEDURE — 84681 ASSAY OF C-PEPTIDE: CPT | Performed by: STUDENT IN AN ORGANIZED HEALTH CARE EDUCATION/TRAINING PROGRAM

## 2024-12-03 PROCEDURE — 36415 COLL VENOUS BLD VENIPUNCTURE: CPT | Mod: PO | Performed by: STUDENT IN AN ORGANIZED HEALTH CARE EDUCATION/TRAINING PROGRAM

## 2024-12-03 NOTE — ASSESSMENT & PLAN NOTE
Continued reports that symptoms of hypoglycemia are occurring at least once weekly and mainly after meals that contain carbohydrates.  Prior PRO cgm study showed symptoms at that time matched with an elevated blood sugar, not a low blood sugar.  Unclear if glucometer is accurate or not but the cgm did note at least a single episode of glucose around 50 which occurred after trail mix had been eaten.      No other concerning history including no bariatric surgery, daily alcohol use, liver dz, kidney dz or heart dz.  No family history of low blood sugar either.  Since symptoms have not been while fasting this is unlikely to be a condition such as insulinoma.  Dietary choices appear to play a role in symptoms onset.    Plan:  - Check post-prandial labs after high carb meal soon (BMP, insulin, proinsulin, c-peptide, BHB, SFU screen)  - After blood draw adjust diet to low glycemic index carbs with recommendations given

## 2024-12-03 NOTE — ASSESSMENT & PLAN NOTE
Elevated BMI.  Recent weight gain over the prior months.  Possibility for undiagnosed prediabetes given recent blood testing has showed elevated BG values on routine serum blood draws.  Will check A1c level on labs

## 2024-12-04 ENCOUNTER — PATIENT MESSAGE (OUTPATIENT)
Facility: CLINIC | Age: 69
End: 2024-12-04
Payer: MEDICARE

## 2024-12-04 ENCOUNTER — TELEPHONE (OUTPATIENT)
Dept: ENDOCRINOLOGY | Facility: CLINIC | Age: 69
End: 2024-12-04
Payer: MEDICARE

## 2024-12-04 DIAGNOSIS — E16.2 HYPOGLYCEMIA: Primary | ICD-10-CM

## 2024-12-04 NOTE — TELEPHONE ENCOUNTER
Labs mistakenly performed after fasting and not post-prandial as was meant.  Also issues with beta hydroxybutyrate not being able to be tested.

## 2024-12-05 ENCOUNTER — PATIENT MESSAGE (OUTPATIENT)
Dept: OTHER | Facility: OTHER | Age: 69
End: 2024-12-05
Payer: MEDICARE

## 2024-12-06 ENCOUNTER — LAB VISIT (OUTPATIENT)
Dept: LAB | Facility: HOSPITAL | Age: 69
End: 2024-12-06
Attending: STUDENT IN AN ORGANIZED HEALTH CARE EDUCATION/TRAINING PROGRAM
Payer: MEDICARE

## 2024-12-06 DIAGNOSIS — E16.2 HYPOGLYCEMIA: ICD-10-CM

## 2024-12-06 LAB
ANION GAP SERPL CALC-SCNC: 10 MMOL/L (ref 8–16)
BUN SERPL-MCNC: 18 MG/DL (ref 8–23)
C PEPTIDE SERPL-MCNC: 17.46 NG/ML (ref 0.78–5.19)
CALCIUM SERPL-MCNC: 9.8 MG/DL (ref 8.7–10.5)
CHLORIDE SERPL-SCNC: 113 MMOL/L (ref 95–110)
CO2 SERPL-SCNC: 21 MMOL/L (ref 23–29)
CREAT SERPL-MCNC: 0.9 MG/DL (ref 0.5–1.4)
EST. GFR  (NO RACE VARIABLE): >60 ML/MIN/1.73 M^2
GLUCOSE SERPL-MCNC: 35 MG/DL (ref 70–110)
INSULIN COLLECTION INTERVAL: ABNORMAL
INSULIN SERPL-ACNC: 57.6 UU/ML
POTASSIUM SERPL-SCNC: 3.4 MMOL/L (ref 3.5–5.1)
PROINSULIN SERPL-SCNC: 7.5 PMOL/L (ref 3.6–22)
SODIUM SERPL-SCNC: 144 MMOL/L (ref 136–145)

## 2024-12-06 PROCEDURE — 84206 ASSAY OF PROINSULIN: CPT | Performed by: STUDENT IN AN ORGANIZED HEALTH CARE EDUCATION/TRAINING PROGRAM

## 2024-12-06 PROCEDURE — 80048 BASIC METABOLIC PNL TOTAL CA: CPT | Performed by: STUDENT IN AN ORGANIZED HEALTH CARE EDUCATION/TRAINING PROGRAM

## 2024-12-06 PROCEDURE — 36415 COLL VENOUS BLD VENIPUNCTURE: CPT | Mod: PO | Performed by: STUDENT IN AN ORGANIZED HEALTH CARE EDUCATION/TRAINING PROGRAM

## 2024-12-06 PROCEDURE — 84681 ASSAY OF C-PEPTIDE: CPT | Performed by: STUDENT IN AN ORGANIZED HEALTH CARE EDUCATION/TRAINING PROGRAM

## 2024-12-06 PROCEDURE — 83525 ASSAY OF INSULIN: CPT | Performed by: STUDENT IN AN ORGANIZED HEALTH CARE EDUCATION/TRAINING PROGRAM

## 2024-12-07 LAB
CHLORPROPAMIDE SERPL-MCNC: NEGATIVE UG/ML
GLIMEPIRIDE SERPL-MCNC: NEGATIVE NG/ML
GLIPIZIDE SERPL-MCNC: NEGATIVE UG/ML
GLYBURIDE SERPL-MCNC: NEGATIVE UG/ML
NATEGLINIDE SERPL QL: NEGATIVE
PIOGLITAZONE: NEGATIVE
REPAGLINIDE SERPL-MCNC: NEGATIVE NG/ML
ROSIGLITAZONE: NEGATIVE
TOLAZAMIDE SERPL-MCNC: NEGATIVE UG/ML
TOLBUTAMIDE SERPL-MCNC: NEGATIVE UG/ML

## 2024-12-09 ENCOUNTER — TELEPHONE (OUTPATIENT)
Dept: ENDOCRINOLOGY | Facility: CLINIC | Age: 69
End: 2024-12-09
Payer: MEDICARE

## 2024-12-09 ENCOUNTER — PATIENT MESSAGE (OUTPATIENT)
Facility: CLINIC | Age: 69
End: 2024-12-09
Payer: MEDICARE

## 2024-12-09 DIAGNOSIS — E16.A2 HYPOGLYCEMIA LEVEL 2: Primary | ICD-10-CM

## 2024-12-09 RX ORDER — BLOOD-GLUCOSE SENSOR
1 EACH MISCELLANEOUS
Qty: 3 EACH | Refills: 11 | Status: SHIPPED | OUTPATIENT
Start: 2024-12-09 | End: 2024-12-09 | Stop reason: SDUPTHER

## 2024-12-09 NOTE — TELEPHONE ENCOUNTER
Severe hypoglycemia seen in the post-prandial setting with glucose level dropping below 40 with noted symptoms.  Due to this finding she will require CGM for close glucose monitoring to give early warning for any hypoglycemia so this can be adequately treated with oral glucose as there may also be a component of hypoglycemia unawareness at mild hypoglycemia levels.      Phone is compatible with Dexcom G7 (uses iphone).  This will be sent to the pharmacy.

## 2024-12-10 LAB — PROINSULIN SERPL-SCNC: 187 PMOL/L (ref 3.6–22)

## 2024-12-11 ENCOUNTER — PATIENT MESSAGE (OUTPATIENT)
Facility: CLINIC | Age: 69
End: 2024-12-11
Payer: MEDICARE

## 2024-12-13 ENCOUNTER — OFFICE VISIT (OUTPATIENT)
Dept: NEUROLOGY | Facility: CLINIC | Age: 69
End: 2024-12-13
Payer: MEDICARE

## 2024-12-13 ENCOUNTER — TELEPHONE (OUTPATIENT)
Dept: NEUROLOGY | Facility: CLINIC | Age: 69
End: 2024-12-13

## 2024-12-13 VITALS
HEART RATE: 73 BPM | RESPIRATION RATE: 17 BRPM | WEIGHT: 172.81 LBS | DIASTOLIC BLOOD PRESSURE: 80 MMHG | HEIGHT: 59 IN | SYSTOLIC BLOOD PRESSURE: 124 MMHG | BODY MASS INDEX: 34.84 KG/M2

## 2024-12-13 DIAGNOSIS — R42 DIZZINESS AND GIDDINESS: ICD-10-CM

## 2024-12-13 DIAGNOSIS — G44.86 CERVICOGENIC HEADACHE: Primary | ICD-10-CM

## 2024-12-13 DIAGNOSIS — G89.29 CHRONIC NONINTRACTABLE HEADACHE, UNSPECIFIED HEADACHE TYPE: ICD-10-CM

## 2024-12-13 DIAGNOSIS — R51.9 CHRONIC NONINTRACTABLE HEADACHE, UNSPECIFIED HEADACHE TYPE: ICD-10-CM

## 2024-12-13 PROCEDURE — 99999 PR PBB SHADOW E&M-EST. PATIENT-LVL V: CPT | Mod: PBBFAC,,, | Performed by: NURSE PRACTITIONER

## 2024-12-13 RX ORDER — BUTALBITAL, ACETAMINOPHEN AND CAFFEINE 50; 325; 40 MG/1; MG/1; MG/1
TABLET ORAL
Qty: 14 TABLET | Refills: 0 | Status: SHIPPED | OUTPATIENT
Start: 2024-12-13

## 2024-12-13 RX ORDER — BUTALBITAL, ACETAMINOPHEN AND CAFFEINE 50; 325; 40 MG/1; MG/1; MG/1
TABLET ORAL
Qty: 14 TABLET | Refills: 0 | Status: SHIPPED | OUTPATIENT
Start: 2024-12-13 | End: 2024-12-13

## 2024-12-13 NOTE — PROGRESS NOTES
Date of service: 12/13/2024  Referring provider: Dr. Juan Buck    Subjective:      Chief complaint: Headache       Patient ID: Cheyenne West is a 69 y.o. who presents today as a new patient with spouse as a new patient for headache.     History of Present Illness  ORIGINAL HEADACHE HISTORY -   Age at onset and course over time: 6 months ago with gradual increase over time. She was seen by Primary Care with MRI Brain non- acute and MRI C Spine with degenerative changes and disc bulging. Today, she reports daily headache with escalation twice a week ,always originating from her cervical spine, radiating into her upper cervical and occipital region. She also reports episodes of dizziness in which she feels off balance, without any falls. She denies a previous headache history. She does have a history of bacterial meningitis (age 7) and viral meningitis (age 32). She wears hearing aids as she is hard of hearing. She is also followed by Endocrinology with ongoing work up for hypoglycemia.     Location: upper cervical, occipital   Quality:  [] Stabbing [x] Pressure [] Tight [] Throbbing/pounding [] Sharp    Duration: [] Seconds [] Minutes [] Hours [x] Days [] Constant   Frequency: [x] Daily [] Weekly [] Monthly   How many days per month is your head or neck 100% pain free: 5   Headaches awaken at night?:   varies   Worst time of day: at worst 8/10  Intensity of pain:   Associated with: [] Photophobia []  Phonophobia [] Osmophobia [] Loss of appetite [x] Nausea [] Vomiting   [x] Dizziness [] Vertigo [] Ringing in the ears [] Blurry vision [] Double vision  [x] Anxiety/Anger/Irritability [x] Problems with concentration [x] Problems with memory [x] Problems with task completion   [x] Problems with relaxation [] Neck tightness/ neck pain [] Nasal congestion [] Nasal or sinus pressure [] Aura   Alleviated by:  [x] Sleep [] Darkness [] Local pressure [] Massage [] Heat [] Ice [] Menses [x]  Medication  Exacerbated by:  [x] Fatigue [] Light [] Noise [] Smells [] Coughing [] Sneezing  [] Bending over [] Change in weather [] Ovulation [] Menses [] Alcohol [x] Stress []  Food  Ipsilateral autonomic: [] nasal congestion [] lacrimation [] ptosis [] injection [] edema [] foreign body sensation [] ear fullness   ICP:  [] transient visual obscurations  [] tinnitus   [] positional headache  [] non-positional     Bowl Habits: [x] Normal [] Constipation [] Diarrhea   Caffeine intake: 2 cups coffee   Sleep habits: trouble falling and staying asleep   Water intake: 30-60 oz   Eye Exam: up to date   Family history of migraine: none   Gyn status (if female) (birth control with estrogen, hysterectomy): menopause   History of asthma, cancer, glaucoma, kidney stones, CVA and osteoporosis: kidney stone (2023), glaucoma    HIT 6: 59    Current acute treatment:  None     Current prevention:  Lexapro  Wellbutrin    Previously tried/failed acute treatment:  Aspirin  Motrin     Previously tried/failed preventative treatment:  None     Considerations:     Review of patient's allergies indicates:   Allergen Reactions    Penicillins      Current Outpatient Medications   Medication Sig Dispense Refill    ALPRAZolam (XANAX) 0.25 MG tablet Take 1 tablet (0.25 mg total) by mouth 3 (three) times daily as needed for Anxiety. 30 tablet 0    atorvastatin (LIPITOR) 10 MG tablet TAKE 1 TABLET EVERY DAY 90 tablet 1    buPROPion (WELLBUTRIN XL) 300 MG 24 hr tablet TAKE 1 TABLET EVERY DAY 90 tablet 1    CALCIUM CARBONATE/VITAMIN D3 (CALTRATE 600 + D ORAL) Take by mouth 2 (two) times daily.      cetirizine 10 mg Cap Take by oral route.      diphenhydrAMINE (BENADRYL) 25 mg capsule Take 25 mg by mouth as needed.      EScitalopram oxalate (LEXAPRO) 20 MG tablet TAKE 1 TABLET EVERY DAY 90 tablet 1    famotidine (PEPCID) 20 MG tablet Take 1 tablet (20 mg total) by mouth 2 (two) times daily. 60 tablet 9    felodipine (PLENDIL) 5 MG 24 hr tablet  TAKE 1 TABLET EVERY DAY 90 tablet 1    levothyroxine (SYNTHROID) 50 MCG tablet TAKE 1 TABLET EVERY DAY 90 tablet 1    multivitamin with minerals tablet Take 1 tablet by mouth once daily.      olmesartan-hydrochlorothiazide (BENICAR HCT) 20-12.5 mg per tablet TAKE 1 TABLET EVERY DAY 90 tablet 1    OMEGA-3 FATTY ACIDS-EPA ORAL Take by mouth once daily.      pantoprazole (PROTONIX) 40 MG tablet TAKE 1 TABLET EVERY DAY 90 tablet 1    potassium chloride (MICRO-K) 10 MEQ CpSR TAKE 1 CAPSULE ONE TIME DAILY 90 capsule 3    VIT A/VIT C/VIT E/ZINC/COPPER (PRESERVISION AREDS ORAL) Take by mouth once daily.      butalbital-acetaminophen-caffeine -40 mg (FIORICET, ESGIC) -40 mg per tablet 1 tablet as needed, ok to repeat 6 hours later. No more than 2 tablets per 24 hours 14 tablet 0    predniSONE (DELTASONE) 10 MG tablet 4 tab daily for 2 days then 3 tab daily for 2 days then 2 tab daily for 2 days then 1 tab daily for 2 days then 1/2 tab daily (Patient not taking: Reported on 12/13/2024) 50 tablet 1     No current facility-administered medications for this visit.       Past Medical History  Past Medical History:   Diagnosis Date    Bilateral carpal tunnel syndrome 10/30/2023    Carpal tunnel syndrome of right wrist     Chronic gastritis     Chronic urticaria     COVID-19 virus infection 1/27/2022    Dissociative fugue     Diverticulosis     Duodenitis     Dyspepsia     Generalized anxiety disorder     Hearing loss on left     Hearing loss on right     Helicobacter pylori (H. pylori)     History of blood transfusion     Hyperlipidemia     Hypertension     Hypothyroidism     IBS (irritable bowel syndrome)     Iron deficiency 3/21/2019    Iron deficiency anemia     Mild mitral regurgitation by prior echocardiogram     Mild obesity     Mild vitamin D deficiency     Osteopenia 6/2/2023    Paraesophageal hiatal hernia     PONV (postoperative nausea and vomiting)     Reflux gastritis     RLS (restless legs syndrome)  2021    Sleep apnea     mild improved with wt. loss    Thyroid goiter     Urticaria, chronic        Past Surgical History  Past Surgical History:   Procedure Laterality Date    ARTHROSCOPY OF KNEE Left 2022    Procedure: ARTHROSCOPY, KNEE;  Surgeon: Davion Massey MD;  Location: [facility];  Service: Orthopedics;  Laterality: Left;    BLADDER SUSPENSION      pubovaginal sling     SECTION, CLASSIC      CHOLECYSTECTOMY      COLONOSCOPY  2011    Dr. Goode, in legacy:diverticulosis, hemorrhoids, melanosis coli-repeat 10 years    COLONOSCOPY N/A 2018    Procedure: COLONOSCOPY;  Surgeon: Rusty Vogt Jr., MD;  Location: [facility];  Service: Endoscopy;  Laterality: N/A; repeat in 10 years for screening    ESOPHAGEAL DILATION      ESOPHAGOGASTRODUODENOSCOPY  2016    ESOPHAGOGASTRODUODENOSCOPY N/A 2018    Procedure: EGD (ESOPHAGOGASTRODUODENOSCOPY);  Surgeon: Rusty Vogt Jr., MD;  Location: [facility];  Service: Endoscopy;  Laterality: N/A;    HERNIA REPAIR      INTRALUMINAL GASTROINTESTINAL TRACT IMAGING VIA CAPSULE N/A 2018    Procedure: IMAGING PROCEDURE, GI TRACT, INTRALUMINAL, VIA CAPSULE;  Surgeon: Loraine Velazquez MD;  Location: [facility];  Service: Endoscopy;  Laterality: N/A;    LAPAROSCOPIC NISSEN FUNDOPLICATION N/A 2018    Procedure: FUNDOPLICATION, NISSEN, LAPAROSCOPIC;  Surgeon: Frank Akins MD;  Location: [facility];  Service: General;  Laterality: N/A;    OPEN REDUCTION AND INTERNAL FIXATION (ORIF) OF INJURY OF FINGER Right 2024    Procedure: Right small finger proximal interphalangeal jpint open treatment with palmar plate repair;  Surgeon: Jose Antonio Aguayo MD;  Location: [facility];  Service: Orthopedics;  Laterality: Right;    THYROIDECTOMY, PARTIAL       partial for benign nodule    UPPER GASTROINTESTINAL ENDOSCOPY  2016    Dr. Whalen, in care everywhere       Family History  Family History   Problem Relation Name Age of Onset     Hypertension Mother      Heart disease Mother      Ulcers Mother      GI problems Mother          colitis    Hypertension Father      Breast cancer Paternal Grandmother      Breast cancer Cousin      Colon cancer Neg Hx      Crohn's disease Neg Hx      Colon polyps Neg Hx      Ulcerative colitis Neg Hx      Stomach cancer Neg Hx      Esophageal cancer Neg Hx      Allergic rhinitis Neg Hx      Allergies Neg Hx      Angioedema Neg Hx      Atopy Neg Hx      Eczema Neg Hx      Immunodeficiency Neg Hx      Rhinitis Neg Hx      Urticaria Neg Hx      Asthma Neg Hx         Social History  Social History     Socioeconomic History    Marital status:    Tobacco Use    Smoking status: Never    Smokeless tobacco: Never   Substance and Sexual Activity    Alcohol use: Yes     Comment: seldom    Drug use: No    Sexual activity: Yes     Social Drivers of Health     Financial Resource Strain: Low Risk  (1/23/2024)    Overall Financial Resource Strain (CARDIA)     Difficulty of Paying Living Expenses: Not hard at all   Food Insecurity: No Food Insecurity (1/23/2024)    Hunger Vital Sign     Worried About Running Out of Food in the Last Year: Never true     Ran Out of Food in the Last Year: Never true   Transportation Needs: No Transportation Needs (1/23/2024)    PRAPARE - Transportation     Lack of Transportation (Medical): No     Lack of Transportation (Non-Medical): No   Physical Activity: Insufficiently Active (1/23/2024)    Exercise Vital Sign     Days of Exercise per Week: 2 days     Minutes of Exercise per Session: 30 min   Stress: Stress Concern Present (1/23/2024)    [employer] - Occupational Stress Questionnaire     Feeling of Stress : Rather much   Housing Stability: Low Risk  (1/23/2024)    Housing Stability Vital Sign     Unable to Pay for Housing in the Last Year: No     Number of Places Lived in the Last Year: 1     Unstable Housing in the Last Year: No        Review of  Systems  14-point review of systems as follows:   No check ted indicates NEGATIVE response   Constitutional: [] weight loss [] change to appetite   Eyes: [] change in vision [] double vision   Ears, nose, mouth, throat: [] frequent nose bleeds [x] ringing in the ears   Respiratory: [] cough [] wheezing   Cardiovascular: [] chest pain [] palpitations   Gastrointestinal: [] jaundice [] nausea/vomiting   Genitourinary: [] incontinence [] burning with urination   Hematologic/lymphatic: [] easy bruising/bleeding [] night sweats   Neurological: [x] numbness [] weakness   Endocrine: [x] fatigue [] heat/cold intolerance   Allergy/Immunologic: [] fevers [] chills   Musculoskeletal: [x] muscle pain [x] joint pain   Psychiatric: [] thoughts of harming self/others [] depression   Integumentary: [] rashes [] sores that do not heal     Objective:        Vitals:    12/13/24 0759   BP: 124/80   Pulse: 73   Resp: 17     Body mass index is 34.91 kg/m².    Constitutional: appears in no acute distress, well-developed, well-nourished     Eyes: normal conjunctiva, PERRLA    Ears, nose, mouth, throat: external appearance of ears and nose normal, hearing intact     Cardiovascular: n/a     Respiratory: unlabored respirations    Gastrointestinal: no visible abdominal masses, no guarding, no visible hernia    Musculoskeletal: normal tone in all four extremities. No abnormal movements. No pronator drift. No orbit. Symmetric finger tapping. Normal station. Normal regular gait.       Spine:   CERVICAL SPINE:  ROM: limited, R>L  MUSCLE SPASM: no   FACET LOADING: no   SPURLING: no  KARY / ANTELMO tender: no     Psychiatric: normal judgment and insight. Oriented to person, place, and time.     Neurologic:   Cortical functions: recent and remote memory intact, normal attention span and concentration, speech fluent, adequate fund of knowledge   Cranial nerves: visual fields full, PERRLA, EOMI, symmetric facial strength, hearing intact, palate elevates  symmetrically, shoulder shrug 5/5, tongue protrudes midline   Reflexes: 2+ in the upper and lower extremities, no Dhillon  Sensation: intact to temperature throughout   Coordination: normal finger to nose, unable to tandem gait     Data Review:     I have personally reviewed the referring provider's notes, labs, & imaging made available to me today.      RADIOLOGY STUDIES:  I have personally reviewed the pertinent images performed.       Results for orders placed or performed in visit on 12/11/24   MRI Brain W WO Contrast    Narrative    EXAMINATION:  MRI BRAIN W WO CONTRAST    CLINICAL HISTORY:  Headache, new or worsening (Age >= 50y); Cervicalgia    TECHNIQUE:  Multiplanar multisequence MR imaging of the brain was performed before and after the administration of 17 mL Gadavist intravenous contrast.    COMPARISON:  None    FINDINGS:  Midline structures within normal limits as is the brainstem and proximal cervical spinal cord.  Marrow signal pattern is normal.  Partially empty with clivus is noted.  No air restricted diffusion.  No defect on the ADC map.  No abnormal areas of T2/FLAIR signal abnormality.  No significant abnormal T1 signal.  No evidence for blooming artifact on gradient weighted imaging to suggest hemosiderin deposition.    No evidence for abnormal parenchymal enhancement or enhancing mass.  No pachymeningeal changes or leptomeningeal enhancement.    No acute ischemic changes or infarct.  No intraparenchymal hemorrhage or contusion.  No mass, mass effect midline shift, edema, or extra-axial fluid collection.  Gray-white matter differentiation maintained.  The cerebral sulci and basal cisterns are normal no hydrocephalus.    Globes are intact.  The paranasal sinuses and mastoid air cells are well pneumatized the T2 flow voids are normal.      Impression    No acute intracranial abnormality.  No evidence for abnormal parenchymal enhancement or enhancing mass.      Electronically signed by: Vigrilio Ware  MD  Date:    12/11/2024  Time:    12:52       Lab Results   Component Value Date     12/06/2024    K 3.4 (L) 12/06/2024    MG 2.5 (H) 11/14/2007     (H) 12/06/2024    CO2 21 (L) 12/06/2024    BUN 18 12/06/2024    CREATININE 0.9 12/06/2024    GLU 35 (LL) 12/06/2024    HGBA1C 5.6 12/03/2024    AST 32 10/31/2024    ALT 28 10/31/2024    ALBUMIN 4.1 10/31/2024    PROT 7.3 10/31/2024    BILITOT 0.4 10/31/2024    CHOL 190 10/31/2024    HDL 68 10/31/2024    LDLCALC 101.0 10/31/2024    TRIG 105 10/31/2024       Lab Results   Component Value Date    WBC 4.97 03/05/2024    HGB 13.6 03/05/2024    HCT 40.4 03/05/2024    MCV 89 03/05/2024     03/05/2024       Lab Results   Component Value Date    TSH 1.752 01/23/2024           Assessment & Plan:       Problem List Items Addressed This Visit       Cervicogenic headache - Primary    Overview     Ongoing for 6 months.     MRI Brain non- acute and MRI C Spine with degeneration and disc bulging.     Start Physical therapy for cervical consideration with bulging disc.      Start Magnesium nightly. Start Fioricet as needed for escalations with limited use to no more than 10 tablets per month. Ok to continue over the counter medication as needed with no more than 2-3 times per week. Headache journal with close consideration for hypoglycemia episodes in relation to increased headache and dizziness            Other Visit Diagnoses       Chronic nonintractable headache, unspecified headache type        Relevant Medications    butalbital-acetaminophen-caffeine -40 mg (FIORICET, ESGIC) -40 mg per tablet    Dizziness and giddiness        CTA head and neck for intracranial stenosis. Continue to follow up with Endocrine for ongoing work up of hypoglycemia    Relevant Orders    CTA Head and Neck (xpd)                Please call our clinic at [phone] or send a message on the [employer] portal if there are any changes to the plan described below, for example,if you  are not contacted for the requested tests, referral(s) within one week, if you are unable to receive the medications prescribed, or if you feel you need to change the treatment course for any reason.     TESTING: CTA head and neck for dizziness     REFERRALS:  - Start Physical therapy for cervical consideration with bulging disc   - Continue to follow up with Endocrine for ongoing work up of hypoglycemia     PREVENTION (use daily regardless of headache):  - Start Magnesium in ONE of the following preparations -               1. Magnesium oxide 800mg nightly (the most common over the counter kind, may causes loose stools)              2. Magnesium citrate 400-500mg nightly (harder to find, but more neutral on the bowels)              3. Magnesium glycinate 400mg nightly (hardest to find, look online, but most bowel-neutral, best absorbed)   - Consider in the future if no response from physical therapy     AS-NEEDED TREATMENT (use total no more than 10 days per month unless otherwise stated):  - Start Fioricet as needed for escalations with limited use to no more than 10 tablets per month  - Ok to continue over the counter medication as needed with no more than 2-3 times per week     OTHER:   - Headache journal with close consideration for hypoglycemia episodes in relation to increased headache and dizziness        Follow up in about 3 months (around 3/13/2025).       TROY GossC      I have spent 60 minutes of total time on the total encounter which includes face to face time and non-face to face time preparing to see the patient (eg, review of labs, previous encounters, care everywhere), obtaining and/or reviewing separately obtained history, documenting clinical information in the electronic or health record, independently interpreting results, and communicating results to the patient/family/caregiver, or care coordination.

## 2024-12-13 NOTE — PATIENT INSTRUCTIONS
Please call our clinic at [phone] or send a message on the [employer] portal if there are any changes to the plan described below, for example,if you are not contacted for the requested tests, referral(s) within one week, if you are unable to receive the medications prescribed, or if you feel you need to change the treatment course for any reason.     TESTING: CTA head and neck for dizziness     REFERRALS:  - Start Physical therapy for cervical consideration with bulging disc   - Continue to follow up with Endocrine for ongoing work up of hypoglycemia     PREVENTION (use daily regardless of headache):  - Start Magnesium in ONE of the following preparations -               1. Magnesium oxide 800mg nightly (the most common over the counter kind, may causes loose stools)              2. Magnesium citrate 400-500mg nightly (harder to find, but more neutral on the bowels)              3. Magnesium glycinate 400mg nightly (hardest to find, look online, but most bowel-neutral, best absorbed)   - Consider in the future if no response from physical therapy     AS-NEEDED TREATMENT (use total no more than 10 days per month unless otherwise stated):  - Start Fioricet as needed for escalations with limited use to no more than 10 tablets per month  - Ok to continue over the counter medication as needed with no more than 2-3 times per week     OTHER:   - Headache journal with close consideration for hypoglycemia episodes in relation to increased headache and dizziness

## 2024-12-13 NOTE — TELEPHONE ENCOUNTER
Spoke with [facility]--provided them with providers KEENAN # for Fioricet Rx.

## 2024-12-18 ENCOUNTER — HOSPITAL ENCOUNTER (OUTPATIENT)
Dept: RADIOLOGY | Facility: HOSPITAL | Age: 69
Discharge: HOME | End: 2024-12-18
Attending: NURSE PRACTITIONER
Payer: MEDICARE

## 2024-12-18 DIAGNOSIS — R42 DIZZINESS AND GIDDINESS: ICD-10-CM

## 2024-12-18 PROCEDURE — 70496 CT ANGIOGRAPHY HEAD: CPT | Mod: 26,,, | Performed by: RADIOLOGY

## 2024-12-18 PROCEDURE — 70498 CT ANGIOGRAPHY NECK: CPT | Mod: 26,,, | Performed by: RADIOLOGY

## 2024-12-18 PROCEDURE — 25500020 PHARM REV CODE 255: Mod: PO | Performed by: NURSE PRACTITIONER

## 2024-12-18 PROCEDURE — 70498 CT ANGIOGRAPHY NECK: CPT | Mod: TC,PO

## 2024-12-18 RX ADMIN — IOHEXOL 100 ML: 350 INJECTION, SOLUTION INTRAVENOUS at 01:12

## 2024-12-19 ENCOUNTER — TELEPHONE (OUTPATIENT)
Dept: ENDOCRINOLOGY | Facility: CLINIC | Age: 69
End: 2024-12-19
Payer: MEDICARE

## 2024-12-19 ENCOUNTER — PATIENT MESSAGE (OUTPATIENT)
Dept: ALLERGY | Facility: CLINIC | Age: 69
End: 2024-12-19
Payer: MEDICARE

## 2024-12-19 RX ORDER — PREDNISONE 10 MG/1
TABLET ORAL
Qty: 50 TABLET | Refills: 1 | Status: SHIPPED | OUTPATIENT
Start: 2024-12-19

## 2024-12-19 NOTE — TELEPHONE ENCOUNTER
I reached out to Cheyenne West recently after her lab testing to evaluate for post-meal drops in blood sugar. She had a meal within 1-2 hours of her blood draw, which previously seemed to cause neuroglycopenic symptoms. These symptoms included confusion, clamminess, thirst, hunger, and headaches. Lab testing had not been conducted during prior episodes to confirm hypoglycemia.    On the day of the lab draw, she ate and then went to the lab with her , where she experienced these symptoms again. The labs obtained (see below) confirmed hypoglycemia, showing inappropriate elevations in proinsulin, insulin, and C-peptide levels, consistent with insulin-mediated hypoglycemia. Her sulfonylurea screen was negative, ruling out medication-induced hypoglycemia.     Latest Reference Range & Units 12/06/24 14:30   eGFR >60 mL/min/1.73 m^2 >60.0   Glucose 70 - 110 mg/dL 35 (LL)   C-Peptide 0.78 - 5.19 ng/mL 17.46 (H)   Insulin <25.0 uU/mL 57.6 (H)   Proinsulin 3.6 - 22 pmol/L 187 (H)   (LL): Data is critically low  (H): Data is abnormally high    Her primary treatment so far has been dietary adjustments, with the possibility of medical or surgical management in the future, though conservative management is preferred. However, given the severity of her hypoglycemia, with blood glucose levels dropping into the 30's, she qualifies for Level 2 hypoglycemia. Additionally, there is concern for hypoglycemia unawareness, as her recent symptoms did not appear until her glucose dropped significantly.    I strongly recommend a continuous glucose monitor (CGM) for close monitoring. A CGM is essential for her to prevent further risks, such as seizures or worse complications, from hypoglycemia, as she may not have adequate warning of drops in glucose without the device. She has already lost some independence due to these issues, including no longer being able to drive, and the CGM, along with PRN use of a glucometer, will help improve  her quality of life moving forward.    It is also important to note that she previously trialed a professional CGM from March 26 - April 1, which showed drops in blood glucose levels that correlated with post-prandial times as well.    Given the seriousness of her recent lab results and the potential risks of severe hypoglycemia, I urge that this request for CGM coverage be expedited for approval.  I have placed orders recently which are pending approval with insurance.  I will continue to stay in contact with the medical team to ensure everything possible is done for approval.

## 2024-12-20 ENCOUNTER — PATIENT MESSAGE (OUTPATIENT)
Dept: ADMINISTRATIVE | Facility: OTHER | Age: 69
End: 2024-12-20
Payer: MEDICARE

## 2025-01-05 NOTE — TELEPHONE ENCOUNTER
Care Due:                  Date            Visit Type   Department     Provider  --------------------------------------------------------------------------------                                MYCHART                              FOLLOWUP/OF  Psychiatric FAMILY  Last Visit: 11-      [facility]ICE VISIT   MEDICINE       Juan Bukc  Next Visit: None Scheduled  None         None Found                                                            Last  Test          Frequency    Reason                     Performed    Due Date  --------------------------------------------------------------------------------    TSH.........  12 months..  levothyroxine............  01- 01-    [facility] Embedded Care Due Messages. Reference number: [id number].   1/05/2025 12:39:18 PM CST

## 2025-01-06 ENCOUNTER — TELEPHONE (OUTPATIENT)
Dept: FAMILY MEDICINE | Facility: CLINIC | Age: 70
End: 2025-01-06
Payer: MEDICARE

## 2025-01-06 DIAGNOSIS — E87.6 LOW SERUM POTASSIUM LEVEL: Primary | ICD-10-CM

## 2025-01-06 RX ORDER — ATORVASTATIN CALCIUM 10 MG/1
10 TABLET, FILM COATED ORAL
Qty: 90 TABLET | Refills: 3 | Status: SHIPPED | OUTPATIENT
Start: 2025-01-06

## 2025-01-06 RX ORDER — LEVOTHYROXINE SODIUM 50 UG/1
50 TABLET ORAL
Qty: 90 TABLET | Refills: 0 | Status: SHIPPED | OUTPATIENT
Start: 2025-01-06

## 2025-01-06 RX ORDER — FELODIPINE 5 MG/1
5 TABLET, EXTENDED RELEASE ORAL
Qty: 90 TABLET | Refills: 3 | Status: SHIPPED | OUTPATIENT
Start: 2025-01-06

## 2025-01-06 RX ORDER — BUPROPION HYDROCHLORIDE 300 MG/1
300 TABLET ORAL
Qty: 90 TABLET | Refills: 3 | Status: SHIPPED | OUTPATIENT
Start: 2025-01-06

## 2025-01-06 RX ORDER — POTASSIUM CHLORIDE 750 MG/1
10 CAPSULE, EXTENDED RELEASE ORAL
Qty: 90 CAPSULE | Refills: 3 | Status: SHIPPED | OUTPATIENT
Start: 2025-01-06

## 2025-01-06 RX ORDER — PANTOPRAZOLE SODIUM 40 MG/1
40 TABLET, DELAYED RELEASE ORAL
Qty: 90 TABLET | Refills: 3 | Status: SHIPPED | OUTPATIENT
Start: 2025-01-06

## 2025-01-06 RX ORDER — OLMESARTAN MEDOXOMIL AND HYDROCHLOROTHIAZIDE 20/12.5 20; 12.5 MG/1; MG/1
TABLET ORAL
Qty: 90 TABLET | Refills: 0 | Status: SHIPPED | OUTPATIENT
Start: 2025-01-06

## 2025-01-06 RX ORDER — ESCITALOPRAM OXALATE 20 MG/1
20 TABLET ORAL
Qty: 90 TABLET | Refills: 3 | Status: SHIPPED | OUTPATIENT
Start: 2025-01-06

## 2025-01-06 NOTE — TELEPHONE ENCOUNTER
Recent potassium level was low with other provider.  Please recheck potassium.  Make sure she is taking her potassium supplement.

## 2025-01-06 NOTE — TELEPHONE ENCOUNTER
Refill Routing Note   Medication(s) are not appropriate for processing by [facility] for the following reason(s):        Required labs abnormal:K+ is (L) for Olmesartan-HCTZ     [facility] action(s):  Defer  Approve     Requires labs : Yes TSH is due     Medication Therapy Plan: Duplicate Therapy: Pantoprazole and H2 (famotidine) Overridden by Juan Buck MD on Aug 12, 2024    Alert overridden per protocol: Yes     Appointments  past 12m or future 3m with PCP    Date Provider   Last Visit   11/21/2024 Juan Buck MD   Next Visit   Visit date not found Juan Buck MD   ED visits in past 90 days: 0        Note composed:12:59 PM 01/06/2025

## 2025-01-07 ENCOUNTER — LAB VISIT (OUTPATIENT)
Dept: LAB | Facility: HOSPITAL | Age: 70
End: 2025-01-07
Payer: MEDICARE

## 2025-01-07 DIAGNOSIS — E87.6 LOW SERUM POTASSIUM LEVEL: ICD-10-CM

## 2025-01-07 LAB — POTASSIUM SERPL-SCNC: 4.1 MMOL/L (ref 3.5–5.1)

## 2025-01-07 PROCEDURE — 36415 COLL VENOUS BLD VENIPUNCTURE: CPT | Mod: PO | Performed by: FAMILY MEDICINE

## 2025-01-07 PROCEDURE — 84132 ASSAY OF SERUM POTASSIUM: CPT | Performed by: FAMILY MEDICINE

## 2025-01-08 ENCOUNTER — PATIENT MESSAGE (OUTPATIENT)
Dept: FAMILY MEDICINE | Facility: CLINIC | Age: 70
End: 2025-01-08
Payer: MEDICARE

## 2025-01-20 ENCOUNTER — PATIENT MESSAGE (OUTPATIENT)
Facility: CLINIC | Age: 70
End: 2025-01-20
Payer: MEDICARE

## 2025-01-26 ENCOUNTER — TELEPHONE (OUTPATIENT)
Dept: ENDOCRINOLOGY | Facility: CLINIC | Age: 70
End: 2025-01-26
Payer: MEDICARE

## 2025-01-26 DIAGNOSIS — E16.A2 HYPOGLYCEMIA LEVEL 2: Primary | ICD-10-CM

## 2025-01-26 NOTE — TELEPHONE ENCOUNTER
Re-order of CGM due to patient never having this sent to her.  Insurance has since approved the device.

## 2025-02-06 ENCOUNTER — TELEPHONE (OUTPATIENT)
Dept: ENDOCRINOLOGY | Facility: CLINIC | Age: 70
End: 2025-02-06
Payer: MEDICARE

## 2025-02-06 DIAGNOSIS — E16.A2 HYPOGLYCEMIA LEVEL 2: Primary | ICD-10-CM

## 2025-02-06 DIAGNOSIS — E16.2 HYPOGLYCEMIA: ICD-10-CM

## 2025-02-06 NOTE — TELEPHONE ENCOUNTER
Dexcomg G7 has been received, unable to order education visit due to insurance and no history of diabetes.  Will provide info on setting up self input of clinic share code to her.

## 2025-02-14 ENCOUNTER — PATIENT MESSAGE (OUTPATIENT)
Facility: CLINIC | Age: 70
End: 2025-02-14
Payer: MEDICARE

## 2025-02-20 NOTE — PROGRESS NOTES
Subjective:    Patient ID:  Cheyenne West is a 69 y.o. female who presents for follow-up of No chief complaint on file.      HPI: Ms. Cheyenne West presents with her  to the clinic for follow up of HTN, mild MR, HLP.   she stated that she is doing pretty well. She denied any chest pain or SOB at rest. She does have chronic WATSON that is unchanged from the past.  She denied any palpitations, lightheadedness, dizziness, or near syncope.  She denies any orthopnea, PND, or edema.  Her  reports that she does snore pretty loudly, and she relates that she has gained about 20 lb over the last year.  She stated that she has sleep study sometime ago and her test was negative and has not required CPAP.  She is on the digital medicine program for hypertension and she has not checked her blood pressure since February 13, 2025.  She stated that she only checks it a couple times a week.  She stated that it has been stable lately.  She is aware that her blood pressure is elevated today in clinic.  She stated that it is higher in clinic today than what she usually gets at home.  She has had some problems with low blood sugars that seems to be leveling off with changes in her diet and wearing a continuous blood sugar monitoring device.  She does not take anything for prediabetes.  Her fasting insulin level was 57.6.      Last visit with Dr. Vargas on 7/23/24:  Pt presents for CV evaluation. Pt with chronic SOB x few years.No CP. Some dizziness  EKG normal. Pt with some palpitations 2x week, lasting 1 minute   CRF-HTN, HLP, age/female, family hx of CAD  Essential hypertension   Mild mitral regurgitation by prior echocardiogram   Pure hypercholesterolemia   Gastroesophageal reflux disease with esophagitis without hemorrhage   Generalized anxiety disorder   Peripheral vascular disease, unspecified   Continue plendil, olemsartan-hctz-HTN   Stress test, holter      Medications: she is not missing any  doses.  She is taking felodipine, olmesartan-HCT and atorvastatin daily.  Sodium: she does add salt to foods when cooking,  she is reading labels for sodium content. she does eat salty foods  Diet: eats breakfast and supper; eats carrots with hummus; trailmix (nuts and honey).  She does not have a schedule for eating.  Dietary Sugars:  She swapped out eats nuts and honey for milk chocolate.  Exercise: she  recently began walking with her . She also has a pedal machine that she received for [employer] but she is not using it very much.  Tobacco: denies previous or current tobacco use   Alcohol: 2 beers occasionally.     Weight: 79.5 kg (175 lb 4.8 oz) she states that her daily weights  has increased by 20 pounds over the last year.Body mass index is 35.41 kg/m².   Wt Readings from Last 3 Encounters:   25 79.5 kg (175 lb 4.8 oz)   24 78.4 kg (172 lb 13.5 oz)   24 78.1 kg (172 lb 3.2 oz)     BP lo/13/2025  9:15 AM CST    85    2025  9:15 AM CST  80      2025  9:15 AM        2025  9:16 AM CST    80    2025  9:16 AM CST  84      2025  9:16 AM        2025  9:10 AM CST    72    2025  9:10 AM CST  91       2025  9:10 AM         2025  9:09 AM CST    71    2025  9:09 AM CST  76      2025  9:09 AM         2025 11:10 AM CST    67    2025 11:10 AM CST  79      2025 11:10 AM        2025  9:12 AM CST    73    2025  9:12 AM CST  75      2025  9:12 AM                Review of Systems   Constitutional: Negative for chills, decreased appetite, fever, night sweats, weight gain and weight loss.   HENT:  Negative for congestion.    Cardiovascular:  Positive for dyspnea on exertion (Chronic and unchanged from the past). Negative for chest pain, claudication, cyanosis, irregular heartbeat, leg swelling, near-syncope, orthopnea, palpitations, paroxysmal nocturnal dyspnea and syncope.    Respiratory:  Negative for cough, hemoptysis, shortness of breath, sputum production and wheezing.    Hematologic/Lymphatic: Negative for adenopathy and bleeding problem. Does not bruise/bleed easily.   Skin:  Negative for color change and nail changes.   Gastrointestinal:  Negative for bloating, abdominal pain, change in bowel habit, heartburn, hematochezia, melena, nausea and vomiting.   Genitourinary:  Negative for hematuria.   Neurological:  Negative for dizziness and light-headedness.   Psychiatric/Behavioral:  Negative for altered mental status.        Objective:   Physical Exam  Constitutional:       General: She is not in acute distress.     Appearance: She is well-developed. She is not diaphoretic.   HENT:      Head: Normocephalic and atraumatic.   Eyes:      General: No scleral icterus.     Conjunctiva/sclera: Conjunctivae normal.   Neck:      Thyroid: No thyromegaly.      Vascular: No JVD.      Trachea: No tracheal deviation.   Cardiovascular:      Rate and Rhythm: Normal rate and regular rhythm.      Pulses: Intact distal pulses.      Heart sounds: Normal heart sounds. No murmur heard.     No friction rub. No gallop.   Pulmonary:      Effort: Pulmonary effort is normal. No respiratory distress.      Breath sounds: No stridor. No wheezing, rhonchi or rales.   Chest:      Chest wall: No tenderness.   Abdominal:      General: Bowel sounds are normal. There is no distension.      Palpations: Abdomen is soft. There is no mass.      Tenderness: There is no abdominal tenderness. There is no guarding or rebound.      Comments: Abdomen obese.   Musculoskeletal:         General: Normal range of motion.      Cervical back: Neck supple.   Lymphadenopathy:      Cervical: No cervical adenopathy.   Skin:     General: Skin is warm and dry.      Coloration: Skin is not pale.      Findings: No erythema or rash.      Comments: [place]   Neurological:      Mental Status: She is alert and oriented to person, place, and time.    Psychiatric:         Mood and Affect: Mood normal.        Latest Reference Range & Units 10/31/24 10:31   Cholesterol Total 120 - 199 mg/dL 190   HDL 40 - 75 mg/dL 68   HDL/Cholesterol Ratio 20.0 - 50.0 % 35.8   Non-HDL Cholesterol mg/dL 122   Total Cholesterol/HDL Ratio 2.0 - 5.0  2.8   Triglycerides 30 - 150 mg/dL 105   LDL Cholesterol 63.0 - 159.0 mg/dL 101.0       Exercise stress EKG 8/01/24:    The patient exercised for 6 minutes 59 seconds on a Tru protocol, corresponding to a functional capacity of 7METS, achieving a peak heart rate of 123 bpm, which is 84% of the age predicted maximum heart rate. Their exercise capacity was normal.    The ECG portion of the study is negative for ischemia.    The patient reported no chest pain during the stress test.    The exercise capacity was normal.    Stress ECG: There is hypertensive blood pressure response with stress.    Holter 24 hour 8/1/24:  Monitoring started at 11:57 AM and continued for 23 hr 59 min. The average heart rate was 78 BPM. The minimum heart rate was 62 BPM, occurring at 5:51:16 AM. The maximum heart rate was 108 BPM, occurring at 1:15:20 PM. Ventricular ectopic activity consisted of 2 beats, of which, 2 were in single PVCs. The patient's rhythm included 21 min 58 sec of tachycardia. The fastest single episode of tachycardia occurred at 1:13:27 PM, lasting 2 min 23 sec, with maximum heart rate of 108 BPM. Supraventricular ectopic activity consisted of 15 beats, of which, 2 were in atrial couplets, 13 were single PACs. The longest R-R interval was 1.1 seconds occurring at 12:29:04 AM. The longest N-N interval was 1.1 seconds occurring at 4:59:00 AM.     Echo 6/5/23:  The left ventricle is normal in size with concentric remodeling and normal systolic function.  The estimated ejection fraction is 60%.  Normal left ventricular diastolic function.  Normal right ventricular size with normal right ventricular systolic function.  The aortic valve appears  structurally normal. There is normal leaflet mobility.  Mild mitral regurgitation.  Mild tricuspid regurgitation.  Normal central venous pressure (3 mmHg).  The estimated PA systolic pressure is 32 mmHg.     Assessment:      1. Mild mitral regurgitation by prior echocardiogram    2. Essential hypertension    3. Pure hypercholesterolemia    4. Peripheral vascular disease, unspecified    5. Class 2 severe obesity due to excess calories with serious comorbidity and body mass index (BMI) of 35.0 to 35.9 in adult      Plan:     Mild mitral regurgitation by prior echocardiogram    Essential hypertension    Pure hypercholesterolemia    Peripheral vascular disease, unspecified    Class 2 severe obesity due to excess calories with serious comorbidity and body mass index (BMI) of 35.0 to 35.9 in adult       Continue felodipine and olmesartan HCT-hypertension.  She is taking a potassium supplement.  Monitor blood pressure at home more frequently.  Digital medicine for hypertension.  Last measurement was February 13, 2025 and it was normal.  Sodium restriction encouraged.  Handout given.  Continue atorvastatin-hyperlipidemia.  Lipids controlled.  Whole foods diet recommended.  Increase non starchy vegetables to consume daily.  Discussed using her continuous blood glucose monitor to help her make dietary changes that even out her blood sugars.  Encouraged exercise such as walking daily.  She also agreed to use her pedal machine especially as a plan B if walking outside is not feasible. Discussed rationale and she verbalized her understanding of this.  Encouraged her to maintain or improve muscle mass.  She agreed to work on dietary changes and increasing walking in order to improve blood sugar stabilization and blood pressure.  Encouraged weight loss.  Consider referral to pulmonology for obstructive sleep apnea evaluation.  Follow-up with Dr. Vargas in 6 months or call sooner for any problems.    Sheri Porter, NP [facility]     This note has been prepared using a combination of MModa[employer] dictation device and typing.  It has been checked for errors but some errors may still exist within the note as a result of speech recognition errors and/or typographical errors.

## 2025-02-21 ENCOUNTER — OFFICE VISIT (OUTPATIENT)
Dept: CARDIOLOGY | Facility: CLINIC | Age: 70
End: 2025-02-21
Payer: MEDICARE

## 2025-02-21 VITALS
OXYGEN SATURATION: 96 % | HEIGHT: 59 IN | HEART RATE: 74 BPM | BODY MASS INDEX: 35.34 KG/M2 | DIASTOLIC BLOOD PRESSURE: 96 MMHG | WEIGHT: 175.31 LBS | SYSTOLIC BLOOD PRESSURE: 144 MMHG

## 2025-02-21 DIAGNOSIS — I10 ESSENTIAL HYPERTENSION: ICD-10-CM

## 2025-02-21 DIAGNOSIS — E78.00 PURE HYPERCHOLESTEROLEMIA: ICD-10-CM

## 2025-02-21 DIAGNOSIS — I34.0 MILD MITRAL REGURGITATION BY PRIOR ECHOCARDIOGRAM: Primary | ICD-10-CM

## 2025-02-21 DIAGNOSIS — I73.9 PERIPHERAL VASCULAR DISEASE, UNSPECIFIED: ICD-10-CM

## 2025-02-21 DIAGNOSIS — E66.01 CLASS 2 SEVERE OBESITY DUE TO EXCESS CALORIES WITH SERIOUS COMORBIDITY AND BODY MASS INDEX (BMI) OF 35.0 TO 35.9 IN ADULT: ICD-10-CM

## 2025-02-21 DIAGNOSIS — E66.812 CLASS 2 SEVERE OBESITY DUE TO EXCESS CALORIES WITH SERIOUS COMORBIDITY AND BODY MASS INDEX (BMI) OF 35.0 TO 35.9 IN ADULT: ICD-10-CM

## 2025-02-21 PROCEDURE — 99999 PR PBB SHADOW E&M-EST. PATIENT-LVL IV: CPT | Mod: PBBFAC,,, | Performed by: NURSE PRACTITIONER

## 2025-02-21 NOTE — PATIENT INSTRUCTIONS
"Magnesium citrate if constipated  Magnesium glycinate if not constipated.  One capsule a day with food. Gradually increase to 400-500mg daily.    Increase non-starchy vegetables to consume daily.    Patient Education       Low Salt Diet   About this topic   Sodium is a type of mineral found in many foods. It may also be called "salt." Sodium helps balance fluids in your body. Too much sodium may be bad for your health. You may have to limit the amount of sodium in your food.  Salt is known as sodium chloride. It is measured in grams (g) or milligrams (mg). Salt or sodium in our diet comes from 3 main sources:  Some sodium is naturally found in food.  We may add salt to our food when we eat or cook.  Processed foods give us most of the sodium in our diet.         What will the results be?   This diet may help lower your blood pressure. It may also help reduce extra water in your body. This may help kidney, heart, or liver problems.  What changes to diet are needed?   You need to know how much sodium is in the food you eat. Read food labels with care. Choose foods that have 5% or less sodium in one serving. Remember, if you eat more than one serving, you will be getting more sodium. It may take a while for your sense of taste to get used to food with less sodium. Be patient with yourself. You may be surprised at how well you will do.  Try to aim for a diet that has 2,300 mg (2.3 g) or less sodium in it each day. The Food & Drug Administration (FDA) has set up guidelines for food labels. These will help you make healthy choices. Look for these terms on food packages:  Sodium-free: Less than 5 mg in each serving. These are safe to eat.  Very low sodium: 35 mg of sodium or less in each serving. These are safe to eat.  Low sodium: 140 mg of sodium or less in each serving. You need to eat these with care.  Reduced sodium: At least 25% less sodium than is most often found in each serving. These foods can still be high in " sodium.  Light in sodium: 50% less sodium in each serving.  Unsalted, no added salt, and without added salt: No salt is added during processing, but the food may still have sodium. Read the food label and check the sodium content before eating.  What foods are good to eat?   You can control the amount of sodium in foods you make at home. Fresh foods that you cook are most often lower in sodium.  Regular bread, unsalted crackers, dry cereal, cooked rice, pasta, quinoa, and corn tortillas.  Fresh, frozen, low sodium, or salt-free canned vegetables. Limit vegetable juice or tomato juice to 1/2 cup (120 mL) each day.  Fresh, frozen, canned, or dried fruit without salt added  Nonfat or low-fat milk and yogurt, low-sodium cottage cheese, and other cheeses low in sodium.  Fresh or frozen beef, veal, lamb, pork, poultry, fish, shellfish  Low sodium canned meats, frozen dinners with less than 600 mg sodium  Corn, safflower, sunflower, and soybean oils and unsalted nuts and seeds  Egg and egg substitutes without added sodium  Dried peas, beans, and low-sodium peanut butter  All plain oils and low-sodium salad dressing  Low-sodium broths, soups, soy sauce, condiments, and snack foods  Pepper, herbs, spices, vinegar, lemon or lime juice  Low-sodium carbonated drinks  What foods should be limited or avoided?   Foods that are prepackaged or canned are most often high in sodium. Foods to limit or avoid include:  Salted breads, rolls, crackers, biscuits, cornbread  Quick breads, self-rising flours, biscuit mixes, regular bread crumbs, instant hot cereals  Commercially prepared rice, pasta, or stuffing mixes; potatoes and vegetable mixes  Regular canned vegetables and juices, vegetables with sauce, and pickled vegetables  Frozen vegetables with seasonings and sauces  Processed fruits with salt or sodium  Malted and chocolate milk and buttermilk  Regular and processed cheese and spreads  Smoked, cured, salted, or canned meat, fish,  or poultry such as jones, sausages, sardines, chipped beef, hot dogs, cold cuts, and frozen breaded meats  Salted and canned peas, beans, and olives  Salted snack foods and nuts  Oils mixed with high-sodium parts such as salad dressing  Meat tenderizers, seasoning salt, and most flavored vinegars  Ketchup, bouillon cubes, salt, sea salt, kosher salt, onion salt, garlic salt, and pink Himalayan salt  What can be done to prevent this health problem?   Check with your doctor before using salt substitutes. Also, check the labels when taking over-the-counter (OTC) drugs such as laxatives and antacids. These can have a high sodium content.  When do I need to call the doctor?   Call your doctor if you have questions about this diet. Talk to a dietitian. They can help you find hidden sources of sodium in the food you eat.  Helpful tips   Use the nutrition facts labels as a guide to look for foods lower in sodium.  Do not use salt when eating or cooking.  Select fresh fruits and vegetables for snacks.  If you are eating out, ask the  to cook your food without salt, or choose foods without sauces.  Season your food with herbs and spices.  Drain and rinse canned beans and vegetables that contain sodium.  Eat foods with potassium. Potassium helps counter the effects of sodium. This may help lower your blood pressure. Foods high in potassium include: Potatoes, tomatoes, bananas, oranges, cantaloupe, beans, and greens.  Where can I learn more?   American Heart Association  https://www.heart.org/en/healthy-living/healthy-eating/eat-smart/sodium/how-to-reduce-sodium   American Heart Association  https://www.heart.org/en/healthy-living/healthy-eating/eat-smart/nutrition-basics/food-packaging-claims   NHS  https://www.nhs.uk/live-well/eat-well/tips-for-a-lower-salt-diet/   UpToDate  http://www.[employer]/contents/low-sodium-diet-beyond-the-basics   Last Reviewed Date   2021-10-11  Consumer Information Use and Disclaimer   This  information is not specific medical advice and does not replace information you receive from your health care provider. This is only a brief summary of general information. It does NOT include all information about conditions, illnesses, injuries, tests, procedures, treatments, therapies, discharge instructions or life-style choices that may apply to you. You must talk with your health care provider for complete information about your health and treatment options. This information should not be used to decide whether or not to accept your health care providers advice, instructions or recommendations. Only your health care provider has the knowledge and training to provide advice that is right for you.  Copyright   Copyright © 2021 UpToDate, Inc. and its affiliates and/or licensors. All rights reserved.

## 2025-03-08 ENCOUNTER — PATIENT MESSAGE (OUTPATIENT)
Facility: CLINIC | Age: 70
End: 2025-03-08
Payer: MEDICARE

## 2025-03-13 ENCOUNTER — OFFICE VISIT (OUTPATIENT)
Dept: NEUROLOGY | Facility: CLINIC | Age: 70
End: 2025-03-13
Payer: MEDICARE

## 2025-03-13 VITALS
WEIGHT: 173.81 LBS | TEMPERATURE: 97 F | HEART RATE: 76 BPM | DIASTOLIC BLOOD PRESSURE: 80 MMHG | HEIGHT: 59 IN | BODY MASS INDEX: 35.04 KG/M2 | RESPIRATION RATE: 17 BRPM | SYSTOLIC BLOOD PRESSURE: 130 MMHG

## 2025-03-13 DIAGNOSIS — G44.229 CHRONIC TENSION-TYPE HEADACHE, NOT INTRACTABLE: Primary | ICD-10-CM

## 2025-03-13 PROCEDURE — 99999 PR PBB SHADOW E&M-EST. PATIENT-LVL IV: CPT | Mod: PBBFAC,,, | Performed by: NURSE PRACTITIONER

## 2025-03-13 NOTE — PATIENT INSTRUCTIONS
Please call our clinic at [phone] or send a message on the [employer] portal if there are any changes to the plan described below, for example,if you are not contacted for the requested tests, referral(s) within one week, if you are unable to receive the medications prescribed, or if you feel you need to change the treatment course for any reason.     TESTING: none     REFERRALS:   - Continue to follow up with Endocrine for ongoing work up of hypoglycemia with significant relationship to headaches     PREVENTION (use daily regardless of headache):  - Continue Magnesium in ONE of the following preparations -               1. Magnesium oxide 800mg nightly (the most common over the counter kind, may causes loose stools)              2. Magnesium citrate 400-500mg nightly (harder to find, but more neutral on the bowels)              3. Magnesium glycinate 400mg nightly (hardest to find, look online, but most bowel-neutral, best absorbed)     AS-NEEDED TREATMENT (use total no more than 10 days per month unless otherwise stated):  - Continue Fioricet as needed for escalations with limited use to no more than 10 tablets per month  - Ok to continue over the counter medication as needed with no more than 2-3 times per week     OTHER:   - Continue headache journal with relation to hypoglycemia

## 2025-03-13 NOTE — PROGRESS NOTES
Date of service: 3/13/2025  Referring provider: No ref. provider found    Subjective:      Chief complaint: Headache       Patient ID: Cheyenne West is a 69 y.o. .    History of Present Illness  INTERVAL HISTORY: 03/13/2025.  She presents today for follow up. Since her last visit, she continues to follow up with Endocrinology for management of hypoglycemia. She continues to report headaches but feels they are typically associated with drops in her blood sugar. She uses Fioricet as needed (0-1 a week) which is effective. Otherwise information below is reviewed and verified with no changes made.    ORIGINAL HEADACHE HISTORY -   Age at onset and course over time: 6 months ago with gradual increase over time. She was seen by Primary Care with MRI Brain non- acute and MRI C Spine with degenerative changes and disc bulging. Today, she reports daily headache with escalation twice a week ,always originating from her cervical spine, radiating into her upper cervical and occipital region. She also reports episodes of dizziness in which she feels off balance, without any falls. She denies a previous headache history. She does have a history of bacterial meningitis (age 7) and viral meningitis (age 32). She wears hearing aids as she is hard of hearing. She is also followed by Endocrinology with ongoing work up for hypoglycemia.     Location: upper cervical, occipital   Quality:  [] Stabbing [x] Pressure [] Tight [] Throbbing/pounding [] Sharp    Duration: [] Seconds [] Minutes [] Hours [x] Days [] Constant   Frequency: [x] Daily [] Weekly [] Monthly   How many days per month is your head or neck 100% pain free: 5   Headaches awaken at night?:   varies   Worst time of day: at worst 8/10  Intensity of pain:   Associated with: [] Photophobia []  Phonophobia [] Osmophobia [] Loss of appetite [x] Nausea [] Vomiting   [x] Dizziness [] Vertigo [] Ringing in the ears [] Blurry vision [] Double vision  [x]  Anxiety/Anger/Irritability [x] Problems with concentration [x] Problems with memory [x] Problems with task completion   [x] Problems with relaxation [] Neck tightness/ neck pain [] Nasal congestion [] Nasal or sinus pressure [] Aura   Alleviated by:  [x] Sleep [] Darkness [] Local pressure [] Massage [] Heat [] Ice [] Menses [x] Medication  Exacerbated by:  [x] Fatigue [] Light [] Noise [] Smells [] Coughing [] Sneezing  [] Bending over [] Change in weather [] Ovulation [] Menses [] Alcohol [x] Stress []  Food  Ipsilateral autonomic: [] nasal congestion [] lacrimation [] ptosis [] injection [] edema [] foreign body sensation [] ear fullness   ICP:  [] transient visual obscurations  [] tinnitus   [] positional headache  [] non-positional     Bowl Habits: [x] Normal [] Constipation [] Diarrhea   Caffeine intake: 2 cups coffee   Sleep habits: trouble falling and staying asleep   Water intake: 30-60 oz   Eye Exam: up to date   Family history of migraine: none   Gyn status (if female) (birth control with estrogen, hysterectomy): menopause   History of asthma, cancer, glaucoma, kidney stones, CVA and osteoporosis: kidney stone (2023), glaucoma    HIT 6: 59    Current acute treatment:  Fioricet     Current prevention:  Lexapro  Wellbutrin    Previously tried/failed acute treatment:  Aspirin  Motrin     Previously tried/failed preventative treatment:  None     Considerations:     Review of patient's allergies indicates:   Allergen Reactions    Penicillins      Current Outpatient Medications   Medication Sig Dispense Refill    ALPRAZolam (XANAX) 0.25 MG tablet Take 1 tablet (0.25 mg total) by mouth 3 (three) times daily as needed for Anxiety. 30 tablet 0    atorvastatin (LIPITOR) 10 MG tablet TAKE 1 TABLET EVERY DAY 90 tablet 3    buPROPion (WELLBUTRIN XL) 300 MG 24 hr tablet TAKE 1 TABLET EVERY DAY 90 tablet 3    butalbital-acetaminophen-caffeine -40 mg (FIORICET, ESGIC) -40 mg per tablet 1 tablet as needed, ok  to repeat 6 hours later. No more than 2 tablets per 24 hours 14 tablet 0    CALCIUM CARBONATE/VITAMIN D3 (CALTRATE 600 + D ORAL) Take by mouth 2 (two) times daily.      cetirizine 10 mg Cap Take by oral route.      diphenhydrAMINE (BENADRYL) 25 mg capsule Take 25 mg by mouth as needed.      EScitalopram oxalate (LEXAPRO) 20 MG tablet TAKE 1 TABLET EVERY DAY 90 tablet 3    famotidine (PEPCID) 20 MG tablet Take 1 tablet (20 mg total) by mouth 2 (two) times daily. 60 tablet 9    felodipine (PLENDIL) 5 MG 24 hr tablet TAKE 1 TABLET EVERY DAY 90 tablet 3    levothyroxine (SYNTHROID) 50 MCG tablet TAKE 1 TABLET EVERY DAY 90 tablet 0    multivitamin with minerals tablet Take 1 tablet by mouth once daily.      olmesartan-hydrochlorothiazide (BENICAR HCT) 20-12.5 mg per tablet TAKE 1 TABLET EVERY DAY 90 tablet 0    OMEGA-3 FATTY ACIDS-EPA ORAL Take by mouth once daily.      pantoprazole (PROTONIX) 40 MG tablet TAKE 1 TABLET EVERY DAY 90 tablet 3    potassium chloride (MICRO-K) 10 MEQ CpSR TAKE 1 CAPSULE EVERY DAY 90 capsule 3    predniSONE (DELTASONE) 10 MG tablet 4 tab daily for 2 days then 3 tab daily for 2 days then 2 tab daily for 2 days then 1 tab daily for 2 days then 1/2 tab daily 50 tablet 1    VIT A/VIT C/VIT E/ZINC/COPPER (PRESERVISION AREDS ORAL) Take by mouth once daily.       No current facility-administered medications for this visit.       Past Medical History  Past Medical History:   Diagnosis Date    Bilateral carpal tunnel syndrome 10/30/2023    Carpal tunnel syndrome of right wrist     Chronic gastritis     Chronic urticaria     COVID-19 virus infection 1/27/2022    Dissociative fugue     Diverticulosis     Duodenitis     Dyspepsia     Generalized anxiety disorder     Hearing loss on left     Hearing loss on right     Helicobacter pylori (H. pylori)     History of blood transfusion     Hyperlipidemia     Hypertension     Hypothyroidism     IBS (irritable bowel syndrome)     Iron deficiency 3/21/2019     Iron deficiency anemia     Mild mitral regurgitation by prior echocardiogram     Mild obesity     Mild vitamin D deficiency     Osteopenia 2023    Paraesophageal hiatal hernia     PONV (postoperative nausea and vomiting)     Reflux gastritis     RLS (restless legs syndrome) 2021    Sleep apnea     mild improved with wt. loss    Thyroid goiter     Urticaria, chronic        Past Surgical History  Past Surgical History:   Procedure Laterality Date    ARTHROSCOPY OF KNEE Left 2022    Procedure: ARTHROSCOPY, KNEE;  Surgeon: Davion Massey MD;  Location: [facility];  Service: Orthopedics;  Laterality: Left;    BLADDER SUSPENSION      pubovaginal sling     SECTION, CLASSIC      CHOLECYSTECTOMY      COLONOSCOPY  2011    Dr. Goode, in legacy:diverticulosis, hemorrhoids, melanosis coli-repeat 10 years    COLONOSCOPY N/A 2018    Procedure: COLONOSCOPY;  Surgeon: Rusty Vogt Jr., MD;  Location: [facility];  Service: Endoscopy;  Laterality: N/A; repeat in 10 years for screening    ESOPHAGEAL DILATION      ESOPHAGOGASTRODUODENOSCOPY  2016    ESOPHAGOGASTRODUODENOSCOPY N/A 2018    Procedure: EGD (ESOPHAGOGASTRODUODENOSCOPY);  Surgeon: Rusty Vogt Jr., MD;  Location: [facility];  Service: Endoscopy;  Laterality: N/A;    HERNIA REPAIR      INTRALUMINAL GASTROINTESTINAL TRACT IMAGING VIA CAPSULE N/A 2018    Procedure: IMAGING PROCEDURE, GI TRACT, INTRALUMINAL, VIA CAPSULE;  Surgeon: Loraine Velazquez MD;  Location[facility];  Service: Endoscopy;  Laterality: N/A;    LAPAROSCOPIC NISSEN FUNDOPLICATION N/A 2018    Procedure: FUNDOPLICATION, NISSEN, LAPAROSCOPIC;  Surgeon: Frank Aknis MD;  Location: [facility];  Service: General;  Laterality: N/A;    OPEN REDUCTION AND INTERNAL FIXATION (ORIF) OF INJURY OF FINGER Right 2024    Procedure: Right small finger proximal interphalangeal jpint open treatment with palmar plate repair;  Surgeon: Jose Antonio Aguayo MD;   Location[facility] OR;  Service: Orthopedics;  Laterality: Right;    THYROIDECTOMY, PARTIAL       partial for benign nodule    UPPER GASTROINTESTINAL ENDOSCOPY  03/04/2016    Dr. Whalen, in care everywhere       Family History  Family History   Problem Relation Name Age of Onset    Hypertension Mother      Heart disease Mother      Ulcers Mother      GI problems Mother          colitis    Hypertension Father      Breast cancer Paternal Grandmother      Breast cancer Cousin      Colon cancer Neg Hx      Crohn's disease Neg Hx      Colon polyps Neg Hx      Ulcerative colitis Neg Hx      Stomach cancer Neg Hx      Esophageal cancer Neg Hx      Allergic rhinitis Neg Hx      Allergies Neg Hx      Angioedema Neg Hx      Atopy Neg Hx      Eczema Neg Hx      Immunodeficiency Neg Hx      Rhinitis Neg Hx      Urticaria Neg Hx      Asthma Neg Hx         Social History  Social History     Socioeconomic History    Marital status:    Tobacco Use    Smoking status: Never    Smokeless tobacco: Never   Substance and Sexual Activity    Alcohol use: Yes     Comment: seldom    Drug use: No    Sexual activity: Yes     Social Drivers of Health     Financial Resource Strain: Low Risk  (2/14/2025)    Overall Financial Resource Strain (CARDIA)     Difficulty of Paying Living Expenses: Not hard at all   Food Insecurity: No Food Insecurity (2/14/2025)    Hunger Vital Sign     Worried About Running Out of Food in the Last Year: Never true     Ran Out of Food in the Last Year: Never true   Transportation Needs: No Transportation Needs (2/14/2025)    PRAPARE - Transportation     Lack of Transportation (Medical): No     Lack of Transportation (Non-Medical): No   Physical Activity: Insufficiently Active (2/14/2025)    Exercise Vital Sign     Days of Exercise per Week: 1 day     Minutes of Exercise per Session: 20 min   Stress: Stress Concern Present (2/14/2025)    [employer] - Occupational Stress Questionnaire      Feeling of Stress : To some extent   Housing Stability: Low Risk  (2/14/2025)    Housing Stability Vital Sign     Unable to Pay for Housing in the Last Year: No     Number of Times Moved in the Last Year: 0     Homeless in the Last Year: No        Review of Systems  14-point review of systems as follows:   No check ted indicates NEGATIVE response   Constitutional: [] weight loss [] change to appetite   Eyes: [] change in vision [] double vision   Ears, nose, mouth, throat: [] frequent nose bleeds [x] ringing in the ears   Respiratory: [] cough [] wheezing   Cardiovascular: [] chest pain [] palpitations   Gastrointestinal: [] jaundice [] nausea/vomiting   Genitourinary: [] incontinence [] burning with urination   Hematologic/lymphatic: [] easy bruising/bleeding [] night sweats   Neurological: [x] numbness [] weakness   Endocrine: [x] fatigue [] heat/cold intolerance   Allergy/Immunologic: [] fevers [] chills   Musculoskeletal: [x] muscle pain [x] joint pain   Psychiatric: [] thoughts of harming self/others [] depression   Integumentary: [] rashes [] sores that do not heal     Objective:        Vitals:    03/13/25 0851   BP: 130/80   Pulse: 76   Resp: 17   Temp: 97 °F (36.1 °C)       Body mass index is 35.11 kg/m².    General exam:  Alert, cooperative, not in distress  Normocephalic and atraumatic  Pink conjunctiva, anicteric sclera, moist mucous membranes  No cervical lymphadenopathy   No joint swelling or tenderness    Data Review:     I have personally reviewed the referring provider's notes, labs, & imaging made available to me today.      RADIOLOGY STUDIES:  I have personally reviewed the pertinent images performed.       Results for orders placed or performed in visit on 12/11/24   MRI Brain W WO Contrast    Narrative    EXAMINATION:  MRI BRAIN W WO CONTRAST    CLINICAL HISTORY:  Headache, new or worsening (Age >= 50y); Cervicalgia    TECHNIQUE:  Multiplanar multisequence MR imaging of the brain was performed  before and after the administration of 17 mL Gadavist intravenous contrast.    COMPARISON:  None    FINDINGS:  Midline structures within normal limits as is the brainstem and proximal cervical spinal cord.  Marrow signal pattern is normal.  Partially empty with clivus is noted.  No air restricted diffusion.  No defect on the ADC map.  No abnormal areas of T2/FLAIR signal abnormality.  No significant abnormal T1 signal.  No evidence for blooming artifact on gradient weighted imaging to suggest hemosiderin deposition.    No evidence for abnormal parenchymal enhancement or enhancing mass.  No pachymeningeal changes or leptomeningeal enhancement.    No acute ischemic changes or infarct.  No intraparenchymal hemorrhage or contusion.  No mass, mass effect midline shift, edema, or extra-axial fluid collection.  Gray-white matter differentiation maintained.  The cerebral sulci and basal cisterns are normal no hydrocephalus.    Globes are intact.  The paranasal sinuses and mastoid air cells are well pneumatized the T2 flow voids are normal.      Impression    No acute intracranial abnormality.  No evidence for abnormal parenchymal enhancement or enhancing mass.      Electronically signed by: Virgilio Ware MD  Date:    12/11/2024  Time:    12:52       Lab Results   Component Value Date     12/06/2024    K 4.1 01/07/2025    MG 2.5 (H) 11/14/2007     (H) 12/06/2024    CO2 21 (L) 12/06/2024    BUN 18 12/06/2024    CREATININE 0.9 12/06/2024    GLU 35 (LL) 12/06/2024    HGBA1C 5.6 12/03/2024    AST 32 10/31/2024    ALT 28 10/31/2024    ALBUMIN 4.1 10/31/2024    PROT 7.3 10/31/2024    BILITOT 0.4 10/31/2024    CHOL 190 10/31/2024    HDL 68 10/31/2024    LDLCALC 101.0 10/31/2024    TRIG 105 10/31/2024       Lab Results   Component Value Date    WBC 4.97 03/05/2024    HGB 13.6 03/05/2024    HCT 40.4 03/05/2024    MCV 89 03/05/2024     03/05/2024       Lab Results   Component Value Date    TSH 1.752 01/23/2024            Assessment & Plan:       Problem List Items Addressed This Visit    None  Visit Diagnoses         Chronic tension-type headache, not intractable    -  Primary    triggered by hypoglycemia events. Continue to follow with Endocrine. Continue Fioricet as needed                  Please call our clinic at [phone] or send a message on the [facility] portal if there are any changes to the plan described below, for example,if you are not contacted for the requested tests, referral(s) within one week, if you are unable to receive the medications prescribed, or if you feel you need to change the treatment course for any reason.     TESTING: none     REFERRALS:   - Continue to follow up with Endocrine for ongoing work up of hypoglycemia with significant relationship to headaches     PREVENTION (use daily regardless of headache):  - Continue Magnesium in ONE of the following preparations -               1. Magnesium oxide 800mg nightly (the most common over the counter kind, may causes loose stools)              2. Magnesium citrate 400-500mg nightly (harder to find, but more neutral on the bowels)              3. Magnesium glycinate 400mg nightly (hardest to find, look online, but most bowel-neutral, best absorbed)     AS-NEEDED TREATMENT (use total no more than 10 days per month unless otherwise stated):  - Continue Fioricet as needed for escalations with limited use to no more than 10 tablets per month  - Ok to continue over the counter medication as needed with no more than 2-3 times per week     OTHER:   - Continue headache journal with relation to hypoglycemia        Follow up in about 6 months (around 9/13/2025) for earlier if needed .       Vicki Carroll NP-C      I have spent 20 minutes of total time on the total encounter which includes face to face time and non-face to face time preparing to see the patient (eg, review of labs, previous encounters, care everywhere), obtaining and/or reviewing separately  obtained history, documenting clinical information in the electronic or health record, independently interpreting results, and communicating results to the patient/family/caregiver, or care coordination.

## 2025-03-15 ENCOUNTER — TELEPHONE (OUTPATIENT)
Dept: ENDOCRINOLOGY | Facility: CLINIC | Age: 70
End: 2025-03-15
Payer: MEDICARE

## 2025-03-15 DIAGNOSIS — E16.2 HYPOGLYCEMIA: Primary | ICD-10-CM

## 2025-03-15 RX ORDER — ACARBOSE 25 MG/1
25 TABLET ORAL
Qty: 90 TABLET | Refills: 11 | Status: SHIPPED | OUTPATIENT
Start: 2025-03-15 | End: 2026-03-15

## 2025-03-15 NOTE — TELEPHONE ENCOUNTER
Planning to order Acarbose 25 mg dose to be taken with meals 3 times a day.  Will trial this to see if she tolerates it and has better post-prandial glucose control.

## 2025-03-21 DIAGNOSIS — E03.9 HYPOTHYROIDISM, UNSPECIFIED TYPE: Primary | ICD-10-CM

## 2025-03-21 RX ORDER — LEVOTHYROXINE SODIUM 50 UG/1
50 TABLET ORAL
Qty: 90 TABLET | Refills: 0 | Status: SHIPPED | OUTPATIENT
Start: 2025-03-21

## 2025-03-21 RX ORDER — OLMESARTAN MEDOXOMIL AND HYDROCHLOROTHIAZIDE 20/12.5 20; 12.5 MG/1; MG/1
1 TABLET ORAL
Qty: 90 TABLET | Refills: 2 | Status: SHIPPED | OUTPATIENT
Start: 2025-03-21

## 2025-03-21 NOTE — TELEPHONE ENCOUNTER
Care Due:                  Date            Visit Type   Department     Provider  --------------------------------------------------------------------------------                                MYCHART                              FOLLOWUP/OF  [facility] FAMILY  Last Visit: 11-      FICE VISIT   MEDICINE       Juan Buck  Next Visit: None Scheduled  None         None Found                                                            Last  Test          Frequency    Reason                     Performed    Due Date  --------------------------------------------------------------------------------    TSH.........  12 months..  levothyroxine............  01- 01-    [facility] Embedded Care Due Messages. Reference number: [id number].   3/21/2025 1:46:17 AM CDT

## 2025-03-21 NOTE — TELEPHONE ENCOUNTER
Refill Routing Note   Medication(s) are not appropriate for processing by [facility] for the following reason(s):        Required labs outdated    [facility] action(s):  Defer  Approve     Requires labs : Yes             Appointments  past 12m or future 3m with PCP    Date Provider   Last Visit   11/21/2024 Juan Buck MD   Next Visit   Visit date not found Juan Buck MD   ED visits in past 90 days: 0        Note composed:5:36 AM 03/21/2025

## 2025-04-11 ENCOUNTER — LAB VISIT (OUTPATIENT)
Dept: LAB | Facility: HOSPITAL | Age: 70
End: 2025-04-11
Attending: FAMILY MEDICINE
Payer: MEDICARE

## 2025-04-11 DIAGNOSIS — E03.9 HYPOTHYROIDISM, UNSPECIFIED TYPE: ICD-10-CM

## 2025-04-11 LAB — TSH SERPL-ACNC: 2.68 UIU/ML (ref 0.4–4)

## 2025-04-11 PROCEDURE — 36415 COLL VENOUS BLD VENIPUNCTURE: CPT | Mod: PO

## 2025-04-11 PROCEDURE — 84443 ASSAY THYROID STIM HORMONE: CPT

## 2025-04-14 ENCOUNTER — RESULTS FOLLOW-UP (OUTPATIENT)
Dept: FAMILY MEDICINE | Facility: CLINIC | Age: 70
End: 2025-04-14

## 2025-04-14 ENCOUNTER — PATIENT MESSAGE (OUTPATIENT)
Facility: CLINIC | Age: 70
End: 2025-04-14
Payer: MEDICARE

## 2025-04-14 DIAGNOSIS — E16.2 HYPOGLYCEMIA: ICD-10-CM

## 2025-04-14 RX ORDER — ACARBOSE 50 MG/1
50 TABLET ORAL
Qty: 90 TABLET | Refills: 11 | Status: SHIPPED | OUTPATIENT
Start: 2025-04-14 | End: 2026-04-14

## 2025-05-15 ENCOUNTER — PATIENT MESSAGE (OUTPATIENT)
Facility: CLINIC | Age: 70
End: 2025-05-15
Payer: MEDICARE

## 2025-05-22 ENCOUNTER — TELEPHONE (OUTPATIENT)
Dept: ENDOCRINOLOGY | Facility: CLINIC | Age: 70
End: 2025-05-22
Payer: MEDICARE

## 2025-05-22 DIAGNOSIS — E66.811 OBESITY, CLASS 1: ICD-10-CM

## 2025-05-22 DIAGNOSIS — E66.9 OBESITY, UNSPECIFIED CLASS, UNSPECIFIED OBESITY TYPE, UNSPECIFIED WHETHER SERIOUS COMORBIDITY PRESENT: ICD-10-CM

## 2025-05-22 DIAGNOSIS — E16.A2 HYPOGLYCEMIA LEVEL 2: Primary | ICD-10-CM

## 2025-05-22 RX ORDER — SEMAGLUTIDE 0.68 MG/ML
0.25 INJECTION, SOLUTION SUBCUTANEOUS
Qty: 3 ML | Refills: 5 | Status: ACTIVE | OUTPATIENT
Start: 2025-05-22

## 2025-05-22 NOTE — TELEPHONE ENCOUNTER
Post-Prandial Hypoglycemia with Obesity - GLP-1 RA Request    Clinical Concern:  The patient has confirmed post-prandial hypoglycemia, evidenced by serum glucose drops to as low as 35 mg/dL occurring 2-3 hours after meals, accompanied by inappropriately elevated insulin, C-peptide, and proinsulin levels. A sulfonylurea screen was negative, ruling out surreptitious hypoglycemic agent use. These findings support a diagnosis of reactive hypoglycemia, often seen in patients with altered incretin and insulin responses in the post-prandial state.    Current Management:  Dietary modifications including frequent low-glycemic, low-carbohydrate meals.  Continuous Glucose Monitoring (CGM) for tracking trends.  Acarbose therapy has been trialed and titrated upwards, showing some improvement, but continued significant post-prandial drops are occurring despite optimal dietary and medication adherence.    Treatment Plan and Rationale for GLP-1 RA Use:  We are proposing initiation of semaglutide (Ozempic) as the preferred GLP-1 receptor agonist, with a dual goal of:  Weight loss in the setting of documented obesity (see BMI calculation below).  Slowing gastric emptying, which is a key component of GLP-1 RA physiology. This effect is anticipated to blunt the rapid nutrient absorption and resultant hyperinsulinemia, thereby reducing post-prandial glucose excursions and reactive hypoglycemia.  Height: 411 (149.9 cm)  Weight: 173 lbs (78.5 kg)  BMI: 34.7 kg/m², consistent with class I obesity    Why GLP-1 RA is Appropriate:  GLP-1 receptor agonists offer a mechanistic benefit in controlling exaggerated insulin response by slowing nutrient transit and reducing incretin surge.  The post-prandial nature of her hypoglycemia makes therapies such as diazoxide or octreotide inappropriate or less effective, as those are typically reserved for fasting or insulinoma-associated hypoglycemia.  The ongoing hypoglycemic episodes despite current  treatment raise a significant risk for hospitalization due to severe neuroglycopenia, falls, or accidents.    Medication Plan:  Order placed for Ozempic (semaglutide) 0.25 mg weekly, to titrate as tolerated per standard dosing schedule.  If Ozempic is not covered, we will substitute with Wegovy (semaglutide) for the refractory post-prandial hypoglycemia and confirmed BMI > 30 kg/m².  Continue acarbose and dietary monitoring with CGM.    Conclusion:  Given the failure of lifestyle and acarbose alone to fully control her hypoglycemia, and the dual benefit of weight loss and gastric slowing, GLP-1 RA therapy is medically necessary. Without escalation in therapy, she remains at high risk for future emergency care or hospitalization due to hypoglycemic episodes. Insurance approval of semaglutide is therefore strongly recommended for safe and effective management.

## 2025-06-02 RX ORDER — LEVOTHYROXINE SODIUM 50 UG/1
50 TABLET ORAL
Qty: 90 TABLET | Refills: 1 | Status: SHIPPED | OUTPATIENT
Start: 2025-06-02

## 2025-06-06 RX ORDER — FAMOTIDINE 20 MG/1
20 TABLET, FILM COATED ORAL 2 TIMES DAILY
Qty: 180 TABLET | Refills: 0 | Status: SHIPPED | OUTPATIENT
Start: 2025-06-06

## 2025-06-23 ENCOUNTER — PATIENT MESSAGE (OUTPATIENT)
Dept: ENDOCRINOLOGY | Facility: CLINIC | Age: 70
End: 2025-06-23
Payer: MEDICARE

## 2025-06-24 ENCOUNTER — PATIENT MESSAGE (OUTPATIENT)
Dept: ENDOCRINOLOGY | Facility: CLINIC | Age: 70
End: 2025-06-24
Payer: MEDICARE

## 2025-06-24 DIAGNOSIS — E66.811 OBESITY, CLASS 1: ICD-10-CM

## 2025-06-24 DIAGNOSIS — E16.A2 HYPOGLYCEMIA LEVEL 2: ICD-10-CM

## 2025-06-25 ENCOUNTER — PATIENT MESSAGE (OUTPATIENT)
Dept: FAMILY MEDICINE | Facility: CLINIC | Age: 70
End: 2025-06-25
Payer: MEDICARE

## 2025-06-25 ENCOUNTER — TELEPHONE (OUTPATIENT)
Dept: FAMILY MEDICINE | Facility: CLINIC | Age: 70
End: 2025-06-25
Payer: MEDICARE

## 2025-06-25 DIAGNOSIS — Z78.0 MENOPAUSE: ICD-10-CM

## 2025-06-25 RX ORDER — PEN NEEDLE, DIABETIC 30 GX3/16"
NEEDLE, DISPOSABLE MISCELLANEOUS
Qty: 100 EACH | Refills: 2 | Status: ACTIVE | OUTPATIENT
Start: 2025-06-25

## 2025-06-25 RX ORDER — SEMAGLUTIDE 0.68 MG/ML
0.5 INJECTION, SOLUTION SUBCUTANEOUS
Qty: 3 ML | Refills: 6 | Status: SHIPPED | OUTPATIENT
Start: 2025-06-25 | End: 2025-06-27

## 2025-06-25 NOTE — TELEPHONE ENCOUNTER
Hello, this patient is asking for a prescrption for pen needles for her Ozempic. I do not have a way to send a request except this way. Would you mind sending in a script for her to [facility] please. Thank you    17 mins  GS  Juan Buck MD  The needles should already come with the Ozempic. I'll forward this to my nursing staff to address.    15 mins  You were added by Juan Buck MD.  14 mins  AF  Maisha Horowitz  thank you it does but she states she runs out. Which she shouldnt

## 2025-06-25 NOTE — TELEPHONE ENCOUNTER
I sent prescription.  It looks like this is being prescribed by endocrinology off-label for reactive hypoglycemia.  If any issues with prescription I would recommend that we forward to Endocrinology to address

## 2025-06-27 ENCOUNTER — OFFICE VISIT (OUTPATIENT)
Dept: ENDOCRINOLOGY | Facility: CLINIC | Age: 70
End: 2025-06-27
Payer: MEDICARE

## 2025-06-27 DIAGNOSIS — E66.811 OBESITY, CLASS 1: ICD-10-CM

## 2025-06-27 DIAGNOSIS — E16.1 REACTIVE HYPOGLYCEMIA: ICD-10-CM

## 2025-06-27 DIAGNOSIS — E03.9 HYPOTHYROIDISM, UNSPECIFIED TYPE: Primary | ICD-10-CM

## 2025-06-27 DIAGNOSIS — E16.A2 HYPOGLYCEMIA LEVEL 2: ICD-10-CM

## 2025-06-27 RX ORDER — SEMAGLUTIDE 0.68 MG/ML
0.5 INJECTION, SOLUTION SUBCUTANEOUS
Qty: 3 ML | Refills: 6 | Status: SHIPPED | OUTPATIENT
Start: 2025-06-27

## 2025-06-27 NOTE — PROGRESS NOTES
ENDOCRINOLOGY FOLLOW UP VISIT: 06/27/2025    The patient location is: Home  The chief complaint leading to consultation is: Hypoglycemia follow up    Visit type: audiovisual    Face to Face time with patient: 23 minutes  35 minutes of total time spent on the encounter, which includes face to face time and non-face to face time preparing to see the patient (eg, review of tests), Obtaining and/or reviewing separately obtained history, Documenting clinical information in the electronic or other health record, Independently interpreting results (not separately reported) and communicating results to the patient/family/caregiver, or Care coordination (not separately reported).     Each patient to whom he or she provides medical services by telemedicine is:  (1) informed of the relationship between the physician and patient and the respective role of any other health care provider with respect to management of the patient; and (2) notified that he or she may decline to receive medical services by telemedicine and may withdraw from such care at any time.    Subjective:      Patient ID: Cheyenne West is a 70 y.o. female.    Chief Complaint:  Follow-up    History of Present Illness      Patient presents today for follow-up of hypoglycemia.    She has a history of reactive hypoglycemia with documented severe episodes. During a lab visit, she experienced a significant hypoglycemic event where blood sugar dropped from 70s to 35, accompanied by confusion and difficulty walking. She reports blood sugar lows in the 50s, typically occurring with dietary indiscretions such as consuming cake or missing medication. Lab testing in December 2024 confirmed postprandial blood sugar drop to 35 with elevated C-peptide, insulin, and proinsulin levels. She currently uses a wearable glucose monitor and follows dietary modifications to manage hypoglycemic episodes, including avoiding simple carbohydrates and focusing on complex  carbohydrates mixed with proteins and fats. She takes Acarbose 50 mg before meals, Ozempic 0.5 mg (increased from 0.25 mg on May 22nd), and thyroid medication 50 mcg. She reports medication compliance and denies adverse effects. She has a history of idiopathic chronic hives. Since starting GLP-1 she has lost about 8 lbs.            Regarding Hypoglycemia:     - Symptoms of hypoglycemia first started:  March 2024 (possibly prior to this)  - Episode frequency:  At least once a week when diagnosed now less often with diet and medicine  - Timing:  Typically noted 1.5-2 hrs after some meals  - Symptoms include: Confusion with vision change, shakiness, sweating and symptoms of feeling like she will pass out.       - Pertinent factors:  No   Yes  []    [x]   Prior hypoglycemic episodes  [x]    []   Currently taking diabetes medications  []    [x]   Occur within 2-3 hours of consuming meal  [x]    []   Occur overnight  [x]    []   Occur after prolonged fasting  [x]    []   Occur after physical exertion  []    [x]   Symptoms resolved after consuming carbohydrate     Corrects lows with:  Glucose tabs      - Personal history of hepatic, renal or cardiac failure:  None  - History of adrenal insufficiency:  No, normal cortisol in the past (Jan 2024)  - History of gastric bypass surgery or fundoplication:  No  - Alcohol use:  Rare     - Does have PRN prescription for steroids for hives that she gets but has not used recently and not for long term continuous use.     - Family history of hypoglycemia:  None     Proven triad as below:  - Whipple triad: (1) Symptoms of hypoglycemia (2) Hypoglycemia (blood glucose level <50 mg/dL) (3) Relief of symptoms following ingestion of carbohydrate      Latest Reference Range & Units 12/03/24 08:18 12/06/24 14:30   Glucose 70 - 110 mg/dL 107 35 (LL)   Hemoglobin A1C External 4.0 - 5.6 % 5.6    Estimated Avg Glucose 68 - 131 mg/dL 114    C-Peptide 0.78 - 5.19 ng/mL 2.62 17.46 (H)   Insulin <25.0  uU/mL 6.9 57.6 (H)   Chlorpropamide  Negative    Tolazamide  Negative    Tolbutamide  Negative    Glimepiride  Negative    Glipizide  Negative    Glyburide  Negative    Repaglinide  Negative    Insulin Collection Interval  blood random   Proinsulin 3.6 - 22 pmol/L 7.5 187 (H)   NATEGLINIDE  Negative    Pioglitazone  Negative    Rosiglitazone  Negative    (LL): Data is critically low  (H): Data is abnormally high     Latest Reference Range & Units 01/23/24 09:50   Creatinine 0.5 - 1.4 mg/dL 0.7   eGFR >60 mL/min/1.73 m^2 >60.0   Cortisol ug/dL 8.60           Professional CGM Data:              Reviewed of CGM and food log showed the lowest BG event was around 50 within 1-1.5 hours after eating scrambled eggs with trail mix and chocolate (no symptoms reported on log)  Only symptoms reported (dizziness) on log sheet was when blood sugar was around 180 based on CGM data after having previously spiked BG up to the 230 range       ROS:   As above    Objective:     There were no vitals taken for this visit.  BP Readings from Last 3 Encounters:   03/13/25 130/80   02/21/25 (!) 144/96   12/13/24 124/80     Wt Readings from Last 1 Encounters:   03/13/25 0851 78.8 kg (173 lb 13.3 oz)     There is no height or weight on file to calculate BMI.    Physical Exam    General: No acute distress. Nontoxic appearing.  Psychiatric: Normal mood. Normal affect. No evidence of SI.  Vitals: Weight: 163.4 lbs.           Lab Review:   Lab Results   Component Value Date    HGBA1C 5.6 12/03/2024     Lab Results   Component Value Date    CHOL 190 10/31/2024    HDL 68 10/31/2024    LDLCALC 101.0 10/31/2024    TRIG 105 10/31/2024    CHOLHDL 35.8 10/31/2024     Lab Results   Component Value Date     12/06/2024    K 4.1 01/07/2025     (H) 12/06/2024    CO2 21 (L) 12/06/2024    GLU 35 (LL) 12/06/2024    BUN 18 12/06/2024    CREATININE 0.9 12/06/2024    CALCIUM 9.8 12/06/2024    PROT 7.3 10/31/2024    ALBUMIN 4.1 10/31/2024    BILITOT 0.4  10/31/2024    ALKPHOS 103 10/31/2024    AST 32 10/31/2024    ALT 28 10/31/2024    ANIONGAP 10 12/06/2024    ESTGFRAFRICA >60.0 01/04/2022    EGFRNONAA >60.0 01/04/2022    TSH 2.684 04/11/2025     Vit D, 25-Hydroxy   Date Value Ref Range Status   09/20/2012 31 30 - 100 ng/mL Final     Comment:     Vitamin D, 25-Hydroxy:  Vitamin D deficiency <10 ng/mL  Vitamin D insufficiency 10-30 ng/mL  Vitamin D sufficiency >30 ng/mL  Vitamin D potential toxicity >100 ng/mL       Assessment and Plan       Continued management of idiopathic reactive hypoglycemia with combination of Acarbose and Ozempic.  Ozempic serving dual purpose of weight management and off-label use for postprandial hypoglycemia control.  Recent lab results confirmed postprandial hypoglycemia with elevated C-peptide and insulin levels.  Dexcom continuous glucose monitor showing improvement in glucose control with estimated A1C of 6.2%.  No evidence of fasting hypoglycemia, ruling out insulinoma for now.  Thyroid function stable on current levothyroxine dose.    REACTIVE HYPOGLYCEMIA:  - Clarified Dexcom readings have a 10-15 minute delay compared to finger stick measurements.  - Patient to continue avoiding simple carbohydrates alone; mix carbs with proteins and fats at meals.  - Recommend choosing complex carbohydrates with low glycemic index and high fiber content.  - Educated on correcting low blood sugar: aim for 15-20 g of carbs, followed by protein for stabilization.  - Continue Acarbose 50 mg with meals, to be taken at the first bite of every meal up to 3 times daily.    HYPOGLYCEMIA LEVEL 2:  - Clarified Dexcom readings have a 10-15 minute delay compared to finger stick measurements.  - Educated on correcting low blood sugar: aim for 15-20 g of carbs, followed by protein for stabilization.    OBESITY, CLASS 1:  - Explained mechanism of Ozempic: reduces hunger, slows gastric emptying, and gradual release of nutrients to prevent sugar spikes.  -  Discussed potential muscle mass loss with Ozempic and importance of maintaining physical activity.  - Continue Ozempic 0.5 mg weekly injection; increase to 1 mg weekly injection after at least 4 weeks on 0.5 mg dose, if tolerated.  - Send a message when ready to increase Ozempic dose to 1 mg after at least 4 weeks on 0.5 mg dose.    PLAN SUMMARY:  - Continue Ozempic 0.5 mg weekly injection  - Increase Ozempic to 1 mg weekly after at least 4 weeks on 0.5 mg dose, if tolerated  - Continue Acarbose 50 mg with meals, up to 3 times daily  - Choose complex carbohydrates with low glycemic index and high fiber content  - Avoid simple carbohydrates alone; mix carbs with proteins and fats at meals  - Maintain physical activity to prevent muscle mass loss  - For low blood sugar, consume 15-20 g of carbs, followed by protein  - Patient to send message when ready to increase Ozempic dose to 1 mg          RTC in 1 year for now.       **Visit today included increased complexity associated with the care of the problems addressed and managing the longitudinal care of the patient due to the serious and/or complex managed problems      Steve Adkins,      This note was generated with the assistance of ambient listening technology. Verbal consent was obtained by the patient and accompanying visitor(s) for the recording of patient appointment to facilitate this note. I attest to having reviewed and edited the generated note for accuracy, though some syntax or spelling errors may persist. Please contact the author of this note for any clarification.

## 2025-07-16 ENCOUNTER — PATIENT MESSAGE (OUTPATIENT)
Dept: FAMILY MEDICINE | Facility: CLINIC | Age: 70
End: 2025-07-16
Payer: MEDICARE

## 2025-07-16 RX ORDER — LEVOTHYROXINE SODIUM 50 UG/1
50 TABLET ORAL
Qty: 30 TABLET | Refills: 0 | Status: SHIPPED | OUTPATIENT
Start: 2025-07-16 | End: 2026-07-16

## 2025-07-29 ENCOUNTER — PATIENT MESSAGE (OUTPATIENT)
Dept: ENDOCRINOLOGY | Facility: CLINIC | Age: 70
End: 2025-07-29
Payer: MEDICARE

## 2025-08-07 ENCOUNTER — OFFICE VISIT (OUTPATIENT)
Dept: FAMILY MEDICINE | Facility: CLINIC | Age: 70
End: 2025-08-07
Payer: MEDICARE

## 2025-08-07 VITALS
TEMPERATURE: 99 F | SYSTOLIC BLOOD PRESSURE: 116 MMHG | BODY MASS INDEX: 31.58 KG/M2 | HEIGHT: 59 IN | HEART RATE: 72 BPM | WEIGHT: 156.63 LBS | DIASTOLIC BLOOD PRESSURE: 78 MMHG

## 2025-08-07 DIAGNOSIS — I63.311 CEREBROVASCULAR ACCIDENT (CVA) DUE TO THROMBOSIS OF RIGHT MIDDLE CEREBRAL ARTERY: Primary | ICD-10-CM

## 2025-08-07 DIAGNOSIS — L50.8 CHRONIC URTICARIA: ICD-10-CM

## 2025-08-07 DIAGNOSIS — I69.354 HEMIPARESIS AFFECTING LEFT SIDE AS LATE EFFECT OF STROKE: ICD-10-CM

## 2025-08-07 DIAGNOSIS — Q21.12 PFO (PATENT FORAMEN OVALE): ICD-10-CM

## 2025-08-07 DIAGNOSIS — I10 ESSENTIAL HYPERTENSION: ICD-10-CM

## 2025-08-07 DIAGNOSIS — E78.5 HYPERLIPIDEMIA, UNSPECIFIED HYPERLIPIDEMIA TYPE: ICD-10-CM

## 2025-08-07 DIAGNOSIS — Z86.718 HISTORY OF THROMBECTOMY: ICD-10-CM

## 2025-08-07 DIAGNOSIS — Z98.890 HISTORY OF THROMBECTOMY: ICD-10-CM

## 2025-08-07 DIAGNOSIS — E16.1 REACTIVE HYPOGLYCEMIA: ICD-10-CM

## 2025-08-07 PROCEDURE — 99999 PR PBB SHADOW E&M-EST. PATIENT-LVL V: CPT | Mod: PBBFAC,,, | Performed by: FAMILY MEDICINE

## 2025-08-07 RX ORDER — CLOPIDOGREL BISULFATE 75 MG/1
75 TABLET ORAL DAILY
Qty: 90 TABLET | Refills: 3 | Status: SHIPPED | OUTPATIENT
Start: 2025-08-07

## 2025-08-07 RX ORDER — CLOPIDOGREL BISULFATE 75 MG/1
75 TABLET ORAL DAILY
COMMUNITY
Start: 2025-07-30 | End: 2025-08-07 | Stop reason: SDUPTHER

## 2025-08-07 RX ORDER — ASPIRIN 81 MG/1
81 TABLET ORAL DAILY
COMMUNITY
Start: 2025-07-29 | End: 2025-08-20

## 2025-08-07 RX ORDER — ACARBOSE 25 MG/1
25 TABLET ORAL 3 TIMES DAILY
COMMUNITY
Start: 2025-06-13

## 2025-08-07 RX ORDER — ATORVASTATIN CALCIUM 80 MG/1
80 TABLET, FILM COATED ORAL DAILY
COMMUNITY
Start: 2025-08-01

## 2025-08-07 NOTE — PROGRESS NOTES
Patient presents for follow-up hospitalization as per below discharge summary.  She had an acute thrombotic right MCA stroke with left-sided hemiparesis facial droop and rightward gaze.  She ended up having thrombectomy and has had significant improvement of her symptoms.  She still notices some mild weakness at the left leg and does not feel her balance is as good..   feels she may have a little more difficulty with eating then previously.  She has been eating okay at home since discharge.  She did see Cardiology in follow-up.  She did have a small PFO but no evidence of thrombus.  Her statin was increased and she is on dual antiplatelet therapy for 3 weeks at which point she is supposed to discontinue aspirin and continue Plavix alone.  She does have a follow-up appointment scheduled with the neurologist.  She was referred for therapy however wanted to do this locally rather than in [place] where she was admitted.  She states she did have an episode of her chronic urticaria recently and has resumed Zyrtec.    Physician Discharge Summary    Patient Name: Cheyenne West  MRN: [id number]  YOB: 1955    Date of Admission: 7/27/2025 11:14 AM  Date of Discharge:    Service: Neurology - Stroke    Admitting Physician: Dr. Loza    Discharge Physician: Dr. Loza    Admission Diagnoses: Cerebrovascular accident (CVA), unspecified mechanism (CMS/HCC) [I63.9]  Ischemic stroke (CMS/HCC) [I63.9]    Discharge Diagnoses: Right MCA stroke/Right M1 Occlusion    Admission Condition: serious    Discharged Condition: good    Indication for Admission: R MCA stroke/Right M1 Occlusion    Hospital Course:  Cheyenne West is a 70 y.o. female with a past medical history of hyperlipidemia, hypoglycemia, thyroid nodule that presented to the [facility] ED as a transfer from [place] for a thrombectomy after she had sudden onset left sided weakness, left sided facial droop, a right sided gaze, and CTH showing a R M1  occlusion. Etiology is cryptogenic. She was transferred to the Neuro-ICU for monitoring status post thrombectomy, TICI 3. Following the procedure, the patient's symptoms improved significantly and NIH was 2 after 24 hours. She underwent a comprehensive stroke work up and 24 hours post procedure was started on aspirin 81 mg monotherapy. The plan was discussed with the patient at bedside. Patient is to continue DAPT for 21 days followed by Plavix monotherapy, with Atorvastatin as well. Evaluated by PT/OT and safe for discharge home. She is to follow up in the stroke clinic and with cardiology for a loop recorder in the outpatient setting. She is agreeable with the plan and ready for discharge.    Stroke Scores:  - Initial NIHSS: 12  - 24 hr NIHSS: 2  - Discharge NIHSS: 2  - Prehospital (Baseline) mRS: 1  - Discharge mRS: mRs: 1: The patient has no significant disability; able to carry out all pre-stroke activities  Stroke Workup:  Imaging Results:  CT Head without Contrast  OSH  CT Angio Head & Neck  OSH--> R M1 occlusion  MRI Brain without contrast  R MCA territory ischemic change and acute infarction  TTE  LVEF >55%, small PFO  DVT US  No evidence of DVT in the visualized portions of the right and left lower extremity veins.  Risk Stratification Labs:  *HbA1C: 5.8  *LDL: 81  *TSH: 1.16    Consults: Speech-Language pathology, Nutrition, Physical therapy, Occupational therapy    PROBLEMS:  Neurological:  #R MCA stroke  #R M1 occlusion  Etiology: Pending further workup. Likely cardioembolic. Cryptogenic as of now.  - Admit to neurocritical care initially with q1h neuro-checks, on tele and continuous pulse ox  - HOB flat and bedrest for 24 hours for ischemic stroke  - Maintain euvolemia, euglycemia & euthermia  - Start Aspirin 81 mg monotherapy  - Keep NPO until dysphagia screen; then NPO except for meds  *Advance diet as per SLP recommendations  - PT/OT eval & treat  - Risk factor stratification with HgA1c, lipid  "panel, TSH, RPR,HIV  - CBC, CMP, UA, Utox, Troponin, EKG  - MRI brain wo contrast  - Stroke education    CV:  #BP Goal & initial cardiac workup  - SBP goal <160 post-thrombectomy  - EKG  - Trop first negative  - 2D echocardiogram to assess cardiac function with bubble study    Pulm:  MARTHA  - NC O2 prn  - Smoking counseling    ELENITAI  F: per Neuro ICU  E: Keep K>4 and Mg>2, phos in normal range  N: NPO for now, as above  GI: Famotidine for GI PPX    Renal:  MARTHA    Hem/ID  #Initial ID wokup  - HIV & RPR    Endo  #Initial endocrine workup  - Accu-checks & LDSSI while NPO  - HbA1c  - TSH    Discharge Exam:  Vitals:  07/29/25 0132 07/29/25 0400 07/29/25 0800 07/29/25 1200  BP: 126/84 113/75 (!) 145/94  Pulse: 65 74 74  Resp: 20  Temp: 99.2 °F (37.3 °C) 98.2 °F (36.8 °C) (!) 97.1 °F (36.2 °C) 98.9 °F (37.2 °C)    Blood pressure (!) 145/94, pulse 74, temperature 98.9 °F (37.2 °C), temperature source Oral, resp. rate 20, height 1.5 m (4' 11.06"), weight 78.8 kg (173 lb 11.6 oz), SpO2 96%.    General Appearance: Alert, cooperative, no distress, appears stated age  Head: Normocephalic, without obvious abnormality, atraumatic  Eyes: PERRL, conjunctiva/corneas clear, EOM's intact, fundi  benign, both eyes  Nose: Nares normal, septum midline, mucosa normal, no drainage or sinus tenderness  Throat: Lips, mucosa, and tongue normal; teeth and gums normal  Neck: Supple, symmetrical, trachea midline, no adenopathy;  thyroid: no enlargement/tenderness/nodules; no carotid  bruit or JVD  Back: Symmetric, no curvature, ROM normal, no CVA tenderness  Lungs: Clear to auscultation bilaterally, respirations unlabored  Chest Wall: No tenderness or deformity  Heart: Regular rate and rhythm, S1 and S2 normal, no murmur, rub or gallop  Abdomen: Soft, non-tender, bowel sounds active all four quadrants,  no masses, no organomegaly  Extremities: Extremities normal, atraumatic, no cyanosis or edema  Pulses: 2+ and symmetric all extremities  Skin: Skin " color, texture, turgor normal, no rashes or lesions  Lymph nodes: Cervical, supraclavicular, and axillary nodes normal  Neurologic: Awake, alert normal strength, decreased sensation on the left face and left arm.    Disposition: Home  Activity: activity as tolerated  Diet: regular diet  Wound Care: none needed    Discussed plan with patient and answered questions: Yes    Signed:  Liam Ornelas MD  Neurology, PGY4    Written with assistance by Sayda Mathur MS4  07/29/25      Cosigned by Leland Loza MD at 07/30/2025 4:10 PM CDT  Electronically signed by Liam Ornelas MD at 07/29/2025 3:37 PM CDT  Electronically signed by Leland Loza MD at 07/30/2025 4:10 PM CDT    Associated attestation - Leland Loza MD - 07/30/2025 4:10 PM CDT  Formatting of this note might be different from the original.  I saw and evaluated the patient. I have reviewed and agree with the residents findings, including all diagnostic interpretations, and plans as written.    Leland Loza MD  Vascular Neurology         Cheyenne was seen today for hospital follow up.    Diagnoses and all orders for this visit:    Cerebrovascular accident (CVA) due to thrombosis of right middle cerebral artery  -     Ambulatory Referral/Consult to Physical Therapy; Future  -     Ambulatory Referral/Consult to Occupational Therapy; Future  -     Ambulatory Referral/Consult to Speech Therapy; Future    Hemiparesis affecting left side as late effect of stroke  -     Ambulatory Referral/Consult to Physical Therapy; Future  -     Ambulatory Referral/Consult to Occupational Therapy; Future  -     Ambulatory Referral/Consult to Speech Therapy; Future    PFO (patent foramen ovale)    Essential hypertension    Hyperlipidemia, unspecified hyperlipidemia type    Chronic urticaria    History of thrombectomy    Reactive hypoglycemia    Other orders  -     clopidogreL (PLAVIX) 75 mg tablet; Take 1 tablet (75 mg  total) by mouth once daily.      Arrange therapy as above.  Continue current medications including the increase dosage on statin.  She has a couple of more weeks of dual antiplatelet therapy at which point she will discontinue aspirin and continue Plavix.  Continue follow-up Cardiology and Neurology.  She is continuing to monitor sugars regarding reactive hypoglycemia.  She will continue her Zyrtec.  Transitional Care Note    Family and/or Caretaker present at visit?  Yes.  Diagnostic tests reviewed/disposition: I have reviewed all completed as well as pending diagnostic tests at the time of discharge.  Disease/illness education:  Yes  Home health/community services discussion/referrals: Patient does not have home health established from hospital visit.  They do not need home health.  If needed, we will set up home health for the patient.   Establishment or re-establishment of referral orders for community resources: No other necessary community resources.   Discussion with other health care providers: No discussion with other health care providers necessary.               Past Medical History:  Past Medical History:   Diagnosis Date    Bilateral carpal tunnel syndrome 10/30/2023    Carpal tunnel syndrome of right wrist     Cerebrovascular accident (CVA) due to thrombosis of right middle cerebral artery 08/07/2025    Chronic gastritis     Chronic urticaria     COVID-19 virus infection 01/27/2022    Dissociative fugue     Diverticulosis     Duodenitis     Dyspepsia     Generalized anxiety disorder     Hearing loss on left     Hearing loss on right     Helicobacter pylori (H. pylori)     Hemiparesis affecting left side as late effect of stroke 08/07/2025    History of blood transfusion     History of thrombectomy 08/07/2025    Hyperlipidemia     Hypertension     Hypothyroidism     IBS (irritable bowel syndrome)     Iron deficiency 03/21/2019    Iron deficiency anemia     Mild mitral regurgitation by prior  echocardiogram     Mild obesity     Mild vitamin D deficiency     Osteopenia 2023    Paraesophageal hiatal hernia     PFO (patent foramen ovale) 2025    PONV (postoperative nausea and vomiting)     Reflux gastritis     RLS (restless legs syndrome) 2021    Sleep apnea     mild improved with wt. loss    Thyroid goiter     Urticaria, chronic      Past Surgical History:   Procedure Laterality Date    ARTHROSCOPY OF KNEE Left 2022    Procedure: ARTHROSCOPY, KNEE;  Surgeon: Davion Massey MD;  Location: [facility] OR;  Service: Orthopedics;  Laterality: Left;    BLADDER SUSPENSION      pubovaginal sling     SECTION, CLASSIC      CHOLECYSTECTOMY      COLONOSCOPY  2011    Dr. Goode, in legacy:diverticulosis, hemorrhoids, melanosis coli-repeat 10 years    COLONOSCOPY N/A 2018    Procedure: COLONOSCOPY;  Surgeon: Rusty Vogt Jr., MD;  Location: [facility];  Service: Endoscopy;  Laterality: N/A; repeat in 10 years for screening    ESOPHAGEAL DILATION      ESOPHAGOGASTRODUODENOSCOPY  2016    ESOPHAGOGASTRODUODENOSCOPY N/A 2018    Procedure: EGD (ESOPHAGOGASTRODUODENOSCOPY);  Surgeon: Rusty Vogt Jr., MD;  Location: [facility];  Service: Endoscopy;  Laterality: N/A;    HERNIA REPAIR      INTRALUMINAL GASTROINTESTINAL TRACT IMAGING VIA CAPSULE N/A 2018    Procedure: IMAGING PROCEDURE, GI TRACT, INTRALUMINAL, VIA CAPSULE;  Surgeon: Loraine Velazquez MD;  Location: [facility];  Service: Endoscopy;  Laterality: N/A;    LAPAROSCOPIC NISSEN FUNDOPLICATION N/A 2018    Procedure: FUNDOPLICATION, NISSEN, LAPAROSCOPIC;  Surgeon: Frank Akins MD;  Location: [facility];  Service: General;  Laterality: N/A;    OPEN REDUCTION AND INTERNAL FIXATION (ORIF) OF INJURY OF FINGER Right 2024    Procedure: Right small finger proximal interphalangeal jpint open treatment with palmar plate repair;  Surgeon: Jose Antonio Aguayo MD;  Location: [facility];  Service:  "Orthopedics;  Laterality: Right;    THROMBECTOMY, CEREBRAL ARTERIAL  07/27/2025    THYROIDECTOMY, PARTIAL       partial for benign nodule    UPPER GASTROINTESTINAL ENDOSCOPY  03/04/2016    Dr. Whalen, in care everywhere     Review of patient's allergies indicates:   Allergen Reactions    Penicillins      Medications Ordered Prior to Encounter[1]  Social History[2]  Family History   Problem Relation Name Age of Onset    Hypertension Mother      Heart disease Mother      Ulcers Mother      GI problems Mother          colitis    Hypertension Father      Breast cancer Paternal Grandmother      Breast cancer Cousin      Colon cancer Neg Hx      Crohn's disease Neg Hx      Colon polyps Neg Hx      Ulcerative colitis Neg Hx      Stomach cancer Neg Hx      Esophageal cancer Neg Hx      Allergic rhinitis Neg Hx      Allergies Neg Hx      Angioedema Neg Hx      Atopy Neg Hx      Eczema Neg Hx      Immunodeficiency Neg Hx      Rhinitis Neg Hx      Urticaria Neg Hx      Asthma Neg Hx             Vitals:    08/07/25 1013   BP: 116/78   Pulse: 72   Temp: 98.5 °F (36.9 °C)   TempSrc: Temporal   Weight: 71 kg (156 lb 9.6 oz)   Height: 4' 11" (1.499 m)     Wt Readings from Last 3 Encounters:   08/07/25 71 kg (156 lb 9.6 oz)   03/13/25 78.8 kg (173 lb 13.3 oz)   02/21/25 79.5 kg (175 lb 4.8 oz)       OBJECTIVE:   APPEARANCE: Well nourished, well developed, in no acute distress.    HEAD: Normocephalic.  Atraumatic.  No sinus tenderness.  EYES:   Right eye: Pupil reactive.  Conjunctiva clear.    Left eye: Pupil reactive.  Conjunctiva clear.  EOMI.    EARS: TM's intact. Light reflex normal. No retraction or perforation.    NOSE:  clear.  MOUTH & THROAT:  No pharyngeal erythema or exudate. No lesions.  NECK: Supple. No bruits.  No JVD.  No cervical lymphadenopathy.  No thyromegaly.    CHEST: Breath sounds clear bilaterally.  Normal respiratory effort  CARDIOVASCULAR: Normal rate.  Regular rhythm.  No murmurs.  No rub.  No " gallops.  ABDOMEN: Bowel sounds normal.  Soft.  No tenderness.  No organomegaly.  PERIPHERAL VASCULAR: No cyanosis.  No clubbing.  No edema.  NEUROLOGIC:  Cranial nerves 2-12 are intact.   Mild weakness noted left leg.  Speech okay.  MENTAL STATUS: Alert.  Oriented x 3.               [1]   Current Outpatient Medications on File Prior to Visit   Medication Sig Dispense Refill    acarbose (PRECOSE) 25 MG Tab Take 25 mg by mouth 3 (three) times daily.      ALPRAZolam (XANAX) 0.25 MG tablet Take 1 tablet (0.25 mg total) by mouth 3 (three) times daily as needed for Anxiety. 30 tablet 0    aspirin (ECOTRIN) 81 MG EC tablet Take 81 mg by mouth once daily.      atorvastatin (LIPITOR) 80 MG tablet Take 80 mg by mouth once daily.      buPROPion (WELLBUTRIN XL) 300 MG 24 hr tablet TAKE 1 TABLET EVERY DAY 90 tablet 3    CALCIUM CARBONATE/VITAMIN D3 (CALTRATE 600 + D ORAL) Take by mouth 2 (two) times daily.      cetirizine 10 mg Cap Take by oral route.      diphenhydrAMINE (BENADRYL) 25 mg capsule Take 25 mg by mouth as needed.      EScitalopram oxalate (LEXAPRO) 20 MG tablet TAKE 1 TABLET EVERY DAY 90 tablet 3    famotidine (PEPCID) 20 MG tablet TAKE 1 TABLET TWICE DAILY 180 tablet 0    felodipine (PLENDIL) 5 MG 24 hr tablet TAKE 1 TABLET EVERY DAY 90 tablet 3    levothyroxine (SYNTHROID) 50 MCG tablet TAKE 1 TABLET EVERY DAY 90 tablet 1    multivitamin with minerals tablet Take 1 tablet by mouth once daily.      olmesartan-hydrochlorothiazide (BENICAR HCT) 20-12.5 mg per tablet TAKE 1 TABLET EVERY DAY 90 tablet 2    OMEGA-3 FATTY ACIDS-EPA ORAL Take by mouth once daily.      pantoprazole (PROTONIX) 40 MG tablet TAKE 1 TABLET EVERY DAY 90 tablet 3    potassium chloride (MICRO-K) 10 MEQ CpSR TAKE 1 CAPSULE EVERY DAY 90 capsule 3    semaglutide (OZEMPIC) 0.25 mg or 0.5 mg (2 mg/3 mL) pen injector Inject 0.5 mg into the skin every 7 days. 3 mL 6    VIT A/VIT C/VIT E/ZINC/COPPER (PRESERVISION AREDS ORAL) Take by mouth once daily.    "   [DISCONTINUED] clopidogreL (PLAVIX) 75 mg tablet Take 75 mg by mouth once daily.      acarbose (PRECOSE) 50 MG Tab Take 1 tablet (50 mg total) by mouth 3 (three) times daily with meals. (Patient not taking: Reported on 8/7/2025) 90 tablet 11    pen needle, diabetic 32 gauge x 5/32" Ndle Use once weekly as directed 100 each 2    predniSONE (DELTASONE) 10 MG tablet 4 tab daily for 2 days then 3 tab daily for 2 days then 2 tab daily for 2 days then 1 tab daily for 2 days then 1/2 tab daily 50 tablet 1    [DISCONTINUED] atorvastatin (LIPITOR) 10 MG tablet TAKE 1 TABLET EVERY DAY 90 tablet 3    [DISCONTINUED] butalbital-acetaminophen-caffeine -40 mg (FIORICET, ESGIC) -40 mg per tablet 1 tablet as needed, ok to repeat 6 hours later. No more than 2 tablets per 24 hours (Patient not taking: Reported on 8/7/2025) 14 tablet 0    [DISCONTINUED] levothyroxine (SYNTHROID) 50 MCG tablet Take 1 tablet (50 mcg total) by mouth before breakfast. 30 tablet 0     No current facility-administered medications on file prior to visit.   [2]   Social History  Socioeconomic History    Marital status:    Tobacco Use    Smoking status: Never    Smokeless tobacco: Never   Substance and Sexual Activity    Alcohol use: Yes     Comment: seldom    Drug use: No    Sexual activity: Yes     Social Drivers of Health     Financial Resource Strain: Low Risk  (2/14/2025)    Overall Financial Resource Strain (CARDIA)     Difficulty of Paying Living Expenses: Not hard at all   Food Insecurity: No Food Insecurity (7/28/2025)    Received from [facility]    Hunger Vital Sign     Worried About Running Out of Food in the Last Year: Never true     Ran Out of Food in the Last Year: Never true   Transportation Needs: No Transportation Needs (7/28/2025)    Received from [facility]    PRAPARE - Transportation     In the past 12 months, has lack of transportation kept you from medical appointments or from getting medications?: No     In the " past 12 months, has lack of transportation kept you from meetings, work, or from getting things needed for daily living?: No   Physical Activity: Insufficiently Active (2/14/2025)    Exercise Vital Sign     Days of Exercise per Week: 1 day     Minutes of Exercise per Session: 20 min   Stress: Stress Concern Present (2/14/2025)    [employer] - Occupational Stress Questionnaire     Feeling of Stress : To some extent   Housing Stability: Low Risk  (7/28/2025)    Received from [facility]    Housing Stability Vital Sign     Unable to Pay for Housing in the Last Year: No     Number of Times Moved in the Last Year: 1     Homeless in the Last Year: No

## 2025-08-18 ENCOUNTER — PATIENT MESSAGE (OUTPATIENT)
Dept: FAMILY MEDICINE | Facility: CLINIC | Age: 70
End: 2025-08-18
Payer: MEDICARE

## 2025-08-21 ENCOUNTER — OFFICE VISIT (OUTPATIENT)
Dept: CARDIOLOGY | Facility: CLINIC | Age: 70
End: 2025-08-21
Payer: MEDICARE

## 2025-08-21 VITALS
HEART RATE: 73 BPM | OXYGEN SATURATION: 96 % | DIASTOLIC BLOOD PRESSURE: 90 MMHG | SYSTOLIC BLOOD PRESSURE: 122 MMHG | BODY MASS INDEX: 31.19 KG/M2 | WEIGHT: 154.38 LBS

## 2025-08-21 DIAGNOSIS — I34.0 MILD MITRAL REGURGITATION BY PRIOR ECHOCARDIOGRAM: ICD-10-CM

## 2025-08-21 DIAGNOSIS — E78.2 MIXED HYPERLIPIDEMIA: ICD-10-CM

## 2025-08-21 DIAGNOSIS — Q21.12 PFO (PATENT FORAMEN OVALE): Primary | ICD-10-CM

## 2025-08-21 DIAGNOSIS — G47.33 OBSTRUCTIVE SLEEP APNEA SYNDROME: ICD-10-CM

## 2025-08-21 DIAGNOSIS — I73.9 PERIPHERAL VASCULAR DISEASE, UNSPECIFIED: ICD-10-CM

## 2025-08-21 DIAGNOSIS — I10 ESSENTIAL HYPERTENSION: ICD-10-CM

## 2025-08-21 PROCEDURE — 99999 PR PBB SHADOW E&M-EST. PATIENT-LVL V: CPT | Mod: PBBFAC,,, | Performed by: INTERNAL MEDICINE

## 2025-09-02 ENCOUNTER — TELEPHONE (OUTPATIENT)
Dept: ALLERGY | Facility: CLINIC | Age: 70
End: 2025-09-02
Payer: MEDICARE

## 2025-09-02 RX ORDER — OLMESARTAN MEDOXOMIL AND HYDROCHLOROTHIAZIDE 20/12.5 20; 12.5 MG/1; MG/1
1 TABLET ORAL
Qty: 90 TABLET | Refills: 3 | Status: SHIPPED | OUTPATIENT
Start: 2025-09-02

## 2025-09-02 RX ORDER — FAMOTIDINE 20 MG/1
20 TABLET, FILM COATED ORAL 2 TIMES DAILY
Qty: 180 TABLET | Refills: 3 | OUTPATIENT
Start: 2025-09-02

## 2025-09-03 DIAGNOSIS — I10 ESSENTIAL HYPERTENSION: Primary | ICD-10-CM

## 2025-09-03 RX ORDER — POTASSIUM CHLORIDE 750 MG/1
10 CAPSULE, EXTENDED RELEASE ORAL
Qty: 90 CAPSULE | Refills: 3 | Status: SHIPPED | OUTPATIENT
Start: 2025-09-03

## 2025-09-03 RX ORDER — BUPROPION HYDROCHLORIDE 300 MG/1
300 TABLET ORAL
Qty: 90 TABLET | Refills: 3 | Status: SHIPPED | OUTPATIENT
Start: 2025-09-03

## 2025-09-03 RX ORDER — FELODIPINE 5 MG/1
5 TABLET, EXTENDED RELEASE ORAL
Qty: 100 TABLET | Refills: 3 | Status: SHIPPED | OUTPATIENT
Start: 2025-09-03

## 2025-09-03 RX ORDER — PANTOPRAZOLE SODIUM 40 MG/1
40 TABLET, DELAYED RELEASE ORAL
Qty: 90 TABLET | Refills: 3 | Status: SHIPPED | OUTPATIENT
Start: 2025-09-03

## 2025-09-03 RX ORDER — ESCITALOPRAM OXALATE 20 MG/1
20 TABLET ORAL
Qty: 90 TABLET | Refills: 3 | Status: SHIPPED | OUTPATIENT
Start: 2025-09-03

## 2025-09-04 ENCOUNTER — TELEPHONE (OUTPATIENT)
Dept: FAMILY MEDICINE | Facility: CLINIC | Age: 70
End: 2025-09-04
Payer: MEDICARE

## (undated) DEVICE — BLADE GATOR 3.5 6 EACH/BOX

## (undated) DEVICE — NEPTUNE 4 PORT MANIFOLD

## (undated) DEVICE — SEE MEDLINE ITEM 146292

## (undated) DEVICE — Device

## (undated) DEVICE — GLOVE BIOGEL ECLIPSE SZ 7.5

## (undated) DEVICE — STRAP OR TABLE 5IN X 72IN

## (undated) DEVICE — SLEEVE SCD EXPRESS CALF MEDIUM

## (undated) DEVICE — DRAPE THREE-QTR REINF 53X77IN

## (undated) DEVICE — ELECTRODE BLADE INSULATED 1 IN

## (undated) DEVICE — GLOVE PROTEXIS LTX MICRO 8

## (undated) DEVICE — SOL IRR NACL .9% 3000ML

## (undated) DEVICE — TROCAR KII BLLN 12MM 10CM

## (undated) DEVICE — SEE MEDLINE ITEM 157131

## (undated) DEVICE — SUT ETHILON 3-0 PS2 18 BLK

## (undated) DEVICE — HANDLE SURG LIGHT NONRIGID

## (undated) DEVICE — LEGGINGS 48X31 INCH

## (undated) DEVICE — ALCOHOL 70% ISOP RUBBING 4OZ

## (undated) DEVICE — SOL WATER STRL IRR 1000ML

## (undated) DEVICE — APPLICATOR CHLORAPREP ORN 26ML

## (undated) DEVICE — SEE L#120831

## (undated) DEVICE — DRAPE STERI-DRAPE 1000 17X11IN

## (undated) DEVICE — CLOSURE STERI STRIP .5X4IN TAN

## (undated) DEVICE — CANNULA ENDOPATH XCEL 5X100MM

## (undated) DEVICE — KIT SAHARA DRAPE DRAW/LIFT

## (undated) DEVICE — SEE MEDLINE ITEM 154981

## (undated) DEVICE — DRAPE C ARM 42 X 120 10/BX

## (undated) DEVICE — FORCEP STRAIGHT DISP

## (undated) DEVICE — DRAIN PENROSE XRAY 18 X 1/4 ST

## (undated) DEVICE — UNDERGLOVES BIOGEL PI SIZE 8

## (undated) DEVICE — PACK CUSTOM ENDO CHOLO SLI

## (undated) DEVICE — DRESSING XEROFORM FOIL PK 1X8

## (undated) DEVICE — BLADE SURG CARBON STEEL SZ11

## (undated) DEVICE — SPLINT PLASTER FAST SET 5X30IN

## (undated) DEVICE — TOURNIQUET SB QC DP 18X4IN

## (undated) DEVICE — PAD CAST SPECIALIST STRL 6

## (undated) DEVICE — TROCAR ENDOPATH XCEL 5X100MM

## (undated) DEVICE — BANDAGE ESMARK LATEX FREE 4INX

## (undated) DEVICE — SYS ENDOSUTURE UNIT RELOAD

## (undated) DEVICE — IRRIGATOR SUCTION W/TIP

## (undated) DEVICE — SUT 0 VICRYL / UR6 (J603)

## (undated) DEVICE — SCISSOR 5MMX35CM DIRECT DRIVE

## (undated) DEVICE — BANDAGE ELAS SOFTWRAP ST 6X5YD

## (undated) DEVICE — SUT MONOCRYL 4-0 PS-2

## (undated) DEVICE — KIT ANTIFOG

## (undated) DEVICE — DRAPE EXTREMITY W/ABC NON-SLIP

## (undated) DEVICE — SEE MEDLINE ITEM 157116

## (undated) DEVICE — GAUZE SPONGE 4X4 12PLY

## (undated) DEVICE — DRESSING GAUZE XEROFORM 5X9

## (undated) DEVICE — DRAPE U SPLIT SHEET 54X76IN

## (undated) DEVICE — SEE MEDLINE ITEM 157117

## (undated) DEVICE — GLOVE PROTEXIS LTX MICRO  7.5

## (undated) DEVICE — CLOSURE SKIN STERI STRIP 1/2X4

## (undated) DEVICE — STOCKINET TUBULAR 1 PLY 6X60IN

## (undated) DEVICE — DISSECTOR 5MM ENDOPATH

## (undated) DEVICE — CORD BIPOLAR 12 FOOT

## (undated) DEVICE — STAPLER SKIN ROTATING HEAD

## (undated) DEVICE — SYR 30CC LUER LOCK

## (undated) DEVICE — RUBBERBAND STERILE 3X1/8IN

## (undated) DEVICE — NDL SPINAL 18GX3.5 SPINOCAN

## (undated) DEVICE — TUBING SUC UNIV W/CONN 12FT

## (undated) DEVICE — LINER SUCTION 3000CC

## (undated) DEVICE — PADDING CAST 3 X 4YD

## (undated) DEVICE — GLOVE PROTEXIS PI SYN SURG 7

## (undated) DEVICE — SUT ETHILON 4-0 PS2 18 BLK

## (undated) DEVICE — SHEARS HARMONIC 5CM 36CM

## (undated) DEVICE — SEE MEDLINE ITEM 152622

## (undated) DEVICE — SYS ENDOSUTURE 5MM 4/BOX

## (undated) DEVICE — DRAPE HAND STERILE

## (undated) DEVICE — KIT WING PAD POSITIONING

## (undated) DEVICE — MAT QUICK 40X30 FLOOR FLUID LF

## (undated) DEVICE — SEE MEDLINE ITEM 157128

## (undated) DEVICE — NDL SAFETY 21G X 1 1/2 ECLPSE

## (undated) DEVICE — PAD PREP 50/CA

## (undated) DEVICE — TUBING PNEUMO

## (undated) DEVICE — TUBE SET INFLOW/OUTFLOW

## (undated) DEVICE — DRAPE HALF SURGICAL 40X58IN

## (undated) DEVICE — ELECTRODE REM PLYHSV RETURN 9